# Patient Record
Sex: FEMALE | Race: BLACK OR AFRICAN AMERICAN | NOT HISPANIC OR LATINO | Employment: OTHER | URBAN - METROPOLITAN AREA
[De-identification: names, ages, dates, MRNs, and addresses within clinical notes are randomized per-mention and may not be internally consistent; named-entity substitution may affect disease eponyms.]

---

## 2017-01-01 ENCOUNTER — PATIENT OUTREACH (OUTPATIENT)
Dept: ADMINISTRATIVE | Facility: CLINIC | Age: 82
End: 2017-01-01
Payer: MEDICARE

## 2017-01-01 ENCOUNTER — HOSPITAL ENCOUNTER (INPATIENT)
Facility: HOSPITAL | Age: 82
LOS: 13 days | DRG: 870 | End: 2017-02-17
Attending: EMERGENCY MEDICINE | Admitting: HOSPITALIST
Payer: MEDICARE

## 2017-01-01 ENCOUNTER — HOSPITAL ENCOUNTER (INPATIENT)
Facility: HOSPITAL | Age: 82
LOS: 6 days | Discharge: NURSING, REHAB OR LONG-TERM CARE | DRG: 291 | End: 2017-01-24
Attending: EMERGENCY MEDICINE | Admitting: HOSPITALIST
Payer: MEDICARE

## 2017-01-01 ENCOUNTER — TELEPHONE (OUTPATIENT)
Dept: INTERNAL MEDICINE | Facility: CLINIC | Age: 82
End: 2017-01-01

## 2017-01-01 VITALS
SYSTOLIC BLOOD PRESSURE: 82 MMHG | WEIGHT: 190.69 LBS | TEMPERATURE: 96 F | BODY MASS INDEX: 37.44 KG/M2 | DIASTOLIC BLOOD PRESSURE: 56 MMHG | HEIGHT: 60 IN

## 2017-01-01 VITALS
DIASTOLIC BLOOD PRESSURE: 62 MMHG | OXYGEN SATURATION: 97 % | HEART RATE: 95 BPM | HEIGHT: 60 IN | TEMPERATURE: 98 F | BODY MASS INDEX: 28.07 KG/M2 | RESPIRATION RATE: 20 BRPM | WEIGHT: 143 LBS | SYSTOLIC BLOOD PRESSURE: 100 MMHG

## 2017-01-01 DIAGNOSIS — M10.9 GOUT OF FOOT, UNSPECIFIED CAUSE, UNSPECIFIED CHRONICITY, UNSPECIFIED LATERALITY: ICD-10-CM

## 2017-01-01 DIAGNOSIS — I50.40 SEVERE COMBINED SYSTOLIC AND DIASTOLIC CONGESTIVE HEART FAILURE: Chronic | ICD-10-CM

## 2017-01-01 DIAGNOSIS — N18.9 CHRONIC KIDNEY DISEASE, UNSPECIFIED STAGE: ICD-10-CM

## 2017-01-01 DIAGNOSIS — I48.0 PAROXYSMAL ATRIAL FIBRILLATION WITH RAPID VENTRICULAR RESPONSE: ICD-10-CM

## 2017-01-01 DIAGNOSIS — I50.9 CONGESTIVE HEART FAILURE, UNSPECIFIED CONGESTIVE HEART FAILURE CHRONICITY, UNSPECIFIED CONGESTIVE HEART FAILURE TYPE: ICD-10-CM

## 2017-01-01 DIAGNOSIS — I50.30 DIASTOLIC CONGESTIVE HEART FAILURE: ICD-10-CM

## 2017-01-01 DIAGNOSIS — I48.20 CHRONIC ATRIAL FIBRILLATION: ICD-10-CM

## 2017-01-01 DIAGNOSIS — I48.0 PAROXYSMAL ATRIAL FIBRILLATION: Chronic | ICD-10-CM

## 2017-01-01 DIAGNOSIS — E87.20 LACTIC ACIDOSIS: ICD-10-CM

## 2017-01-01 DIAGNOSIS — R93.0 ABNORMAL CT OF THE HEAD: ICD-10-CM

## 2017-01-01 DIAGNOSIS — N17.9 ACUTE ON CHRONIC RENAL FAILURE: Primary | ICD-10-CM

## 2017-01-01 DIAGNOSIS — I10 ESSENTIAL HYPERTENSION: ICD-10-CM

## 2017-01-01 DIAGNOSIS — I87.2 CHRONIC VENOUS STASIS DERMATITIS OF BOTH LOWER EXTREMITIES: Chronic | ICD-10-CM

## 2017-01-01 DIAGNOSIS — E16.2 HYPOGLYCEMIA: ICD-10-CM

## 2017-01-01 DIAGNOSIS — T68.XXXA HYPOTHERMIA, INITIAL ENCOUNTER: ICD-10-CM

## 2017-01-01 DIAGNOSIS — R07.9 CHEST PAIN, UNSPECIFIED TYPE: Primary | ICD-10-CM

## 2017-01-01 DIAGNOSIS — N18.9 ACUTE ON CHRONIC RENAL FAILURE: Primary | ICD-10-CM

## 2017-01-01 DIAGNOSIS — R60.0 EDEMA OF LOWER EXTREMITY, UNSPECIFIED LATERALITY: ICD-10-CM

## 2017-01-01 DIAGNOSIS — E87.5 HYPERKALEMIA: ICD-10-CM

## 2017-01-01 DIAGNOSIS — R79.1 SUPRATHERAPEUTIC INR: ICD-10-CM

## 2017-01-01 DIAGNOSIS — I42.0 NONISCHEMIC DILATED CARDIOMYOPATHY: Chronic | ICD-10-CM

## 2017-01-01 DIAGNOSIS — I48.91 ATRIAL FIBRILLATION WITH RVR: ICD-10-CM

## 2017-01-01 LAB
ALBUMIN SERPL BCP-MCNC: 1.7 G/DL
ALBUMIN SERPL BCP-MCNC: 1.8 G/DL
ALBUMIN SERPL BCP-MCNC: 1.8 G/DL
ALBUMIN SERPL BCP-MCNC: 1.9 G/DL
ALBUMIN SERPL BCP-MCNC: 1.9 G/DL
ALBUMIN SERPL BCP-MCNC: 2.2 G/DL
ALBUMIN SERPL BCP-MCNC: 2.6 G/DL
ALBUMIN SERPL BCP-MCNC: 2.7 G/DL
ALBUMIN SERPL BCP-MCNC: 2.9 G/DL
ALLENS TEST: ABNORMAL
ALP SERPL-CCNC: 107 U/L
ALP SERPL-CCNC: 108 U/L
ALP SERPL-CCNC: 114 U/L
ALP SERPL-CCNC: 117 U/L
ALP SERPL-CCNC: 121 U/L
ALP SERPL-CCNC: 122 U/L
ALP SERPL-CCNC: 134 U/L
ALP SERPL-CCNC: 89 U/L
ALP SERPL-CCNC: 90 U/L
ALP SERPL-CCNC: 97 U/L
ALP SERPL-CCNC: 99 U/L
ALT SERPL W/O P-5'-P-CCNC: 10 U/L
ALT SERPL W/O P-5'-P-CCNC: 10 U/L
ALT SERPL W/O P-5'-P-CCNC: 12 U/L
ALT SERPL W/O P-5'-P-CCNC: 13 U/L
ALT SERPL W/O P-5'-P-CCNC: 18 U/L
ALT SERPL W/O P-5'-P-CCNC: 18 U/L
ALT SERPL W/O P-5'-P-CCNC: 20 U/L
ALT SERPL W/O P-5'-P-CCNC: 22 U/L
ALT SERPL W/O P-5'-P-CCNC: 23 U/L
ALT SERPL W/O P-5'-P-CCNC: 30 U/L
ALT SERPL W/O P-5'-P-CCNC: 31 U/L
AMMONIA PLAS-SCNC: 41 UMOL/L
AMMONIA PLAS-SCNC: 59 UMOL/L
AMORPH CRY URNS QL MICRO: ABNORMAL
ANION GAP SERPL CALC-SCNC: 10 MMOL/L
ANION GAP SERPL CALC-SCNC: 11 MMOL/L
ANION GAP SERPL CALC-SCNC: 12 MMOL/L
ANION GAP SERPL CALC-SCNC: 13 MMOL/L
ANION GAP SERPL CALC-SCNC: 14 MMOL/L
ANION GAP SERPL CALC-SCNC: 15 MMOL/L
ANION GAP SERPL CALC-SCNC: 16 MMOL/L
ANION GAP SERPL CALC-SCNC: 17 MMOL/L
ANION GAP SERPL CALC-SCNC: 17 MMOL/L
ANION GAP SERPL CALC-SCNC: 19 MMOL/L
ANION GAP SERPL CALC-SCNC: 22 MMOL/L
ANION GAP SERPL CALC-SCNC: 26 MMOL/L
ANION GAP SERPL CALC-SCNC: 27 MMOL/L
ANION GAP SERPL CALC-SCNC: 8 MMOL/L
ANISOCYTOSIS BLD QL SMEAR: ABNORMAL
ANISOCYTOSIS BLD QL SMEAR: ABNORMAL
ANISOCYTOSIS BLD QL SMEAR: SLIGHT
AST SERPL-CCNC: 15 U/L
AST SERPL-CCNC: 16 U/L
AST SERPL-CCNC: 17 U/L
AST SERPL-CCNC: 18 U/L
AST SERPL-CCNC: 18 U/L
AST SERPL-CCNC: 19 U/L
AST SERPL-CCNC: 21 U/L
AST SERPL-CCNC: 27 U/L
AST SERPL-CCNC: 34 U/L
AST SERPL-CCNC: 45 U/L
AST SERPL-CCNC: 70 U/L
BACTERIA #/AREA URNS HPF: ABNORMAL /HPF
BACTERIA #/AREA URNS HPF: ABNORMAL /HPF
BACTERIA BLD CULT: NORMAL
BACTERIA BLD CULT: NORMAL
BACTERIA SPEC AEROBE CULT: NORMAL
BACTERIA SPEC AEROBE CULT: NORMAL
BACTERIA STL CULT: NORMAL
BACTERIA UR CULT: NO GROWTH
BACTERIA UR CULT: NORMAL
BASOPHILS # BLD AUTO: 0.01 K/UL
BASOPHILS # BLD AUTO: 0.02 K/UL
BASOPHILS # BLD AUTO: 0.03 K/UL
BASOPHILS # BLD AUTO: ABNORMAL K/UL
BASOPHILS NFR BLD: 0 %
BASOPHILS NFR BLD: 0.1 %
BASOPHILS NFR BLD: 0.2 %
BASOPHILS NFR BLD: 0.3 %
BASOPHILS NFR BLD: 0.4 %
BILIRUB DIRECT SERPL-MCNC: 0.8 MG/DL
BILIRUB DIRECT SERPL-MCNC: 0.8 MG/DL
BILIRUB DIRECT SERPL-MCNC: 1 MG/DL
BILIRUB SERPL-MCNC: 1.1 MG/DL
BILIRUB SERPL-MCNC: 1.3 MG/DL
BILIRUB SERPL-MCNC: 1.3 MG/DL
BILIRUB SERPL-MCNC: 1.4 MG/DL
BILIRUB SERPL-MCNC: 1.5 MG/DL
BILIRUB SERPL-MCNC: 1.6 MG/DL
BILIRUB SERPL-MCNC: 1.8 MG/DL
BILIRUB SERPL-MCNC: 1.9 MG/DL
BILIRUB SERPL-MCNC: 2.6 MG/DL
BILIRUB UR QL STRIP: NEGATIVE
BILIRUB UR QL STRIP: NEGATIVE
BNP SERPL-MCNC: 1641 PG/ML
BNP SERPL-MCNC: 3500 PG/ML
BUN SERPL-MCNC: 40 MG/DL
BUN SERPL-MCNC: 50 MG/DL
BUN SERPL-MCNC: 51 MG/DL
BUN SERPL-MCNC: 51 MG/DL
BUN SERPL-MCNC: 53 MG/DL
BUN SERPL-MCNC: 60 MG/DL
BUN SERPL-MCNC: 61 MG/DL
BUN SERPL-MCNC: 62 MG/DL
BUN SERPL-MCNC: 63 MG/DL
BUN SERPL-MCNC: 64 MG/DL
BUN SERPL-MCNC: 65 MG/DL
BUN SERPL-MCNC: 69 MG/DL
BUN SERPL-MCNC: 70 MG/DL
BUN SERPL-MCNC: 71 MG/DL
BUN SERPL-MCNC: 73 MG/DL
BUN SERPL-MCNC: 76 MG/DL
BUN SERPL-MCNC: 77 MG/DL
BUN SERPL-MCNC: 78 MG/DL
C DIFF GDH STL QL: NEGATIVE
C DIFF TOX A+B STL QL IA: NEGATIVE
CALCIUM SERPL-MCNC: 7.8 MG/DL
CALCIUM SERPL-MCNC: 7.9 MG/DL
CALCIUM SERPL-MCNC: 8 MG/DL
CALCIUM SERPL-MCNC: 8 MG/DL
CALCIUM SERPL-MCNC: 8.1 MG/DL
CALCIUM SERPL-MCNC: 8.2 MG/DL
CALCIUM SERPL-MCNC: 8.2 MG/DL
CALCIUM SERPL-MCNC: 8.4 MG/DL
CALCIUM SERPL-MCNC: 8.8 MG/DL
CALCIUM SERPL-MCNC: 8.9 MG/DL
CALCIUM SERPL-MCNC: 9 MG/DL
CALCIUM SERPL-MCNC: 9.1 MG/DL
CALCIUM SERPL-MCNC: 9.2 MG/DL
CALCIUM SERPL-MCNC: 9.2 MG/DL
CALCIUM SERPL-MCNC: 9.4 MG/DL
CALCIUM SERPL-MCNC: 9.6 MG/DL
CHLORIDE SERPL-SCNC: 101 MMOL/L
CHLORIDE SERPL-SCNC: 101 MMOL/L
CHLORIDE SERPL-SCNC: 90 MMOL/L
CHLORIDE SERPL-SCNC: 91 MMOL/L
CHLORIDE SERPL-SCNC: 92 MMOL/L
CHLORIDE SERPL-SCNC: 93 MMOL/L
CHLORIDE SERPL-SCNC: 94 MMOL/L
CHLORIDE SERPL-SCNC: 95 MMOL/L
CHLORIDE SERPL-SCNC: 96 MMOL/L
CHLORIDE SERPL-SCNC: 96 MMOL/L
CHLORIDE SERPL-SCNC: 97 MMOL/L
CHLORIDE SERPL-SCNC: 98 MMOL/L
CHLORIDE SERPL-SCNC: 98 MMOL/L
CHLORIDE SERPL-SCNC: 99 MMOL/L
CHLORIDE UR-SCNC: 29 MMOL/L
CLARITY UR: ABNORMAL
CLARITY UR: CLEAR
CO2 SERPL-SCNC: 13 MMOL/L
CO2 SERPL-SCNC: 19 MMOL/L
CO2 SERPL-SCNC: 20 MMOL/L
CO2 SERPL-SCNC: 20 MMOL/L
CO2 SERPL-SCNC: 21 MMOL/L
CO2 SERPL-SCNC: 22 MMOL/L
CO2 SERPL-SCNC: 23 MMOL/L
CO2 SERPL-SCNC: 24 MMOL/L
CO2 SERPL-SCNC: 25 MMOL/L
CO2 SERPL-SCNC: 25 MMOL/L
CO2 SERPL-SCNC: 26 MMOL/L
CO2 SERPL-SCNC: 28 MMOL/L
CO2 SERPL-SCNC: 30 MMOL/L
CO2 SERPL-SCNC: 30 MMOL/L
CO2 SERPL-SCNC: 32 MMOL/L
CO2 SERPL-SCNC: 35 MMOL/L
CO2 SERPL-SCNC: 36 MMOL/L
CO2 SERPL-SCNC: 8 MMOL/L
CO2 SERPL-SCNC: 8 MMOL/L
COLOR UR: ABNORMAL
COLOR UR: YELLOW
CREAT SERPL-MCNC: 1.7 MG/DL
CREAT SERPL-MCNC: 2 MG/DL
CREAT SERPL-MCNC: 2 MG/DL
CREAT SERPL-MCNC: 2.2 MG/DL
CREAT SERPL-MCNC: 2.5 MG/DL
CREAT SERPL-MCNC: 2.6 MG/DL
CREAT SERPL-MCNC: 2.7 MG/DL
CREAT SERPL-MCNC: 2.8 MG/DL
CREAT SERPL-MCNC: 2.9 MG/DL
CREAT SERPL-MCNC: 2.9 MG/DL
CREAT SERPL-MCNC: 3.1 MG/DL
CREAT SERPL-MCNC: 3.3 MG/DL
CREAT SERPL-MCNC: 3.4 MG/DL
CREAT SERPL-MCNC: 3.4 MG/DL
CREAT SERPL-MCNC: 3.5 MG/DL
CREAT UR-MCNC: 50.1 MG/DL
CREAT UR-MCNC: 50.1 MG/DL
DELSYS: ABNORMAL
DIASTOLIC DYSFUNCTION: YES
DIFFERENTIAL METHOD: ABNORMAL
DIGOXIN SERPL-MCNC: 1 NG/ML
E COLI SXT1 STL QL IA: NEGATIVE
E COLI SXT2 STL QL IA: NEGATIVE
EOSINOPHIL # BLD AUTO: 0 K/UL
EOSINOPHIL # BLD AUTO: ABNORMAL K/UL
EOSINOPHIL NFR BLD: 0 %
EOSINOPHIL URNS QL WRIGHT STN: NORMAL
ERYTHROCYTE [DISTWIDTH] IN BLOOD BY AUTOMATED COUNT: 18.2 %
ERYTHROCYTE [DISTWIDTH] IN BLOOD BY AUTOMATED COUNT: 18.4 %
ERYTHROCYTE [DISTWIDTH] IN BLOOD BY AUTOMATED COUNT: 18.5 %
ERYTHROCYTE [DISTWIDTH] IN BLOOD BY AUTOMATED COUNT: 18.5 %
ERYTHROCYTE [DISTWIDTH] IN BLOOD BY AUTOMATED COUNT: 18.8 %
ERYTHROCYTE [DISTWIDTH] IN BLOOD BY AUTOMATED COUNT: 18.8 %
ERYTHROCYTE [DISTWIDTH] IN BLOOD BY AUTOMATED COUNT: 19 %
ERYTHROCYTE [DISTWIDTH] IN BLOOD BY AUTOMATED COUNT: 19.1 %
ERYTHROCYTE [DISTWIDTH] IN BLOOD BY AUTOMATED COUNT: 19.2 %
ERYTHROCYTE [DISTWIDTH] IN BLOOD BY AUTOMATED COUNT: 19.3 %
ERYTHROCYTE [DISTWIDTH] IN BLOOD BY AUTOMATED COUNT: 19.4 %
ERYTHROCYTE [DISTWIDTH] IN BLOOD BY AUTOMATED COUNT: 19.5 %
ERYTHROCYTE [DISTWIDTH] IN BLOOD BY AUTOMATED COUNT: 19.6 %
ERYTHROCYTE [DISTWIDTH] IN BLOOD BY AUTOMATED COUNT: 20 %
ERYTHROCYTE [DISTWIDTH] IN BLOOD BY AUTOMATED COUNT: 20 %
ERYTHROCYTE [DISTWIDTH] IN BLOOD BY AUTOMATED COUNT: 20.1 %
ERYTHROCYTE [DISTWIDTH] IN BLOOD BY AUTOMATED COUNT: 20.2 %
ERYTHROCYTE [DISTWIDTH] IN BLOOD BY AUTOMATED COUNT: 20.4 %
ERYTHROCYTE [DISTWIDTH] IN BLOOD BY AUTOMATED COUNT: 20.5 %
ERYTHROCYTE [DISTWIDTH] IN BLOOD BY AUTOMATED COUNT: 20.8 %
ERYTHROCYTE [SEDIMENTATION RATE] IN BLOOD BY WESTERGREN METHOD: 10 MM/H
ERYTHROCYTE [SEDIMENTATION RATE] IN BLOOD BY WESTERGREN METHOD: 10 MM/H
ERYTHROCYTE [SEDIMENTATION RATE] IN BLOOD BY WESTERGREN METHOD: 15 MM/H
ERYTHROCYTE [SEDIMENTATION RATE] IN BLOOD BY WESTERGREN METHOD: 15 MM/H
EST. GFR  (AFRICAN AMERICAN): 13 ML/MIN/1.73 M^2
EST. GFR  (AFRICAN AMERICAN): 14 ML/MIN/1.73 M^2
EST. GFR  (AFRICAN AMERICAN): 15 ML/MIN/1.73 M^2
EST. GFR  (AFRICAN AMERICAN): 17 ML/MIN/1.73 M^2
EST. GFR  (AFRICAN AMERICAN): 18 ML/MIN/1.73 M^2
EST. GFR  (AFRICAN AMERICAN): 19 ML/MIN/1.73 M^2
EST. GFR  (AFRICAN AMERICAN): 20 ML/MIN/1.73 M^2
EST. GFR  (AFRICAN AMERICAN): 23 ML/MIN/1.73 M^2
EST. GFR  (AFRICAN AMERICAN): 26 ML/MIN/1.73 M^2
EST. GFR  (AFRICAN AMERICAN): 26 ML/MIN/1.73 M^2
EST. GFR  (AFRICAN AMERICAN): 32 ML/MIN/1.73 M^2
EST. GFR  (NON AFRICAN AMERICAN): 12 ML/MIN/1.73 M^2
EST. GFR  (NON AFRICAN AMERICAN): 13 ML/MIN/1.73 M^2
EST. GFR  (NON AFRICAN AMERICAN): 15 ML/MIN/1.73 M^2
EST. GFR  (NON AFRICAN AMERICAN): 16 ML/MIN/1.73 M^2
EST. GFR  (NON AFRICAN AMERICAN): 17 ML/MIN/1.73 M^2
EST. GFR  (NON AFRICAN AMERICAN): 20 ML/MIN/1.73 M^2
EST. GFR  (NON AFRICAN AMERICAN): 23 ML/MIN/1.73 M^2
EST. GFR  (NON AFRICAN AMERICAN): 23 ML/MIN/1.73 M^2
EST. GFR  (NON AFRICAN AMERICAN): 28 ML/MIN/1.73 M^2
ESTIMATED PA SYSTOLIC PRESSURE: 43.09
FIO2: 100
FIO2: 45
FIO2: 45
FIO2: 60
FIO2: 60
FLOW: 15
GLOBAL PERICARDIAL EFFUSION: ABNORMAL
GLUCOSE SERPL-MCNC: 100 MG/DL
GLUCOSE SERPL-MCNC: 105 MG/DL
GLUCOSE SERPL-MCNC: 107 MG/DL
GLUCOSE SERPL-MCNC: 108 MG/DL
GLUCOSE SERPL-MCNC: 111 MG/DL
GLUCOSE SERPL-MCNC: 114 MG/DL
GLUCOSE SERPL-MCNC: 114 MG/DL
GLUCOSE SERPL-MCNC: 122 MG/DL
GLUCOSE SERPL-MCNC: 123 MG/DL
GLUCOSE SERPL-MCNC: 124 MG/DL
GLUCOSE SERPL-MCNC: 128 MG/DL
GLUCOSE SERPL-MCNC: 138 MG/DL
GLUCOSE SERPL-MCNC: 140 MG/DL
GLUCOSE SERPL-MCNC: 149 MG/DL
GLUCOSE SERPL-MCNC: 152 MG/DL
GLUCOSE SERPL-MCNC: 152 MG/DL
GLUCOSE SERPL-MCNC: 156 MG/DL
GLUCOSE SERPL-MCNC: 160 MG/DL
GLUCOSE SERPL-MCNC: 161 MG/DL
GLUCOSE SERPL-MCNC: 177 MG/DL
GLUCOSE SERPL-MCNC: 179 MG/DL
GLUCOSE SERPL-MCNC: 217 MG/DL
GLUCOSE SERPL-MCNC: 224 MG/DL
GLUCOSE SERPL-MCNC: 84 MG/DL
GLUCOSE SERPL-MCNC: 92 MG/DL
GLUCOSE SERPL-MCNC: 99 MG/DL
GLUCOSE UR QL STRIP: NEGATIVE
GLUCOSE UR QL STRIP: NEGATIVE
GRAM STN SPEC: NORMAL
GRAN CASTS #/AREA URNS LPF: 2 /LPF
HCO3 UR-SCNC: 14.7 MMOL/L (ref 24–28)
HCO3 UR-SCNC: 15.6 MMOL/L (ref 24–28)
HCO3 UR-SCNC: 22.6 MMOL/L (ref 24–28)
HCO3 UR-SCNC: 25.7 MMOL/L (ref 24–28)
HCO3 UR-SCNC: 26.1 MMOL/L (ref 24–28)
HCT VFR BLD AUTO: 29 %
HCT VFR BLD AUTO: 29.9 %
HCT VFR BLD AUTO: 30.2 %
HCT VFR BLD AUTO: 33.4 %
HCT VFR BLD AUTO: 34.6 %
HCT VFR BLD AUTO: 34.9 %
HCT VFR BLD AUTO: 34.9 %
HCT VFR BLD AUTO: 35.6 %
HCT VFR BLD AUTO: 35.7 %
HCT VFR BLD AUTO: 35.8 %
HCT VFR BLD AUTO: 35.9 %
HCT VFR BLD AUTO: 36.1 %
HCT VFR BLD AUTO: 36.1 %
HCT VFR BLD AUTO: 36.8 %
HCT VFR BLD AUTO: 37.2 %
HCT VFR BLD AUTO: 37.3 %
HCT VFR BLD AUTO: 37.5 %
HCT VFR BLD AUTO: 37.7 %
HCT VFR BLD AUTO: 38.6 %
HCT VFR BLD AUTO: 39.1 %
HEMOGLOBIN A1C REFERRAL TEST: 6.6 % (ref 4–5.6)
HGB BLD-MCNC: 10.3 G/DL
HGB BLD-MCNC: 10.4 G/DL
HGB BLD-MCNC: 10.4 G/DL
HGB BLD-MCNC: 10.5 G/DL
HGB BLD-MCNC: 10.6 G/DL
HGB BLD-MCNC: 10.7 G/DL
HGB BLD-MCNC: 10.8 G/DL
HGB BLD-MCNC: 10.8 G/DL
HGB BLD-MCNC: 10.9 G/DL
HGB BLD-MCNC: 11 G/DL
HGB BLD-MCNC: 11 G/DL
HGB BLD-MCNC: 11.1 G/DL
HGB BLD-MCNC: 11.1 G/DL
HGB BLD-MCNC: 11.2 G/DL
HGB BLD-MCNC: 11.5 G/DL
HGB BLD-MCNC: 9 G/DL
HGB BLD-MCNC: 9.6 G/DL
HGB BLD-MCNC: 9.7 G/DL
HGB UR QL STRIP: ABNORMAL
HGB UR QL STRIP: ABNORMAL
HYALINE CASTS #/AREA URNS LPF: 0 /LPF
HYALINE CASTS #/AREA URNS LPF: 10 /LPF
HYPOCHROMIA BLD QL SMEAR: ABNORMAL
INR PPP: 1.6
INR PPP: 1.7
INR PPP: 1.8
INR PPP: 1.9
INR PPP: 2
INR PPP: 2
INR PPP: 2.2
INR PPP: 2.3
INR PPP: 2.4
INR PPP: 2.4
INR PPP: 2.5
INR PPP: 2.5
INR PPP: 2.6
INR PPP: 2.9
INR PPP: 4.8
INR PPP: 5.2
INR PPP: 5.7
KETONES UR QL STRIP: NEGATIVE
KETONES UR QL STRIP: NEGATIVE
LACTATE SERPL-SCNC: 2.6 MMOL/L
LACTATE SERPL-SCNC: 5.2 MMOL/L
LACTATE SERPL-SCNC: >12 MMOL/L
LACTATE SERPL-SCNC: >12 MMOL/L
LEUKOCYTE ESTERASE UR QL STRIP: ABNORMAL
LEUKOCYTE ESTERASE UR QL STRIP: NEGATIVE
LIPASE SERPL-CCNC: 69 U/L
LYMPHOCYTES # BLD AUTO: 0.7 K/UL
LYMPHOCYTES # BLD AUTO: 0.8 K/UL
LYMPHOCYTES # BLD AUTO: 0.9 K/UL
LYMPHOCYTES # BLD AUTO: 1 K/UL
LYMPHOCYTES # BLD AUTO: 1.1 K/UL
LYMPHOCYTES # BLD AUTO: 1.1 K/UL
LYMPHOCYTES # BLD AUTO: 1.2 K/UL
LYMPHOCYTES # BLD AUTO: 1.5 K/UL
LYMPHOCYTES # BLD AUTO: 1.7 K/UL
LYMPHOCYTES # BLD AUTO: ABNORMAL K/UL
LYMPHOCYTES NFR BLD: 10.1 %
LYMPHOCYTES NFR BLD: 10.4 %
LYMPHOCYTES NFR BLD: 12 %
LYMPHOCYTES NFR BLD: 13 %
LYMPHOCYTES NFR BLD: 14 %
LYMPHOCYTES NFR BLD: 15.1 %
LYMPHOCYTES NFR BLD: 15.7 %
LYMPHOCYTES NFR BLD: 16.1 %
LYMPHOCYTES NFR BLD: 18.9 %
LYMPHOCYTES NFR BLD: 20.2 %
LYMPHOCYTES NFR BLD: 5 %
LYMPHOCYTES NFR BLD: 5 %
LYMPHOCYTES NFR BLD: 5.4 %
LYMPHOCYTES NFR BLD: 7.2 %
LYMPHOCYTES NFR BLD: 9.1 %
LYMPHOCYTES NFR BLD: 9.8 %
MAGNESIUM SERPL-MCNC: 1.6 MG/DL
MAGNESIUM SERPL-MCNC: 1.6 MG/DL
MAGNESIUM SERPL-MCNC: 1.7 MG/DL
MAGNESIUM SERPL-MCNC: 1.8 MG/DL
MAGNESIUM SERPL-MCNC: 1.9 MG/DL
MAGNESIUM SERPL-MCNC: 1.9 MG/DL
MAGNESIUM SERPL-MCNC: 2 MG/DL
MAGNESIUM SERPL-MCNC: 2 MG/DL
MAGNESIUM SERPL-MCNC: 2.2 MG/DL
MAGNESIUM SERPL-MCNC: 2.2 MG/DL
MAGNESIUM SERPL-MCNC: 2.3 MG/DL
MAGNESIUM SERPL-MCNC: 2.3 MG/DL
MAGNESIUM SERPL-MCNC: 2.5 MG/DL
MCH RBC QN AUTO: 23.6 PG
MCH RBC QN AUTO: 23.9 PG
MCH RBC QN AUTO: 24 PG
MCH RBC QN AUTO: 24.2 PG
MCH RBC QN AUTO: 24.2 PG
MCH RBC QN AUTO: 24.3 PG
MCH RBC QN AUTO: 24.5 PG
MCH RBC QN AUTO: 24.6 PG
MCH RBC QN AUTO: 24.6 PG
MCH RBC QN AUTO: 24.7 PG
MCH RBC QN AUTO: 24.8 PG
MCH RBC QN AUTO: 25.3 PG
MCHC RBC AUTO-ENTMCNC: 28.5 %
MCHC RBC AUTO-ENTMCNC: 28.6 %
MCHC RBC AUTO-ENTMCNC: 28.6 %
MCHC RBC AUTO-ENTMCNC: 29 %
MCHC RBC AUTO-ENTMCNC: 29 %
MCHC RBC AUTO-ENTMCNC: 29.1 %
MCHC RBC AUTO-ENTMCNC: 29.8 %
MCHC RBC AUTO-ENTMCNC: 29.8 %
MCHC RBC AUTO-ENTMCNC: 30.1 %
MCHC RBC AUTO-ENTMCNC: 30.2 %
MCHC RBC AUTO-ENTMCNC: 30.2 %
MCHC RBC AUTO-ENTMCNC: 30.5 %
MCHC RBC AUTO-ENTMCNC: 30.5 %
MCHC RBC AUTO-ENTMCNC: 30.6 %
MCHC RBC AUTO-ENTMCNC: 30.8 %
MCHC RBC AUTO-ENTMCNC: 30.9 %
MCHC RBC AUTO-ENTMCNC: 31 %
MCHC RBC AUTO-ENTMCNC: 31.2 %
MCHC RBC AUTO-ENTMCNC: 32.1 %
MCHC RBC AUTO-ENTMCNC: 32.1 %
MCV RBC AUTO: 75 FL
MCV RBC AUTO: 76 FL
MCV RBC AUTO: 77 FL
MCV RBC AUTO: 79 FL
MCV RBC AUTO: 80 FL
MCV RBC AUTO: 80 FL
MCV RBC AUTO: 81 FL
MCV RBC AUTO: 83 FL
MCV RBC AUTO: 84 FL
MCV RBC AUTO: 85 FL
MCV RBC AUTO: 86 FL
MICROSCOPIC COMMENT: ABNORMAL
MICROSCOPIC COMMENT: ABNORMAL
MIN VOL: 7.3
MIN VOL: 7.6
MITRAL VALVE MOBILITY: NORMAL
MITRAL VALVE REGURGITATION: ABNORMAL
MODE: ABNORMAL
MONOCYTES # BLD AUTO: 0.5 K/UL
MONOCYTES # BLD AUTO: 0.6 K/UL
MONOCYTES # BLD AUTO: 0.7 K/UL
MONOCYTES # BLD AUTO: 0.8 K/UL
MONOCYTES # BLD AUTO: 0.9 K/UL
MONOCYTES # BLD AUTO: 0.9 K/UL
MONOCYTES # BLD AUTO: 1 K/UL
MONOCYTES # BLD AUTO: 1 K/UL
MONOCYTES # BLD AUTO: 1.1 K/UL
MONOCYTES # BLD AUTO: 1.1 K/UL
MONOCYTES # BLD AUTO: 1.2 K/UL
MONOCYTES # BLD AUTO: 1.6 K/UL
MONOCYTES # BLD AUTO: ABNORMAL K/UL
MONOCYTES NFR BLD: 10.1 %
MONOCYTES NFR BLD: 10.8 %
MONOCYTES NFR BLD: 11.2 %
MONOCYTES NFR BLD: 11.4 %
MONOCYTES NFR BLD: 11.5 %
MONOCYTES NFR BLD: 11.6 %
MONOCYTES NFR BLD: 13.1 %
MONOCYTES NFR BLD: 2.5 %
MONOCYTES NFR BLD: 3.5 %
MONOCYTES NFR BLD: 3.6 %
MONOCYTES NFR BLD: 4 %
MONOCYTES NFR BLD: 5.1 %
MONOCYTES NFR BLD: 6.4 %
MONOCYTES NFR BLD: 6.5 %
MONOCYTES NFR BLD: 6.8 %
MONOCYTES NFR BLD: 7.1 %
MONOCYTES NFR BLD: 7.2 %
MONOCYTES NFR BLD: 9.5 %
MONOCYTES NFR BLD: 9.8 %
MONOCYTES NFR BLD: 9.9 %
NEUTROPHILS # BLD AUTO: 11.1 K/UL
NEUTROPHILS # BLD AUTO: 12.2 K/UL
NEUTROPHILS # BLD AUTO: 13.6 K/UL
NEUTROPHILS # BLD AUTO: 14.3 K/UL
NEUTROPHILS # BLD AUTO: 16.2 K/UL
NEUTROPHILS # BLD AUTO: 16.9 K/UL
NEUTROPHILS # BLD AUTO: 17.6 K/UL
NEUTROPHILS # BLD AUTO: 4.1 K/UL
NEUTROPHILS # BLD AUTO: 4.1 K/UL
NEUTROPHILS # BLD AUTO: 5 K/UL
NEUTROPHILS # BLD AUTO: 5.4 K/UL
NEUTROPHILS # BLD AUTO: 5.5 K/UL
NEUTROPHILS # BLD AUTO: 6.3 K/UL
NEUTROPHILS # BLD AUTO: 7.2 K/UL
NEUTROPHILS # BLD AUTO: 7.2 K/UL
NEUTROPHILS # BLD AUTO: 7.7 K/UL
NEUTROPHILS # BLD AUTO: 7.7 K/UL
NEUTROPHILS # BLD AUTO: 8 K/UL
NEUTROPHILS # BLD AUTO: 9.7 K/UL
NEUTROPHILS NFR BLD: 68.2 %
NEUTROPHILS NFR BLD: 70.6 %
NEUTROPHILS NFR BLD: 73.6 %
NEUTROPHILS NFR BLD: 74.5 %
NEUTROPHILS NFR BLD: 75.2 %
NEUTROPHILS NFR BLD: 75.6 %
NEUTROPHILS NFR BLD: 77.6 %
NEUTROPHILS NFR BLD: 78 %
NEUTROPHILS NFR BLD: 78 %
NEUTROPHILS NFR BLD: 79 %
NEUTROPHILS NFR BLD: 80.1 %
NEUTROPHILS NFR BLD: 82.4 %
NEUTROPHILS NFR BLD: 83 %
NEUTROPHILS NFR BLD: 83 %
NEUTROPHILS NFR BLD: 86 %
NEUTROPHILS NFR BLD: 86.3 %
NEUTROPHILS NFR BLD: 87.2 %
NEUTROPHILS NFR BLD: 91.4 %
NEUTROPHILS NFR BLD: 92 %
NEUTROPHILS NFR BLD: 92 %
NEUTS BAND NFR BLD MANUAL: 3 %
NITRITE UR QL STRIP: NEGATIVE
NITRITE UR QL STRIP: POSITIVE
NON-SQ EPI CELLS #/AREA URNS HPF: 3 /HPF
NRBC BLD-RTO: ABNORMAL /100 WBC
OB PNL STL: POSITIVE
OSMOLALITY SERPL: 326 MOSM/KG
OSMOLALITY UR: 354 MOSM/KG
OVALOCYTES BLD QL SMEAR: ABNORMAL
PCO2 BLDA: 32.1 MMHG (ref 35–45)
PCO2 BLDA: 33 MMHG (ref 35–45)
PCO2 BLDA: 34.6 MMHG (ref 35–45)
PCO2 BLDA: 36.4 MMHG (ref 35–45)
PCO2 BLDA: 40.7 MMHG (ref 35–45)
PEEP: 5
PH SMN: 7.17 [PH] (ref 7.35–7.45)
PH SMN: 7.29 [PH] (ref 7.35–7.45)
PH SMN: 7.44 [PH] (ref 7.35–7.45)
PH SMN: 7.46 [PH] (ref 7.35–7.45)
PH SMN: 7.48 [PH] (ref 7.35–7.45)
PH UR STRIP: 5 [PH] (ref 5–8)
PH UR STRIP: 5 [PH] (ref 5–8)
PHOSPHATE SERPL-MCNC: 2.8 MG/DL
PHOSPHATE SERPL-MCNC: 2.8 MG/DL
PHOSPHATE SERPL-MCNC: 3.5 MG/DL
PHOSPHATE SERPL-MCNC: 4.1 MG/DL
PHOSPHATE SERPL-MCNC: 4.2 MG/DL
PHOSPHATE SERPL-MCNC: 4.4 MG/DL
PHOSPHATE SERPL-MCNC: 4.6 MG/DL
PHOSPHATE SERPL-MCNC: 4.7 MG/DL
PHOSPHATE SERPL-MCNC: 4.7 MG/DL
PHOSPHATE SERPL-MCNC: 5.1 MG/DL
PHOSPHATE SERPL-MCNC: 5.3 MG/DL
PHOSPHATE SERPL-MCNC: 5.6 MG/DL
PHOSPHATE SERPL-MCNC: 5.6 MG/DL
PHOSPHATE SERPL-MCNC: 5.8 MG/DL
PHOSPHATE SERPL-MCNC: 6 MG/DL
PIP: 30
PIP: 31
PLATELET # BLD AUTO: 112 K/UL
PLATELET # BLD AUTO: 121 K/UL
PLATELET # BLD AUTO: 140 K/UL
PLATELET # BLD AUTO: 145 K/UL
PLATELET # BLD AUTO: 162 K/UL
PLATELET # BLD AUTO: 163 K/UL
PLATELET # BLD AUTO: 170 K/UL
PLATELET # BLD AUTO: 172 K/UL
PLATELET # BLD AUTO: 173 K/UL
PLATELET # BLD AUTO: 178 K/UL
PLATELET # BLD AUTO: 180 K/UL
PLATELET # BLD AUTO: 182 K/UL
PLATELET # BLD AUTO: 185 K/UL
PLATELET # BLD AUTO: 203 K/UL
PLATELET # BLD AUTO: 256 K/UL
PLATELET # BLD AUTO: 270 K/UL
PLATELET # BLD AUTO: 290 K/UL
PLATELET # BLD AUTO: 295 K/UL
PLATELET # BLD AUTO: 322 K/UL
PLATELET # BLD AUTO: 359 K/UL
PLATELET BLD QL SMEAR: ABNORMAL
PMV BLD AUTO: 10 FL
PMV BLD AUTO: 10.2 FL
PMV BLD AUTO: 10.3 FL
PMV BLD AUTO: 10.4 FL
PMV BLD AUTO: 10.4 FL
PMV BLD AUTO: 10.5 FL
PMV BLD AUTO: 10.6 FL
PMV BLD AUTO: 10.7 FL
PMV BLD AUTO: 10.9 FL
PMV BLD AUTO: 10.9 FL
PMV BLD AUTO: 11.1 FL
PMV BLD AUTO: 9.3 FL
PMV BLD AUTO: 9.3 FL
PMV BLD AUTO: 9.5 FL
PMV BLD AUTO: 9.5 FL
PMV BLD AUTO: 9.7 FL
PMV BLD AUTO: 9.7 FL
PO2 BLDA: 111 MMHG (ref 80–100)
PO2 BLDA: 121 MMHG (ref 80–100)
PO2 BLDA: 127 MMHG (ref 80–100)
PO2 BLDA: 89 MMHG (ref 80–100)
PO2 BLDA: 91 MMHG (ref 80–100)
POC BE: -1 MMOL/L
POC BE: -10 MMOL/L
POC BE: -13 MMOL/L
POC BE: 2 MMOL/L
POC BE: 2 MMOL/L
POC SATURATED O2: 96 % (ref 95–100)
POC SATURATED O2: 98 % (ref 95–100)
POC SATURATED O2: 98 % (ref 95–100)
POC SATURATED O2: 99 % (ref 95–100)
POC SATURATED O2: 99 % (ref 95–100)
POC TCO2: 16 MMOL/L (ref 23–27)
POC TCO2: 17 MMOL/L (ref 23–27)
POC TCO2: 24 MMOL/L (ref 23–27)
POC TCO2: 27 MMOL/L (ref 23–27)
POC TCO2: 27 MMOL/L (ref 23–27)
POCT GLUCOSE: 101 MG/DL (ref 70–110)
POCT GLUCOSE: 104 MG/DL (ref 70–110)
POCT GLUCOSE: 108 MG/DL (ref 70–110)
POCT GLUCOSE: 109 MG/DL (ref 70–110)
POCT GLUCOSE: 110 MG/DL (ref 70–110)
POCT GLUCOSE: 110 MG/DL (ref 70–110)
POCT GLUCOSE: 112 MG/DL (ref 70–110)
POCT GLUCOSE: 113 MG/DL (ref 70–110)
POCT GLUCOSE: 122 MG/DL (ref 70–110)
POCT GLUCOSE: 124 MG/DL (ref 70–110)
POCT GLUCOSE: 125 MG/DL (ref 70–110)
POCT GLUCOSE: 134 MG/DL (ref 70–110)
POCT GLUCOSE: 134 MG/DL (ref 70–110)
POCT GLUCOSE: 135 MG/DL (ref 70–110)
POCT GLUCOSE: 144 MG/DL (ref 70–110)
POCT GLUCOSE: 144 MG/DL (ref 70–110)
POCT GLUCOSE: 150 MG/DL (ref 70–110)
POCT GLUCOSE: 152 MG/DL (ref 70–110)
POCT GLUCOSE: 152 MG/DL (ref 70–110)
POCT GLUCOSE: 155 MG/DL (ref 70–110)
POCT GLUCOSE: 157 MG/DL (ref 70–110)
POCT GLUCOSE: 159 MG/DL (ref 70–110)
POCT GLUCOSE: 164 MG/DL (ref 70–110)
POCT GLUCOSE: 166 MG/DL (ref 70–110)
POCT GLUCOSE: 169 MG/DL (ref 70–110)
POCT GLUCOSE: 169 MG/DL (ref 70–110)
POCT GLUCOSE: 173 MG/DL (ref 70–110)
POCT GLUCOSE: 174 MG/DL (ref 70–110)
POCT GLUCOSE: 175 MG/DL (ref 70–110)
POCT GLUCOSE: 175 MG/DL (ref 70–110)
POCT GLUCOSE: 176 MG/DL (ref 70–110)
POCT GLUCOSE: 178 MG/DL (ref 70–110)
POCT GLUCOSE: 178 MG/DL (ref 70–110)
POCT GLUCOSE: 181 MG/DL (ref 70–110)
POCT GLUCOSE: 184 MG/DL (ref 70–110)
POCT GLUCOSE: 186 MG/DL (ref 70–110)
POCT GLUCOSE: 186 MG/DL (ref 70–110)
POCT GLUCOSE: 187 MG/DL (ref 70–110)
POCT GLUCOSE: 189 MG/DL (ref 70–110)
POCT GLUCOSE: 189 MG/DL (ref 70–110)
POCT GLUCOSE: 190 MG/DL (ref 70–110)
POCT GLUCOSE: 192 MG/DL (ref 70–110)
POCT GLUCOSE: 193 MG/DL (ref 70–110)
POCT GLUCOSE: 193 MG/DL (ref 70–110)
POCT GLUCOSE: 195 MG/DL (ref 70–110)
POCT GLUCOSE: 221 MG/DL (ref 70–110)
POCT GLUCOSE: 225 MG/DL (ref 70–110)
POCT GLUCOSE: 254 MG/DL (ref 70–110)
POCT GLUCOSE: 256 MG/DL (ref 70–110)
POCT GLUCOSE: 302 MG/DL (ref 70–110)
POCT GLUCOSE: 310 MG/DL (ref 70–110)
POCT GLUCOSE: 73 MG/DL (ref 70–110)
POCT GLUCOSE: 83 MG/DL (ref 70–110)
POCT GLUCOSE: 91 MG/DL (ref 70–110)
POCT GLUCOSE: 91 MG/DL (ref 70–110)
POCT GLUCOSE: 98 MG/DL (ref 70–110)
POCT GLUCOSE: <20 MG/DL (ref 70–110)
POIKILOCYTOSIS BLD QL SMEAR: SLIGHT
POLYCHROMASIA BLD QL SMEAR: ABNORMAL
POTASSIUM SERPL-SCNC: 2.3 MMOL/L
POTASSIUM SERPL-SCNC: 3 MMOL/L
POTASSIUM SERPL-SCNC: 3 MMOL/L
POTASSIUM SERPL-SCNC: 3.1 MMOL/L
POTASSIUM SERPL-SCNC: 3.3 MMOL/L
POTASSIUM SERPL-SCNC: 3.4 MMOL/L
POTASSIUM SERPL-SCNC: 3.4 MMOL/L
POTASSIUM SERPL-SCNC: 3.5 MMOL/L
POTASSIUM SERPL-SCNC: 3.5 MMOL/L
POTASSIUM SERPL-SCNC: 3.6 MMOL/L
POTASSIUM SERPL-SCNC: 3.7 MMOL/L
POTASSIUM SERPL-SCNC: 3.7 MMOL/L
POTASSIUM SERPL-SCNC: 3.8 MMOL/L
POTASSIUM SERPL-SCNC: 3.8 MMOL/L
POTASSIUM SERPL-SCNC: 4 MMOL/L
POTASSIUM SERPL-SCNC: 4 MMOL/L
POTASSIUM SERPL-SCNC: 4.1 MMOL/L
POTASSIUM SERPL-SCNC: 4.1 MMOL/L
POTASSIUM SERPL-SCNC: 4.2 MMOL/L
POTASSIUM SERPL-SCNC: 5.6 MMOL/L
POTASSIUM SERPL-SCNC: 5.7 MMOL/L
POTASSIUM SERPL-SCNC: 7.2 MMOL/L
POTASSIUM SERPL-SCNC: 7.5 MMOL/L
POTASSIUM UR-SCNC: 64 MMOL/L
PROCALCITONIN SERPL IA-MCNC: 2.74 NG/ML
PROT SERPL-MCNC: 5.3 G/DL
PROT SERPL-MCNC: 5.4 G/DL
PROT SERPL-MCNC: 5.4 G/DL
PROT SERPL-MCNC: 6.2 G/DL
PROT SERPL-MCNC: 6.3 G/DL
PROT SERPL-MCNC: 6.4 G/DL
PROT SERPL-MCNC: 6.7 G/DL
PROT UR QL STRIP: ABNORMAL
PROT UR QL STRIP: ABNORMAL
PROT UR-MCNC: 310 MG/DL
PROT/CREAT RATIO, UR: 6.19
PROTHROMBIN TIME: 17 SEC
PROTHROMBIN TIME: 17.4 SEC
PROTHROMBIN TIME: 18.8 SEC
PROTHROMBIN TIME: 19 SEC
PROTHROMBIN TIME: 19 SEC
PROTHROMBIN TIME: 19.3 SEC
PROTHROMBIN TIME: 19.6 SEC
PROTHROMBIN TIME: 19.9 SEC
PROTHROMBIN TIME: 19.9 SEC
PROTHROMBIN TIME: 20.3 SEC
PROTHROMBIN TIME: 20.8 SEC
PROTHROMBIN TIME: 22.8 SEC
PROTHROMBIN TIME: 23.4 SEC
PROTHROMBIN TIME: 24.1 SEC
PROTHROMBIN TIME: 24.8 SEC
PROTHROMBIN TIME: 25.7 SEC
PROTHROMBIN TIME: 26 SEC
PROTHROMBIN TIME: 27.1 SEC
PROTHROMBIN TIME: 29.4 SEC
PROTHROMBIN TIME: 47.5 SEC
PROTHROMBIN TIME: 51.5 SEC
PROTHROMBIN TIME: 56.7 SEC
RBC # BLD AUTO: 3.67 M/UL
RBC # BLD AUTO: 4 M/UL
RBC # BLD AUTO: 4.01 M/UL
RBC # BLD AUTO: 4.21 M/UL
RBC # BLD AUTO: 4.28 M/UL
RBC # BLD AUTO: 4.35 M/UL
RBC # BLD AUTO: 4.36 M/UL
RBC # BLD AUTO: 4.36 M/UL
RBC # BLD AUTO: 4.38 M/UL
RBC # BLD AUTO: 4.39 M/UL
RBC # BLD AUTO: 4.41 M/UL
RBC # BLD AUTO: 4.47 M/UL
RBC # BLD AUTO: 4.5 M/UL
RBC # BLD AUTO: 4.5 M/UL
RBC # BLD AUTO: 4.54 M/UL
RBC # BLD AUTO: 4.57 M/UL
RBC # BLD AUTO: 4.58 M/UL
RBC # BLD AUTO: 4.6 M/UL
RBC # BLD AUTO: 4.67 M/UL
RBC # BLD AUTO: 4.68 M/UL
RBC #/AREA URNS HPF: 3 /HPF (ref 0–4)
RBC #/AREA URNS HPF: 5 /HPF (ref 0–4)
RETIRED EF AND QEF - SEE NOTES: 30 (ref 55–65)
SAMPLE: ABNORMAL
SITE: ABNORMAL
SODIUM SERPL-SCNC: 131 MMOL/L
SODIUM SERPL-SCNC: 131 MMOL/L
SODIUM SERPL-SCNC: 133 MMOL/L
SODIUM SERPL-SCNC: 134 MMOL/L
SODIUM SERPL-SCNC: 135 MMOL/L
SODIUM SERPL-SCNC: 136 MMOL/L
SODIUM SERPL-SCNC: 138 MMOL/L
SODIUM SERPL-SCNC: 139 MMOL/L
SODIUM UR-SCNC: 41 MMOL/L
SODIUM UR-SCNC: 43 MMOL/L
SP GR UR STRIP: 1.01 (ref 1–1.03)
SP GR UR STRIP: 1.01 (ref 1–1.03)
SP02: 100
SP02: 100
SP02: 99
SQUAMOUS #/AREA URNS HPF: 2 /HPF
STOMATOCYTES BLD QL SMEAR: PRESENT
T4 FREE SERPL-MCNC: 0.93 NG/DL
TARGETS BLD QL SMEAR: ABNORMAL
TRICUSPID VALVE REGURGITATION: ABNORMAL
TRIGL SERPL-MCNC: 176 MG/DL
TRIGL SERPL-MCNC: 247 MG/DL
TRIGL SERPL-MCNC: 65 MG/DL
TROPONIN I SERPL DL<=0.01 NG/ML-MCNC: 0.02 NG/ML
TROPONIN I SERPL DL<=0.01 NG/ML-MCNC: 0.03 NG/ML
TROPONIN I SERPL DL<=0.01 NG/ML-MCNC: 0.04 NG/ML
TSH SERPL DL<=0.005 MIU/L-ACNC: 17.59 UIU/ML
URN SPEC COLLECT METH UR: ABNORMAL
URN SPEC COLLECT METH UR: ABNORMAL
UROBILINOGEN UR STRIP-ACNC: 1 EU/DL
UROBILINOGEN UR STRIP-ACNC: ABNORMAL EU/DL
UUN UR-MCNC: 259 MG/DL
VANCOMYCIN SERPL-MCNC: 16.9 UG/ML
VANCOMYCIN SERPL-MCNC: 18.8 UG/ML
VANCOMYCIN SERPL-MCNC: 9.6 UG/ML
VT: 500
WBC # BLD AUTO: 10.94 K/UL
WBC # BLD AUTO: 11.63 K/UL
WBC # BLD AUTO: 12.69 K/UL
WBC # BLD AUTO: 14.67 K/UL
WBC # BLD AUTO: 15.74 K/UL
WBC # BLD AUTO: 16.68 K/UL
WBC # BLD AUTO: 17.67 K/UL
WBC # BLD AUTO: 18.38 K/UL
WBC # BLD AUTO: 19.29 K/UL
WBC # BLD AUTO: 5.77 K/UL
WBC # BLD AUTO: 5.94 K/UL
WBC # BLD AUTO: 6.63 K/UL
WBC # BLD AUTO: 7.13 K/UL
WBC # BLD AUTO: 7.44 K/UL
WBC # BLD AUTO: 7.94 K/UL
WBC # BLD AUTO: 8.48 K/UL
WBC # BLD AUTO: 8.77 K/UL
WBC # BLD AUTO: 9.17 K/UL
WBC # BLD AUTO: 9.67 K/UL
WBC # BLD AUTO: 9.92 K/UL
WBC #/AREA URNS HPF: 3 /HPF (ref 0–5)
WBC #/AREA URNS HPF: 5 /HPF (ref 0–5)

## 2017-01-01 PROCEDURE — 96376 TX/PRO/DX INJ SAME DRUG ADON: CPT

## 2017-01-01 PROCEDURE — 84484 ASSAY OF TROPONIN QUANT: CPT | Mod: 91

## 2017-01-01 PROCEDURE — 63600175 PHARM REV CODE 636 W HCPCS: Performed by: INTERNAL MEDICINE

## 2017-01-01 PROCEDURE — 25000003 PHARM REV CODE 250: Performed by: HOSPITALIST

## 2017-01-01 PROCEDURE — 27000221 HC OXYGEN, UP TO 24 HOURS

## 2017-01-01 PROCEDURE — 87070 CULTURE OTHR SPECIMN AEROBIC: CPT

## 2017-01-01 PROCEDURE — C9113 INJ PANTOPRAZOLE SODIUM, VIA: HCPCS | Performed by: INTERNAL MEDICINE

## 2017-01-01 PROCEDURE — 80202 ASSAY OF VANCOMYCIN: CPT

## 2017-01-01 PROCEDURE — 94761 N-INVAS EAR/PLS OXIMETRY MLT: CPT

## 2017-01-01 PROCEDURE — 25000003 PHARM REV CODE 250: Performed by: INTERNAL MEDICINE

## 2017-01-01 PROCEDURE — 80053 COMPREHEN METABOLIC PANEL: CPT

## 2017-01-01 PROCEDURE — 37799 UNLISTED PX VASCULAR SURGERY: CPT

## 2017-01-01 PROCEDURE — 25000242 PHARM REV CODE 250 ALT 637 W/ HCPCS: Performed by: INTERNAL MEDICINE

## 2017-01-01 PROCEDURE — 83735 ASSAY OF MAGNESIUM: CPT

## 2017-01-01 PROCEDURE — 80048 BASIC METABOLIC PNL TOTAL CA: CPT

## 2017-01-01 PROCEDURE — 97535 SELF CARE MNGMENT TRAINING: CPT

## 2017-01-01 PROCEDURE — 94640 AIRWAY INHALATION TREATMENT: CPT

## 2017-01-01 PROCEDURE — 97110 THERAPEUTIC EXERCISES: CPT

## 2017-01-01 PROCEDURE — 84300 ASSAY OF URINE SODIUM: CPT

## 2017-01-01 PROCEDURE — G8982 BODY POS GOAL STATUS: HCPCS | Mod: CJ

## 2017-01-01 PROCEDURE — 82962 GLUCOSE BLOOD TEST: CPT

## 2017-01-01 PROCEDURE — 85610 PROTHROMBIN TIME: CPT

## 2017-01-01 PROCEDURE — 99232 SBSQ HOSP IP/OBS MODERATE 35: CPT | Mod: ,,, | Performed by: INTERNAL MEDICINE

## 2017-01-01 PROCEDURE — 84478 ASSAY OF TRIGLYCERIDES: CPT

## 2017-01-01 PROCEDURE — 85007 BL SMEAR W/DIFF WBC COUNT: CPT

## 2017-01-01 PROCEDURE — 84484 ASSAY OF TROPONIN QUANT: CPT

## 2017-01-01 PROCEDURE — 87427 SHIGA-LIKE TOXIN AG IA: CPT

## 2017-01-01 PROCEDURE — 80162 ASSAY OF DIGOXIN TOTAL: CPT

## 2017-01-01 PROCEDURE — 97530 THERAPEUTIC ACTIVITIES: CPT

## 2017-01-01 PROCEDURE — 25000003 PHARM REV CODE 250: Performed by: EMERGENCY MEDICINE

## 2017-01-01 PROCEDURE — 36415 COLL VENOUS BLD VENIPUNCTURE: CPT

## 2017-01-01 PROCEDURE — 93306 TTE W/DOPPLER COMPLETE: CPT

## 2017-01-01 PROCEDURE — 31720 CLEARANCE OF AIRWAYS: CPT

## 2017-01-01 PROCEDURE — G8997 SWALLOW GOAL STATUS: HCPCS | Mod: CI

## 2017-01-01 PROCEDURE — 87205 SMEAR GRAM STAIN: CPT

## 2017-01-01 PROCEDURE — 11000001 HC ACUTE MED/SURG PRIVATE ROOM

## 2017-01-01 PROCEDURE — 94003 VENT MGMT INPAT SUBQ DAY: CPT

## 2017-01-01 PROCEDURE — 63600175 PHARM REV CODE 636 W HCPCS: Performed by: EMERGENCY MEDICINE

## 2017-01-01 PROCEDURE — G8987 SELF CARE CURRENT STATUS: HCPCS | Mod: CL | Performed by: SPECIALIST

## 2017-01-01 PROCEDURE — 84100 ASSAY OF PHOSPHORUS: CPT

## 2017-01-01 PROCEDURE — 85025 COMPLETE CBC W/AUTO DIFF WBC: CPT

## 2017-01-01 PROCEDURE — 0BH17EZ INSERTION OF ENDOTRACHEAL AIRWAY INTO TRACHEA, VIA NATURAL OR ARTIFICIAL OPENING: ICD-10-PCS | Performed by: INTERNAL MEDICINE

## 2017-01-01 PROCEDURE — 97163 PT EVAL HIGH COMPLEX 45 MIN: CPT

## 2017-01-01 PROCEDURE — 87088 URINE BACTERIA CULTURE: CPT

## 2017-01-01 PROCEDURE — 87040 BLOOD CULTURE FOR BACTERIA: CPT | Mod: 59

## 2017-01-01 PROCEDURE — 87077 CULTURE AEROBIC IDENTIFY: CPT

## 2017-01-01 PROCEDURE — 99285 EMERGENCY DEPT VISIT HI MDM: CPT

## 2017-01-01 PROCEDURE — 83036 HEMOGLOBIN GLYCOSYLATED A1C: CPT

## 2017-01-01 PROCEDURE — G8979 MOBILITY GOAL STATUS: HCPCS | Mod: CL

## 2017-01-01 PROCEDURE — 87086 URINE CULTURE/COLONY COUNT: CPT

## 2017-01-01 PROCEDURE — 94002 VENT MGMT INPAT INIT DAY: CPT

## 2017-01-01 PROCEDURE — 99900026 HC AIRWAY MAINTENANCE (STAT)

## 2017-01-01 PROCEDURE — 87449 NOS EACH ORGANISM AG IA: CPT

## 2017-01-01 PROCEDURE — 93010 ELECTROCARDIOGRAM REPORT: CPT | Mod: ,,, | Performed by: INTERNAL MEDICINE

## 2017-01-01 PROCEDURE — 99233 SBSQ HOSP IP/OBS HIGH 50: CPT | Mod: ,,, | Performed by: INTERNAL MEDICINE

## 2017-01-01 PROCEDURE — 99900035 HC TECH TIME PER 15 MIN (STAT)

## 2017-01-01 PROCEDURE — 25000003 PHARM REV CODE 250: Performed by: NURSE PRACTITIONER

## 2017-01-01 PROCEDURE — 63600175 PHARM REV CODE 636 W HCPCS: Performed by: HOSPITALIST

## 2017-01-01 PROCEDURE — 93005 ELECTROCARDIOGRAM TRACING: CPT

## 2017-01-01 PROCEDURE — 97161 PT EVAL LOW COMPLEX 20 MIN: CPT

## 2017-01-01 PROCEDURE — 84156 ASSAY OF PROTEIN URINE: CPT

## 2017-01-01 PROCEDURE — 97535 SELF CARE MNGMENT TRAINING: CPT | Performed by: SPECIALIST

## 2017-01-01 PROCEDURE — 83605 ASSAY OF LACTIC ACID: CPT

## 2017-01-01 PROCEDURE — 96375 TX/PRO/DX INJ NEW DRUG ADDON: CPT

## 2017-01-01 PROCEDURE — 83880 ASSAY OF NATRIURETIC PEPTIDE: CPT

## 2017-01-01 PROCEDURE — 20000000 HC ICU ROOM

## 2017-01-01 PROCEDURE — 02HV33Z INSERTION OF INFUSION DEVICE INTO SUPERIOR VENA CAVA, PERCUTANEOUS APPROACH: ICD-10-PCS | Performed by: EMERGENCY MEDICINE

## 2017-01-01 PROCEDURE — 36600 WITHDRAWAL OF ARTERIAL BLOOD: CPT

## 2017-01-01 PROCEDURE — 12000002 HC ACUTE/MED SURGE SEMI-PRIVATE ROOM

## 2017-01-01 PROCEDURE — 82140 ASSAY OF AMMONIA: CPT

## 2017-01-01 PROCEDURE — 25500020 PHARM REV CODE 255: Performed by: INTERNAL MEDICINE

## 2017-01-01 PROCEDURE — 5A1955Z RESPIRATORY VENTILATION, GREATER THAN 96 CONSECUTIVE HOURS: ICD-10-PCS | Performed by: INTERNAL MEDICINE

## 2017-01-01 PROCEDURE — G8978 MOBILITY CURRENT STATUS: HCPCS | Mod: CN

## 2017-01-01 PROCEDURE — 87186 SC STD MICRODIL/AGAR DIL: CPT

## 2017-01-01 PROCEDURE — 99291 CRITICAL CARE FIRST HOUR: CPT | Mod: ,,, | Performed by: INTERNAL MEDICINE

## 2017-01-01 PROCEDURE — 85027 COMPLETE CBC AUTOMATED: CPT

## 2017-01-01 PROCEDURE — 87045 FECES CULTURE AEROBIC BACT: CPT

## 2017-01-01 PROCEDURE — G0378 HOSPITAL OBSERVATION PER HR: HCPCS

## 2017-01-01 PROCEDURE — 96365 THER/PROPH/DIAG IV INF INIT: CPT | Mod: 59

## 2017-01-01 PROCEDURE — 83735 ASSAY OF MAGNESIUM: CPT | Mod: 91

## 2017-01-01 PROCEDURE — 51702 INSERT TEMP BLADDER CATH: CPT

## 2017-01-01 PROCEDURE — 99291 CRITICAL CARE FIRST HOUR: CPT

## 2017-01-01 PROCEDURE — 97166 OT EVAL MOD COMPLEX 45 MIN: CPT | Performed by: SPECIALIST

## 2017-01-01 PROCEDURE — 97112 NEUROMUSCULAR REEDUCATION: CPT

## 2017-01-01 PROCEDURE — 80076 HEPATIC FUNCTION PANEL: CPT

## 2017-01-01 PROCEDURE — 82272 OCCULT BLD FECES 1-3 TESTS: CPT

## 2017-01-01 PROCEDURE — 84443 ASSAY THYROID STIM HORMONE: CPT

## 2017-01-01 PROCEDURE — 82803 BLOOD GASES ANY COMBINATION: CPT

## 2017-01-01 PROCEDURE — 29580 STRAPPING UNNA BOOT: CPT

## 2017-01-01 PROCEDURE — 83930 ASSAY OF BLOOD OSMOLALITY: CPT

## 2017-01-01 PROCEDURE — 97167 OT EVAL HIGH COMPLEX 60 MIN: CPT

## 2017-01-01 PROCEDURE — 83935 ASSAY OF URINE OSMOLALITY: CPT

## 2017-01-01 PROCEDURE — 36556 INSERT NON-TUNNEL CV CATH: CPT

## 2017-01-01 PROCEDURE — G8996 SWALLOW CURRENT STATUS: HCPCS | Mod: CJ

## 2017-01-01 PROCEDURE — 84100 ASSAY OF PHOSPHORUS: CPT | Mod: 91

## 2017-01-01 PROCEDURE — 87046 STOOL CULTR AEROBIC BACT EA: CPT | Mod: 59

## 2017-01-01 PROCEDURE — 99285 EMERGENCY DEPT VISIT HI MDM: CPT | Mod: 25

## 2017-01-01 PROCEDURE — 96374 THER/PROPH/DIAG INJ IV PUSH: CPT

## 2017-01-01 PROCEDURE — 25000003 PHARM REV CODE 250

## 2017-01-01 PROCEDURE — 99499 UNLISTED E&M SERVICE: CPT | Mod: ,,, | Performed by: INTERNAL MEDICINE

## 2017-01-01 PROCEDURE — 84133 ASSAY OF URINE POTASSIUM: CPT

## 2017-01-01 PROCEDURE — G8981 BODY POS CURRENT STATUS: HCPCS | Mod: CL

## 2017-01-01 PROCEDURE — 92610 EVALUATE SWALLOWING FUNCTION: CPT

## 2017-01-01 PROCEDURE — G8988 SELF CARE GOAL STATUS: HCPCS | Mod: CL

## 2017-01-01 PROCEDURE — 83690 ASSAY OF LIPASE: CPT

## 2017-01-01 PROCEDURE — 82436 ASSAY OF URINE CHLORIDE: CPT

## 2017-01-01 PROCEDURE — P9047 ALBUMIN (HUMAN), 25%, 50ML: HCPCS | Performed by: INTERNAL MEDICINE

## 2017-01-01 PROCEDURE — 99223 1ST HOSP IP/OBS HIGH 75: CPT | Mod: ,,, | Performed by: INTERNAL MEDICINE

## 2017-01-01 PROCEDURE — 81000 URINALYSIS NONAUTO W/SCOPE: CPT

## 2017-01-01 PROCEDURE — 84439 ASSAY OF FREE THYROXINE: CPT

## 2017-01-01 PROCEDURE — 89220 SPUTUM SPECIMEN COLLECTION: CPT

## 2017-01-01 PROCEDURE — 63600175 PHARM REV CODE 636 W HCPCS: Performed by: NURSE PRACTITIONER

## 2017-01-01 PROCEDURE — 87205 SMEAR GRAM STAIN: CPT | Mod: 91

## 2017-01-01 PROCEDURE — 85025 COMPLETE CBC W/AUTO DIFF WBC: CPT | Mod: 91

## 2017-01-01 PROCEDURE — 93306 TTE W/DOPPLER COMPLETE: CPT | Mod: 26,,, | Performed by: INTERNAL MEDICINE

## 2017-01-01 PROCEDURE — G8987 SELF CARE CURRENT STATUS: HCPCS | Mod: CN

## 2017-01-01 PROCEDURE — 84132 ASSAY OF SERUM POTASSIUM: CPT

## 2017-01-01 PROCEDURE — 96361 HYDRATE IV INFUSION ADD-ON: CPT

## 2017-01-01 PROCEDURE — 84540 ASSAY OF URINE/UREA-N: CPT

## 2017-01-01 PROCEDURE — 84145 PROCALCITONIN (PCT): CPT

## 2017-01-01 PROCEDURE — 25000242 PHARM REV CODE 250 ALT 637 W/ HCPCS: Performed by: HOSPITALIST

## 2017-01-01 PROCEDURE — G8988 SELF CARE GOAL STATUS: HCPCS | Mod: CK | Performed by: SPECIALIST

## 2017-01-01 PROCEDURE — 25000003 PHARM REV CODE 250: Performed by: PHYSICIAN ASSISTANT

## 2017-01-01 PROCEDURE — 25000242 PHARM REV CODE 250 ALT 637 W/ HCPCS: Performed by: EMERGENCY MEDICINE

## 2017-01-01 PROCEDURE — 96367 TX/PROPH/DG ADDL SEQ IV INF: CPT

## 2017-01-01 RX ORDER — PANTOPRAZOLE SODIUM 40 MG/10ML
40 INJECTION, POWDER, LYOPHILIZED, FOR SOLUTION INTRAVENOUS 2 TIMES DAILY
Status: DISCONTINUED | OUTPATIENT
Start: 2017-01-01 | End: 2017-01-01

## 2017-01-01 RX ORDER — DIGOXIN 125 MCG
125 TABLET ORAL DAILY
Status: ON HOLD | COMMUNITY
End: 2017-01-01 | Stop reason: HOSPADM

## 2017-01-01 RX ORDER — ISOSORBIDE MONONITRATE 30 MG/1
30 TABLET, EXTENDED RELEASE ORAL DAILY
Status: DISCONTINUED | OUTPATIENT
Start: 2017-01-01 | End: 2017-01-01

## 2017-01-01 RX ORDER — HYDRALAZINE HYDROCHLORIDE 25 MG/1
25 TABLET, FILM COATED ORAL EVERY 8 HOURS
Status: DISCONTINUED | OUTPATIENT
Start: 2017-01-01 | End: 2017-01-01

## 2017-01-01 RX ORDER — ACETAMINOPHEN 325 MG/1
650 TABLET ORAL EVERY 6 HOURS PRN
Status: DISCONTINUED | OUTPATIENT
Start: 2017-01-01 | End: 2017-01-01 | Stop reason: HOSPADM

## 2017-01-01 RX ORDER — FUROSEMIDE 10 MG/ML
40 INJECTION INTRAMUSCULAR; INTRAVENOUS ONCE
Status: COMPLETED | OUTPATIENT
Start: 2017-01-01 | End: 2017-01-01

## 2017-01-01 RX ORDER — MORPHINE SULFATE 10 MG/ML
2 INJECTION INTRAMUSCULAR; INTRAVENOUS; SUBCUTANEOUS
Status: DISCONTINUED | OUTPATIENT
Start: 2017-01-01 | End: 2017-01-01

## 2017-01-01 RX ORDER — ALLOPURINOL 100 MG/1
100 TABLET ORAL DAILY
Qty: 270 TABLET | Refills: 1
Start: 2017-01-01

## 2017-01-01 RX ORDER — ALLOPURINOL 100 MG/1
100 TABLET ORAL DAILY
Status: DISCONTINUED | OUTPATIENT
Start: 2017-01-01 | End: 2017-01-01 | Stop reason: HOSPADM

## 2017-01-01 RX ORDER — NOREPINEPHRINE BITARTRATE/D5W 4MG/250ML
PLASTIC BAG, INJECTION (ML) INTRAVENOUS
Status: COMPLETED
Start: 2017-01-01 | End: 2017-01-01

## 2017-01-01 RX ORDER — ALBUTEROL SULFATE 2.5 MG/.5ML
5 SOLUTION RESPIRATORY (INHALATION)
Status: COMPLETED | OUTPATIENT
Start: 2017-01-01 | End: 2017-01-01

## 2017-01-01 RX ORDER — PANTOPRAZOLE SODIUM 40 MG/1
40 TABLET, DELAYED RELEASE ORAL DAILY
Status: DISCONTINUED | OUTPATIENT
Start: 2017-01-01 | End: 2017-01-01 | Stop reason: HOSPADM

## 2017-01-01 RX ORDER — DEXTROSE 50 % IN WATER (D50W) INTRAVENOUS SYRINGE
25
Status: COMPLETED | OUTPATIENT
Start: 2017-01-01 | End: 2017-01-01

## 2017-01-01 RX ORDER — SODIUM CHLORIDE 9 MG/ML
1000 INJECTION, SOLUTION INTRAVENOUS
Status: COMPLETED | OUTPATIENT
Start: 2017-01-01 | End: 2017-01-01

## 2017-01-01 RX ORDER — ONDANSETRON 8 MG/1
8 TABLET, ORALLY DISINTEGRATING ORAL EVERY 8 HOURS PRN
Status: DISCONTINUED | OUTPATIENT
Start: 2017-01-01 | End: 2017-01-01 | Stop reason: HOSPADM

## 2017-01-01 RX ORDER — FUROSEMIDE 10 MG/ML
80 INJECTION INTRAMUSCULAR; INTRAVENOUS
Status: COMPLETED | OUTPATIENT
Start: 2017-01-01 | End: 2017-01-01

## 2017-01-01 RX ORDER — POTASSIUM CHLORIDE 14.9 MG/ML
20 INJECTION INTRAVENOUS ONCE
Status: COMPLETED | OUTPATIENT
Start: 2017-01-01 | End: 2017-01-01

## 2017-01-01 RX ORDER — DOPAMINE HYDROCHLORIDE 160 MG/100ML
5 INJECTION, SOLUTION INTRAVENOUS CONTINUOUS
Status: DISCONTINUED | OUTPATIENT
Start: 2017-01-01 | End: 2017-01-01

## 2017-01-01 RX ORDER — IPRATROPIUM BROMIDE AND ALBUTEROL SULFATE 2.5; .5 MG/3ML; MG/3ML
3 SOLUTION RESPIRATORY (INHALATION) EVERY 4 HOURS PRN
Status: DISCONTINUED | OUTPATIENT
Start: 2017-01-01 | End: 2017-01-01 | Stop reason: HOSPADM

## 2017-01-01 RX ORDER — METOPROLOL TARTRATE 1 MG/ML
5 INJECTION, SOLUTION INTRAVENOUS ONCE
Status: COMPLETED | OUTPATIENT
Start: 2017-01-01 | End: 2017-01-01

## 2017-01-01 RX ORDER — SODIUM CHLORIDE 9 MG/ML
INJECTION, SOLUTION INTRAVENOUS CONTINUOUS
Status: DISCONTINUED | OUTPATIENT
Start: 2017-01-01 | End: 2017-01-01

## 2017-01-01 RX ORDER — FUROSEMIDE 10 MG/ML
20 INJECTION INTRAMUSCULAR; INTRAVENOUS 2 TIMES DAILY
Status: DISCONTINUED | OUTPATIENT
Start: 2017-01-01 | End: 2017-01-01

## 2017-01-01 RX ORDER — FUROSEMIDE 40 MG/1
80 TABLET ORAL 2 TIMES DAILY
Status: DISCONTINUED | OUTPATIENT
Start: 2017-01-01 | End: 2017-01-01

## 2017-01-01 RX ORDER — SODIUM BICARBONATE 1 MEQ/ML
100 SYRINGE (ML) INTRAVENOUS ONCE
Status: COMPLETED | OUTPATIENT
Start: 2017-01-01 | End: 2017-01-01

## 2017-01-01 RX ORDER — SODIUM CHLORIDE 0.9 % (FLUSH) 0.9 %
3 SYRINGE (ML) INJECTION EVERY 8 HOURS
Status: DISCONTINUED | OUTPATIENT
Start: 2017-01-01 | End: 2017-01-01 | Stop reason: HOSPADM

## 2017-01-01 RX ORDER — BISACODYL 10 MG
10 SUPPOSITORY, RECTAL RECTAL ONCE
Status: COMPLETED | OUTPATIENT
Start: 2017-01-01 | End: 2017-01-01

## 2017-01-01 RX ORDER — POTASSIUM CHLORIDE 20 MEQ/1
20 TABLET, EXTENDED RELEASE ORAL DAILY
COMMUNITY

## 2017-01-01 RX ORDER — FUROSEMIDE 10 MG/ML
60 INJECTION INTRAMUSCULAR; INTRAVENOUS ONCE
Status: COMPLETED | OUTPATIENT
Start: 2017-01-01 | End: 2017-01-01

## 2017-01-01 RX ORDER — DIGOXIN 0.25 MG/ML
250 INJECTION INTRAMUSCULAR; INTRAVENOUS ONCE
Status: COMPLETED | OUTPATIENT
Start: 2017-01-01 | End: 2017-01-01

## 2017-01-01 RX ORDER — LORAZEPAM 2 MG/ML
1 INJECTION INTRAMUSCULAR ONCE
Status: COMPLETED | OUTPATIENT
Start: 2017-01-01 | End: 2017-01-01

## 2017-01-01 RX ORDER — ONDANSETRON 2 MG/ML
4 INJECTION INTRAMUSCULAR; INTRAVENOUS EVERY 6 HOURS PRN
Status: DISCONTINUED | OUTPATIENT
Start: 2017-01-01 | End: 2017-01-01 | Stop reason: HOSPADM

## 2017-01-01 RX ORDER — POTASSIUM CHLORIDE 29.8 MG/ML
40 INJECTION INTRAVENOUS 2 TIMES DAILY
Status: DISCONTINUED | OUTPATIENT
Start: 2017-01-01 | End: 2017-01-01

## 2017-01-01 RX ORDER — LORAZEPAM 2 MG/ML
2 INJECTION INTRAMUSCULAR ONCE
Status: COMPLETED | OUTPATIENT
Start: 2017-01-01 | End: 2017-01-01

## 2017-01-01 RX ORDER — PROMETHAZINE HYDROCHLORIDE 25 MG/1
25 SUPPOSITORY RECTAL EVERY 6 HOURS PRN
Status: DISCONTINUED | OUTPATIENT
Start: 2017-01-01 | End: 2017-01-01

## 2017-01-01 RX ORDER — ALBUMIN HUMAN 250 G/1000ML
25 SOLUTION INTRAVENOUS EVERY 6 HOURS
Status: COMPLETED | OUTPATIENT
Start: 2017-01-01 | End: 2017-01-01

## 2017-01-01 RX ORDER — WARFARIN 1 MG/1
1 TABLET ORAL DAILY
Status: DISCONTINUED | OUTPATIENT
Start: 2017-01-01 | End: 2017-01-01 | Stop reason: HOSPADM

## 2017-01-01 RX ORDER — GABAPENTIN 100 MG/1
200 CAPSULE ORAL 2 TIMES DAILY
Status: ON HOLD | COMMUNITY
End: 2017-01-01 | Stop reason: HOSPADM

## 2017-01-01 RX ORDER — ONDANSETRON 2 MG/ML
4 INJECTION INTRAMUSCULAR; INTRAVENOUS
Status: COMPLETED | OUTPATIENT
Start: 2017-01-01 | End: 2017-01-01

## 2017-01-01 RX ORDER — ALBUTEROL SULFATE 2.5 MG/.5ML
5 SOLUTION RESPIRATORY (INHALATION) EVERY 4 HOURS
Status: COMPLETED | OUTPATIENT
Start: 2017-01-01 | End: 2017-01-01

## 2017-01-01 RX ORDER — PROPOFOL 10 MG/ML
INJECTION, EMULSION INTRAVENOUS
Status: DISPENSED
Start: 2017-01-01 | End: 2017-01-01

## 2017-01-01 RX ORDER — AMIODARONE HYDROCHLORIDE 200 MG/1
200 TABLET ORAL 2 TIMES DAILY
Status: DISCONTINUED | OUTPATIENT
Start: 2017-01-01 | End: 2017-01-01

## 2017-01-01 RX ORDER — BUMETANIDE 0.25 MG/ML
2 INJECTION INTRAMUSCULAR; INTRAVENOUS DAILY
Status: DISCONTINUED | OUTPATIENT
Start: 2017-01-01 | End: 2017-01-01

## 2017-01-01 RX ORDER — ATORVASTATIN CALCIUM 10 MG/1
20 TABLET, FILM COATED ORAL NIGHTLY
Status: DISCONTINUED | OUTPATIENT
Start: 2017-01-01 | End: 2017-01-01

## 2017-01-01 RX ORDER — POTASSIUM CHLORIDE 20 MEQ/15ML
20 SOLUTION ORAL ONCE
Status: COMPLETED | OUTPATIENT
Start: 2017-01-01 | End: 2017-01-01

## 2017-01-01 RX ORDER — GLUCAGON 1 MG
1 KIT INJECTION
Status: DISCONTINUED | OUTPATIENT
Start: 2017-01-01 | End: 2017-01-01

## 2017-01-01 RX ORDER — HEPARIN SODIUM 5000 [USP'U]/ML
5000 INJECTION, SOLUTION INTRAVENOUS; SUBCUTANEOUS EVERY 8 HOURS
Status: DISCONTINUED | OUTPATIENT
Start: 2017-01-01 | End: 2017-01-01

## 2017-01-01 RX ORDER — PROPOFOL 10 MG/ML
5 INJECTION, EMULSION INTRAVENOUS CONTINUOUS
Status: DISCONTINUED | OUTPATIENT
Start: 2017-01-01 | End: 2017-01-01

## 2017-01-01 RX ORDER — BUMETANIDE 0.25 MG/ML
2 INJECTION INTRAMUSCULAR; INTRAVENOUS ONCE
Status: COMPLETED | OUTPATIENT
Start: 2017-01-01 | End: 2017-01-01

## 2017-01-01 RX ORDER — FUROSEMIDE 10 MG/ML
20 INJECTION INTRAMUSCULAR; INTRAVENOUS ONCE
Status: COMPLETED | OUTPATIENT
Start: 2017-01-01 | End: 2017-01-01

## 2017-01-01 RX ORDER — WARFARIN 1 MG/1
1 TABLET ORAL DAILY
Status: DISCONTINUED | OUTPATIENT
Start: 2017-01-01 | End: 2017-01-01

## 2017-01-01 RX ORDER — DEXTROSE 50 % IN WATER (D50W) INTRAVENOUS SYRINGE
Status: COMPLETED
Start: 2017-01-01 | End: 2017-01-01

## 2017-01-01 RX ORDER — PRAVASTATIN SODIUM 40 MG/1
40 TABLET ORAL DAILY
Status: DISCONTINUED | OUTPATIENT
Start: 2017-01-01 | End: 2017-01-01 | Stop reason: HOSPADM

## 2017-01-01 RX ORDER — BISACODYL 10 MG
10 SUPPOSITORY, RECTAL RECTAL DAILY PRN
Status: DISCONTINUED | OUTPATIENT
Start: 2017-01-01 | End: 2017-01-01

## 2017-01-01 RX ORDER — SODIUM BICARBONATE 1 MEQ/ML
50 SYRINGE (ML) INTRAVENOUS ONCE
Status: COMPLETED | OUTPATIENT
Start: 2017-01-01 | End: 2017-01-01

## 2017-01-01 RX ORDER — PRAVASTATIN SODIUM 40 MG/1
40 TABLET ORAL DAILY
Status: DISCONTINUED | OUTPATIENT
Start: 2017-01-01 | End: 2017-01-01

## 2017-01-01 RX ORDER — ETOMIDATE 2 MG/ML
INJECTION INTRAVENOUS
Status: DISPENSED
Start: 2017-01-01 | End: 2017-01-01

## 2017-01-01 RX ORDER — DIGOXIN 0.25 MG/ML
125 INJECTION INTRAMUSCULAR; INTRAVENOUS EVERY 6 HOURS
Status: COMPLETED | OUTPATIENT
Start: 2017-01-01 | End: 2017-01-01

## 2017-01-01 RX ORDER — DIGOXIN 125 MCG
0.12 TABLET ORAL EVERY OTHER DAY
Status: DISCONTINUED | OUTPATIENT
Start: 2017-01-01 | End: 2017-01-01

## 2017-01-01 RX ORDER — GABAPENTIN 600 MG/1
600 TABLET ORAL DAILY
Status: ON HOLD | COMMUNITY
End: 2017-01-01 | Stop reason: HOSPADM

## 2017-01-01 RX ORDER — DIGOXIN 125 MCG
0.12 TABLET ORAL DAILY
Status: DISCONTINUED | OUTPATIENT
Start: 2017-01-01 | End: 2017-01-01

## 2017-01-01 RX ORDER — SUCCINYLCHOLINE CHLORIDE 20 MG/ML
INJECTION INTRAMUSCULAR; INTRAVENOUS
Status: DISPENSED
Start: 2017-01-01 | End: 2017-01-01

## 2017-01-01 RX ORDER — DIGOXIN 0.25 MG/ML
125 INJECTION INTRAMUSCULAR; INTRAVENOUS DAILY
Status: DISCONTINUED | OUTPATIENT
Start: 2017-01-01 | End: 2017-01-01

## 2017-01-01 RX ORDER — ACETAMINOPHEN 325 MG/1
650 TABLET ORAL EVERY 8 HOURS PRN
Status: DISCONTINUED | OUTPATIENT
Start: 2017-01-01 | End: 2017-01-01

## 2017-01-01 RX ORDER — DEXTROMETHORPHAN POLISTIREX 30 MG/5 ML
1 SUSPENSION, EXTENDED RELEASE 12 HR ORAL DAILY PRN
Status: DISCONTINUED | OUTPATIENT
Start: 2017-01-01 | End: 2017-01-01

## 2017-01-01 RX ORDER — AMIODARONE HYDROCHLORIDE 200 MG/1
400 TABLET ORAL 2 TIMES DAILY
Status: DISCONTINUED | OUTPATIENT
Start: 2017-01-01 | End: 2017-01-01

## 2017-01-01 RX ORDER — DILTIAZEM HYDROCHLORIDE 5 MG/ML
20 INJECTION INTRAVENOUS ONCE
Status: COMPLETED | OUTPATIENT
Start: 2017-01-01 | End: 2017-01-01

## 2017-01-01 RX ORDER — FUROSEMIDE 40 MG/1
80 TABLET ORAL DAILY
Qty: 180 TABLET | Refills: 3
Start: 2017-01-01

## 2017-01-01 RX ORDER — HEPARIN SODIUM 5000 [USP'U]/ML
5000 INJECTION, SOLUTION INTRAVENOUS; SUBCUTANEOUS EVERY 12 HOURS
Status: DISCONTINUED | OUTPATIENT
Start: 2017-01-01 | End: 2017-01-01

## 2017-01-01 RX ORDER — CIPROFLOXACIN 500 MG/1
500 TABLET ORAL EVERY 12 HOURS
Status: DISCONTINUED | OUTPATIENT
Start: 2017-01-01 | End: 2017-01-01

## 2017-01-01 RX ORDER — LORAZEPAM 2 MG/ML
1 INJECTION INTRAMUSCULAR EVERY 30 MIN PRN
Status: DISCONTINUED | OUTPATIENT
Start: 2017-01-01 | End: 2017-01-01 | Stop reason: HOSPADM

## 2017-01-01 RX ORDER — RAMELTEON 8 MG/1
8 TABLET ORAL NIGHTLY PRN
Status: DISCONTINUED | OUTPATIENT
Start: 2017-01-01 | End: 2017-01-01

## 2017-01-01 RX ORDER — SODIUM CHLORIDE 9 MG/ML
500 INJECTION, SOLUTION INTRAVENOUS
Status: DISCONTINUED | OUTPATIENT
Start: 2017-01-01 | End: 2017-01-01

## 2017-01-01 RX ORDER — HYDROMORPHONE HYDROCHLORIDE 2 MG/ML
0.5 INJECTION, SOLUTION INTRAMUSCULAR; INTRAVENOUS; SUBCUTANEOUS ONCE
Status: COMPLETED | OUTPATIENT
Start: 2017-01-01 | End: 2017-01-01

## 2017-01-01 RX ORDER — INSULIN ASPART 100 [IU]/ML
0-5 INJECTION, SOLUTION INTRAVENOUS; SUBCUTANEOUS EVERY 6 HOURS PRN
Status: DISCONTINUED | OUTPATIENT
Start: 2017-01-01 | End: 2017-01-01

## 2017-01-01 RX ORDER — IPRATROPIUM BROMIDE AND ALBUTEROL SULFATE 2.5; .5 MG/3ML; MG/3ML
3 SOLUTION RESPIRATORY (INHALATION) EVERY 4 HOURS
Status: DISCONTINUED | OUTPATIENT
Start: 2017-01-01 | End: 2017-01-01

## 2017-01-01 RX ORDER — FUROSEMIDE 40 MG/1
80 TABLET ORAL DAILY
Status: DISCONTINUED | OUTPATIENT
Start: 2017-01-01 | End: 2017-01-01 | Stop reason: HOSPADM

## 2017-01-01 RX ORDER — FUROSEMIDE 10 MG/ML
80 INJECTION INTRAMUSCULAR; INTRAVENOUS ONCE
Status: COMPLETED | OUTPATIENT
Start: 2017-01-01 | End: 2017-01-01

## 2017-01-01 RX ORDER — LEVOTHYROXINE SODIUM ANHYDROUS 100 UG/5ML
100 INJECTION, POWDER, LYOPHILIZED, FOR SOLUTION INTRAVENOUS DAILY
Status: DISCONTINUED | OUTPATIENT
Start: 2017-01-01 | End: 2017-01-01

## 2017-01-01 RX ORDER — FUROSEMIDE 10 MG/ML
40 INJECTION INTRAMUSCULAR; INTRAVENOUS 2 TIMES DAILY
Status: DISCONTINUED | OUTPATIENT
Start: 2017-01-01 | End: 2017-01-01

## 2017-01-01 RX ORDER — MAGNESIUM SULFATE HEPTAHYDRATE 40 MG/ML
2 INJECTION, SOLUTION INTRAVENOUS ONCE
Status: COMPLETED | OUTPATIENT
Start: 2017-01-01 | End: 2017-01-01

## 2017-01-01 RX ORDER — IPRATROPIUM BROMIDE AND ALBUTEROL SULFATE 2.5; .5 MG/3ML; MG/3ML
3 SOLUTION RESPIRATORY (INHALATION) EVERY 4 HOURS PRN
Status: DISCONTINUED | OUTPATIENT
Start: 2017-01-01 | End: 2017-01-01

## 2017-01-01 RX ORDER — FUROSEMIDE 10 MG/ML
40 INJECTION INTRAMUSCULAR; INTRAVENOUS
Status: ACTIVE | OUTPATIENT
Start: 2017-01-01 | End: 2017-01-01

## 2017-01-01 RX ORDER — METOPROLOL SUCCINATE 50 MG/1
100 TABLET, EXTENDED RELEASE ORAL DAILY
Status: DISCONTINUED | OUTPATIENT
Start: 2017-01-01 | End: 2017-01-01 | Stop reason: HOSPADM

## 2017-01-01 RX ORDER — POTASSIUM CHLORIDE 14.9 MG/ML
20 INJECTION INTRAVENOUS
Status: COMPLETED | OUTPATIENT
Start: 2017-01-01 | End: 2017-01-01

## 2017-01-01 RX ORDER — POLYETHYLENE GLYCOL 3350 17 G/17G
17 POWDER, FOR SOLUTION ORAL DAILY PRN
Status: DISCONTINUED | OUTPATIENT
Start: 2017-01-01 | End: 2017-01-01

## 2017-01-01 RX ORDER — SODIUM BICARBONATE 1 MEQ/ML
50 SYRINGE (ML) INTRAVENOUS
Status: COMPLETED | OUTPATIENT
Start: 2017-01-01 | End: 2017-01-01

## 2017-01-01 RX ORDER — PHYTONADIONE 10 MG/ML
5 INJECTION, EMULSION INTRAMUSCULAR; INTRAVENOUS; SUBCUTANEOUS ONCE
Status: COMPLETED | OUTPATIENT
Start: 2017-01-01 | End: 2017-01-01

## 2017-01-01 RX ORDER — AMIODARONE HYDROCHLORIDE 200 MG/1
200 TABLET ORAL DAILY
Status: DISCONTINUED | OUTPATIENT
Start: 2017-02-23 | End: 2017-01-01

## 2017-01-01 RX ORDER — MILRINONE LACTATE 0.2 MG/ML
0.25 INJECTION, SOLUTION INTRAVENOUS CONTINUOUS
Status: DISCONTINUED | OUTPATIENT
Start: 2017-01-01 | End: 2017-01-01

## 2017-01-01 RX ORDER — POTASSIUM CHLORIDE 20 MEQ/15ML
40 SOLUTION ORAL ONCE
Status: COMPLETED | OUTPATIENT
Start: 2017-01-01 | End: 2017-01-01

## 2017-01-01 RX ORDER — HYDROMORPHONE HYDROCHLORIDE 2 MG/ML
0.5 INJECTION, SOLUTION INTRAMUSCULAR; INTRAVENOUS; SUBCUTANEOUS EVERY 10 MIN PRN
Status: DISCONTINUED | OUTPATIENT
Start: 2017-01-01 | End: 2017-01-01 | Stop reason: HOSPADM

## 2017-01-01 RX ORDER — NOREPINEPHRINE BITARTRATE/D5W 4MG/250ML
0.02 PLASTIC BAG, INJECTION (ML) INTRAVENOUS CONTINUOUS
Status: DISCONTINUED | OUTPATIENT
Start: 2017-01-01 | End: 2017-01-01

## 2017-01-01 RX ORDER — ONDANSETRON 2 MG/ML
4 INJECTION INTRAMUSCULAR; INTRAVENOUS EVERY 12 HOURS PRN
Status: DISCONTINUED | OUTPATIENT
Start: 2017-01-01 | End: 2017-01-01

## 2017-01-01 RX ORDER — IPRATROPIUM BROMIDE AND ALBUTEROL SULFATE 2.5; .5 MG/3ML; MG/3ML
3 SOLUTION RESPIRATORY (INHALATION)
Status: DISCONTINUED | OUTPATIENT
Start: 2017-01-01 | End: 2017-01-01

## 2017-01-01 RX ORDER — METRONIDAZOLE 500 MG/100ML
500 INJECTION, SOLUTION INTRAVENOUS
Status: DISCONTINUED | OUTPATIENT
Start: 2017-01-01 | End: 2017-01-01

## 2017-01-01 RX ORDER — METOPROLOL SUCCINATE 100 MG/1
100 TABLET, EXTENDED RELEASE ORAL DAILY
Start: 2017-01-01

## 2017-01-01 RX ORDER — GLYCOPYRROLATE 0.2 MG/ML
0.2 INJECTION INTRAMUSCULAR; INTRAVENOUS 3 TIMES DAILY PRN
Status: DISCONTINUED | OUTPATIENT
Start: 2017-01-01 | End: 2017-01-01 | Stop reason: HOSPADM

## 2017-01-01 RX ORDER — POTASSIUM CHLORIDE 20 MEQ/15ML
20 SOLUTION ORAL ONCE
Status: DISCONTINUED | OUTPATIENT
Start: 2017-01-01 | End: 2017-01-01 | Stop reason: HOSPADM

## 2017-01-01 RX ADMIN — CIPROFLOXACIN 500 MG: 500 TABLET, FILM COATED ORAL at 08:01

## 2017-01-01 RX ADMIN — Medication 0.11 MCG/KG/MIN: at 08:02

## 2017-01-01 RX ADMIN — Medication 3 ML: at 05:02

## 2017-01-01 RX ADMIN — Medication 3 ML: at 09:02

## 2017-01-01 RX ADMIN — DEXTROSE MONOHYDRATE 50 ML: 25 INJECTION, SOLUTION INTRAVENOUS at 06:02

## 2017-01-01 RX ADMIN — I.V. FAT EMULSION 250 ML: 20 EMULSION INTRAVENOUS at 10:02

## 2017-01-01 RX ADMIN — SODIUM BICARBONATE: 84 INJECTION, SOLUTION INTRAVENOUS at 11:02

## 2017-01-01 RX ADMIN — FUROSEMIDE 80 MG: 10 INJECTION, SOLUTION INTRAMUSCULAR; INTRAVENOUS at 08:01

## 2017-01-01 RX ADMIN — LORAZEPAM 1 MG: 2 INJECTION, SOLUTION INTRAMUSCULAR; INTRAVENOUS at 02:02

## 2017-01-01 RX ADMIN — BUMETANIDE 2 MG: 0.25 INJECTION, SOLUTION INTRAMUSCULAR; INTRAVENOUS at 08:02

## 2017-01-01 RX ADMIN — ACETAMINOPHEN 650 MG: 325 TABLET ORAL at 01:01

## 2017-01-01 RX ADMIN — Medication 0.11 MCG/KG/MIN: at 02:02

## 2017-01-01 RX ADMIN — AMIODARONE HYDROCHLORIDE 0.5 MG/MIN: 1.8 INJECTION, SOLUTION INTRAVENOUS at 02:02

## 2017-01-01 RX ADMIN — PANTOPRAZOLE SODIUM 40 MG: 40 INJECTION, POWDER, FOR SOLUTION INTRAVENOUS at 09:02

## 2017-01-01 RX ADMIN — FUROSEMIDE 60 MG: 10 INJECTION, SOLUTION INTRAMUSCULAR; INTRAVENOUS at 01:01

## 2017-01-01 RX ADMIN — METRONIDAZOLE 500 MG: 500 INJECTION, SOLUTION INTRAVENOUS at 02:02

## 2017-01-01 RX ADMIN — ALLOPURINOL 100 MG: 100 TABLET ORAL at 08:01

## 2017-01-01 RX ADMIN — Medication 0.12 MCG/KG/MIN: at 11:02

## 2017-01-01 RX ADMIN — PIPERACILLIN SODIUM AND TAZOBACTAM SODIUM 4.5 G: 4; .5 INJECTION, POWDER, FOR SOLUTION INTRAVENOUS at 08:02

## 2017-01-01 RX ADMIN — IPRATROPIUM BROMIDE AND ALBUTEROL SULFATE 3 ML: .5; 3 SOLUTION RESPIRATORY (INHALATION) at 03:02

## 2017-01-01 RX ADMIN — IPRATROPIUM BROMIDE AND ALBUTEROL SULFATE 3 ML: .5; 3 SOLUTION RESPIRATORY (INHALATION) at 11:02

## 2017-01-01 RX ADMIN — PANTOPRAZOLE SODIUM 40 MG: 40 INJECTION, POWDER, FOR SOLUTION INTRAVENOUS at 08:02

## 2017-01-01 RX ADMIN — METOPROLOL SUCCINATE 100 MG: 50 TABLET, EXTENDED RELEASE ORAL at 08:01

## 2017-01-01 RX ADMIN — CALCIUM GLUCONATE 1 G: 94 INJECTION, SOLUTION INTRAVENOUS at 02:02

## 2017-01-01 RX ADMIN — METOPROLOL SUCCINATE 100 MG: 50 TABLET, EXTENDED RELEASE ORAL at 02:01

## 2017-01-01 RX ADMIN — ACETAMINOPHEN 650 MG: 325 TABLET ORAL at 08:01

## 2017-01-01 RX ADMIN — ONDANSETRON 4 MG: 2 INJECTION INTRAMUSCULAR; INTRAVENOUS at 09:02

## 2017-01-01 RX ADMIN — DOPAMINE HYDROCHLORIDE 15 MCG/KG/MIN: 160 INJECTION, SOLUTION INTRAVENOUS at 01:02

## 2017-01-01 RX ADMIN — LEVOTHYROXINE SODIUM ANHYDROUS 100 MCG: 100 INJECTION, POWDER, LYOPHILIZED, FOR SOLUTION INTRAVENOUS at 09:02

## 2017-01-01 RX ADMIN — Medication 10 ML: at 10:02

## 2017-01-01 RX ADMIN — SODIUM BICARBONATE 50 MEQ: 84 INJECTION INTRAVENOUS at 01:02

## 2017-01-01 RX ADMIN — FUROSEMIDE 40 MG: 10 INJECTION, SOLUTION INTRAMUSCULAR; INTRAVENOUS at 05:01

## 2017-01-01 RX ADMIN — LORAZEPAM 2 MG: 2 INJECTION, SOLUTION INTRAMUSCULAR; INTRAVENOUS at 01:02

## 2017-01-01 RX ADMIN — IPRATROPIUM BROMIDE AND ALBUTEROL SULFATE 3 ML: .5; 3 SOLUTION RESPIRATORY (INHALATION) at 08:02

## 2017-01-01 RX ADMIN — AMIODARONE HYDROCHLORIDE 0.5 MG/MIN: 1.8 INJECTION, SOLUTION INTRAVENOUS at 12:02

## 2017-01-01 RX ADMIN — DIGOXIN 250 MCG: 250 INJECTION, SOLUTION INTRAMUSCULAR; INTRAVENOUS at 04:01

## 2017-01-01 RX ADMIN — Medication 0.11 MCG/KG/MIN: at 06:02

## 2017-01-01 RX ADMIN — DEXTROSE MONOHYDRATE 25 G: 25 INJECTION, SOLUTION INTRAVENOUS at 02:02

## 2017-01-01 RX ADMIN — SODIUM BICARBONATE: 84 INJECTION, SOLUTION INTRAVENOUS at 08:02

## 2017-01-01 RX ADMIN — HYDROMORPHONE HYDROCHLORIDE 0.5 MG: 2 INJECTION INTRAMUSCULAR; INTRAVENOUS; SUBCUTANEOUS at 02:02

## 2017-01-01 RX ADMIN — WARFARIN SODIUM 1 MG: 1 TABLET ORAL at 05:01

## 2017-01-01 RX ADMIN — PIPERACILLIN SODIUM AND TAZOBACTAM SODIUM 4.5 G: 4; .5 INJECTION, POWDER, FOR SOLUTION INTRAVENOUS at 09:02

## 2017-01-01 RX ADMIN — INSULIN ASPART 4 UNITS: 100 INJECTION, SOLUTION INTRAVENOUS; SUBCUTANEOUS at 12:02

## 2017-01-01 RX ADMIN — Medication 3 ML: at 02:02

## 2017-01-01 RX ADMIN — SODIUM POLYSTYRENE SULFONATE 30 G: 15 SUSPENSION ORAL; RECTAL at 08:02

## 2017-01-01 RX ADMIN — Medication 10 ML: at 06:02

## 2017-01-01 RX ADMIN — ONDANSETRON 4 MG: 2 INJECTION INTRAMUSCULAR; INTRAVENOUS at 11:01

## 2017-01-01 RX ADMIN — RETINOL, ERGOCALCIFEROL, .ALPHA.-TOCOPHEROL ACETATE, DL-, PHYTONADIONE, ASCORBIC ACID, NIACINAMIDE, RIBOFLAVIN 5-PHOSPHATE SODIUM, THIAMINE HYDROCHLORIDE, PYRIDOXINE HYDROCHLORIDE, DEXPANTHENOL, BIOTIN, FOLIC ACID, AND CYANOCOBALAMIN: KIT at 10:02

## 2017-01-01 RX ADMIN — METRONIDAZOLE 500 MG: 500 INJECTION, SOLUTION INTRAVENOUS at 10:02

## 2017-01-01 RX ADMIN — FUROSEMIDE 60 MG: 10 INJECTION, SOLUTION INTRAMUSCULAR; INTRAVENOUS at 03:01

## 2017-01-01 RX ADMIN — POTASSIUM CHLORIDE 40 MEQ: 400 INJECTION, SOLUTION INTRAVENOUS at 10:02

## 2017-01-01 RX ADMIN — Medication 0.02 MCG: at 11:02

## 2017-01-01 RX ADMIN — METOPROLOL SUCCINATE 100 MG: 50 TABLET, EXTENDED RELEASE ORAL at 09:01

## 2017-01-01 RX ADMIN — BUMETANIDE 2 MG: 0.25 INJECTION, SOLUTION INTRAMUSCULAR; INTRAVENOUS at 11:02

## 2017-01-01 RX ADMIN — PIPERACILLIN SODIUM,TAZOBACTAM SODIUM 4.5 G: 4; .5 INJECTION, POWDER, FOR SOLUTION INTRAVENOUS at 09:02

## 2017-01-01 RX ADMIN — SODIUM CHLORIDE 500 ML: 0.9 INJECTION, SOLUTION INTRAVENOUS at 12:02

## 2017-01-01 RX ADMIN — SODIUM BICARBONATE: 84 INJECTION, SOLUTION INTRAVENOUS at 06:02

## 2017-01-01 RX ADMIN — ACETAMINOPHEN 650 MG: 325 TABLET ORAL at 02:01

## 2017-01-01 RX ADMIN — DOPAMINE HYDROCHLORIDE 5 MCG/KG/MIN: 160 INJECTION, SOLUTION INTRAVENOUS at 06:02

## 2017-01-01 RX ADMIN — FUROSEMIDE 60 MG: 10 INJECTION, SOLUTION INTRAMUSCULAR; INTRAVENOUS at 05:01

## 2017-01-01 RX ADMIN — DIGOXIN 125 MCG: 250 INJECTION, SOLUTION INTRAMUSCULAR; INTRAVENOUS; PARENTERAL at 05:02

## 2017-01-01 RX ADMIN — CHLOROTHIAZIDE SODIUM 250 MG: 500 INJECTION, POWDER, LYOPHILIZED, FOR SOLUTION INTRAVENOUS at 06:01

## 2017-01-01 RX ADMIN — AMIODARONE HYDROCHLORIDE 0.5 MG/MIN: 1.8 INJECTION, SOLUTION INTRAVENOUS at 10:02

## 2017-01-01 RX ADMIN — CIPROFLOXACIN 500 MG: 500 TABLET, FILM COATED ORAL at 09:01

## 2017-01-01 RX ADMIN — FUROSEMIDE 40 MG: 10 INJECTION, SOLUTION INTRAMUSCULAR; INTRAVENOUS at 09:01

## 2017-01-01 RX ADMIN — PROPOFOL 15 MCG/KG/MIN: 10 INJECTION, EMULSION INTRAVENOUS at 06:02

## 2017-01-01 RX ADMIN — PRAVASTATIN SODIUM 40 MG: 40 TABLET ORAL at 09:01

## 2017-01-01 RX ADMIN — PROPOFOL 30.05 MCG/KG/MIN: 10 INJECTION, EMULSION INTRAVENOUS at 11:02

## 2017-01-01 RX ADMIN — SODIUM CHLORIDE: 0.9 INJECTION, SOLUTION INTRAVENOUS at 04:02

## 2017-01-01 RX ADMIN — PRAVASTATIN SODIUM 40 MG: 40 TABLET ORAL at 09:02

## 2017-01-01 RX ADMIN — FUROSEMIDE 80 MG: 10 INJECTION, SOLUTION INTRAMUSCULAR; INTRAVENOUS at 11:01

## 2017-01-01 RX ADMIN — ONDANSETRON 4 MG: 2 INJECTION INTRAMUSCULAR; INTRAVENOUS at 01:02

## 2017-01-01 RX ADMIN — PANTOPRAZOLE SODIUM 40 MG: 40 TABLET, DELAYED RELEASE ORAL at 08:01

## 2017-01-01 RX ADMIN — Medication 10 ML: at 11:02

## 2017-01-01 RX ADMIN — AMIODARONE HYDROCHLORIDE 0.5 MG/MIN: 1.8 INJECTION, SOLUTION INTRAVENOUS at 04:02

## 2017-01-01 RX ADMIN — LEVOTHYROXINE SODIUM ANHYDROUS 100 MCG: 100 INJECTION, POWDER, LYOPHILIZED, FOR SOLUTION INTRAVENOUS at 01:02

## 2017-01-01 RX ADMIN — AMIODARONE HYDROCHLORIDE 0.5 MG/MIN: 1.8 INJECTION, SOLUTION INTRAVENOUS at 06:02

## 2017-01-01 RX ADMIN — PANTOPRAZOLE SODIUM 40 MG: 40 TABLET, DELAYED RELEASE ORAL at 09:01

## 2017-01-01 RX ADMIN — ONDANSETRON 4 MG: 2 INJECTION INTRAMUSCULAR; INTRAVENOUS at 08:02

## 2017-01-01 RX ADMIN — Medication 10 ML: at 09:02

## 2017-01-01 RX ADMIN — SODIUM POLYSTYRENE SULFONATE 30 G: 15 SUSPENSION ORAL; RECTAL at 06:02

## 2017-01-01 RX ADMIN — DILTIAZEM HYDROCHLORIDE 20 MG: 5 INJECTION INTRAVENOUS at 09:01

## 2017-01-01 RX ADMIN — INSULIN HUMAN 6 UNITS: 100 INJECTION, SOLUTION PARENTERAL at 08:02

## 2017-01-01 RX ADMIN — Medication 3 ML: at 11:02

## 2017-01-01 RX ADMIN — POTASSIUM CHLORIDE 20 MEQ: 14.9 INJECTION, SOLUTION INTRAVENOUS at 10:02

## 2017-01-01 RX ADMIN — Medication 0.15 MCG/KG/MIN: at 07:02

## 2017-01-01 RX ADMIN — SODIUM BICARBONATE 100 MEQ: 84 INJECTION INTRAVENOUS at 07:02

## 2017-01-01 RX ADMIN — MAGNESIUM SULFATE HEPTAHYDRATE 2 G: 40 INJECTION, SOLUTION INTRAVENOUS at 08:02

## 2017-01-01 RX ADMIN — Medication 0.02 MCG/KG/MIN: at 08:02

## 2017-01-01 RX ADMIN — HYDRALAZINE HYDROCHLORIDE 25 MG: 25 TABLET, FILM COATED ORAL at 01:01

## 2017-01-01 RX ADMIN — POTASSIUM BICARBONATE 50 MEQ: 25 TABLET, EFFERVESCENT ORAL at 06:02

## 2017-01-01 RX ADMIN — ALLOPURINOL 100 MG: 100 TABLET ORAL at 09:01

## 2017-01-01 RX ADMIN — FUROSEMIDE 40 MG: 10 INJECTION, SOLUTION INTRAMUSCULAR; INTRAVENOUS at 08:01

## 2017-01-01 RX ADMIN — CALCIUM GLUCONATE: 94 INJECTION, SOLUTION INTRAVENOUS at 10:02

## 2017-01-01 RX ADMIN — SODIUM CHLORIDE: 0.9 INJECTION, SOLUTION INTRAVENOUS at 06:02

## 2017-01-01 RX ADMIN — BUMETANIDE 2 MG: 0.25 INJECTION, SOLUTION INTRAMUSCULAR; INTRAVENOUS at 09:02

## 2017-01-01 RX ADMIN — Medication 0.15 MCG/KG/MIN: at 06:02

## 2017-01-01 RX ADMIN — PIPERACILLIN SODIUM,TAZOBACTAM SODIUM 4.5 G: 4; .5 INJECTION, POWDER, FOR SOLUTION INTRAVENOUS at 08:02

## 2017-01-01 RX ADMIN — Medication 0.02 MCG/KG/MIN: at 02:02

## 2017-01-01 RX ADMIN — AMIODARONE HYDROCHLORIDE 0.5 MG/MIN: 1.8 INJECTION, SOLUTION INTRAVENOUS at 05:02

## 2017-01-01 RX ADMIN — METRONIDAZOLE 500 MG: 500 INJECTION, SOLUTION INTRAVENOUS at 06:02

## 2017-01-01 RX ADMIN — AMIODARONE HYDROCHLORIDE 1 MG/MIN: 1.8 INJECTION, SOLUTION INTRAVENOUS at 02:02

## 2017-01-01 RX ADMIN — AMIODARONE HYDROCHLORIDE 400 MG: 200 TABLET ORAL at 09:02

## 2017-01-01 RX ADMIN — POTASSIUM CHLORIDE 40 MEQ: 400 INJECTION, SOLUTION INTRAVENOUS at 06:02

## 2017-01-01 RX ADMIN — Medication 10 ML: at 08:02

## 2017-01-01 RX ADMIN — LEVOTHYROXINE SODIUM ANHYDROUS 100 MCG: 100 INJECTION, POWDER, LYOPHILIZED, FOR SOLUTION INTRAVENOUS at 08:02

## 2017-01-01 RX ADMIN — MILRINONE LACTATE 0.25 MCG/KG/MIN: 200 INJECTION, SOLUTION INTRAVENOUS at 05:02

## 2017-01-01 RX ADMIN — POTASSIUM CHLORIDE 20 MEQ: 200 INJECTION, SOLUTION INTRAVENOUS at 09:02

## 2017-01-01 RX ADMIN — CHLOROTHIAZIDE SODIUM 250 MG: 500 INJECTION, POWDER, LYOPHILIZED, FOR SOLUTION INTRAVENOUS at 05:01

## 2017-01-01 RX ADMIN — AMIODARONE HYDROCHLORIDE 1 MG/MIN: 1.8 INJECTION, SOLUTION INTRAVENOUS at 10:02

## 2017-01-01 RX ADMIN — POTASSIUM PHOSPHATE, MONOBASIC 500 MG: 500 TABLET, SOLUBLE ORAL at 04:01

## 2017-01-01 RX ADMIN — FUROSEMIDE 80 MG: 40 TABLET ORAL at 08:01

## 2017-01-01 RX ADMIN — HYDROMORPHONE HYDROCHLORIDE 0.5 MG: 2 INJECTION INTRAMUSCULAR; INTRAVENOUS; SUBCUTANEOUS at 01:02

## 2017-01-01 RX ADMIN — POTASSIUM CHLORIDE 40 MEQ: 20 SOLUTION ORAL at 08:01

## 2017-01-01 RX ADMIN — ACETAMINOPHEN 650 MG: 325 TABLET ORAL at 10:02

## 2017-01-01 RX ADMIN — SODIUM CHLORIDE: 0.9 INJECTION, SOLUTION INTRAVENOUS at 11:02

## 2017-01-01 RX ADMIN — CALCIUM GLUCONATE 1 G: 94 INJECTION, SOLUTION INTRAVENOUS at 08:02

## 2017-01-01 RX ADMIN — Medication 3 ML: at 06:02

## 2017-01-01 RX ADMIN — PRAVASTATIN SODIUM 40 MG: 40 TABLET ORAL at 08:02

## 2017-01-01 RX ADMIN — IPRATROPIUM BROMIDE AND ALBUTEROL SULFATE 3 ML: .5; 3 SOLUTION RESPIRATORY (INHALATION) at 12:02

## 2017-01-01 RX ADMIN — SODIUM BICARBONATE: 84 INJECTION, SOLUTION INTRAVENOUS at 10:02

## 2017-01-01 RX ADMIN — ALBUTEROL SULFATE 5 MG: 2.5 SOLUTION RESPIRATORY (INHALATION) at 07:02

## 2017-01-01 RX ADMIN — PROPOFOL 30.05 MCG/KG/MIN: 10 INJECTION, EMULSION INTRAVENOUS at 05:02

## 2017-01-01 RX ADMIN — DOPAMINE HYDROCHLORIDE 5 MCG/KG/MIN: 160 INJECTION, SOLUTION INTRAVENOUS at 03:02

## 2017-01-01 RX ADMIN — PROPOFOL 30.05 MCG/KG/MIN: 10 INJECTION, EMULSION INTRAVENOUS at 10:02

## 2017-01-01 RX ADMIN — VANCOMYCIN HYDROCHLORIDE 1000 MG: 1 INJECTION, POWDER, LYOPHILIZED, FOR SOLUTION INTRAVENOUS at 11:02

## 2017-01-01 RX ADMIN — AMIODARONE HYDROCHLORIDE 150 MG: 1.5 INJECTION, SOLUTION INTRAVENOUS at 10:02

## 2017-01-01 RX ADMIN — AMIODARONE HYDROCHLORIDE 400 MG: 200 TABLET ORAL at 08:02

## 2017-01-01 RX ADMIN — PRAVASTATIN SODIUM 40 MG: 40 TABLET ORAL at 08:01

## 2017-01-01 RX ADMIN — AMIODARONE HYDROCHLORIDE 0.5 MG/MIN: 1.8 INJECTION, SOLUTION INTRAVENOUS at 01:02

## 2017-01-01 RX ADMIN — PROPOFOL 30.05 MCG/KG/MIN: 10 INJECTION, EMULSION INTRAVENOUS at 12:02

## 2017-01-01 RX ADMIN — SODIUM CHLORIDE: 0.9 INJECTION, SOLUTION INTRAVENOUS at 01:02

## 2017-01-01 RX ADMIN — ACETAMINOPHEN 650 MG: 325 TABLET ORAL at 03:01

## 2017-01-01 RX ADMIN — PIPERACILLIN SODIUM AND TAZOBACTAM SODIUM 4.5 G: 4; .5 INJECTION, POWDER, FOR SOLUTION INTRAVENOUS at 11:02

## 2017-01-01 RX ADMIN — DIPHENHYDRAMINE HYDROCHLORIDE, ZINC ACETATE: 2; .1 CREAM TOPICAL at 09:01

## 2017-01-01 RX ADMIN — ONDANSETRON 4 MG: 2 INJECTION INTRAMUSCULAR; INTRAVENOUS at 03:02

## 2017-01-01 RX ADMIN — METRONIDAZOLE 500 MG: 500 INJECTION, SOLUTION INTRAVENOUS at 11:02

## 2017-01-01 RX ADMIN — ALBUTEROL SULFATE 5 MG: 2.5 SOLUTION RESPIRATORY (INHALATION) at 03:02

## 2017-01-01 RX ADMIN — BUMETANIDE 2 MG: 0.25 INJECTION, SOLUTION INTRAMUSCULAR; INTRAVENOUS at 12:02

## 2017-01-01 RX ADMIN — POTASSIUM CHLORIDE 20 MEQ: 14.9 INJECTION, SOLUTION INTRAVENOUS at 01:02

## 2017-01-01 RX ADMIN — Medication 3 ML: at 01:02

## 2017-01-01 RX ADMIN — PROPOFOL 30.05 MCG/KG/MIN: 10 INJECTION, EMULSION INTRAVENOUS at 03:02

## 2017-01-01 RX ADMIN — ONDANSETRON 4 MG: 2 INJECTION INTRAMUSCULAR; INTRAVENOUS at 02:02

## 2017-01-01 RX ADMIN — PROPOFOL 30 MCG/KG/MIN: 10 INJECTION, EMULSION INTRAVENOUS at 10:02

## 2017-01-01 RX ADMIN — Medication 10 ML: at 05:02

## 2017-01-01 RX ADMIN — I.V. FAT EMULSION 250 ML: 20 EMULSION INTRAVENOUS at 11:02

## 2017-01-01 RX ADMIN — IPRATROPIUM BROMIDE AND ALBUTEROL SULFATE 3 ML: .5; 3 SOLUTION RESPIRATORY (INHALATION) at 07:02

## 2017-01-01 RX ADMIN — ALBUTEROL SULFATE 5 MG: 2.5 SOLUTION RESPIRATORY (INHALATION) at 09:02

## 2017-01-01 RX ADMIN — DIGOXIN 0.12 MG: 125 TABLET ORAL at 09:02

## 2017-01-01 RX ADMIN — POTASSIUM CHLORIDE 40 MEQ: 400 INJECTION, SOLUTION INTRAVENOUS at 09:02

## 2017-01-01 RX ADMIN — AMIODARONE HYDROCHLORIDE 400 MG: 200 TABLET ORAL at 10:02

## 2017-01-01 RX ADMIN — ONDANSETRON 4 MG: 2 INJECTION INTRAMUSCULAR; INTRAVENOUS at 04:02

## 2017-01-01 RX ADMIN — POTASSIUM CHLORIDE 20 MEQ: 20 SOLUTION ORAL at 04:01

## 2017-01-01 RX ADMIN — ALBUMIN (HUMAN) 25 G: 12.5 SOLUTION INTRAVENOUS at 11:02

## 2017-01-01 RX ADMIN — Medication 0.11 MCG/KG/MIN: at 12:02

## 2017-01-01 RX ADMIN — Medication 0.35 MCG/KG/MIN: at 02:02

## 2017-01-01 RX ADMIN — Medication 0.1 MCG/KG/MIN: at 06:02

## 2017-01-01 RX ADMIN — FUROSEMIDE 20 MG: 10 INJECTION, SOLUTION INTRAMUSCULAR; INTRAVENOUS at 11:02

## 2017-01-01 RX ADMIN — DIGOXIN 0.12 MG: 0.12 TABLET ORAL at 08:01

## 2017-01-01 RX ADMIN — DIGOXIN 125 MCG: 250 INJECTION, SOLUTION INTRAMUSCULAR; INTRAVENOUS; PARENTERAL at 09:02

## 2017-01-01 RX ADMIN — POTASSIUM CHLORIDE 20 MEQ: 200 INJECTION, SOLUTION INTRAVENOUS at 01:02

## 2017-01-01 RX ADMIN — SODIUM POLYSTYRENE SULFONATE 30 G: 15 SUSPENSION ORAL; RECTAL at 11:02

## 2017-01-01 RX ADMIN — AMIODARONE HYDROCHLORIDE 150 MG: 1.5 INJECTION, SOLUTION INTRAVENOUS at 01:02

## 2017-01-01 RX ADMIN — INSULIN ASPART 4 UNITS: 100 INJECTION, SOLUTION INTRAVENOUS; SUBCUTANEOUS at 05:02

## 2017-01-01 RX ADMIN — PHYTONADIONE 5 MG: 10 INJECTION, EMULSION INTRAMUSCULAR; INTRAVENOUS; SUBCUTANEOUS at 06:02

## 2017-01-01 RX ADMIN — HYDROMORPHONE HYDROCHLORIDE 0.5 MG: 2 INJECTION INTRAMUSCULAR; INTRAVENOUS; SUBCUTANEOUS at 12:02

## 2017-01-01 RX ADMIN — SODIUM CHLORIDE 1000 ML: 0.9 INJECTION, SOLUTION INTRAVENOUS at 10:02

## 2017-01-01 RX ADMIN — PIPERACILLIN SODIUM,TAZOBACTAM SODIUM 4.5 G: 4; .5 INJECTION, POWDER, FOR SOLUTION INTRAVENOUS at 10:02

## 2017-01-01 RX ADMIN — SODIUM CHLORIDE: 0.9 INJECTION, SOLUTION INTRAVENOUS at 09:02

## 2017-01-01 RX ADMIN — Medication 3 ML: at 03:02

## 2017-01-01 RX ADMIN — DOPAMINE HYDROCHLORIDE 10 MCG/KG/MIN: 160 INJECTION, SOLUTION INTRAVENOUS at 09:02

## 2017-01-01 RX ADMIN — INSULIN ASPART 2 UNITS: 100 INJECTION, SOLUTION INTRAVENOUS; SUBCUTANEOUS at 12:02

## 2017-01-01 RX ADMIN — INSULIN HUMAN 6 UNITS: 100 INJECTION, SOLUTION PARENTERAL at 02:02

## 2017-01-01 RX ADMIN — PROPOFOL 30 MCG/KG/MIN: 10 INJECTION, EMULSION INTRAVENOUS at 05:02

## 2017-01-01 RX ADMIN — DIGOXIN 125 MCG: 250 INJECTION, SOLUTION INTRAMUSCULAR; INTRAVENOUS; PARENTERAL at 12:02

## 2017-01-01 RX ADMIN — ACETAMINOPHEN 650 MG: 325 TABLET ORAL at 09:02

## 2017-01-01 RX ADMIN — ONDANSETRON 8 MG: 4 TABLET, ORALLY DISINTEGRATING ORAL at 02:01

## 2017-01-01 RX ADMIN — PROPOFOL 5 MCG/KG/MIN: 10 INJECTION, EMULSION INTRAVENOUS at 02:02

## 2017-01-01 RX ADMIN — SODIUM CHLORIDE: 0.9 INJECTION, SOLUTION INTRAVENOUS at 10:02

## 2017-01-01 RX ADMIN — PROPOFOL 20 MCG/KG/MIN: 10 INJECTION, EMULSION INTRAVENOUS at 11:02

## 2017-01-01 RX ADMIN — WARFARIN SODIUM 1 MG: 1 TABLET ORAL at 06:01

## 2017-01-01 RX ADMIN — IOHEXOL 15 ML: 300 INJECTION, SOLUTION INTRAVENOUS at 06:02

## 2017-01-01 RX ADMIN — SODIUM BICARBONATE: 84 INJECTION, SOLUTION INTRAVENOUS at 03:02

## 2017-01-01 RX ADMIN — ONDANSETRON 4 MG: 2 INJECTION INTRAMUSCULAR; INTRAVENOUS at 10:02

## 2017-01-01 RX ADMIN — Medication 0.1 MCG/KG/MIN: at 02:02

## 2017-01-01 RX ADMIN — Medication 3 ML: at 10:02

## 2017-01-01 RX ADMIN — DEXTROSE MONOHYDRATE 25 G: 25 INJECTION, SOLUTION INTRAVENOUS at 08:02

## 2017-01-01 RX ADMIN — PANTOPRAZOLE SODIUM 40 MG: 40 INJECTION, POWDER, FOR SOLUTION INTRAVENOUS at 10:02

## 2017-01-01 RX ADMIN — BISACODYL 10 MG: 10 SUPPOSITORY RECTAL at 03:01

## 2017-01-01 RX ADMIN — ACETAZOLAMIDE 250 MG: 500 INJECTION, POWDER, LYOPHILIZED, FOR SOLUTION INTRAVENOUS at 04:01

## 2017-01-01 RX ADMIN — DEXTROSE 50 % IN WATER (D50W) INTRAVENOUS SYRINGE 50 ML: at 06:02

## 2017-01-01 RX ADMIN — FUROSEMIDE 40 MG: 10 INJECTION, SOLUTION INTRAVENOUS at 09:02

## 2017-01-01 RX ADMIN — AMIODARONE HYDROCHLORIDE 400 MG: 200 TABLET ORAL at 11:02

## 2017-01-01 RX ADMIN — SODIUM POLYSTYRENE SULFONATE 30 G: 15 SUSPENSION ORAL; RECTAL at 04:02

## 2017-01-01 RX ADMIN — SODIUM CHLORIDE 1000 ML: 0.9 INJECTION, SOLUTION INTRAVENOUS at 07:02

## 2017-01-01 RX ADMIN — SODIUM BICARBONATE 50 MEQ: 84 INJECTION INTRAVENOUS at 08:02

## 2017-01-01 RX ADMIN — LORAZEPAM 1 MG: 2 INJECTION, SOLUTION INTRAMUSCULAR; INTRAVENOUS at 12:02

## 2017-01-01 RX ADMIN — SODIUM CHLORIDE 500 ML: 0.9 INJECTION, SOLUTION INTRAVENOUS at 09:01

## 2017-01-01 RX ADMIN — Medication: at 10:02

## 2017-01-01 RX ADMIN — WARFARIN SODIUM 1 MG: 1 TABLET ORAL at 04:01

## 2017-01-01 RX ADMIN — IPRATROPIUM BROMIDE AND ALBUTEROL SULFATE 3 ML: .5; 3 SOLUTION RESPIRATORY (INHALATION) at 04:02

## 2017-01-01 RX ADMIN — FUROSEMIDE 60 MG: 10 INJECTION, SOLUTION INTRAMUSCULAR; INTRAVENOUS at 06:01

## 2017-01-01 RX ADMIN — DOPAMINE HYDROCHLORIDE 5 MCG/KG/MIN: 160 INJECTION, SOLUTION INTRAVENOUS at 01:02

## 2017-01-01 RX ADMIN — VANCOMYCIN HYDROCHLORIDE 1000 MG: 1 INJECTION, POWDER, LYOPHILIZED, FOR SOLUTION INTRAVENOUS at 09:02

## 2017-01-01 RX ADMIN — GLYCOPYRROLATE 0.2 MG: 0.2 INJECTION, SOLUTION INTRAMUSCULAR; INTRAVENOUS at 12:02

## 2017-01-01 RX ADMIN — POTASSIUM CHLORIDE 20 MEQ: 20 SOLUTION ORAL at 01:01

## 2017-01-01 RX ADMIN — METOPROLOL TARTRATE 5 MG: 5 INJECTION INTRAVENOUS at 03:01

## 2017-01-01 RX ADMIN — DIGOXIN 0.12 MG: 125 TABLET ORAL at 08:02

## 2017-01-01 RX ADMIN — ALBUTEROL SULFATE 5 MG: 2.5 SOLUTION RESPIRATORY (INHALATION) at 12:02

## 2017-01-01 RX ADMIN — Medication 0.1 MCG/KG/MIN: at 12:02

## 2017-01-01 RX ADMIN — IPRATROPIUM BROMIDE AND ALBUTEROL SULFATE 3 ML: .5; 3 SOLUTION RESPIRATORY (INHALATION) at 02:02

## 2017-01-01 RX ADMIN — ALBUMIN (HUMAN) 25 G: 12.5 SOLUTION INTRAVENOUS at 05:02

## 2017-01-01 RX ADMIN — LORAZEPAM 1 MG: 2 INJECTION, SOLUTION INTRAMUSCULAR; INTRAVENOUS at 03:02

## 2017-01-01 RX ADMIN — Medication 0.11 MCG/KG/MIN: at 04:02

## 2017-01-01 RX ADMIN — POTASSIUM CHLORIDE 40 MEQ: 400 INJECTION, SOLUTION INTRAVENOUS at 03:02

## 2017-01-01 RX ADMIN — FUROSEMIDE 80 MG: 40 TABLET ORAL at 09:01

## 2017-01-01 RX ADMIN — Medication 0.05 MCG/KG/MIN: at 02:02

## 2017-01-01 RX ADMIN — Medication 0.05 MCG/KG/MIN: at 09:02

## 2017-01-01 RX ADMIN — DOPAMINE HYDROCHLORIDE 10 MCG/KG/MIN: 160 INJECTION, SOLUTION INTRAVENOUS at 07:02

## 2017-01-01 RX ADMIN — PROPOFOL 30 MCG/KG/MIN: 10 INJECTION, EMULSION INTRAVENOUS at 04:02

## 2017-01-01 RX ADMIN — Medication 0.1 MCG/KG/MIN: at 04:02

## 2017-01-01 RX ADMIN — AMIODARONE HYDROCHLORIDE 1 MG/MIN: 1.8 INJECTION, SOLUTION INTRAVENOUS at 06:02

## 2017-01-01 RX ADMIN — RETINOL, ERGOCALCIFEROL, .ALPHA.-TOCOPHEROL ACETATE, DL-, PHYTONADIONE, ASCORBIC ACID, NIACINAMIDE, RIBOFLAVIN 5-PHOSPHATE SODIUM, THIAMINE HYDROCHLORIDE, PYRIDOXINE HYDROCHLORIDE, DEXPANTHENOL, BIOTIN, FOLIC ACID, AND CYANOCOBALAMIN: KIT at 11:02

## 2017-01-01 RX ADMIN — FUROSEMIDE 20 MG: 10 INJECTION, SOLUTION INTRAMUSCULAR; INTRAVENOUS at 07:01

## 2017-01-01 RX ADMIN — CHLOROTHIAZIDE SODIUM 250 MG: 500 INJECTION, POWDER, LYOPHILIZED, FOR SOLUTION INTRAVENOUS at 11:01

## 2017-01-16 PROBLEM — Z79.811 LONG TERM CURRENT USE OF AROMATASE INHIBITOR: Chronic | Status: ACTIVE | Noted: 2017-01-01

## 2017-01-16 PROBLEM — D50.9 IRON DEFICIENCY ANEMIA: Chronic | Status: ACTIVE | Noted: 2017-01-01

## 2017-01-16 PROBLEM — I48.91 ATRIAL FIBRILLATION WITH RVR: Status: ACTIVE | Noted: 2017-01-01

## 2017-01-16 PROBLEM — R07.2 SUBSTERNAL CHEST PAIN: Status: ACTIVE | Noted: 2017-01-01

## 2017-01-16 PROBLEM — I48.0 PAROXYSMAL ATRIAL FIBRILLATION WITH RAPID VENTRICULAR RESPONSE: Status: ACTIVE | Noted: 2017-01-01

## 2017-01-16 NOTE — ED NOTES
Pt states she has not yet felt the urge to urinate since receiving lasix. Pt placed on bedpan and encouraged to urinate. Full diaper noted when placing pt on bedpan- unable to measure output. Pt refuses daniel catheter at this time.

## 2017-01-16 NOTE — ED NOTES
Pt states pain has decreased to 6/10. Denies needs at this time. Lying comfortably in bed speaking with her sister

## 2017-01-16 NOTE — ED NOTES
Pt arrived per [facility] from [facility] c/o right-sided and midsternal chest pain since last night. States pain improves when she belches and when she applies pressure to chest. Denies nausea or SOB. Atrial fibrillation with RVR on monitor on arrival. Skin is warm, dry.

## 2017-01-16 NOTE — ED NOTES
Pt c/o burning to bilateral lower legs. States her unna boots feel too tight. Coban dressing to lower legs appears too tight. Removed. Wound care consult entered, per nursing protocol. Pt reports immediate improvement in pain

## 2017-01-16 NOTE — ED PROVIDER NOTES
"Encounter Date: 1/16/2017       History     Chief Complaint   Patient presents with    Chest Pain     pt arrived per [facility] from [facility] c/o substernal chest pain onset last night after dinner. states pain relieved when she belches     Review of patient's allergies indicates:   Allergen Reactions    Shellfish containing products Itching     Can eat shrimp    Adhesive      Other reaction(s): blisters    Iodinated contrast media - iv dye      Other reaction(s): Vomiting    Iodine      Other reaction(s): Topical  Other reaction(s): Rash    Morphine      Other reaction(s): Hallucinations     HPI Comments: 81yo female with PMHx of HTN, dyslipidemia, CHF, A-fib, A-flutter, SVT, cardiomyopathy, and on anticoagulation with coumadin presents via EMS for CP.  Pt reports she started having CP this morning while she was lying in bed, as reported to me.  Pt was recently DC'd from this facility on 1/5/17 for CHF exacerbation. Pt cannot recall if she has had this type of CP previously and reports, "I can't remember, Miss.  I'm old."      Patient is a 82 y.o. female presenting with the following complaint: chest pain. The history is provided by the patient, the EMS personnel and medical records.   Chest Pain   The current episode started 2 to 3 hours ago. Chest pain occurs constantly. The chest pain is unchanged. At its most intense, the chest pain is at 9/10. The chest pain is currently at 9/10. The quality of the pain is described as similar to previous episodes and aching. The pain radiates to the upper back. Chest pain is worsened by certain positions. Primary symptoms include fatigue, shortness of breath (occasionally), palpitations and vomiting (X2 today). Pertinent negatives for primary symptoms include no fever, no syncope, no cough, no abdominal pain, no nausea, no dizziness and no altered mental status.   The fatigue began today. The fatigue has been unchanged since its onset.   The shortness of breath began " today. The shortness of breath developed at rest. The shortness of breath is moderate. The patient's medical history is significant for CHF.   The palpitations began 1 to 2 hours ago. An episode of palpitations lasts for more than 30 minutes. The palpitations have occurred more than 10 time(s). The palpitations occur while in bed. The palpitations also occurred with shortness of breath (occasionally). The palpitations did not occur with syncope or dizziness.   The vomiting began today. Vomiting occurs 2 to 5 times per day. The emesis contains stomach contents.   Pertinent negatives for associated symptoms include no diaphoresis, no lower extremity edema, no near-syncope, no numbness, no orthopnea, no paroxysmal nocturnal dyspnea and no weakness. She tried nitroglycerin and aspirin for the symptoms. Risk factors include being elderly, obesity and sedentary lifestyle.   Her past medical history is significant for arrhythmia, CHF, hyperlipidemia and hypertension.   Pertinent negatives for past medical history include no aneurysm and no recent injury.   Procedure history is positive for echocardiogram.     Past Medical History   Diagnosis Date    *Atrial fibrillation     *Atrial flutter     Atrial fibrillation     Atrial flutter     Cardiomyopathy     CHF (congestive heart failure)     Colon cancer     Dyslipidemia     Hx of breast cancer     Hyperlipidemia     Hypertension     Supraventricular tachycardia      PAF, AFL     No past medical history pertinent negatives.  Past Surgical History   Procedure Laterality Date    Colon surgery  2000     upper colon removal    Partial hip arthroplasty       anuerysm in hip/right hip    Cataract extraction  8/5/09     both eyes (Dr. Adam)    Breast cyst excision       R right     Family History   Problem Relation Age of Onset    Heart disease Mother     Hypertension Mother     Diabetes Mother     Glaucoma Mother     Glaucoma Maternal Aunt      Social History    Substance Use Topics    Smoking status: Never Smoker    Smokeless tobacco: Never Used    Alcohol use No     Review of Systems   Constitutional: Positive for fatigue. Negative for chills, diaphoresis and fever.   HENT: Negative for rhinorrhea and sore throat.    Respiratory: Positive for chest tightness and shortness of breath (occasionally). Negative for cough.    Cardiovascular: Positive for chest pain and palpitations. Negative for orthopnea, leg swelling, syncope and near-syncope.   Gastrointestinal: Positive for vomiting (X2 today). Negative for abdominal pain, diarrhea and nausea.   Musculoskeletal: Positive for back pain (radiation from chest).   Skin: Negative for rash.   Allergic/Immunologic: Negative for immunocompromised state.   Neurological: Negative for dizziness, syncope, weakness, numbness and headaches.   Psychiatric/Behavioral: Negative for confusion.   All other systems reviewed and are negative.      Physical Exam   Initial Vitals   BP Pulse Resp Temp SpO2   01/16/17 0857 01/16/17 0857 01/16/17 0857 01/16/17 0857 01/16/17 0857   106/74 131 18 97.7 °F (36.5 °C) 95 %     Physical Exam    Nursing note and vitals reviewed.  Constitutional: She appears well-developed and well-nourished. She is active and cooperative.  Non-toxic appearance. She does not have a sickly appearance. She does not appear ill. She appears distressed.   HENT:   Head: Normocephalic and atraumatic.   Eyes: Conjunctivae and EOM are normal.   Neck: Normal range of motion. Neck supple.   Cardiovascular: An irregularly irregular rhythm present. Tachycardia present.    Pulses:       Radial pulses are 2+ on the right side, and 2+ on the left side.        Dorsalis pedis pulses are 2+ on the right side, and 2+ on the left side.   Pulmonary/Chest: Effort normal and breath sounds normal.   Abdominal: Soft. Normal appearance and bowel sounds are normal. She exhibits no distension. There is no tenderness. There is no rigidity, no  rebound, no guarding and no CVA tenderness.   Musculoskeletal:   LE wrapped bilaterally.    Neurological: She is alert and oriented to person, place, and time. She has normal strength. GCS eye subscore is 4. GCS verbal subscore is 5. GCS motor subscore is 6.   Skin: Skin is warm, dry and intact. No rash noted.   Psychiatric: She has a normal mood and affect. Her speech is normal and behavior is normal. Judgment and thought content normal. Cognition and memory are normal.         ED Course   Procedures  Labs Reviewed   CBC W/ AUTO DIFFERENTIAL - Abnormal; Notable for the following:        Result Value    Hemoglobin 11.5 (*)     MCH 24.6 (*)     MCHC 29.8 (*)     RDW 20.0 (*)     Platelets 359 (*)     Gran # 11.1 (*)     Lymph # 0.7 (*)     Gran% 87.2 (*)     Lymph% 5.4 (*)     All other components within normal limits    Narrative:     PLEASE REVIEW ORDER START TIME BEFORE MARKING SPECIMEN  COLLECTED.   PROTIME-INR - Abnormal; Notable for the following:     Prothrombin Time 19.6 (*)     INR 1.9 (*)     All other components within normal limits   CBC W/ AUTO DIFFERENTIAL - Abnormal; Notable for the following:     WBC 14.67 (*)     Hemoglobin 11.1 (*)     MCH 25.3 (*)     MCHC 29.8 (*)     RDW 18.5 (*)     Gran # 12.2 (*)     Lymph # 0.8 (*)     Mono # 1.6 (*)     Gran% 83.0 (*)     Lymph% 5.4 (*)     All other components within normal limits    Narrative:     PLEASE REVIEW ORDER START TIME BEFORE MARKING SPECIMEN  COLLECTED.   COMPREHENSIVE METABOLIC PANEL - Abnormal; Notable for the following:     CO2 20 (*)     Glucose 124 (*)     BUN, Bld 61 (*)     Creatinine 2.0 (*)     Albumin 2.9 (*)     Total Bilirubin 1.8 (*)     Anion Gap 19 (*)     eGFR if  26 (*)     eGFR if non  23 (*)     All other components within normal limits    Narrative:     PLEASE REVIEW ORDER START TIME BEFORE MARKING SPECIMEN  COLLECTED.   TROPONIN I - Abnormal; Notable for the following:     Troponin I 0.034 (*)      All other components within normal limits    Narrative:     PLEASE REVIEW ORDER START TIME BEFORE MARKING SPECIMEN  COLLECTED.   B-TYPE NATRIURETIC PEPTIDE - Abnormal; Notable for the following:     BNP 1641 (*)     All other components within normal limits    Narrative:     PLEASE REVIEW ORDER START TIME BEFORE MARKING SPECIMEN  COLLECTED.   TROPONIN I - Abnormal; Notable for the following:     Troponin I 0.034 (*)     All other components within normal limits    Narrative:     PLEASE REVIEW ORDER START TIME BEFORE MARKING SPECIMEN  COLLECTED.              Imaging Results         X-Ray Chest AP Portable (Final result) Result time:  01/16/17 10:04:40    Final result by Keith Caruso MD (01/16/17 10:04:40)    Impression:     Stable appearance of the chest with findings suggestive of pulmonary edema and possible interstitial lung disease.      Electronically signed by: KEITH CARUSO MD  Date:     01/16/17  Time:    10:04     Narrative:    Chest pain.    Comparison: 1/2/17.    Single frontal view of the chest was obtained.    Trachea is patent.  Cardiac silhouette is enlarged.  There is atherosclerosis.  Prominent interstitial lung markings again noted some of which may be related to edema.  No new consolidation, mass, effusion, pneumothorax or free air below the diaphragm.  Clips are seen in the right axilla.  Advanced degenerative changes of the shoulders noted.  Most are similar to prior.              Medical Decision Making:   Initial Assessment:   81yo female here for CP which started this morning.  Pt has history of HTN, dyslipidemia, CHF, a-fib, a-flutter, cardiomyopathy.    Differential Diagnosis:   STEMI/NStemi, A-Fib RVR, Pneumonia, pneumothorax, GERD  Clinical Tests:   Lab Tests: Ordered and Reviewed  Radiological Study: Ordered and Reviewed  Medical Tests: Ordered and Reviewed  ED Management:  Labs, IV meds, EKG, CXR.  Pt was turned over to  at 1000.               Attending Attestation:      Physician Attestation Statement for NP/PA:   I have conducted a face to face encounter with this patient in addition to the NP/PA, due to NP/PA Request    Other NP/PA Attestation Additions:    History of Present Illness: 82-year-old female chest pain.   Physical Exam: CV : Irregularly irregular.  Diminished breath sounds bilaterally.                  ED Course     Clinical Impression:   The primary encounter diagnosis was Chest pain, unspecified type. Diagnoses of Congestive heart failure, unspecified congestive heart failure chronicity, unspecified congestive heart failure type and Atrial fibrillation with RVR were also pertinent to this visit.          KHALIDA Gunn  01/16/17 1154  82-year-old female     Yury Kaur MD  01/16/17 8938

## 2017-01-16 NOTE — H&P
[facility]  History & Physical    Patient Name: Myra Murphy  MRN: [id number]  Admission Date: 2017  Attending Physician: Denys Zuñiga MD  Primary Care Provider: Lupe Delatorre MD         Patient information was obtained from patient, relative(s), past medical records and ER records.     Subjective:     Principal Problem:Substernal chest pain    Chief Complaint:  Substernal chest pain     HPI: Myra Murphy is a 82 y.o. black woman with history of ALYSE-BSO in , hypertension, hyperlipidemia, gastroesophageal reflux disease, memory deficit, degenerative joint disease of the knees and spine, gout, peripheral neuralgia, history of colon cancer s/p resection in , history of right breast cancer (T1N3Mx ER/ND positive, Her-2 Colleen negative) s/p right partial mastectomy and lymph node dissection 3/14/08 and chemotherapy (docataxel, doxorubicin, cyclophosphamide) on aromatase inhibitor therapy, history of total hip arthroplasty 3/05/10, peripheral vascular disease, chronic venous stasis dermatitis of the legs, obstructive sleep apnea (supposed to be on CPAP 12 cmH2O but CPAP broken), 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 She lives at [facility] in [place], [place]. She used to be a . Her sister Caty Figueroa is involved in her medical care. Her primary care physician is Dr. Lupe Delatorre. Her cardiologist is Dr. Sunshine Mckeon. She has refused ICD placement.     On 16 a nurse recommended to her primary care physician, Dr. Delatorre, to decrease her furosemide from 80 mg to 20 mg  "daily. Dr. Delatorre agreed. She was subsequently hospitalized under the care of Cardiology at [facility] from 12/26/16-1/05/17 for CHF exacerbation cardiogenic shock, hypoxia, and mental decline. She had developed acute renal failure. She was diuresed with IV furosemide and dobutamine drips, removing 20 lbs of fluid (181 lbs to 161 lbs). Furosemide was increased to 80 mg twice daily. Digoxin was discontinued, and she was started on hydralazine and isosorbide mononitrate. She was fluid restricted to 1500 mL daily. She was discharged to [facility] skilled nursing facility.     On 1/15/17 she began having substernal after dinner, described as "sticking" and nonradiating. The night nurse reassured her, but the pain persisted the next morning, so the day shift nurse sent her to [facility] ED. She received aspirin 325 mg and sublingual nitroglycerin without relief. She was found to be in atrial fibrillation with rapid ventricular response (pulse 134 bpm on EKG). Chest x-ray showed stable pulmonary edema. Labs showed new leukocytosis (14,670) and mildly elevated troponin (0.034). BNP was improved (1641) from 12/31/16 (2770). She was given diltiazem 20 mg IV, furosemide 80 mg IV, and ondansetron 4 mg IV, reporting some relief. She was admitted to [facility] to further evaluate her chest pain.       Past Medical History   Diagnosis Date    Atrial fibrillation     Atrial flutter     Carcinoma of right breast, estrogen receptor negative and progesterone receptor positive 02/2008    Chronic combined systolic and diastolic congestive heart failure     Colon cancer     Dilated cardiomyopathy 12/01/2008     adriamycin toxicity    Dyslipidemia     Hyperlipidemia     Hypertension     Supraventricular tachycardia      PAF, AFL       Past Surgical History   Procedure Laterality Date    Colon surgery  2000     upper colon removal    Cataract extraction  8/5/09     both eyes (Dr." Kia)    Cardioversion  04/24/2014    Total abdominal hysterectomy w/ bilateral salpingoophorectomy  1973    Mastectomy, partial Right 03/14/2008     R right    Total hip arthroplasty  03/05/2010     anuerysm in hip/right hip       Review of patient's allergies indicates:   Allergen Reactions    Shellfish containing products Itching     Can eat shrimp    Adhesive      Other reaction(s): blisters    Iodinated contrast media - iv dye      Other reaction(s): Vomiting    Iodine      Other reaction(s): Topical  Other reaction(s): Rash    Morphine      Other reaction(s): Hallucinations       No current facility-administered medications on file prior to encounter.      Current Outpatient Prescriptions on File Prior to Encounter   Medication Sig    acetaminophen (TYLENOL) 325 MG tablet Take 2 tablets (650 mg total) by mouth every 6 (six) hours as needed for Pain. Do NOT exceed 3000 mg in a 24 hour time period and do not take with other medications containing acetaminophen  Do NOT drink alcohol when taking this medication    allopurinol (ZYLOPRIM) 100 MG tablet TAKE 1 TABLET(100 MG) BY MOUTH THREE TIMES DAILY (Patient taking differently: Take 100 mg by mouth once daily. TAKE 1 TABLET(100 MG) BY MOUTH THREE TIMES DAILY)    fluticasone (FLONASE) 50 mcg/actuation nasal spray 1 spray by Each Nare route once daily.    furosemide (LASIX) 40 MG tablet Take 2 tablets (80 mg total) by mouth 2 (two) times daily. 80 mg twice daily for 1 week. Then, take 80 mg once daily    hydrALAZINE (APRESOLINE) 25 MG tablet Take 1 tablet (25 mg total) by mouth every 8 (eight) hours.    isosorbide mononitrate (IMDUR) 30 MG 24 hr tablet Take 1 tablet (30 mg total) by mouth once daily.    letrozole (FEMARA) 2.5 mg Tab Take 1 tablet (2.5 mg total) by mouth once daily.    metoprolol succinate (TOPROL-XL) 25 MG 24 hr tablet Take 2 tablets (50 mg total) by mouth once daily.    omeprazole (PRILOSEC) 20 MG capsule Take 1 capsule (20 mg  total) by mouth once daily.    pravastatin (PRAVACHOL) 40 MG tablet Take 1 tablet (40 mg total) by mouth once daily.    senna-docusate 8.6-50 mg (PERICOLACE) 8.6-50 mg per tablet Take 1 tablet by mouth daily as needed for Constipation.    warfarin (COUMADIN) 1 MG tablet Take 1 tablet (1 mg total) by mouth Daily. Adjust to keep INR between 2.0-3.0     Family History     Problem Relation (Age of Onset)    Diabetes Mother    Glaucoma Mother, Maternal Aunt    Heart disease Mother    Hypertension Mother        Social History Main Topics    Smoking status: Never Smoker    Smokeless tobacco: Never Used    Alcohol use No    Drug use: No    Sexual activity: Not on file     Review of Systems   Constitutional: Negative for chills and fever.   HENT: Negative for sore throat and trouble swallowing.    Eyes: Negative for pain and redness.   Respiratory: Negative for cough and shortness of breath.    Cardiovascular: Positive for chest pain. Negative for leg swelling.   Gastrointestinal: Negative for nausea and vomiting.   Genitourinary: Negative for difficulty urinating and dysuria.   Musculoskeletal: Negative for myalgias and neck pain.   Skin: Negative for color change and rash.   Neurological: Negative for seizures and syncope.     Objective:     Vital Signs (Most Recent):  Temp: 97.4 °F (36.3 °C) (01/16/17 1428)  Pulse: (!) 126 (01/16/17 1430)  Resp: 20 (01/16/17 1430)  BP: (!) 104/53 (01/16/17 1430)  SpO2: 99 % (01/16/17 1430) Vital Signs (24h Range):  Temp:  [97.4 °F (36.3 °C)-97.7 °F (36.5 °C)] 97.4 °F (36.3 °C)  Pulse:  [] 126  Resp:  [18-24] 20  SpO2:  [95 %-99 %] 99 %  BP: ()/(51-86) 104/53     Weight: 75.3 kg (166 lb)  Body mass index is 32.42 kg/(m^2).    Physical Exam   Constitutional: She appears well-developed. No distress.   HENT:   Head: Normocephalic and atraumatic.   Eyes: Conjunctivae are normal. Pupils are equal, round, and reactive to light.   Neck: Neck supple. JVD present.    Cardiovascular: An irregularly irregular rhythm present. Tachycardia present.    Murmur heard.   Systolic murmur is present   Pulmonary/Chest: Effort normal. She has rales.   Abdominal: Soft. There is no tenderness.   Musculoskeletal: She exhibits no edema or tenderness.   Neurological: She is alert.   Answers questions about history wrongly that her sister corrects   Skin: Skin is warm and dry.   Psychiatric: Her affect is blunt. Cognition and memory are impaired.        Significant Labs: All pertinent labs within the past 24 hours have been reviewed.  See HPI for significant lab results.    Significant Imaging:   EKG 1/16/17: Atrial fibrillation with rapid ventricular response with premature ventricular or aberrantly conducted complexes. No significant change from 12/26/16.  X-Ray Chest AP Portable 1/16/17: Comparison: 1/2/17. Trachea is patent.  Cardiac silhouette is enlarged.  There is atherosclerosis.  Prominent interstitial lung markings again noted some of which may be related to edema.  No new consolidation, mass, effusion, pneumothorax or free air below the diaphragm.  Clips are seen in the right axilla.  Advanced degenerative changes of the shoulders noted.  Most are similar to prior.  Impression: Stable appearance of the chest with findings suggestive of pulmonary edema and possible interstitial lung disease.    Assessment/Plan:     * Substernal chest pain due to Paroxysmal atrial fibrillation with rapid ventricular response  Restart digoxin, renally dosed at 0.125 mg every other day.      MANE (obstructive sleep apnea)  CPAP broken. Follow up with Dr. Delatorre.      Peripheral vascular disease  Continue pravastatin 40 mg daily.       Chronic venous stasis dermatitis of both lower extremities  Legs wrapped. Continue wound care at SNF as ordered from main [place].      Chronic combined systolic and diastolic CHF, NYHA class 4  Continue hydralazine 25 mg q8h, isosorbide mononitrate 30 mg daily,furosemide 80 mg  twice daily. Takes Toprol-XL 50 mg daily and will increase to 100 mg.      Chronic kidney disease, stage 4, severely decreased GFR  Stable on admission.      Atrial fibrillation with RVR  Increase Toprol-XL 50 mg to 100 mg.      Iron deficiency anemia  Stable on admission. Was transfused pRBCs at [facility].      Memory deficit  Patient denies. Sister reports worsening since admission to [facility], likely due to hypoxic encephalopathy with residual effects.      VTE Risk Mitigation     None        Disposition: Return to SNF tomorrow if rate controlled.    Denys Zuñiga MD  Department of Hospital Medicine   [facility]

## 2017-01-16 NOTE — SUBJECTIVE & OBJECTIVE
Past Medical History   Diagnosis Date    Atrial fibrillation     Atrial flutter     Carcinoma of right breast, estrogen receptor negative and progesterone receptor positive 02/2008    Chronic combined systolic and diastolic congestive heart failure     Colon cancer     Dilated cardiomyopathy 12/01/2008     adriamycin toxicity    Dyslipidemia     Hyperlipidemia     Hypertension     Supraventricular tachycardia      PAF, AFL       Past Surgical History   Procedure Laterality Date    Colon surgery  2000     upper colon removal    Cataract extraction  8/5/09     both eyes (Dr. Adam)    Cardioversion  04/24/2014    Total abdominal hysterectomy w/ bilateral salpingoophorectomy  1973    Mastectomy, partial Right 03/14/2008     R right    Total hip arthroplasty  03/05/2010     anuerysm in hip/right hip       Review of patient's allergies indicates:   Allergen Reactions    Shellfish containing products Itching     Can eat shrimp    Adhesive      Other reaction(s): blisters    Iodinated contrast media - iv dye      Other reaction(s): Vomiting    Iodine      Other reaction(s): Topical  Other reaction(s): Rash    Morphine      Other reaction(s): Hallucinations       No current facility-administered medications on file prior to encounter.      Current Outpatient Prescriptions on File Prior to Encounter   Medication Sig    acetaminophen (TYLENOL) 325 MG tablet Take 2 tablets (650 mg total) by mouth every 6 (six) hours as needed for Pain. Do NOT exceed 3000 mg in a 24 hour time period and do not take with other medications containing acetaminophen  Do NOT drink alcohol when taking this medication    allopurinol (ZYLOPRIM) 100 MG tablet TAKE 1 TABLET(100 MG) BY MOUTH THREE TIMES DAILY (Patient taking differently: Take 100 mg by mouth once daily. TAKE 1 TABLET(100 MG) BY MOUTH THREE TIMES DAILY)    fluticasone (FLONASE) 50 mcg/actuation nasal spray 1 spray by Each Nare route once daily.    furosemide  (LASIX) 40 MG tablet Take 2 tablets (80 mg total) by mouth 2 (two) times daily. 80 mg twice daily for 1 week. Then, take 80 mg once daily    hydrALAZINE (APRESOLINE) 25 MG tablet Take 1 tablet (25 mg total) by mouth every 8 (eight) hours.    isosorbide mononitrate (IMDUR) 30 MG 24 hr tablet Take 1 tablet (30 mg total) by mouth once daily.    letrozole (FEMARA) 2.5 mg Tab Take 1 tablet (2.5 mg total) by mouth once daily.    metoprolol succinate (TOPROL-XL) 25 MG 24 hr tablet Take 2 tablets (50 mg total) by mouth once daily.    omeprazole (PRILOSEC) 20 MG capsule Take 1 capsule (20 mg total) by mouth once daily.    pravastatin (PRAVACHOL) 40 MG tablet Take 1 tablet (40 mg total) by mouth once daily.    senna-docusate 8.6-50 mg (PERICOLACE) 8.6-50 mg per tablet Take 1 tablet by mouth daily as needed for Constipation.    warfarin (COUMADIN) 1 MG tablet Take 1 tablet (1 mg total) by mouth Daily. Adjust to keep INR between 2.0-3.0     Family History     Problem Relation (Age of Onset)    Diabetes Mother    Glaucoma Mother, Maternal Aunt    Heart disease Mother    Hypertension Mother        Social History Main Topics    Smoking status: Never Smoker    Smokeless tobacco: Never Used    Alcohol use No    Drug use: No    Sexual activity: Not on file     Review of Systems   Constitutional: Negative for chills and fever.   HENT: Negative for sore throat and trouble swallowing.    Eyes: Negative for pain and redness.   Respiratory: Negative for cough and shortness of breath.    Cardiovascular: Positive for chest pain. Negative for leg swelling.   Gastrointestinal: Negative for nausea and vomiting.   Genitourinary: Negative for difficulty urinating and dysuria.   Musculoskeletal: Negative for myalgias and neck pain.   Skin: Negative for color change and rash.   Neurological: Negative for seizures and syncope.     Objective:     Vital Signs (Most Recent):  Temp: 97.4 °F (36.3 °C) (01/16/17 1428)  Pulse: (!) 126  (01/16/17 1430)  Resp: 20 (01/16/17 1430)  BP: (!) 104/53 (01/16/17 1430)  SpO2: 99 % (01/16/17 1430) Vital Signs (24h Range):  Temp:  [97.4 °F (36.3 °C)-97.7 °F (36.5 °C)] 97.4 °F (36.3 °C)  Pulse:  [] 126  Resp:  [18-24] 20  SpO2:  [95 %-99 %] 99 %  BP: ()/(51-86) 104/53     Weight: 75.3 kg (166 lb)  Body mass index is 32.42 kg/(m^2).    Physical Exam   Constitutional: She appears well-developed. No distress.   HENT:   Head: Normocephalic and atraumatic.   Eyes: Conjunctivae are normal. Pupils are equal, round, and reactive to light.   Neck: Neck supple. JVD present.   Cardiovascular: An irregularly irregular rhythm present. Tachycardia present.    Murmur heard.   Systolic murmur is present   Pulmonary/Chest: Effort normal. She has rales.   Abdominal: Soft. There is no tenderness.   Musculoskeletal: She exhibits no edema or tenderness.   Neurological: She is alert.   Answers questions about history wrongly that her sister corrects   Skin: Skin is warm and dry.   Psychiatric: Her affect is blunt. Cognition and memory are impaired.        Significant Labs: All pertinent labs within the past 24 hours have been reviewed.  See HPI for significant lab results.    Significant Imaging:   EKG 1/16/17: Atrial fibrillation with rapid ventricular response with premature ventricular or aberrantly conducted complexes. No significant change from 12/26/16.  X-Ray Chest AP Portable 1/16/17: Comparison: 1/2/17. Trachea is patent.  Cardiac silhouette is enlarged.  There is atherosclerosis.  Prominent interstitial lung markings again noted some of which may be related to edema.  No new consolidation, mass, effusion, pneumothorax or free air below the diaphragm.  Clips are seen in the right axilla.  Advanced degenerative changes of the shoulders noted.  Most are similar to prior.  Impression: Stable appearance of the chest with findings suggestive of pulmonary edema and possible interstitial lung disease.

## 2017-01-16 NOTE — IP AVS SNAPSHOT
[facility]  [address]  [place] [address]  Phone: [phone]           Patient Discharge Instructions     Our goal is to set you up for success. This packet includes information on your condition, medications, and your home care. It will help you to care for yourself so you don't get sicker and need to go back to the hospital.     Please ask your nurse if you have any questions.        There are many details to remember when preparing to leave the hospital. Here is what you will need to do:    1. Take your medicine. If you are prescribed medications, review your Medication List in the following pages. You may have new medications to  at the pharmacy and others that you'll need to stop taking. Review the instructions for how and when to take your medications. Talk with your doctor or nurses if you are unsure of what to do.     2. Go to your follow-up appointments. Specific follow-up information is listed in the following pages. Your may be contacted by a transition nurse or clinical provider about future appointments. Be sure we have all of the phone numbers to reach you, if needed. Please contact your provider's office if you are unable to make an appointment.     3. Watch for warning signs. Your doctor or nurse will give you detailed warning signs to watch for and when to call for assistance. These instructions may also include educational information about your condition. If you experience any of warning signs to your health, call your doctor.               ** Verify the list of medication(s) below is accurate and up to date. Carry this with you in case of emergency. If your medications have changed, please notify your healthcare provider.             Medication List      CHANGE how you take these medications        Additional Info                      allopurinol 100 MG tablet   Commonly known as:  ZYLOPRIM   Quantity:  270 tablet   Refills:  1   Dose:  100 mg   What changed:    -  how much to take  - how to take this  - when to take this  - additional instructions   Comments:  **Patient requests 90 days supply**    Last time this was given:  100 mg on 1/24/2017  8:49 AM   Instructions:  Take 1 tablet (100 mg total) by mouth once daily.     Begin Date    AM    Noon    PM    Bedtime       furosemide 40 MG tablet   Commonly known as:  LASIX   Quantity:  180 tablet   Refills:  3   Dose:  80 mg   What changed:    - when to take this  - additional instructions    Last time this was given:  80 mg on 1/24/2017  8:49 AM   Instructions:  Take 2 tablets (80 mg total) by mouth once daily.     Begin Date    AM    Noon    PM    Bedtime       metoprolol succinate 100 MG 24 hr tablet   Commonly known as:  TOPROL-XL   Refills:  0   Dose:  100 mg   What changed:    - medication strength  - how much to take    Last time this was given:  100 mg on 1/24/2017  8:49 AM   Instructions:  Take 1 tablet (100 mg total) by mouth once daily.     Begin Date    AM    Noon    PM    Bedtime       warfarin 1 MG tablet   Commonly known as:  COUMADIN   Quantity:  240 tablet   Refills:  6   Dose:  1 mg   What changed:  additional instructions   Comments:  **Patient requests 90 days supply**    Last time this was given:  1 mg on 1/23/2017  4:45 PM   Instructions:  Take 1 tablet (1 mg total) by mouth Daily. Adjust to keep INR between 2.0-3.0     Begin Date    AM    Noon    PM    Bedtime         CONTINUE taking these medications        Additional Info                      acetaminophen 325 MG tablet   Commonly known as:  TYLENOL   Refills:  0   Dose:  650 mg    Last time this was given:  650 mg on 1/23/2017  8:45 PM   Instructions:  Take 2 tablets (650 mg total) by mouth every 6 (six) hours as needed for Pain. Do NOT exceed 3000 mg in a 24 hour time period and do not take with other medications containing acetaminophen Do NOT drink alcohol when taking this medication     Begin Date    AM    Noon    PM    Bedtime       fluticasone 50  mcg/actuation nasal spray   Commonly known as:  FLONASE   Quantity:  16 g   Refills:  0   Dose:  1 spray    Instructions:  1 spray by Each Nare route once daily.     Begin Date    AM    Noon    PM    Bedtime       letrozole 2.5 mg Tab   Commonly known as:  FEMARA   Quantity:  90 tablet   Refills:  1   Dose:  2.5 mg    Instructions:  Take 1 tablet (2.5 mg total) by mouth once daily.     Begin Date    AM    Noon    PM    Bedtime       omeprazole 20 MG capsule   Commonly known as:  PRILOSEC   Quantity:  90 capsule   Refills:  1   Dose:  20 mg   Comments:  **Patient requests 90 days supply**    Instructions:  Take 1 capsule (20 mg total) by mouth once daily.     Begin Date    AM    Noon    PM    Bedtime       potassium chloride SA 20 MEQ tablet   Commonly known as:  K-DUR,KLOR-CON   Refills:  0   Dose:  20 mEq    Instructions:  Take 20 mEq by mouth once daily.     Begin Date    AM    Noon    PM    Bedtime       pravastatin 40 MG tablet   Commonly known as:  PRAVACHOL   Quantity:  90 tablet   Refills:  1   Dose:  40 mg   Comments:  **Patient requests 90 days supply**    Last time this was given:  40 mg on 1/24/2017  8:49 AM   Instructions:  Take 1 tablet (40 mg total) by mouth once daily.     Begin Date    AM    Noon    PM    Bedtime       senna-docusate 8.6-50 mg 8.6-50 mg per tablet   Commonly known as:  PERICOLACE   Refills:  0   Dose:  1 tablet    Instructions:  Take 1 tablet by mouth daily as needed for Constipation.     Begin Date    AM    Noon    PM    Bedtime         STOP taking these medications     digoxin 125 mcg tablet   Commonly known as:  LANOXIN       gabapentin 100 MG capsule   Commonly known as:  NEURONTIN       gabapentin 600 MG tablet   Commonly known as:  NEURONTIN       hydrALAZINE 25 MG tablet   Commonly known as:  APRESOLINE       isosorbide mononitrate 30 MG 24 hr tablet   Commonly known as:  IMDUR            Where to Get Your Medications      Information about where to get these medications is  not yet available     ! Ask your nurse or doctor about these medications     allopurinol 100 MG tablet    furosemide 40 MG tablet    metoprolol succinate 100 MG 24 hr tablet                  Please bring to all follow up appointments:    1. A copy of your discharge instructions.  2. All medicines you are currently taking in their original bottles.  3. Identification and insurance card.    Please arrive 15 minutes ahead of scheduled appointment time.    Please call 24 hours in advance if you must reschedule your appointment and/or time.        Your Scheduled Appointments     Feb 06, 2017 10:00 AM [facility]   Hospital Follow Up with MD Ben Saucedo - Internal Medicine ([facility])    [address]  [facility] [address]   [phone]            Mar 06, 2017  9:20 AM [facility]   New Patient with Haris Rodriguez MD   [facility] ([facility]    [address]  [facility] [address]   [phone]              Follow-up Information     Follow up with Lupe Delatorre MD On 2/6/2017.    Specialty:  Family Medicine    Why:  time: 10:00    Contact information:    [address]  [facility] [address]  [phone]          Follow up with [facility] On 1/24/2017.    Specialties:  Nursing Home Agency, SNF Agency    Why:  SNF    Contact information:    [place] [address]  [phone]          Discharge Instructions     Future Orders    Activity: Per rehab recommendations     Diet Adult Regular     Questions:    Total calories:      Fat restriction, if any:      Protein restriction, if any:      Na restriction, if any:  2gNa    Fluid restriction:      Additional restrictions:      Full code     No activity restrictions     Skin assessment every shift      Vital signs per facility protocol         Discharge Instructions       HEART FAILURE, WHAT IS   HEART FAILURE: MAKING CHANGES TO YOUR DIET   HEART FAILURE: MEDICINES TO HELP YOUR HEART    HEART FAILURE: TAKING MEDICATION TO CONTROL   HEART FAILURE: TRACKING YOUR WEIGHT   ATRIAL FIBRILLATION, UNDERSTANDING   ATRIAL FLUTTER/FIBRILLATION, WHAT IS     HEART FAILURE, DISCHARGE INSTRUCTIONS FOR         Discharge References/Attachments     HEART FAILURE, WHAT IS (ENGLISH)    HEART FAILURE: MAKING CHANGES TO YOUR DIET (ENGLISH)    HEART FAILURE: TRACKING YOUR WEIGHT (ENGLISH)    ATRIAL FIBRILLATION, UNDERSTANDING (ENGLISH)    HEART FAILURE: BEING ACTIVE (ENGLISH)    HYPERTENSION, ESTABLISHED (ENGLISH)    ANEMIA, IRON-DEFICIENCY (ADULT) (ENGLISH)    CHRONIC KIDNEY DISEASE (CKD) (ENGLISH)    DEALING WITH SLEEP PROBLEMS, HEART FAILURE (ENGLISH)    HEART FAILURE, DISCHARGE INSTRUCTIONS FOR (ENGLISH)        Primary Diagnosis     Your primary diagnosis was:  Paroxysmal Atrial Fibrillation With Rapid Ventricular Response      Admission Information     Date & Time Provider Department CSN    1/16/2017  9:00 AM Denys Zuñiga MD [facility] [id number]      Care Providers     Provider Role Specialty Primary office phone    Denys Zuñiga MD Attending Provider Hospitalist [phone]    Denys Zuñiga MD Physician  Hospitalist  [phone]    Chanelle Galan MD Consulting Physician  Nephrology [phone]    Vinay Pretty MD Consulting Physician  INTERVENTIONAL CARDIOLOGY [phone]    Natividad Schultz MD Consulting Physician  Cardiology [phone]    Skyler Saba MD Consulting Physician  Cardiology [phone]    Jason Cespedes MD Consulting Physician  Cardiology [phone]      Important Medicare Message          Most Recent Value    Important Message from Medicare Regarding Discharge Appeal Rights  Signed/date by patient/caregiver, Explained to patient/caregiver, Given to patient/caregiver yes 01/23/2017 1159      Your Vitals Were     BP Pulse Temp Resp Height Weight    102/57 (BP Location: Left arm, Patient Position: Lying, BP Method: Automatic) 76 97.8 °F (36.6 °C)  (Oral) 20 5' (1.524 m) 64.9 kg (143 lb)    SpO2 BMI             97% 27.93 kg/m2         Recent Lab Values        3/7/2005 2/21/2014 4/22/2014 6/22/2015 6/22/2015 8/22/2016 12/27/2016        10:23 AM 12:52 PM 12:10 PM  3:44 PM  3:47 PM  1:07 PM  3:00 AM     A1C 5.6 6.5 (H) 7.1 (H) 5.3 5.2 5.0 6.1          5.2       Comment for A1C at  1:07 PM on 8/22/2016:  According to ADA guidelines, hemoglobin A1C <7.0% represents  optimal control in non-pregnant diabetic patients.  Different  metrics may apply to specific populations.   Standards of Medical Care in Diabetes - 2016.  For the purpose of screening for the presence of diabetes:  <5.7%     Consistent with the absence of diabetes  5.7-6.4%  Consistent with increasing risk for diabetes   (prediabetes)  >or=6.5%  Consistent with diabetes  Currently no consensus exists for use of hemoglobin A1C  for diagnosis of diabetes for children.      Comment for A1C at  3:00 AM on 12/27/2016:  According to ADA guidelines, hemoglobin A1C <7.0% represents  optimal control in non-pregnant diabetic patients.  Different  metrics may apply to specific populations.   Standards of Medical Care in Diabetes - 2016.  For the purpose of screening for the presence of diabetes:  <5.7%     Consistent with the absence of diabetes  5.7-6.4%  Consistent with increasing risk for diabetes   (prediabetes)  >or=6.5%  Consistent with diabetes  Currently no consensus exists for use of hemoglobin A1C  for diagnosis of diabetes for children.        Allergies as of 1/24/2017        Reactions    Shellfish Containing Products Itching    Can eat shrimp    Adhesive     Other reaction(s): blisters    Iodinated Contrast Media - Iv Dye     Other reaction(s): Vomiting    Iodine     Other reaction(s): Topical  Other reaction(s): Rash    Morphine     Other reaction(s): Hallucinations      [facility]     [facility] Nurse Care Line - 24/7 Assistance  Unless otherwise directed by your provider, please contact  [facility], our nurse care line that is available for 24/7 assistance.     Registered nurses in the [facility] provide clinical advisement, health education, appointment booking, and other advisory services.  Call for this free service at [phone].        Advance Directives     An advance directive is a document which, in the event you are no longer able to make decisions for yourself, tells your healthcare team what kind of treatment you do or do not want to receive, or who you would like to make those decisions for you.  If you do not currently have an advance directive, [facility] encourages you to create one.  For more information call:  [phone]), [phone] ([phone]),  or log on to [email or web address].        Language Assistance Services     ATTENTION: Language assistance services are available, free of charge. Please call [phone].      ATENCIÓN: Si habla español, tiene a zaragoza disposición servicios gratuitos de asistencia lingüística. Llame al [phone].     CHÚ Ý: N?u b?n nói Ti?ng Vi?t, có các d?ch v? h? tr? ngôn ng? mi?n phí dành cho b?n. G?i s? [phone].        Heart Failure Education       Heart Failure: Being Active  You have a condition called heart failure. Being active doesnt mean that you have to wear yourself out. Even a little movement each day helps to strengthen your heart. If you cant get out to exercise, you can do simple stretching and strengthening exercises at home. These are good ways to keep you well-conditioned and prevent you and your heart from becoming excessively weak.    Ideas to get you started  · Add a little movement to things you do now. Walk to mail letters. Park your car at the far end of the parking lot and walk to the store. Walk up a flight of stairs instead of taking the elevator.  · Choose activities you enjoy. You might walk, swim, or ride an exercise bike. Things like gardening and washing the car count,  too. Other possibilities include: washing dishes, walking the dog, walking around the mall, and doing aerobic activities with friends.  · Join a group exercise program at a [facility] or [facility], a senior center, or a community center. Or look into a hospital cardiac rehabilitation program. Ask your doctor if you qualify.  Tips to keep you going  · Get up and get dressed each day. Go to a coffee shop and read a newspaper or go somewhere that you'll be in the presence of other active people. Youll feel more like being active.  · Make a plan. Choose one or more activities that you enjoy and that you can easily do. Then plan to do at least one each day. You might write your plan on a calendar.  · Go with a friend or a group if you like company. This can help you feel supported and stay motivated, too.  · Plan social events that you enjoy. This will keep you mentally engaged as well as physically motivated to do things you find pleasure in.  For your safety  · Talk with your healthcare provider before starting an exercise program.  · Exercise indoors when its too hot or too cold outside, or when the air quality is poor. Try walking at a shopping mall.  · Wear socks and sturdy shoes to maintain your balance and prevent falls.  · Start slowly. Do a few minutes several times a day at first. Increase your time and speed little by little.  · Stop and rest whenever you feel tired or get short of breath.  · Dont push yourself on days when you dont feel well.  © 9155-6335 [employer]. [address] [place] [address], [place] [address]. All rights reserved. This information is not intended as a substitute for professional medical care. Always follow your healthcare professional's instructions.              Heart Failure: Evaluating Your Heart  You have a condition called heart failure. To evaluate your condition, your doctor will examine you, ask questions, and do some tests. Along with looking for signs of heart failure, the  doctor looks for any other health problems that may have led to heart failure. The results of your evaluation will help your doctor form a treatment plan.  Health history and physical exam  Your visit will start with a health history. Tell the doctor about any symptoms youve noticed and about all medicines you take. Then youll have a physical exam. This includes listening to your heartbeat and breathing. Youll also be checked for swelling (edema) in your legs and neck. When you have fluid buildup or fluid in the lungs, it may be called congestive heart failure.  Diagnosing heart failure     During an echocardiogram, sound waves bounce off the heart. These are converted into a picture on the screen.   The following may be done to help your doctor form a diagnosis:  · X-rays show the size and shape of your heart. These pictures can also show fluid in your lungs.  · An electrocardiogram (ECG or EKG) shows the pattern of your heartbeat. Small pads (electrodes) are placed on your chest, arms, and legs. Wires connect the pads to the ECG machine, which records your hearts electrical signals. This can give the doctor information about heart function.  · An echocardiogram uses ultrasound waves to show the structure and movement of your heart muscle. This shows how well the heart pumps. It also shows the thickness of the heart walls, and if the heart is enlarged. It is one of the most useful, non-invasive tests as it provides information about the heart's general function. This helps your doctor make treatment decisions.  · Lab tests evaluate small amounts of blood or urine for signs of problems. A BNP lab test can help diagnose and evaluate heart failure. BNP stands for B-type natriuretic peptide. The ventricles secrete more BNP when heart failure worsens. Lab tests can also provide information about metabolic dysfunction or heart dysfunction.  Your treatment plan  Based on the results of your evaluation and tests, your  doctor will develop a treatment plan. This plan is designed to relieve some of your heart failure symptoms and help make you more comfortable. Your treatment plan may include:  · Medicine to help your heart work better and improve your quality of life  · Changes in what you eat and drink to help prevent fluid from backing up in your body  · Daily monitoring of your weight and heart failure symptoms to see how well your treatment plan is working  · Exercise to help you stay healthy  · Help with quitting smoking  · Emotional and psychological support to help adjust to the changes  · Referrals to other specialists to make sure you are being treated comprehensively  © 8187-1402 [employer]. [address] [place] [address], [place] [address]. All rights reserved. This information is not intended as a substitute for professional medical care. Always follow your healthcare professional's instructions.              Heart Failure: Making Changes to Your Diet  You have a condition called heart failure. When you have heart failure, excess fluid is more likely to build up in your body because your heart isn't working well. This makes the heart work harder to pump blood. Fluid buildup causes symptoms such as shortness of breath and swelling (edema). This is often referred to as congestive heart failure or CHF. Controlling the amount of salt (sodium) you eat may help stop fluid from building up. Your doctor may also tell you to reduce the amount of fluid you drink.  Reading food labels    Your healthcare provider will tell you how much sodium you can eat each day. Read food labels to keep track. Keep in mind that certain foods are high in salt. These include canned, frozen, and processed foods. Check the amount of sodium in each serving. Watch out for high-sodium ingredients. These include MSG (monosodium glutamate), baking soda, and sodium phosphate.   Eating less salt  Give yourself time to get used to eating less salt. It  may take a little while. Here are some tips to help:  · Take the saltshaker off the table. Replace it with salt-free herb mixes and spices.  · Eat fresh or plain frozen vegetables. These have much less salt than canned vegetables.  · Choose low-sodium snacks like sodium-free pretzels, crackers, or air-popped popcorn.  · Dont add salt to your food when youre cooking. Instead, season your foods with pepper, lemon, garlic, or onion.  · When you eat out, ask that your food be cooked without added salt.  · Avoid eating fried foods as these often have a great deal of salt.  If youre told to limit fluids  You may need to limit how much fluid you have to help prevent swelling. This includes anything that is liquid at room temperature, such as ice cream and soup. If your doctor tells you to limit fluid, try these tips:  · Measure drinks in a measuring cup before you drink them. This will help you meet daily goals.  · Chill drinks to make them more refreshing.  · Suck on frozen lemon wedges to quench thirst.  · Only drink when youre thirsty.  · Chew sugarless gum or suck on hard candy to keep your mouth moist.  · Weigh yourself daily to know if your body's fluid content is rising.  My sodium goal  Your healthcare provider may give you a sodium goal to meet each day. This includes sodium found in food as well as salt that you add. My goal is to eat no more than ___________ mg of sodium per day.     When to call your doctor  Call your doctor right away if you have any symptoms of worsening heart failure. These can include:  · Sudden weight gain  · Increased swelling of your legs or ankles  · Trouble breathing when youre resting or at night  · Increase in the number of pillows you have to sleep on  · Chest pain, pressure, discomfort, or pain in the jaw, neck, or back   © 5598-8547 [employer]. [address] [place] [address], [place] [address]. All rights reserved. This information is not intended as a substitute for  professional medical care. Always follow your healthcare professional's instructions.              Heart Failure: Medicines to Help Your Heart    You have a condition called heart failure (also known as congestive heart failure, or CHF). Your doctor will likely prescribe medicines for heart failure and any underlying health problems you have. Most heart failure patients take one or more types of medicinen. Your healthcare provider will work to find the combination of medicines that works best for you.  Heart failure medicines  Here are the most common heart failure medicines:  · ACE inhibitors lower blood pressure and decrease strain on the heart. This makes it easier for the heart to pump. Angiotensin receptor blockers have similar effects. These are prescribed for some patients instead of ACE inhibitors.  · Beta-blockers relieve stress on the heart. They also improve symptoms. They may also improve the heart's pumping action over time.  · Diuretics (also called water pills) help rid your body of excess water. This can help rid your body of swelling (edema). Having less fluid to pump means your heart doesnt have to work as hard. Some diuretics make your body lose a mineral called potassium. Your doctor will tell you if you need to take supplements or eat more foods high in potassium.  · Digoxin helps your heart pump with more strength. This helps your heart pump more blood with each beat. So, more oxygen-rich blood travels to the rest of the body.  · Aldosterone antagonists help alter hormones and decrease strain on the heart.  · Hydralazine and nitrates are two separate medicines used together to treat heart failure. They may come in one combination pill. They lower blood pressure and decrease how hard the heart has to pump.  Medicines for related conditions  Controlling other heart problems helps keep heart failure under control, too. Depending on other heart problems you have, medicines may be prescribed  to:  · Lower blood pressure (antihypertensives).  · Lower cholesterol levels (statins).  · Prevent blood clots (anticoagulants or aspirin).  · Keep the heartbeat steady (antiarrhythmics).  © 7903-1843 [employer]. [address] [place] [address], [place] [address]. All rights reserved. This information is not intended as a substitute for professional medical care. Always follow your healthcare professional's instructions.              Heart Failure: Procedures That May Help    The heart is a muscle that pumps oxygen-rich blood to all parts of the body. When you have heart failure, the heart is not able to pump as well as it should. Blood and fluid may back up into the lungs (congestive heart failure), and some parts of the body dont get enough oxygen-rich blood to work normally. These problems lead to the symptoms of heart failure.     Certain procedures may help the heart pump better in some cases of heart failure. Some procedures are done to treat health problems that may have caused the heart failure such as coronary artery disease or heart rhythm problems. For more serious heart failure, other options are available.  Treating artery and valve problems  If you have coronary artery disease or valve disease, procedures may be done to improve blood flow. This helps the heart pump better, which can improve heart failure symptoms. First, your doctor may do a cardiac catheterization to help detect clogged blood vessels or valve damage. During this procedure, a  thin tube (catheter) in inserted into a blood vessel and guided to the heart. There a dye is injected and a special type of X-ray (angiogram) is taken of the blood vessels. Procedures to open a blocked artery or fix damaged valves can also be done using catheterization.  · Angioplasty uses a balloon-tipped instrument at the end of the catheter. The balloon is inflated to widen the narrowed artery. In many cases, a stent is expanded to further support the  narrowed artery. A stent is a metal mesh tube.  · Valve surgery repairs or replacement of faulty valves can also be done during catheterization so blood can flow properly through the chambers of the heart.  Bypass surgery is another option to help treat blocked arteries. It uses a healthy blood vessel from elsewhere in the body. The healthy blood vessel is attached above and below the blocked area so that blood can flow around the blocked artery.  Treating heart rhythm problems  A device may be placed in the chest to help a weak heart maintain a healthy, heartbeat so the heart can pump more effectively:  · Pacemaker. A pacemaker is an implanted device that regulates your heartbeat electronically. It monitors your heart's rhythm and generates a painless electric impulse that helps the heart beat in a regular rhythm. A pacemaker is programmed to meet your specific heart rhythm needs.  · Biventricular pacing/cardiac resynchronization therapy. A type of pacemaker that paces both pumping chambers of the heart at the same time to coordinate contractions and to improve the heart's function. Some people with heart failure are candidates for this therapy.  · Implantable cardioverter defibrillator. A device similar to a pacemaker that senses when the heart is beating too fast and delivers an electrical shock to convert the fast rhythm to a normal rhythm. This can be a life saving device.  In severe cases  In more serious cases of heart failure when other treatments no longer work, other options may include:  · Ventricular assist devices (VADs). These are mechanical devices used to take over the pumping function for one or both of the heart's ventricles, or pumping chambers. A VAD may be necessary when heart failure progresses to the point that medicines and other treatments no longer help. In some cases, a VAD may be used as a bridge to transplant.  · Heart transplant. This is replacing the diseased heart with a healthy one  from a donor. This is an option for a few people who are very sick. A heart transplant is very serious and not an option for all patients. Your doctor can tell you more.  © 6897-0361 [employer]. [address] [place] [address], [place] [address]. All rights reserved. This information is not intended as a substitute for professional medical care. Always follow your healthcare professional's instructions.              Heart Failure: Tracking Your Weight  You have a condition called heart failure. When you have heart failure, a sudden weight gain or a steady rise in weight is a warning sign that your body is retaining too much water and salt. This could mean your heart failure is getting worse. If left untreated, it can cause problems for your lungs and result in shortness of breath. Weighing yourself each day is the best way to know if youre retaining water. If your weight goes up quickly, call your doctor. You will be given instructions on how to get rid of the excess water. You will likely need medicines and to avoid salt. This will help your heart work better.  Call your doctor if you gain more than 2 pounds in 1 day, more than 5 pounds in 1 week, or whatever weight gain you were told to report by your doctor. This is often a sign of worsening heart failure and needs to be evaluated and treated. Your doctor will tell you what to do next.   Tips for weighing yourself    · Weigh yourself at the same time each morning, wearing the same clothes. Weigh yourself after urinating and before eating.  · Use the same scale each day. Make sure the numbers are easy to read. Put the scale on a flat, hard surface -- not on a rug or carpet.  · Do not stop weighing yourself. If you forget one day, weigh again the next morning.  How to use your weight chart  · Keep your weight chart near the scale. Write your weight on the chart as soon as you get off the scale.  · Fill in the month and the start date on the chart. Then write  down your weight each day. Your chart will look like this:    · If you miss a day, leave the space blank. Weigh yourself the next day and write your weight in the next space.  · Take your weight chart with you when you go to see your doctor.  © 1460-9600 [employer]. [address] [place] [address], [place] [address]. All rights reserved. This information is not intended as a substitute for professional medical care. Always follow your healthcare professional's instructions.              Heart Failure: Warning Signs of a Flare-Up  You have a condition called heart failure. Once you have heart failure, flare-ups can happen. Below are signs that can mean your heart failure is getting worse. If you notice any of these warning signs, call your healthcare provider.  Swelling    · Your feet, ankles, or lower legs get puffier.  · You notice skin changes on your lower legs.  · Your shoes feel too tight.  · Your clothes are tighter in the waist.  · You have trouble getting rings on or off your fingers.  Shortness of breath  · You have to breathe harder even when youre doing your normal activities or when youre resting.  · You are short of breath walking up stairs or even short distances.  · You wake up at night short of breath or coughing.  · You need to use more pillows or sit up to sleep.  · You wake up tired or restless.  Other warning signs  · You feel weaker, dizzy, or more tired.  · You have chest pain or changes in your heartbeat.  · You have a cough that wont go away.  · You cant remember things or dont feel like eating.  Tracking your weight  Gaining weight is often the first warning sign that heart failure is getting worse. Gaining even a few pounds can be a sign that your body is retaining excess water and salt. Weighing yourself each day in the morning after you urinate and before you eat, is the best way to know if you're retaining water. Get a scale that is easy to read and make sure you wear the same  clothes and use the same scale every time you weigh. Your healthcare provider will show you how to track your weight. Call your doctor if you gain more than 2 pounds in 1 day, 5 pounds in 1 week, or whatever weight gain you were told to report by your doctor. This is often a sign of worsening heart failure and needs to be evaluated and treated before it compromises your breathing. Your doctor will tell you what to do next.    © 5069-9053 [employer]. [address] [place] [address], [place] [address]. All rights reserved. This information is not intended as a substitute for professional medical care. Always follow your healthcare professional's instructions.              Coumadin Discharge Instructions                         Chronic [place] Disease Education             Diabetes Discharge Instructions                                   [facility] Sign-Up     Activating your [facility] account is as easy as 1-2-3!     1) Visit [email or web address], select Sign Up Now, enter this activation code and your date of birth, then select Next.  [email or web address]  Expires: 2/17/2017  9:09 AM      2) Create a username and password to use when you visit [facility] in the future and select a security question in case you lose your password and select Next.    3) Enter your e-mail address and click Sign Up!    Additional Information  If you have questions, please e-mail [email or web address] or call [phone] to talk to our [facility] staff. Remember, [facility] is NOT to be used for urgent needs. For medical emergencies, dial 911.          [facility] complies with applicable Federal civil rights laws and does not discriminate on the basis of race, color, national origin, age, disability, or sex.

## 2017-01-17 NOTE — PROGRESS NOTES
made phone contact with [facility] , Oliva to check status of referral.  Justo said she will have to follow up with her nurse regarding status of referral.  She will follow up with  after she speaks with her nurse.

## 2017-01-17 NOTE — PROGRESS NOTES
4:03pm -  received phone call from Sabiha Whaley Oliva stating patient is accepted for skilled level of care admission.  Oliva reported that patient can not admit until tomorrow.  She has scheduled a 10:30am appointment with the sister, Caty to complete admission paper work tomorrow. Oliva will follow up with  after admission paper work has been signed.

## 2017-01-17 NOTE — PLAN OF CARE
Problem: Occupational Therapy Goal  Goal: Occupational Therapy Goal  Goals to be met by: 02/07/2017     Patient will increase functional independence with ADLs by performing:    UE Dressing with Set-up Assistance.  LE Dressing with Moderate Assistance and Assistive Devices as needed.  Grooming while EOB with Modified [place].  Sitting at edge of bed x 20 minutes with Supervision.  Supine to sit with Minimal Assistance.  Upper extremity exercise program x 10 reps per handout, with supervision.   Outcome: Ongoing (interventions implemented as appropriate)  OT evaluation complete.  Pt will benefit from SNF placement.

## 2017-01-17 NOTE — PROGRESS NOTES
consulted to assist with discharge planning, need skilled nursing facility placement.  Patient admitted from [facility] where she was receiving skilled level of care.  Family does not want patient to readmit to [facility] for skilled level of care.   Transition navigator, Angy spoke with family regarding preference for skilled nursing facility.  Per family preference  faxed referral to [facility] via [facility].  Then attempted to make phone contact with [facility] admit coordinator at [phone] to inform her referral has been sent via [facility] for skilled level of care admission.  Admit coordinator was not available left voice message.

## 2017-01-17 NOTE — PT/OT/SLP EVAL
Occupational Therapy  Evaluation    Myra Murphy   MRN: [id number]   Admitting Diagnosis: Paroxysmal atrial fibrillation with rapid ventricular response    OT Date of Treatment: 01/17/17   OT Start Time: 0910  OT Stop Time: 0937  OT Total Time (min): 27 min    Billable Minutes:  Evaluation 15  Self Care/Home Management 12    Diagnosis: Paroxysmal atrial fibrillation with rapid ventricular response   Per MD note, pt was at [facility] from 12/26/16-1/05/17 for CHF exacerbation cardiogenic shock, hypoxia, and mental decline and then t/f'd to  ([facility]).      Past Medical History   Diagnosis Date    Anticoagulant long-term use     Arthritis     Atrial fibrillation     Atrial flutter     Carcinoma of right breast, estrogen receptor negative and progesterone receptor positive 02/2008    Chronic combined systolic and diastolic congestive heart failure     Colon cancer     Diabetes mellitus     Dilated cardiomyopathy 12/01/2008     adriamycin toxicity    Dyslipidemia     Encounter for blood transfusion     Hyperlipidemia     Hypertension     Supraventricular tachycardia      PAF, AFL      Past Surgical History   Procedure Laterality Date    Colon surgery  2000     upper colon removal    Cataract extraction  8/5/09     both eyes (Dr. Adam)    Cardioversion  04/24/2014    Total abdominal hysterectomy w/ bilateral salpingoophorectomy  1973    Mastectomy, partial Right 03/14/2008     R right    Total hip arthroplasty  03/05/2010     anuerysm in hip/right hip    Hysterectomy         Referring physician: Abadco   Date referred to OT: 01/16/2017    General Precautions: Standard, fall (standard)  Orthopedic Precautions: N/A  Braces: N/A          Patient History:  Living Environment  Lives With: alone (at  PTA)  Living Arrangements: apartment  Home Accessibility: other (see comments) (elevator)  Living Environment Comment: Pt admitted to [facility] from  ([facility]).  Pt reports assist with t/fs  to w/c and ADLs at SNF.  Lived alone in 2nd floor apt with elevator prior to going to SNF.  Equipment Currently Used at Home: wheelchair    Prior level of function:   Bed Mobility/Transfers: needs device and assist  Grooming: needs assist  Bathing: needs assist  Upper Body Dressing: needs assist  Lower Body Dressing: needs assist  Toileting: needs assist  Home Management Skills: unable to perform        Dominant hand: right    Patient/Family stated goals: To go back to SNF    Pain Rating:  (Pt did not report number)  Location - Side: Bilateral     Location: leg  Pain Addressed: Reposition, Distraction, Cessation of Activity  Pain Rating Post-Intervention: 0/10    Objective:  Patient found with: telemetry    Cognitive Exam:  Oriented to: Person and Situation  Follows Commands/attention: Follows two-step commands  Communication: clear/fluent  Memory:  Impaired LTM  Safety awareness/insight to disability: impaired  Coping skills/emotional control: Appropriate to situation    Visual/perceptual:  Intact     Physical Exam:  Postural examination/scapula alignment: Rounded shoulder, Anterior pelvic tilt and Scoliosis      Sensation:   Intact    Upper Extremity Range of Motion:  Right Upper Extremity: shoulder flexion to 85 degrees, limited IR, WFL ER and distal ROM  Left Upper Extremity: shoulder flexion to 85 degrees, limited IR, WFL ER and distal ROM    Upper Extremity Strength:  Right Upper Extremity: 3-/5  Left Upper Extremity: 3-/5      Fine motor coordination:   Intact    Gross motor coordination: WFL    Functional Mobility:  Bed Mobility:  Supine to Sit: Maximum Assistance  Sit to Supine: Total Assistance    Transfers:  Sit <> Stand Assistance: Activity did not occur (Pt declined to attempt stand stating she broke her hip years ago; Pt reported t/fs with Assist to w/c at SNF)        Activities of Daily Living:  Feeding Level of Assistance: Activity did not occur    UE Dressing Level of Assistance: Moderate  "assistance    LE Dressing Level of Assistance: Total assistance    Grooming Position: EOB  Grooming Level of Assistance: Contact guard assistance     Toileting Level of Assistance: Activity did not occur (Pt in diaper)     Bathing Level of Assistance: Activity did not occur      Balance:   Static Sit: FAIR: Maintains without assist, but unable to take any challenges   Dynamic Sit: FAIR: Cannot move trunk without losing balance      Therapeutic Activities and Exercises:  Pt able to sit EOB with CGA for self care tasks.     AM-PAC 6 CLICK ADL  How much help from another person does this patient currently need?  1 = Unable, Total/Dependent Assistance  2 = A lot, Maximum/Moderate Assistance  3 = A little, Minimum/Contact Guard/Supervision  4 = None, Modified [place]/Independent    Putting on and taking off regular lower body clothing? : 1  Bathing (including washing, rinsing, drying)?: 2  Toileting, which includes using toilet, bedpan, or urinal? : 2  Putting on and taking off regular upper body clothing?: 2  Taking care of personal grooming such as brushing teeth?: 3  Eating meals?: 3  Total Score: 13    AM-PAC Raw Score CMS "G-Code Modifier Level of Impairment Assistance   6 % Total / Unable   7 - 9 CM 80 - 100% Maximal Assist   10 - 14 CL 60 - 80% Moderate Assist   15 - 19 CK 40 - 60% Moderate Assist   20 - 22 CJ 20 - 40% Minimal Assist   23 CI 1-20% SBA / CGA   24 CH 0% Independent/ Mod I       Patient left supine with all lines intact, call button in reach and PCT present    Assessment:  Myra Murphy is a 82 y.o. female with a medical diagnosis of Paroxysmal atrial fibrillation with rapid ventricular response and presents with deficits in balance, strength, ROM, and endurance limiting ability to assist with ADLs.  Pt was at SNF prior to admit and would benefit from more skilled services as lived alone prior.    Rehab identified problem list/impairments: Rehab identified problem list/impairments: " weakness, impaired endurance, impaired self care skills, impaired functional mobilty, impaired balance, impaired cognition, decreased ROM, decreased safety awareness, pain    Rehab potential is fair.    Activity tolerance: Fair    Discharge recommendations: Discharge Facility/Level Of Care Needs: nursing facility, skilled (Pt reports she is willling to return to SNF placement, but does not want to return to the same facility)     Barriers to discharge: Barriers to Discharge: Inaccessible home environment, Decreased caregiver support    Equipment recommendations:  (TBD )     GOALS:   Occupational Therapy Goals        Problem: Occupational Therapy Goal    Goal Priority Disciplines Outcome Interventions   Occupational Therapy Goal     OT, PT/OT     Description:  Goals to be met by: 02/07/2017     Patient will increase functional independence with ADLs by performing:    UE Dressing with Set-up Assistance.  LE Dressing with Moderate Assistance and Assistive Devices as needed.  Grooming while EOB with Modified [place].  Sitting at edge of bed x 20 minutes with Supervision.  Supine to sit with Minimal Assistance.  Upper extremity exercise program x 10 reps per handout, with supervision.                PLAN:  Patient to be seen 5 x/week to address the above listed problems via self-care/home management, therapeutic exercises, therapeutic activities  Plan of Care expires: 02/07/17  Plan of Care reviewed with: patient    OT G-codes  Functional Assessment Tool Used: AM-PAC  Score: 13  Functional Limitation: Self care  Self Care Current Status (): CL  Self Care Goal Status (): VON Aguilar OT  01/17/2017

## 2017-01-17 NOTE — PROGRESS NOTES
Pt's bp 92/56 manually. Pt asymptomatic. Lasix 80 mg po scheduled for 2100. T, Edward, NP notified of pt's bp. Instructed to give lasix as scheduled. Will admin and continue to monitor.

## 2017-01-17 NOTE — PLAN OF CARE
Problem: Patient Care Overview  Goal: Plan of Care Review  Outcome: Ongoing (interventions implemented as appropriate)  Plan of care reviewed with pt. Pt verbalizes understanding. Bed in lowest position, side rails up times 2, wheels locked, nonslip socks on, and bed alarm on. Call bell w/in reach. Instructed to call for needs/assist oob. No c/o pain t/o night. Pt on telemetry. A fib 80's-90's with occasional pvc's. No true red alarms noted. Will continue to monitor.

## 2017-01-17 NOTE — CONSULTS
Pt states that she has been having bilateral lower legs wrapped with Unna Boots for several years now to manage swelling. Nursing changed at [facility] recently. Pt complained of pain during admit to ED and so coban was cut free. Appeared to be a Zinc Oxide contact layer left intact. Removed. Pt complaining of itching. Will clean area with saline and apply Calamine Unna Boot to bilateral legs. Change q 7 days if C/D/I.     Left Lower Leg with chronic Venous Stasis Dermatitis. Noted Venous Stasis Ulcer 0.5 x 0.3 (cm) - pink/moist to medial calf.     Posterior Left Leg: Venous Stasis Dermatitis and Ulcer 0.5 x 0.3 (cm) pink/moist.     Right Lower Leg with Chronic Venous Stasis Dermatitis. No swelling noted currently. Darkened, dead skin easily peeling away, revealing intact skin beneath.     Right Posterior Leg with Venous Stasis Dermatitis. No open wounds noted.

## 2017-01-17 NOTE — PT/OT/SLP EVAL
Physical Therapy  Evaluation    Myra Murphy   MRN: [id number]   Admitting Diagnosis: Paroxysmal atrial fibrillation with rapid ventricular response    PT Received On: 01/17/17  PT Start Time: 1340     PT Stop Time: 1410    PT Total Time (min): 30 min       Billable Minutes:  Evaluation 10 and Therapeutic Activity 15    Diagnosis: Paroxysmal atrial fibrillation with rapid ventricular response      Past Medical History   Diagnosis Date    Anticoagulant long-term use     Arthritis     Atrial fibrillation     Atrial flutter     Carcinoma of right breast, estrogen receptor negative and progesterone receptor positive 02/2008    Chronic combined systolic and diastolic congestive heart failure     Colon cancer     Diabetes mellitus     Dilated cardiomyopathy 12/01/2008     adriamycin toxicity    Dyslipidemia     Encounter for blood transfusion     Hyperlipidemia     Hypertension     Supraventricular tachycardia      PAF, AFL      Past Surgical History   Procedure Laterality Date    Colon surgery  2000     upper colon removal    Cataract extraction  8/5/09     both eyes (Dr. Adam)    Cardioversion  04/24/2014    Total abdominal hysterectomy w/ bilateral salpingoophorectomy  1973    Mastectomy, partial Right 03/14/2008     R right    Total hip arthroplasty  03/05/2010     anuerysm in hip/right hip    Hysterectomy         Referring physician: Sam  Date referred to PT: 1/17/2017    General Precautions: Standard, fall  Orthopedic Precautions: N/A, Full weight bearing   Braces: N/A            Patient History:  Lives With: sibling(s), alone  Living Arrangements: apartment  Transportation Available: family or friend will provide  Equipment Currently Used at Home: wheelchair, walker, rolling  DME owned (not currently used): none    Previous Level of Function:  Ambulation Skills: needs device and assist  Transfer Skills: needs device and assist  ADL Skills: needs assist    Subjective:  Communicated with  primary nurse prior to session.  Patient poor historian  Chief Complaint: weakness  Patient goals:  Go home    Pain Rating:  (did not rate on scale)   Location - Side: Left     Location: hip  Pain Addressed: Reposition, Distraction       Objective:   Patient found with: telemetry, oxygen     Cognitive Exam:  Oriented to: Person, Place and Time    Follows Commands/attention: Follows one-step commands  Communication: clear/fluent  Safety awareness/insight to disability: impaired    Physical Exam:  Postural examination/scapula alignment: Rounded shoulder    Skin integrity: Visible skin intact  Edema: Mild BLE    Sensation:   na    Upper Extremity Range of Motion:  Right Upper Extremity: WFL  Left Upper Extremity: Deficits: elvation decreased     Upper Extremity Strength:  Right Upper Extremity: +3/5  Left Upper Extremity: +3/5    Lower Extremity Range of Motion:  Right Lower Extremity: WFL  Left Lower Extremity: Deficits: hip    Lower Extremity Strength:  Right Lower Extremity: 3/5 to -4/5  Left Lower Extremity: +2/5 to +3/5       Fine motor coordination:  na    Gross motor coordination: impaired    Functional Mobility:  Bed Mobility:  Rolling/Turning to Left: Moderate assistance  Supine to Sit: Maximum Assistance  Sit to Supine: Maximum Assistance    Transfers:  Sit <> Stand Assistance: Moderate Assistance  Sit <> Stand Assistive Device: Rolling Walker    Gait:   Gait Distance: unable to take steps slided feet towards left  Assistance 1: Moderate assistance    Stairs:  na    Balance:   Static Sit: FAIR+: Able to take MINIMAL challenges from all directions  Dynamic Sit: FAIR+: Maintains balance through MINIMAL excursions of active trunk motion  Static Stand: POOR: Needs MODERATE assist to maintain  Dynamic stand: POOR: N/A    Therapeutic Activities and Exercises:  Worked on sit to stand for postural control and strengthening. Patient stood X 3 briefllly with the last trial the best in duration and quality    AM-PAC 6  CLICK MOBILITY  How much help from another person does this patient currently need?   1 = Unable, Total/Dependent Assistance  2 = A lot, Maximum/Moderate Assistance  3 = A little, Minimum/Contact Guard/Supervision  4 = None, Modified [place]/Independent    Turning over in bed (including adjusting bedclothes, sheets and blankets)?: 3  Sitting down on and standing up from a chair with arms (e.g., wheelchair, bedside commode, etc.): 2  Moving from lying on back to sitting on the side of the bed?: 2  Moving to and from a bed to a chair (including a wheelchair)?: 2  Need to walk in hospital room?: 2  Climbing 3-5 steps with a railing?: 1  Total Score: 12     AM-PAC Raw Score CMS G-Code Modifier Level of Impairment Assistance   6 % Total / Unable   7 - 9 CM 80 - 100% Maximal Assist   10 - 14 CL 60 - 80% Moderate Assist   15 - 19 CK 40 - 60% Moderate Assist   20 - 22 CJ 20 - 40% Minimal Assist   23 CI 1-20% SBA / CGA   24 CH 0% Independent/ Mod I     Patient left supine with all lines intact, call button in reach and bed alarm on.    Assessment:   Myra Murphy is a 82 y.o. female with a medical diagnosis of Paroxysmal atrial fibrillation with rapid ventricular response and presents with weakness, gait disturbance and decline from PLOF. PT should help strengthen and improve functional mobility with transfer and gait.    Rehab identified problem list/impairments: Rehab identified problem list/impairments: weakness, gait instability, impaired balance, impaired self care skills, impaired functional mobilty, impaired cognition, decreased safety awareness, decreased lower extremity function, decreased upper extremity function    Rehab potential is good.    Activity tolerance: Fair    Discharge recommendations: Discharge Facility/Level Of Care Needs: nursing facility, skilled     Barriers to discharge: Barriers to Discharge: Decreased caregiver support    Equipment recommendations: Equipment Needed After Discharge:   (TBD)     GOALS:   Physical Therapy Goals        Problem: Physical Therapy Goal    Goal Priority Disciplines Outcome Goal Variances Interventions   Physical Therapy Goal     PT/OT, PT      Description:  Goals to be met by: 2017     Patient will increase functional independence with mobility by performin. Supine to sit with Moderate Assistance  2. Sit to stand transfer with Minimal Assistance  3. Bed to chair transfer with Moderate Assistance using Rolling Walker  4. Gait  x 20 feet with Moderate Assistance using Rolling Walker.                 PLAN:    Patient to be seen 5 x/week to address the above listed problems via gait training, therapeutic activities, therapeutic exercises  Plan of Care expires: 17  Plan of Care reviewed with: patient    Functional Assessment Tool Used: ampac  Score: 12  Functional Limitation: Changing and maintaining body position  Changing and Maintaining Body Position Current Status (): CL  Changing and Maintaining Body Position Goal Status (): SHANNON Babin, PT  2017

## 2017-01-17 NOTE — PLAN OF CARE
Pt discharged from [facility] to [facility] 1/5/17.  Per Md pt stable for return to SNF however pt and pt's sister (Caty) refusing for her to go back to [facility] as she feels they did not take care of her when she was having chest pain. Pt and sister provided Tn with new SNF- [facility] in [place]. TN consulted RACHELL Garcia to facilitate new SNF placement. TN notified Dr. Vargas of above.    Prior to admit to SNF pt lived at [facility] and had [facility]  and all DME in place.  TN to follow and assist SW as needed.       01/17/17 1127   Discharge Assessment   Assessment Type Discharge Planning Assessment   Confirmed/corrected address and phone number on facesheet? Yes   Assessment information obtained from? Patient;Caregiver   Expected Length of Stay (days) 1   Communicated expected length of stay with patient/caregiver yes   Prior to hospitilization cognitive status: Alert/Oriented   Prior to hospitalization functional status: Needs Assistance;Assistive Equipment   Current cognitive status: Alert/Oriented   Current Functional Status: Assistive Equipment;Needs Assistance   Arrived From skilled nursing facility   Lives With alone   Able to Return to Prior Arrangements yes   Is patient able to care for self after discharge? No   How many people do you have in your home that can help with your care after discharge? 0   Who are your caregiver(s) and their phone number(s)? Caty (sister) [phone]   Patient's perception of discharge disposition skilled nursing facility   Readmission Within The Last 30 Days previous discharge plan unsuccessful   If yes, most recent facility name: [facility]   What reason does the patient think brought them back to the hospital? chest pain   Patient currently being followed by outpatient case management? No   Patient currently receives home health services? No   Does the patient currently use HME? Yes   Patient currently receives private duty nursing? No   Patient currently receives  any other outside agency services? No   Equipment Currently Used at Home wheelchair;3-in-1 commode;rollator;raised toilet   Do you have any problems affording any of your prescribed medications? No   Is the patient taking medications as prescribed? yes   Do you have any financial concerns preventing you from receiving the healthcare you need? No   Does the patient have transportation to healthcare appointments? Yes   Transportation Available family or friend will provide   On Dialysis? No   Does the patient receive services at the Coumadin Clinic? No   Are there any open cases? No   Discharge Plan A Skilled Nursing Facility   Discharge Plan B Home Health   Patient/Family In Agreement With Plan yes

## 2017-01-17 NOTE — CONSULTS
Medical Nutrition Therapy      Reason for Assessment: Consulted for coumadin diet education  Dx: A-fib  Medical Hx: HTN, HLD, DM, CHF, Gerd,     Interdisciplinary Rounds: Did not attend  Discharge planning: ADA/Cardiac diet    General Info: Pt reports appetite improved. Eating breakfast at time of visit. Denies n/v/problems chewing/swallowing/cultural or [place] food preferences. Reports to have been on coumadin for awhile and has previously been educated on coumadin diet restrictions. Denies need for further information at this time.   Diet/Supplement PTA: ADA/Cardiac    Current Diet: Cardiac/1500 ml Fluid restriction  % intake of meals:50-75% of breakfast    Ht: 152.4  Wt: 71 kg  % IBW: 148%    Labs: Reviewed  Meds: Reviewed    Overall Physical Appearance: Well Nourished    Level of Risk: Low    Nutrition Dx: No nutrition diagnosis at this time R/T Nutrition Parameters WNL AEB : 50-75% po intake.    Next Follow Up Date: 1/24/2017

## 2017-01-17 NOTE — PLAN OF CARE
Problem: Patient Care Overview  Goal: Plan of Care Review  Outcome: Ongoing (interventions implemented as appropriate)  Pt on RA with sats of 96%.  Will continue to monitor.

## 2017-01-18 PROBLEM — R07.9 CHEST PAIN: Status: ACTIVE | Noted: 2017-01-01

## 2017-01-18 NOTE — PLAN OF CARE
Problem: Patient Care Overview  Goal: Plan of Care Review  Outcome: Ongoing (interventions implemented as appropriate)  No Pressure ulcers noted, only dermatitis between buttocks.  Repositioned every 2 hours.  Telemetry remains in A fib with frequent PVC's.  Fall precautions maintained.  Bed alarm on.  Continue with plan of care.

## 2017-01-18 NOTE — PT/OT/SLP PROGRESS
Physical Therapy  Treatment    Myra Murphy   MRN: [id number]   Admitting Diagnosis: Paroxysmal atrial fibrillation with rapid ventricular response    PT Received On: 17  PT Start Time: 1225     PT Stop Time: 1252    PT Total Time (min): 27 min       Billable Minutes:  Therapeutic Activity 16 and Therapeutic Exercise 11    Treatment Type: Treatment  PT/PTA: PTA     PTA Visit Number: 1       General Precautions: Standard, fall  Orthopedic Precautions: N/A   Braces:           Subjective:  Communicated with nsg prior to session.      Pain Ratin/10                   Objective:   Patient found with: oxygen, peripheral IV, telemetry    Functional Mobility:  Bed Mobility:   Supine to Sit: Maximum Assistance  Sit to Supine: Maximum Assistance    Transfers:  Sit <> Stand Assistance:  (X 3 trials with Mod A,tolerated ~3-5 seconds ea)  Sit <> Stand Assistive Device: Rolling Walker    Gait:   Gait Distance: N/A    Stairs:      Balance:   Static Sit: FAIR-: Maintains without assist but inconsistent   Dynamic Sit: FAIR: Cannot move trunk without losing balance  Static Stand: POOR: Needs MODERATE assist to maintain  Dynamic stand: 0: N/A     Therapeutic Activities and Exercises:       AM-PAC 6 CLICK MOBILITY  How much help from another person does this patient currently need?   1 = Unable, Total/Dependent Assistance  2 = A lot, Maximum/Moderate Assistance  3 = A little, Minimum/Contact Guard/Supervision  4 = None, Modified [place]/Independent    Turning over in bed (including adjusting bedclothes, sheets and blankets)?: 3  Sitting down on and standing up from a chair with arms (e.g., wheelchair, bedside commode, etc.): 2  Moving from lying on back to sitting on the side of the bed?: 2  Moving to and from a bed to a chair (including a wheelchair)?: 2  Need to walk in hospital room?: 2  Climbing 3-5 steps with a railing?: 1  Total Score: 12    AM-PAC Raw Score CMS G-Code Modifier Level of Impairment Assistance   6 CN  100% Total / Unable   7 - 9 CM 80 - 100% Maximal Assist   10 - 14 CL 60 - 80% Moderate Assist   15 - 19 CK 40 - 60% Moderate Assist   20 - 22 CJ 20 - 40% Minimal Assist   23 CI 1-20% SBA / CGA   24 CH 0% Independent/ Mod I     Patient left supine with all lines intact, call button in reach and bed alarm on.    Assessment:  Myra Murphy is a 82 y.o. female with a medical diagnosis of Paroxysmal atrial fibrillation with rapid ventricular response and presents with .    Rehab identified problem list/impairments: Rehab identified problem list/impairments: weakness, impaired endurance, impaired functional mobilty, decreased lower extremity function, impaired cognition, decreased upper extremity function    Rehab potential is fair.    Activity tolerance: Fair    Discharge recommendations: Discharge Facility/Level Of Care Needs: nursing facility, skilled     Barriers to discharge: Barriers to Discharge: Decreased caregiver support    Equipment recommendations: Equipment Needed After Discharge:  (TBD)     GOALS:   Physical Therapy Goals        Problem: Physical Therapy Goal    Goal Priority Disciplines Outcome Goal Variances Interventions   Physical Therapy Goal     PT/OT, PT Ongoing (interventions implemented as appropriate)     Description:  Goals to be met by: 2017     Patient will increase functional independence with mobility by performin. Supine to sit with Moderate Assistance  2. Sit to stand transfer with Minimal Assistance  3. Bed to chair transfer with Moderate Assistance using Rolling Walker  4. Gait  x 20 feet with Moderate Assistance using Rolling Walker.                 PLAN:    Patient to be seen 5 x/week  to address the above listed problems via gait training, therapeutic activities, therapeutic exercises  Plan of Care expires: 17  Plan of Care reviewed with: patient         Walter ANDRES Awais, PTA  2017

## 2017-01-18 NOTE — SUBJECTIVE & OBJECTIVE
Interval History: Says that she is feeling better today. No further chest pain or palpitations.     Review of Systems   Constitutional: Negative for fever.   Respiratory: Negative for cough and shortness of breath.    Gastrointestinal: Negative for nausea and vomiting.     Objective:     Vital Signs (Most Recent):  Temp: 98.2 °F (36.8 °C) (01/17/17 2005)  Pulse: 80 (01/17/17 2005)  Resp: 18 (01/17/17 2005)  BP: 90/64 (01/17/17 2005)  SpO2: 99 % (01/17/17 1553) Vital Signs (24h Range):  Temp:  [96.1 °F (35.6 °C)-98.6 °F (37 °C)] 98.2 °F (36.8 °C)  Pulse:  [80-94] 80  Resp:  [18-21] 18  SpO2:  [96 %-99 %] 99 %  BP: ()/(56-68) 90/64     Weight: 71 kg (156 lb 9.6 oz)  Body mass index is 30.58 kg/(m^2).    Intake/Output Summary (Last 24 hours) at 01/17/17 2057  Last data filed at 01/17/17 1800   Gross per 24 hour   Intake              840 ml   Output              940 ml   Net             -100 ml      Physical Exam   Constitutional: She is oriented to person, place, and time. She appears well-developed and well-nourished. No distress.   Cardiovascular: Normal rate.  An irregularly irregular rhythm present.   Murmur heard.  Pulmonary/Chest: Effort normal and breath sounds normal.   Abdominal: Soft. Bowel sounds are normal. She exhibits no distension.   Musculoskeletal: She exhibits no edema or tenderness.   Neurological: She is alert and oriented to person, place, and time.   Skin: Skin is warm and dry.   Nursing note and vitals reviewed.      Significant Labs:   CBC:   Recent Labs  Lab 01/16/17  0946 01/16/17  1010   WBC 12.69 14.67*   HGB 11.5* 11.1*   HCT 38.6 37.3   * 322     CMP:   Recent Labs  Lab 01/16/17  1010 01/17/17  0633    136   K 3.7 4.2   CL 97 96   CO2 20* 26   * 108   BUN 61* 65*   CREATININE 2.0* 2.5*   CALCIUM 9.4 9.6   PROT 6.7  --    ALBUMIN 2.9*  --    BILITOT 1.8*  --    ALKPHOS 117  --    AST 18  --    ALT 13  --    ANIONGAP 19* 14   EGFRNONAA 23* 17*     Coagulation:    Recent Labs  Lab 01/16/17  0954   INR 1.9*     Troponin:   Recent Labs  Lab 01/16/17  1010 01/16/17  2358 01/17/17  0633   TROPONINI 0.034*  0.034* 0.034* 0.035*       Significant Imaging: I have reviewed all pertinent imaging results/findings within the past 24 hours.

## 2017-01-18 NOTE — SUBJECTIVE & OBJECTIVE
Interval History: Says that she isn't quite right today. She is having some constipation currently. She denies SOB, but appears to be SOB.     Review of Systems   Constitutional: Negative for chills and fever.   Respiratory: Positive for shortness of breath. Negative for cough.    Cardiovascular: Negative for chest pain and palpitations.   Gastrointestinal: Positive for constipation. Negative for nausea and vomiting.     Objective:     Vital Signs (Most Recent):  Temp: 97.7 °F (36.5 °C) (01/18/17 1125)  Pulse: 74 (01/18/17 1125)  Resp: 20 (01/18/17 1125)  BP: 101/70 (01/18/17 1125)  SpO2: 100 % (01/18/17 1143) Vital Signs (24h Range):  Temp:  [96.4 °F (35.8 °C)-98.2 °F (36.8 °C)] 97.7 °F (36.5 °C)  Pulse:  [] 74  Resp:  [18-20] 20  SpO2:  [95 %-100 %] 100 %  BP: ()/(59-70) 101/70     Weight: 72.6 kg (160 lb)  Body mass index is 31.25 kg/(m^2).    Intake/Output Summary (Last 24 hours) at 01/18/17 1756  Last data filed at 01/18/17 1700   Gross per 24 hour   Intake              920 ml   Output              788 ml   Net              132 ml      Physical Exam   Constitutional: She appears well-developed and well-nourished. No distress.   Neck: JVD present.   Cardiovascular: Normal rate.    Murmur heard.  Pulmonary/Chest: Effort normal. No respiratory distress. She has rales.   Abdominal: Soft. Bowel sounds are normal. She exhibits no distension. There is no tenderness.   Musculoskeletal: She exhibits edema.   Neurological: She is alert.   Nursing note and vitals reviewed.      Significant Labs:   BMP:   Recent Labs  Lab 01/18/17  0400 01/18/17  1019    161*   * 133*   K 4.0 4.1   CL 96 97   CO2 25 23   BUN 70* 70*   CREATININE 2.6* 2.6*   CALCIUM 8.9 8.9   MG 2.3  --      BNP 3500    Significant Imaging: I have reviewed all pertinent imaging results/findings within the past 24 hours.

## 2017-01-18 NOTE — PT/OT/SLP PROGRESS
Occupational Therapy  Treatment    Myra Murphy   MRN: [id number]   Admitting Diagnosis: Paroxysmal atrial fibrillation with rapid ventricular response    OT Date of Treatment: 17   OT Start Time:   OT Stop Time:   OT Total Time (min): 34 min    Billable Minutes:  Self Care/Home Management 24, Therapeutic Activity 10 and Total Time 34    General Precautions: Standard, fall  Orthopedic Precautions: N/A  Braces: N/A         Subjective:  Communicated with nurse prior to session.    Pain Ratin/10              Pain Rating Post-Intervention: 0/10    Objective:  Patient found with: telemetry, oxygen     Functional Mobility:  Bed Mobility:  Rolling/Turning to Left: Maximum assistance, With side rail (4 trials)  Rolling/Turning Right: Maximum assistance, With side rail (4 trials for hygiene after BM)  Scooting/Bridging: With assist of 2, Dependent (drawsheet )  Supine to Sit: Maximum Assistance, WIth side rail  Sit to Supine: Moderate Assistance, With side rail (mod vcfor hand placement to descend to sideline)    Transfers:   Sit <> Stand Assistance: Activity did not occur    Functional Ambulation: NA    Activities of Daily Living:  Feeding Level of Assistance: Set-up Assistance (ate very little 2/2 c/o constipation)    Grooming Position: Seated, EOB  Grooming Level of Assistance: Stand by assistance (brushed teeth and OT assisted with setup tooth paste on brush)     Toileting Level of Assistance: Total assistance (2 person asist in bed cleanup 2/2 constaipated; had stool softeners )      Balance:   Static Sit: FAIR+: Able to take MINIMAL challenges from all directions  Dynamic Sit: FAIR: Cannot move trunk without losing balance  Static Stand:NA  Dynamic stand:  NA    Therapeutic Activities and Exercises:  Pt. anxious and complained of constipation. Nurse aware and nursing addressing pt needs.  Pt. agreeable to EOB sitting; bed mobility max assist supine>sit with assist for LB and UB; sensitivity in legs to  touch.  Sat 20 min EOB with SBA.  Pt fed self with Setup assist but ate very little; performed oral care with SBA to brush teeth.  Pt. Returned to supine with mod assist for legs only to lift into bed as pt. lowered self onto L side with BUE on bed rail.  Once in bed, pt dep x 2 for cleanup after BM in diaper; rolled to R/L side in bed with max assist with bed rail use.       AM-PAC 6 CLICK ADL   How much help from another person does this patient currently need?   1 = Unable, Total/Dependent Assistance  2 = A lot, Maximum/Moderate Assistance  3 = A little, Minimum/Contact Guard/Supervision  4 = None, Modified [place]/Independent    Putting on and taking off regular lower body clothing? : 1  Bathing (including washing, rinsing, drying)?: 2  Toileting, which includes using toilet, bedpan, or urinal? : 1  Putting on and taking off regular upper body clothing?: 2  Taking care of personal grooming such as brushing teeth?: 3  Eating meals?: 3  Total Score: 12     AM-PAC Raw Score CMS G-Code Modifier Level of Impairment Assistance   6 % Total / Unable   7 - 9 CM 80 - 100% Maximal Assist   10 - 14 CL 60 - 80% Moderate Assist   15 - 19 CK 40 - 60% Moderate Assist   20 - 22 CJ 20 - 40% Minimal Assist   23 CI 1-20% SBA / CGA   24 CH 0% Independent/ Mod I     Patient left supine with all lines intact and call button in reach    ASSESSMENT:  Myra Murphy is a 82 y.o. female with a medical diagnosis of Paroxysmal atrial fibrillation with rapid ventricular response and presents with limited by constipation today; anxious and easily frustrated.  Pt. Making good progress with EOB sitting endurance for ADLS. Contrinue with OT POC.    Rehab identified problem list/impairments: Rehab identified problem list/impairments: weakness, impaired self care skills, impaired balance, decreased safety awareness, impaired endurance, impaired functional mobilty, decreased upper extremity function, impaired skin, decreased lower  extremity function    Rehab potential is good.    Activity tolerance: Fair    Discharge recommendations: Discharge Facility/Level Of Care Needs: nursing facility, skilled     Barriers to discharge: Barriers to Discharge: Decreased caregiver support    Equipment recommendations:  (TBD)     GOALS:   Occupational Therapy Goals        Problem: Occupational Therapy Goal    Goal Priority Disciplines Outcome Interventions   Occupational Therapy Goal     OT, PT/OT Ongoing (interventions implemented as appropriate)    Description:  Goals to be met by: 02/07/2017     Patient will increase functional independence with ADLs by performing:    UE Dressing with Set-up Assistance.  LE Dressing with Moderate Assistance and Assistive Devices as needed.  Grooming while EOB with Modified [place].  Sitting at edge of bed x 20 minutes with Supervision.  Supine to sit with Minimal Assistance.  Upper extremity exercise program x 10 reps per handout, with supervision.                Plan:  Patient to be seen 5 x/week to address the above listed problems via self-care/home management, therapeutic activities, therapeutic exercises  Plan of Care expires:    Plan of Care reviewed with: patient         Leah Molina OT  01/18/2017

## 2017-01-18 NOTE — PROGRESS NOTES
[facility] Hospital Medicine  Progress Note    Patient Name: Myra Murphy  MRN: [id number]  Patient Class: OP- Observation   Admission Date: 2017  Length of Stay: 0 days  Attending Physician: Farhad Vargas MD  Primary Care Provider: Lupe Delatorre MD        Subjective:     Principal Problem:Paroxysmal atrial fibrillation with rapid ventricular response    HPI:  Myra Murphy is a 82 y.o. black woman with history of ALYSE-BSO in , hypertension, hyperlipidemia, gastroesophageal reflux disease, memory deficit, degenerative joint disease of the knees and spine, gout, peripheral neuralgia, history of colon cancer s/p resection in , history of right breast cancer (T1N3Mx ER/RI positive, Her-2 Colleen negative) s/p right partial mastectomy and lymph node dissection 3/14/08 and chemotherapy (docataxel, doxorubicin, cyclophosphamide) on aromatase inhibitor therapy, history of total hip arthroplasty 3/05/10, peripheral vascular disease, chronic venous stasis dermatitis of the legs, obstructive sleep apnea (supposed to be on CPAP 12 cmH2O but CPAP broken), 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 She lives at [facility] in [place], [place]. She used to be a . Her sister Caty Figueroa is involved in her medical care. Her primary care physician is Dr. Lupe Delatorre. Her cardiologist is Dr. Sunshine Mckeon. She has refused ICD placement.     On 16 a nurse recommended to her primary care physician, Dr. Delatorre, to decrease her furosemide from 80 mg to 20 mg daily. Dr. Delatorre agreed. She was subsequently  "hospitalized under the care of Cardiology at [facility] from 12/26/16-1/05/17 for CHF exacerbation cardiogenic shock, hypoxia, and mental decline. She had developed acute renal failure. She was diuresed with IV furosemide and dobutamine drips, removing 20 lbs of fluid (181 lbs to 161 lbs). Furosemide was increased to 80 mg twice daily. Digoxin was discontinued, and she was started on hydralazine and isosorbide mononitrate. She was fluid restricted to 1500 mL daily. She was discharged to [facility] skilled nursing facility.     On 1/15/17 she began having substernal after dinner, described as "sticking" and nonradiating. The night nurse reassured her, but the pain persisted the next morning, so the day shift nurse sent her to [facility] ED. She received aspirin 325 mg and sublingual nitroglycerin without relief. She was found to be in atrial fibrillation with rapid ventricular response (pulse 134 bpm on EKG). Chest x-ray showed stable pulmonary edema. Labs showed new leukocytosis (14,670) and mildly elevated troponin (0.034). BNP was improved (1641) from 12/31/16 (2770). She was given diltiazem 20 mg IV, furosemide 80 mg IV, and ondansetron 4 mg IV, reporting some relief. She was admitted to [facility] to further evaluate her chest pain.       Hospital Course:       Interval History: Says that she is feeling better today. No further chest pain or palpitations.     Review of Systems   Constitutional: Negative for fever.   Respiratory: Negative for cough and shortness of breath.    Gastrointestinal: Negative for nausea and vomiting.     Objective:     Vital Signs (Most Recent):  Temp: 98.2 °F (36.8 °C) (01/17/17 2005)  Pulse: 80 (01/17/17 2005)  Resp: 18 (01/17/17 2005)  BP: 90/64 (01/17/17 2005)  SpO2: 99 % (01/17/17 1553) Vital Signs (24h Range):  Temp:  [96.1 °F (35.6 °C)-98.6 °F (37 °C)] 98.2 °F (36.8 °C)  Pulse:  [80-94] 80  Resp:  [18-21] 18  SpO2:  [96 %-99 %] 99 " %  BP: ()/(56-68) 90/64     Weight: 71 kg (156 lb 9.6 oz)  Body mass index is 30.58 kg/(m^2).    Intake/Output Summary (Last 24 hours) at 01/17/17 2057  Last data filed at 01/17/17 1800   Gross per 24 hour   Intake              840 ml   Output              940 ml   Net             -100 ml      Physical Exam   Constitutional: She is oriented to person, place, and time. She appears well-developed and well-nourished. No distress.   Cardiovascular: Normal rate.  An irregularly irregular rhythm present.   Murmur heard.  Pulmonary/Chest: Effort normal and breath sounds normal.   Abdominal: Soft. Bowel sounds are normal. She exhibits no distension.   Musculoskeletal: She exhibits no edema or tenderness.   Neurological: She is alert and oriented to person, place, and time.   Skin: Skin is warm and dry.   Nursing note and vitals reviewed.      Significant Labs:   CBC:   Recent Labs  Lab 01/16/17  0946 01/16/17  1010   WBC 12.69 14.67*   HGB 11.5* 11.1*   HCT 38.6 37.3   * 322     CMP:   Recent Labs  Lab 01/16/17  1010 01/17/17  0633    136   K 3.7 4.2   CL 97 96   CO2 20* 26   * 108   BUN 61* 65*   CREATININE 2.0* 2.5*   CALCIUM 9.4 9.6   PROT 6.7  --    ALBUMIN 2.9*  --    BILITOT 1.8*  --    ALKPHOS 117  --    AST 18  --    ALT 13  --    ANIONGAP 19* 14   EGFRNONAA 23* 17*     Coagulation:   Recent Labs  Lab 01/16/17  0954   INR 1.9*     Troponin:   Recent Labs  Lab 01/16/17  1010 01/16/17  2358 01/17/17  0633   TROPONINI 0.034*  0.034* 0.034* 0.035*       Significant Imaging: I have reviewed all pertinent imaging results/findings within the past 24 hours.    Assessment/Plan:      * Paroxysmal atrial fibrillation with rapid ventricular response  Increased Toprol-XL 50 mg to 100 mg. Restart digoxin, renally dosed at 0.125 mg every other day. HR is better controlled. INR is 1.9. Continue warfarin.       Chronic venous stasis dermatitis of both lower extremities  Legs wrapped. Continue wound care at  SNF as ordered from main campus.      Debility  Does not want to return to [facility], so SW working on new SNF placement.       Chronic combined systolic and diastolic CHF, NYHA class 4  Continue hydralazine 25 mg q8h, isosorbide mononitrate 30 mg daily,furosemide 80 mg twice daily. Takes Toprol-XL 50 mg daily and increased to 100 mg for rate control.     Chronic kidney disease, stage 4, severely decreased GFR  Stable on admission, but up slightly this AM. Hold PM lasix today and reassess tomorrow. Baseline creatinine is 1.8 to 2.       Substernal chest pain  Due to rapid a fib. Troponin negative for ischemia.      VTE Risk Mitigation         Ordered     Medium Risk of VTE  Once      01/16/17 1832     Reason for No Pharmacological VTE Prophylaxis  Once      01/16/17 1832          Farhad Vargas MD  Department of Hospital Medicine   [facility]

## 2017-01-18 NOTE — PROGRESS NOTES
TN informed by Dr. Vargas pt not stable for discharge to SNF today. TN informed RACHELL Mejia. Plan for discharge to [facility] when stable.    Tn notified pt's sister Caty ([phone]) of discharge on hold for today.

## 2017-01-18 NOTE — PLAN OF CARE
Problem: Patient Care Overview  Goal: Plan of Care Review  Outcome: Ongoing (interventions implemented as appropriate)  Plan of care reviewed with patient. Voice understanding. Afib on monitor HR 70-80'S. Possible 6 beat run of vtach today and physician assistant aware. C/O constipation today. Suppository administered per orders. Patient will be monitored overnight.

## 2017-01-18 NOTE — PROGRESS NOTES
10:02am -  made phone contact with [facility] Oliva [phone] to make sure she know that patient is currently in observation status at [facility].  Patient had an inpatient admission stay December 2016 before admitting to [facility].  She was at [facility] two weeks for skilled level of care before admission to [facility].   reinstated that admit to inpatient order from December and observation status from this admission are attached in right care.      10:13AM -  made phone contact with sister, Caty at [phone] informing her patient was in observation status at [facility].  She was able to admit to [facility] in December for skilled her because patient had stayed three midnights.   told sister she need to confirm with [facility] admit coordinator that Medicare will cover skilled level of care admission.    Sister reported she was enroute to [facility] to complete admit paper work.

## 2017-01-18 NOTE — PROGRESS NOTES
informed by transition navigator, Angy patient is not ready for discharge to skilled nursing facility today.  Transition navigator will inform sister, Caty.       made phone contact with [facility] , Oliva and informed her patient's discharge has been put on hold for today.

## 2017-01-18 NOTE — PROGRESS NOTES
12:15pm -  received phone call from [facility] Shalini stating sister (Caty) has completed admission paper work.  Oliva said patient can admit today after she received and review transfer facility orders.    12:17pm -  informed Dr. Vargas daughter has signed admit paper work at [facility].

## 2017-01-18 NOTE — PLAN OF CARE
Problem: Patient Care Overview  Goal: Plan of Care Review  Outcome: Ongoing (interventions implemented as appropriate)  Received pt on 2L NC; SAT 99%

## 2017-01-18 NOTE — PLAN OF CARE
Problem: Patient Care Overview  Goal: Plan of Care Review  Pt received on nasal cannula at 2  lpm. SPO2   96%.  Pt in no apparent respiratory distress.  Will continue to monitor.

## 2017-01-18 NOTE — PLAN OF CARE
Problem: Physical Therapy Goal  Goal: Physical Therapy Goal  Goals to be met by: 2017     Patient will increase functional independence with mobility by performin. Supine to sit with Moderate Assistance  2. Sit to stand transfer with Minimal Assistance  3. Bed to chair transfer with Moderate Assistance using Rolling Walker  4. Gait x 20 feet with Moderate Assistance using Rolling Walker.    Outcome: Ongoing (interventions implemented as appropriate)  Goals ongoing

## 2017-01-18 NOTE — PLAN OF CARE
Problem: Physical Therapy Goal  Goal: Physical Therapy Goal  Goals to be met by: 2017     Patient will increase functional independence with mobility by performin. Supine to sit with Moderate Assistance  2. Sit to stand transfer with Minimal Assistance  3. Bed to chair transfer with Moderate Assistance using Rolling Walker  4. Gait x 20 feet with Moderate Assistance using Rolling Walker.    Outcome: Ongoing (interventions implemented as appropriate)  Recommend SNF

## 2017-01-18 NOTE — PLAN OF CARE
Problem: Patient Care Overview  Goal: Plan of Care Review  Outcome: Ongoing (interventions implemented as appropriate)  Pt is resting in bed. AAOx4. Only reported pain while trying to ambulate. Hip pain from previous surgery. PT and OT worked with Pt, see notes. A fib on the monitor. Pt has bed alarm set and is aware to call for assistance. No noted distress. Pt to go to SNF tomorrow.

## 2017-01-19 NOTE — PROGRESS NOTES
[facility] Hospital Medicine  Progress Note    Patient Name: Myra Murphy  MRN: [id number]  Patient Class: IP- Inpatient   Admission Date: 2017  Length of Stay: 0 days  Attending Physician: Farhad Vargas MD  Primary Care Provider: Lupe Delatorre MD        Subjective:     Principal Problem:Paroxysmal atrial fibrillation with rapid ventricular response    HPI:  Myra Murphy is a 82 y.o. black woman with history of ALYSE-BSO in , hypertension, hyperlipidemia, gastroesophageal reflux disease, memory deficit, degenerative joint disease of the knees and spine, gout, peripheral neuralgia, history of colon cancer s/p resection in , history of right breast cancer (T1N3Mx ER/WA positive, Her-2 Colleen negative) s/p right partial mastectomy and lymph node dissection 3/14/08 and chemotherapy (docataxel, doxorubicin, cyclophosphamide) on aromatase inhibitor therapy, history of total hip arthroplasty 3/05/10, peripheral vascular disease, chronic venous stasis dermatitis of the legs, obstructive sleep apnea (supposed to be on CPAP 12 cmH2O but CPAP broken), 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 She lives at [facility] in [place], [place]. She used to be a . Her sister Caty Figueroa is involved in her medical care. Her primary care physician is Dr. Lupe Delatorre. Her cardiologist is Dr. Sunshine Mckeon. She has refused ICD placement.     On 16 a nurse recommended to her primary care physician, Dr. Delatorre, to decrease her furosemide from 80 mg to 20 mg daily. Dr. Delatorre agreed. She was subsequently  "hospitalized under the care of Cardiology at [facility] from 12/26/16-1/05/17 for CHF exacerbation cardiogenic shock, hypoxia, and mental decline. She had developed acute renal failure. She was diuresed with IV furosemide and dobutamine drips, removing 20 lbs of fluid (181 lbs to 161 lbs). Furosemide was increased to 80 mg twice daily. Digoxin was discontinued, and she was started on hydralazine and isosorbide mononitrate. She was fluid restricted to 1500 mL daily. She was discharged to [facility] skilled nursing facility.     On 1/15/17 she began having substernal after dinner, described as "sticking" and nonradiating. The night nurse reassured her, but the pain persisted the next morning, so the day shift nurse sent her to [facility] ED. She received aspirin 325 mg and sublingual nitroglycerin without relief. She was found to be in atrial fibrillation with rapid ventricular response (pulse 134 bpm on EKG). Chest x-ray showed stable pulmonary edema. Labs showed new leukocytosis (14,670) and mildly elevated troponin (0.034). BNP was improved (1641) from 12/31/16 (2770). She was given diltiazem 20 mg IV, furosemide 80 mg IV, and ondansetron 4 mg IV, reporting some relief. She was admitted to [facility] to further evaluate her chest pain.       Hospital Course:       Interval History: Says that she isn't quite right today. She is having some constipation currently. She denies SOB, but appears to be SOB.     Review of Systems   Constitutional: Negative for chills and fever.   Respiratory: Positive for shortness of breath. Negative for cough.    Cardiovascular: Negative for chest pain and palpitations.   Gastrointestinal: Positive for constipation. Negative for nausea and vomiting.     Objective:     Vital Signs (Most Recent):  Temp: 97.7 °F (36.5 °C) (01/18/17 1125)  Pulse: 74 (01/18/17 1125)  Resp: 20 (01/18/17 1125)  BP: 101/70 (01/18/17 1125)  SpO2: 100 % (01/18/17 1143) " Vital Signs (24h Range):  Temp:  [96.4 °F (35.8 °C)-98.2 °F (36.8 °C)] 97.7 °F (36.5 °C)  Pulse:  [] 74  Resp:  [18-20] 20  SpO2:  [95 %-100 %] 100 %  BP: ()/(59-70) 101/70     Weight: 72.6 kg (160 lb)  Body mass index is 31.25 kg/(m^2).    Intake/Output Summary (Last 24 hours) at 01/18/17 1756  Last data filed at 01/18/17 1700   Gross per 24 hour   Intake              920 ml   Output              788 ml   Net              132 ml      Physical Exam   Constitutional: She appears well-developed and well-nourished. No distress.   Neck: JVD present.   Cardiovascular: Normal rate.    Murmur heard.  Pulmonary/Chest: Effort normal. No respiratory distress. She has rales.   Abdominal: Soft. Bowel sounds are normal. She exhibits no distension. There is no tenderness.   Musculoskeletal: She exhibits edema.   Neurological: She is alert.   Nursing note and vitals reviewed.      Significant Labs:   BMP:   Recent Labs  Lab 01/18/17  0400 01/18/17  1019    161*   * 133*   K 4.0 4.1   CL 96 97   CO2 25 23   BUN 70* 70*   CREATININE 2.6* 2.6*   CALCIUM 8.9 8.9   MG 2.3  --      BNP 3500    Significant Imaging: I have reviewed all pertinent imaging results/findings within the past 24 hours.    Assessment/Plan:      * Paroxysmal atrial fibrillation with rapid ventricular response  Increased Toprol-XL 50 mg to 100 mg. Stop digoxin given renal dysfunction. HR is better controlled. INR is 2.5. Continue warfarin.       Chronic venous stasis dermatitis of both lower extremities  Legs wrapped. Continue wound care at SNF.      Chronic combined systolic and diastolic CHF, NYHA class 4  Acute exacerbation today with BNP up to 3500 and increased symptoms. Will hold hydralazine 25 mg q8h, isosorbide mononitrate 30 mg daily because of low blood pressure. Will give IV furosemide 60 mg this afternoon. Continue Toprol- mg daily.     Chronic kidney disease, stage 4, severely decreased GFR  Cardiorenal syndrome    Stable  on admission, but up to 2.6 this AM. Gave a small amount of saline this AM with no change in creatinine or sodium. Now likely related to volume overload. Will give IV lasix now. Monitor UOP and renal function.     VTE Risk Mitigation         Ordered     Medium Risk of VTE  Once      01/16/17 1832     Reason for No Pharmacological VTE Prophylaxis  Once      01/16/17 1832          Farhad Vargas MD  Department of Hospital Medicine   [facility]

## 2017-01-19 NOTE — PLAN OF CARE
Problem: Patient Care Overview  Goal: Plan of Care Review  Sats 96% 1L NC; tolerating well; will continue to monitor.

## 2017-01-19 NOTE — CONSULTS
[facility]  Cardiology  Consult Note    Patient Name: Myra Murphy  MRN: [id number]  Admission Date: 1/16/2017  Hospital Length of Stay: 1 days  Code Status: Full Code   Attending Provider: Farhad Kenney MD   Consulting Provider: Steffen Cash NP  Primary Care Physician: Lupe Delatorre MD  Principal Problem:Paroxysmal atrial fibrillation with rapid ventricular response    Patient information was obtained from patient, past medical records and ER records.     Inpatient consult to [facility]  Consult performed by: STEFFEN CASH  Consult ordered by: FARHAD KENNEY  Reason for consult: CHF        Subjective:     Chief Complaint:    Chest pain and SOB with cough    HPI:yMra Murphy is a 82 y.o.female who presented to the ED on 01/15/2017 with reports of substernal chest pain that was nonradiating and sticking in nature. Pain was not relieved by NTG and ASA given in the ED. She was found to be in AF RVR. Chest x-ray showed pulmonary edema, noted to have mildly elevated troponin and elevated BNP. She was given diltiazem 20 mg IV, furosemide 80 mg IV with moderate relief in symptoms. Patient follows Dr. Sunshine Mckeon for cardiology at [facility].  Patient's Lasix was changed at the end of last year 2/2 declining renal function.  She was subsequently hospitalized at [facility] from 12/26/16-1/05/17 with CHF exacerbation cardiogenic shock, hypoxia, and mental decline. She had developed acute renal failure. She was diuresed with IV furosemide and dobutamine drips, removing 20 lbs of fluid (181 lbs to 161 lbs).  Her weight yesterday is documented at 160 lbs. Upon discharge her Lasix was increased to 80 mg twice daily. Digoxin was discontinued, and she was started on hydralazine and isosorbide mononitrate with 1.5 L fluid restriction. 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 On interview she is tearful regarding her current state of health and does not want any invasive procedures including valve replacement or PPM/AICD.     Past Medical History   Diagnosis Date    Anticoagulant long-term use     Arthritis     Atrial fibrillation     Atrial flutter     Carcinoma of right breast, estrogen receptor negative and progesterone receptor positive 02/2008    Chronic combined systolic and diastolic congestive heart failure     Colon cancer     Diabetes mellitus     Dilated cardiomyopathy 12/01/2008     adriamycin toxicity    Dyslipidemia     Encounter for blood transfusion     Hyperlipidemia     Hypertension     Supraventricular tachycardia      PAF, AFL       Past Surgical History   Procedure Laterality Date    Colon surgery  2000     upper colon removal    Cataract extraction  8/5/09     both eyes (Dr. Adam)    Cardioversion  04/24/2014    Total abdominal hysterectomy w/ bilateral salpingoophorectomy  1973    Mastectomy, partial Right 03/14/2008     R right    Total hip arthroplasty  03/05/2010     anuerysm in hip/right hip    Hysterectomy         Review of patient's allergies indicates:   Allergen Reactions    Shellfish containing products Itching     Can eat shrimp    Adhesive      Other reaction(s): blisters    Iodinated contrast media - iv dye      Other reaction(s): Vomiting    Iodine      Other reaction(s): Topical  Other reaction(s): Rash    Morphine      Other reaction(s): Hallucinations       No current facility-administered medications on file prior to encounter.      Current Outpatient Prescriptions on File Prior to Encounter   Medication Sig    acetaminophen (TYLENOL) 325 MG tablet Take 2 tablets (650 mg total) by mouth every 6 (six) hours as needed for Pain. Do NOT exceed 3000 mg in a 24 hour time period and do not take with  other medications containing acetaminophen  Do NOT drink alcohol when taking this medication    allopurinol (ZYLOPRIM) 100 MG tablet TAKE 1 TABLET(100 MG) BY MOUTH THREE TIMES DAILY (Patient taking differently: Take 100 mg by mouth 3 (three) times daily. )    fluticasone (FLONASE) 50 mcg/actuation nasal spray 1 spray by Each Nare route once daily.    furosemide (LASIX) 40 MG tablet Take 2 tablets (80 mg total) by mouth 2 (two) times daily. 80 mg twice daily for 1 week. Then, take 80 mg once daily    hydrALAZINE (APRESOLINE) 25 MG tablet Take 1 tablet (25 mg total) by mouth every 8 (eight) hours.    isosorbide mononitrate (IMDUR) 30 MG 24 hr tablet Take 1 tablet (30 mg total) by mouth once daily.    letrozole (FEMARA) 2.5 mg Tab Take 1 tablet (2.5 mg total) by mouth once daily.    metoprolol succinate (TOPROL-XL) 25 MG 24 hr tablet Take 2 tablets (50 mg total) by mouth once daily. (Patient taking differently: Take 50 mg by mouth once daily. Take half tablet by mouth daily)    omeprazole (PRILOSEC) 20 MG capsule Take 1 capsule (20 mg total) by mouth once daily.    pravastatin (PRAVACHOL) 40 MG tablet Take 1 tablet (40 mg total) by mouth once daily.    warfarin (COUMADIN) 1 MG tablet Take 1 tablet (1 mg total) by mouth Daily. Adjust to keep INR between 2.0-3.0 (Patient taking differently: Take 1 mg by mouth Daily. Adjust to keep INR between 2.0-3.0. Take as directed at the [facility].)    senna-docusate 8.6-50 mg (PERICOLACE) 8.6-50 mg per tablet Take 1 tablet by mouth daily as needed for Constipation.     Family History     Problem Relation (Age of Onset)    Diabetes Mother    Glaucoma Mother, Maternal Aunt    Heart disease Mother    Hypertension Mother        Social History Main Topics    Smoking status: Never Smoker    Smokeless tobacco: Never Used    Alcohol use No    Drug use: No    Sexual activity: Not on file     Review of Systems   Constitutional: Negative.    HENT: Negative.    Eyes:  Negative.    Respiratory: Positive for cough and shortness of breath. Negative for apnea, choking, chest tightness, wheezing and stridor.    Cardiovascular: Positive for leg swelling. Negative for chest pain and palpitations.   Gastrointestinal: Negative.    Endocrine: Negative.    Genitourinary:        Incontinent of urine at times   Musculoskeletal: Negative.    Skin: Positive for wound (BLE).   Allergic/Immunologic: Negative.    Neurological: Negative.    Hematological: Negative.    Psychiatric/Behavioral: Negative.      Objective:     Vital Signs (Most Recent):  Temp: 96.3 °F (35.7 °C) (01/19/17 0800)  Pulse: (!) 46 (01/19/17 0800)  Resp: 15 (01/19/17 0800)  BP: 118/75 (01/19/17 0800)  SpO2: 97 % (01/19/17 0720) Vital Signs (24h Range):  Temp:  [96.3 °F (35.7 °C)-98.3 °F (36.8 °C)] 96.3 °F (35.7 °C)  Pulse:  [45-99] 46  Resp:  [15-20] 15  SpO2:  [94 %-100 %] 97 %  BP: ()/(57-75) 118/75     Weight: 72.6 kg (160 lb)  Body mass index is 31.25 kg/(m^2).    SpO2: 97 %  O2 Device (Oxygen Therapy): nasal cannula      Intake/Output Summary (Last 24 hours) at 01/19/17 1012  Last data filed at 01/18/17 1804   Gross per 24 hour   Intake              840 ml   Output              226 ml   Net              614 ml       Lines/Drains/Airways     Peripheral Intravenous Line                 Peripheral IV - Single Lumen 01/17/17 2300 Left Forearm 1 day                Physical Exam   Constitutional: No distress.   HENT:   Head: Normocephalic and atraumatic.   Eyes: Right eye exhibits no discharge. Left eye exhibits no discharge.   Neck: JVD present.   Cardiovascular: Normal rate.  An irregularly irregular rhythm present. Frequent extrasystoles are present.   Murmur heard.  Pulmonary/Chest: Effort normal. No accessory muscle usage. No tachypnea. No respiratory distress. She has decreased breath sounds. She has rales.   Abdominal: Soft. Bowel sounds are normal.   Musculoskeletal: She exhibits edema.   BLE dressings CDI    Neurological: She is alert.   Skin: Skin is warm and dry. She is not diaphoretic.   Psychiatric: She has a normal mood and affect. Her behavior is normal. Judgment and thought content normal.         Recent Labs  Lab 01/19/17  0340   *   K 4.2   CL 97   CO2 23   BUN 71*   CREATININE 2.8*   MG 2.3       Recent Labs  Lab 01/19/17 0340   WBC 7.94   RBC 4.21   HGB 10.4*   HCT 35.9*      MCV 85   MCH 24.7*   MCHC 29.0*       Recent Labs  Lab 01/19/17  0340   ALT 10   AST 15   ALKPHOS 122   BILITOT 1.5*   PROT 6.3   ALBUMIN 2.6*       Recent Labs  Lab 01/19/17 0340   INR 2.9*     Assessment and Plan:     Active Diagnoses:    Diagnosis Date Noted POA    PRINCIPAL PROBLEM:  Paroxysmal atrial fibrillation with rapid ventricular response [I48.0] Presented with AF RVR rate since controlled; telemetry reviewed noting rate in 80s at the present; she has frequent multifocal PVCs and runs of VT longest noted 7 beats; Taking Toprol  mg daily with Coumadin for anticoagulation; INR 2.9 today; would recommend continuation of current treatment; Patient underwent DCCV in the past with return of AF/FLT in 1 week; previously had AF/FLT ablation recommended; she did not follow through and is not interested at this time 01/16/2017 Yes    Chronic combined systolic and diastolic CHF, NYHA class 4 [I50.42] Patient with noted pulmonary edema on chest x ray; BNP 3500; JVD and rales on exam with peripheral edema; Echo noting EF 20% with severe MR and moderate to severe TR; SBP noted marginal at ; on BB; Lasix 80 mg IV given this morning; Inaccurate I&O documented per nursing as patient is intermittently incontinent of urine and pads/diapers are not being weighed; Depending on response to Lasix; would consider adding 250 mg IV Diuril to be dosed with 80 mg IV Lasix this evening; Nephrology following as well; patient is not open to HD or invasive testing/procedures such as ICD, RHC, valve replacement; Discussed with  "her the likely mo of readmission despite best medical regimen to which she began crying asking "when will this end"  ? Hospice candidate   05/11/2014 Yes      Problems Resolved During this Admission:    Diagnosis Date Noted Date Resolved POA       VTE Risk Mitigation         Ordered     Medium Risk of VTE  Once      01/16/17 1832     Reason for No Pharmacological VTE Prophylaxis  Once      01/16/17 1832              Steffen Robb NP  Cardiology   [facility]      "

## 2017-01-19 NOTE — PLAN OF CARE
Problem: Patient Care Overview  Goal: Plan of Care Review  Sats 96% on 1L NC; tolerating well; will continue to monitor.

## 2017-01-19 NOTE — CONSULTS
NEPHROLOGY CONSULT NOTE    HPI & INTERVAL HISTORY:    Past Medical History   Diagnosis Date    Anticoagulant long-term use     Arthritis     Atrial fibrillation     Atrial flutter     Carcinoma of right breast, estrogen receptor negative and progesterone receptor positive 02/2008    Chronic combined systolic and diastolic congestive heart failure     Colon cancer     Diabetes mellitus     Dilated cardiomyopathy 12/01/2008     adriamycin toxicity    Dyslipidemia     Encounter for blood transfusion     Hyperlipidemia     Hypertension     Supraventricular tachycardia      PAF, AFL      Past Surgical History   Procedure Laterality Date    Colon surgery  2000     upper colon removal    Cataract extraction  8/5/09     both eyes (Dr. Adam)    Cardioversion  04/24/2014    Total abdominal hysterectomy w/ bilateral salpingoophorectomy  1973    Mastectomy, partial Right 03/14/2008     R right    Total hip arthroplasty  03/05/2010     anuerysm in hip/right hip    Hysterectomy        Review of patient's allergies indicates:   Allergen Reactions    Shellfish containing products Itching     Can eat shrimp    Adhesive      Other reaction(s): blisters    Iodinated contrast media - iv dye      Other reaction(s): Vomiting    Iodine      Other reaction(s): Topical  Other reaction(s): Rash    Morphine      Other reaction(s): Hallucinations      Prescriptions Prior to Admission   Medication Sig Dispense Refill Last Dose    acetaminophen (TYLENOL) 325 MG tablet Take 2 tablets (650 mg total) by mouth every 6 (six) hours as needed for Pain. Do NOT exceed 3000 mg in a 24 hour time period and do not take with other medications containing acetaminophen  Do NOT drink alcohol when taking this medication  0 1/15/2017 at Unknown time    allopurinol (ZYLOPRIM) 100 MG tablet TAKE 1 TABLET(100 MG) BY MOUTH THREE TIMES DAILY (Patient taking differently: Take 100 mg by mouth 3 (three) times daily. ) 270 tablet 1 1/16/2017  at Unknown time    digoxin (LANOXIN) 125 mcg tablet Take 125 mcg by mouth once daily.       fluticasone (FLONASE) 50 mcg/actuation nasal spray 1 spray by Each Nare route once daily. 16 g 0 1/16/2017 at Unknown time    furosemide (LASIX) 40 MG tablet Take 2 tablets (80 mg total) by mouth 2 (two) times daily. 80 mg twice daily for 1 week. Then, take 80 mg once daily 180 tablet 3 1/15/2017 at Unknown time    gabapentin (NEURONTIN) 100 MG capsule Take 200 mg by mouth 2 (two) times daily.       gabapentin (NEURONTIN) 600 MG tablet Take 600 mg by mouth once daily. In the evening       hydrALAZINE (APRESOLINE) 25 MG tablet Take 1 tablet (25 mg total) by mouth every 8 (eight) hours. 90 tablet 1 1/16/2017 at Unknown time    isosorbide mononitrate (IMDUR) 30 MG 24 hr tablet Take 1 tablet (30 mg total) by mouth once daily. 30 tablet 1 1/16/2017 at Unknown time    letrozole (FEMARA) 2.5 mg Tab Take 1 tablet (2.5 mg total) by mouth once daily. 90 tablet 1 1/16/2017 at Unknown time    metoprolol succinate (TOPROL-XL) 25 MG 24 hr tablet Take 2 tablets (50 mg total) by mouth once daily. (Patient taking differently: Take 50 mg by mouth once daily. Take half tablet by mouth daily) 90 tablet 1 1/16/2017 at Unknown time    omeprazole (PRILOSEC) 20 MG capsule Take 1 capsule (20 mg total) by mouth once daily. 90 capsule 1 1/16/2017 at Unknown time    potassium chloride SA (K-DUR,KLOR-CON) 20 MEQ tablet Take 20 mEq by mouth once daily.       pravastatin (PRAVACHOL) 40 MG tablet Take 1 tablet (40 mg total) by mouth once daily. 90 tablet 1     warfarin (COUMADIN) 1 MG tablet Take 1 tablet (1 mg total) by mouth Daily. Adjust to keep INR between 2.0-3.0 (Patient taking differently: Take 1 mg by mouth Daily. Adjust to keep INR between 2.0-3.0. Take as directed at the [facility].) 240 tablet 6 1/16/2017 at Unknown time    senna-docusate 8.6-50 mg (PERICOLACE) 8.6-50 mg per tablet Take 1 tablet by mouth daily as needed for  Constipation.   Unknown at Unknown time       Social History     Social History    Marital status:      Spouse name: N/A    Number of children: N/A    Years of education: N/A     Occupational History    Not on file.     Social History Main Topics    Smoking status: Never Smoker    Smokeless tobacco: Never Used    Alcohol use No    Drug use: No    Sexual activity: Not on file     Other Topics Concern    Not on file     Social History Narrative        MEDS   allopurinol  100 mg Oral Daily    metoprolol succinate  100 mg Oral Daily    pantoprazole  40 mg Oral Daily    pravastatin  40 mg Oral Daily                  CONTINOUS INFUSIONS:      Intake/Output Summary (Last 24 hours) at 01/19/17 1057  Last data filed at 01/18/17 1804   Gross per 24 hour   Intake              840 ml   Output              226 ml   Net              614 ml        HEMODYNAMICS:    Temp:  [96.3 °F (35.7 °C)-98.3 °F (36.8 °C)] 96.3 °F (35.7 °C)  Pulse:  [45-99] 46  Resp:  [15-20] 15  SpO2:  [94 %-100 %] 97 %  BP: ()/(57-75) 118/75   Gen:  NAD  No CP, no SOB, no cough, no nausea, no vomiting,   no diarrhea, no fever, no chills.   Decreased intake.  Cards: Pulse 50  Pul: diminished breath sounds   Abdomen soft  Extremities : edema  Skin:dry   Incontinent  LABS   Lab Results   Component Value Date    WBC 7.94 01/19/2017    HGB 10.4 (L) 01/19/2017    HCT 35.9 (L) 01/19/2017    MCV 85 01/19/2017     01/19/2017          Recent Labs  Lab 01/19/17  0340   GLU 99   CALCIUM 9.0   ALBUMIN 2.6*   PROT 6.3   *   K 4.2   CO2 23   CL 97   BUN 71*   CREATININE 2.8*   ALKPHOS 122   ALT 10   AST 15   BILITOT 1.5*      Lab Results   Component Value Date    PTH 81.5 (H) 03/16/2007    CALCIUM 9.0 01/19/2017    PHOS 4.7 (H) 01/19/2017      Lab Results   Component Value Date    IRON 19 (L) 09/12/2016    TIBC 269 09/12/2016    FERRITIN 215 09/12/2016        ABG  No results for input(s): PH, PO2, PCO2, HCO3, BE in the last 168  hours.      IMAGING:  CXR reviewed    ASSESSMENT / PLAN  Non ischemic dilated cardiomyopathy.  Echo 2016 -  1 - Severely depressed left ventricular systolic function (EF 20-25%).     2 - Left ventricular diastolic dysfunction.     3 - Biatrial enlargement.     4 - Moderately depressed right ventricular systolic function .     5 - Severe mitral regurgitation.     6 - Moderate to severe tricuspid regurgitation.     7 - Pulmonary hypertension. The estimated PA systolic pressure is 65 mmHg.     8 - Increased central venous pressure.   Acute kidney injury on Chronic kidney disease 4.  Creatinine 1.9 on 12/27/16.  Creatinine 2 on 1/16/17.  Creatinine 2.8 today.  Azotemia. BUN 71.  Metabolic acidosis.  Hyponatremia. Na 134.  Metabolic bone disease.   Hyperphosphatemia. Phosphorus 4.7. Binders.  Corrected calcium 10.2   Poor nutrition Albumin 2.6. Supplements.  Anemia. Hb 10.4. Check iron.  1/16/16 CXR - Stable appearance of the chest with findings suggestive of pulmonary edema and possible interstitial lung disease.   Blood pressure 118/75.  Avoid nephrotoxic agents, hypotension.  Weight daily. I and O.  Nepro 1 can tid.  UA, UA sodium, US kidney, CXR.  Will follow up.

## 2017-01-19 NOTE — PLAN OF CARE
Problem: Occupational Therapy Goal  Goal: Occupational Therapy Goal  Goals to be met by: 02/07/2017     Patient will increase functional independence with ADLs by performing:    UE Dressing with Set-up Assistance.  LE Dressing with Moderate Assistance and Assistive Devices as needed.  Grooming while EOB with Modified Foard.  Sitting at edge of bed x 20 minutes with Supervision.  Supine to sit with Minimal Assistance.  Upper extremity exercise program x 10 reps per handout, with supervision.   Outcome: Ongoing (interventions implemented as appropriate)  Myra Mruphy is a 82 y.o. female with a medical diagnosis of Paroxysmal atrial fibrillation with rapid ventricular response and presents with decreased overall strength, endurance, and Foard with ADL's and fx mobility. Pt with some confusion this am reporting she was 53 years old and also stating that she can't remember things. She also requires increased time with all tasks and encouragement to increase active participation. Continue OT services to address functional goals.  ÓSCAR Lemus/SHIVAM

## 2017-01-19 NOTE — PT/OT/SLP PROGRESS
Physical Therapy  Treatment    Myra Murphy   MRN: [id number]   Admitting Diagnosis: Paroxysmal atrial fibrillation with rapid ventricular response    PT Received On: 17  PT Start Time: 1232     PT Stop Time: 1256    PT Total Time (min): 24 min       Billable Minutes:  Therapeutic Activity 13 and Therapeutic Exercise 11    Treatment Type: Treatment  PT/PTA: PTA     PTA Visit Number: 2       General Precautions: Standard, fall  Orthopedic Precautions: N/A   Braces:           Subjective:  Communicated with nsg prior to session.      Pain Ratin/10                   Objective:   Patient found with: oxygen, telemetry    Functional Mobility:  Bed Mobility:   Rolling/Turning to Left: Moderate assistance  Rolling/Turning Right: Moderate assistance    Transfers:       Gait:        Stairs:      Balance:   Static Sit:   Dynamic Sit:   Static Stand:   Dynamic stand:      Therapeutic Activities and Exercises: le AA supine ex's X 15 reps inc: ap,qs,hs,abd/add,slr, pt to HOB with Dep A X 2, fed pt some food during rx      AM-PAC 6 CLICK MOBILITY  How much help from another person does this patient currently need?   1 = Unable, Total/Dependent Assistance  2 = A lot, Maximum/Moderate Assistance  3 = A little, Minimum/Contact Guard/Supervision  4 = None, Modified [place]/Independent    Turning over in bed (including adjusting bedclothes, sheets and blankets)?: 3  Sitting down on and standing up from a chair with arms (e.g., wheelchair, bedside commode, etc.): 2  Moving from lying on back to sitting on the side of the bed?: 2  Moving to and from a bed to a chair (including a wheelchair)?: 2  Need to walk in hospital room?: 2  Climbing 3-5 steps with a railing?: 1  Total Score: 12    AM-PAC Raw Score CMS G-Code Modifier Level of Impairment Assistance   6 % Total / Unable   7 - 9 CM 80 - 100% Maximal Assist   10 - 14 CL 60 - 80% Moderate Assist   15 - 19 CK 40 - 60% Moderate Assist   20 - 22 CJ 20 - 40% Minimal  Assist   23 CI 1-20% SBA / CGA   24 CH 0% Independent/ Mod I     Patient left supine with all lines intact, call button in reach and PCT present.    Assessment: tolerated w/o c/o's,L le weaker than R  Myra Murphy is a 82 y.o. female with a medical diagnosis of Paroxysmal atrial fibrillation with rapid ventricular response and presents with .    Rehab identified problem list/impairments: Rehab identified problem list/impairments: weakness, impaired endurance, impaired functional mobilty, decreased safety awareness, decreased upper extremity function, decreased lower extremity function    Rehab potential is good.    Activity tolerance: Fair    Discharge recommendations: Discharge Facility/Level Of Care Needs: nursing facility, skilled     Barriers to discharge: Barriers to Discharge: Decreased caregiver support    Equipment recommendations: Equipment Needed After Discharge:  (TBD)     GOALS: see general POC  Physical Therapy Goals        Problem: Physical Therapy Goal    Goal Priority Disciplines Outcome Goal Variances Interventions   Physical Therapy Goal     PT/OT, PT Ongoing (interventions implemented as appropriate)     Description:  Goals to be met by: 2017     Patient will increase functional independence with mobility by performin. Supine to sit with Moderate Assistance  2. Sit to stand transfer with Minimal Assistance  3. Bed to chair transfer with Moderate Assistance using Rolling Walker  4. Gait  x 20 feet with Moderate Assistance using Rolling Walker.                 PLAN:    Patient to be seen 5 x/week  to address the above listed problems via gait training, therapeutic activities, therapeutic exercises  Plan of Care expires: 17  Plan of Care reviewed with: patient         Walter Ernandez, PTA  2017

## 2017-01-19 NOTE — PT/OT/SLP PROGRESS
"Occupational Therapy  Treatment    Myra Murphy   MRN: [id number]   Admitting Diagnosis: Paroxysmal atrial fibrillation with rapid ventricular response    OT Date of Treatment: 17   OT Start Time: 945  OT Stop Time: 1015  OT Total Time (min): 30 min    Billable Minutes:  Self Care/Home Management 15 and Therapeutic Activity 15    General Precautions: Standard, fall  Orthopedic Precautions: N/A  Braces:           Subjective:  Communicated with RN prior to session.  "I'm just so weak."    Pain Ratin/10              Pain Rating Post-Intervention: 0/10    Objective:  Patient found with: telemetry, oxygen     Functional Mobility:  Bed Mobility:  Rolling/Turning to Left: Maximum assistance, With side rail (increased time and effort)  Rolling/Turning Right:  (increased time and effort)  Scooting/Bridging: Total Assistance (scoot to EOB)  Supine to Sit: Maximum Assistance, WIth side rail (increased time and effort)  Sit to Supine: Moderate Assistance, With side rail    Transfers:   Sit <> Stand Assistance: Maximum Assistance  Sit <> Stand Assistive Device: Rolling Walker      Activities of Daily Living:  Feeding Level of Assistance:  (breakfast tray in room. Pt refused to eat after max encouragement.)    LE Dressing Level of Assistance: Total assistance to haris socks.  Grooming Position: Seated, EOB  Grooming Level of Assistance: Stand by assistance (brush teeth, wash face)                  Balance:   Static Sit: FAIR+: Able to take MINIMAL challenges from all directions  Dynamic Sit: FAIR+: Maintains balance through MINIMAL excursions of active trunk motion  Static Stand: 0: Needs MAXIMAL assist to maintain   Dynamic stand: 0: N/A    Therapeutic Activities and Exercises:  Pt transferred sit<>stand with Max A x 4 attempts, using RW. Pt unable to extend trunk and stand tall all attempts and unable to take small side steps. Pt tolerated ~20 sec stand each trial.   Pt sat EOB ~20 min with SBA.    AM-PAC 6 CLICK ADL "   How much help from another person does this patient currently need?   1 = Unable, Total/Dependent Assistance  2 = A lot, Maximum/Moderate Assistance  3 = A little, Minimum/Contact Guard/Supervision  4 = None, Modified [place]/Independent    Putting on and taking off regular lower body clothing? : 1  Bathing (including washing, rinsing, drying)?: 2  Toileting, which includes using toilet, bedpan, or urinal? : 1  Putting on and taking off regular upper body clothing?: 2  Taking care of personal grooming such as brushing teeth?: 3  Eating meals?: 3  Total Score: 12     AM-PAC Raw Score CMS G-Code Modifier Level of Impairment Assistance   6 % Total / Unable   7 - 9 CM 80 - 100% Maximal Assist   10 - 14 CL 60 - 80% Moderate Assist   15 - 19 CK 40 - 60% Moderate Assist   20 - 22 CJ 20 - 40% Minimal Assist   23 CI 1-20% SBA / CGA   24 CH 0% Independent/ Mod I     Patient left supine with all lines intact and call button in reach    ASSESSMENT:  Myra Murphy is a 82 y.o. female with a medical diagnosis of Paroxysmal atrial fibrillation with rapid ventricular response and presents with decreased overall strength, endurance, and [place] with ADL's and fx mobility. Pt with some confusion this am reporting she was 53 years old and also stating that she can't remember things. She also requires increased time with all tasks and encouragement to increase active participation. Continue OT services to address functional goals.        Rehab identified problem list/impairments: Rehab identified problem list/impairments: weakness, impaired endurance, impaired self care skills, impaired functional mobilty, gait instability, impaired balance, impaired cognition, decreased safety awareness, decreased upper extremity function, decreased lower extremity function    Rehab potential is good.    Activity tolerance: Good    Discharge recommendations:       Barriers to discharge:      Equipment recommendations:       GOALS:    Occupational Therapy Goals        Problem: Occupational Therapy Goal    Goal Priority Disciplines Outcome Interventions   Occupational Therapy Goal     OT, PT/OT Ongoing (interventions implemented as appropriate)    Description:  Goals to be met by: 02/07/2017     Patient will increase functional independence with ADLs by performing:    UE Dressing with Set-up Assistance.  LE Dressing with Moderate Assistance and Assistive Devices as needed.  Grooming while EOB with Modified [place].  Sitting at edge of bed x 20 minutes with Supervision.  Supine to sit with Minimal Assistance.  Upper extremity exercise program x 10 reps per handout, with supervision.                Plan:  Patient to be seen 5 x/week to address the above listed problems via self-care/home management, therapeutic activities, therapeutic exercises  Plan of Care expires:    Plan of Care reviewed with: patient         ASIF Lemus  01/19/2017

## 2017-01-19 NOTE — PLAN OF CARE
Problem: Patient Care Overview  Goal: Plan of Care Review  Outcome: Ongoing (interventions implemented as appropriate)  Plan of care reviewed with patient. Voices understanding. Afib on monitor with frequent PVC's noted. In and out complete per orders to obtain urine sample. IV lasix and IV diuril will be administered. Will be monitored overnight.

## 2017-01-19 NOTE — PHARMACY MED REC
[facility] Medication Reconciliation  Template    Patient was admitted on 1/16/2017 for Paroxysmal atrial fibrillation with rapid ventricular response.      Patient's prior to admission medication regimen was as follows:  Prescriptions Prior to Admission   Medication Sig Dispense Refill Last Dose    acetaminophen (TYLENOL) 325 MG tablet Take 2 tablets (650 mg total) by mouth every 6 (six) hours as needed for Pain. Do NOT exceed 3000 mg in a 24 hour time period and do not take with other medications containing acetaminophen  Do NOT drink alcohol when taking this medication  0 1/15/2017 at Unknown time    allopurinol (ZYLOPRIM) 100 MG tablet TAKE 1 TABLET(100 MG) BY MOUTH THREE TIMES DAILY (Patient taking differently: Take 100 mg by mouth 3 (three) times daily. ) 270 tablet 1 1/16/2017 at Unknown time    digoxin (LANOXIN) 125 mcg tablet Take 125 mcg by mouth once daily.       fluticasone (FLONASE) 50 mcg/actuation nasal spray 1 spray by Each Nare route once daily. 16 g 0 1/16/2017 at Unknown time    furosemide (LASIX) 40 MG tablet Take 2 tablets (80 mg total) by mouth 2 (two) times daily. 80 mg twice daily for 1 week. Then, take 80 mg once daily 180 tablet 3 1/15/2017 at Unknown time    gabapentin (NEURONTIN) 100 MG capsule Take 200 mg by mouth 2 (two) times daily.       gabapentin (NEURONTIN) 600 MG tablet Take 600 mg by mouth once daily. In the evening       hydrALAZINE (APRESOLINE) 25 MG tablet Take 1 tablet (25 mg total) by mouth every 8 (eight) hours. 90 tablet 1 1/16/2017 at Unknown time    isosorbide mononitrate (IMDUR) 30 MG 24 hr tablet Take 1 tablet (30 mg total) by mouth once daily. 30 tablet 1 1/16/2017 at Unknown time    letrozole (FEMARA) 2.5 mg Tab Take 1 tablet (2.5 mg total) by mouth once daily. 90 tablet 1 1/16/2017 at Unknown time    metoprolol succinate (TOPROL-XL) 25 MG 24 hr tablet Take 2 tablets (50 mg total) by mouth once daily. (Patient taking differently: Take 50 mg by mouth  "once daily. Take half tablet by mouth daily) 90 tablet 1 1/16/2017 at Unknown time    omeprazole (PRILOSEC) 20 MG capsule Take 1 capsule (20 mg total) by mouth once daily. 90 capsule 1 1/16/2017 at Unknown time    potassium chloride SA (K-DUR,KLOR-CON) 20 MEQ tablet Take 20 mEq by mouth once daily.       pravastatin (PRAVACHOL) 40 MG tablet Take 1 tablet (40 mg total) by mouth once daily. 90 tablet 1     warfarin (COUMADIN) 1 MG tablet Take 1 tablet (1 mg total) by mouth Daily. Adjust to keep INR between 2.0-3.0 (Patient taking differently: Take 1 mg by mouth Daily. Adjust to keep INR between 2.0-3.0. Take as directed at [facility].) 240 tablet 6 1/16/2017 at Unknown time    senna-docusate 8.6-50 mg (PERICOLACE) 8.6-50 mg per tablet Take 1 tablet by mouth daily as needed for Constipation.   Unknown at Unknown time         Please add appropriate    SmartPhrase below:  Admission Medication Reconciliation - Pharmacy Consult Note    The home medication history was taken by Samia Armstrong CPHT.  Based on information gathered and subsequent review by the clinical pharmacist, the items below may need attention.     You may go to "Admission" then "Reconcile Home Medications" tabs to review and/or act upon these items. Based on information gathered and subsequent review by the clinical pharmacist, the items below may need attention.    Potentially problematic discrepancies with current MAR  o None    Potential issues to be addressed PRIOR TO DISCHARGE  o None    Please address this information as you see fit.  Feel free to contact us if you have any questions or require assistance.    Gunnar Rodriguez  [phone]                  "

## 2017-01-19 NOTE — PLAN OF CARE
Problem: Patient Care Overview  Goal: Plan of Care Review  Outcome: Ongoing (interventions implemented as appropriate)  Plan of care reviewed with patient. Able to participate in care with confusion. Bilateral lower extremities with dressings intact. Telemetry remains a fib with pvc. No complaints of pain or distress.repositioned 2 hours. Will continue to monitor.

## 2017-01-20 PROBLEM — R07.9 CHEST PAIN: Status: RESOLVED | Noted: 2017-01-01 | Resolved: 2017-01-01

## 2017-01-20 PROBLEM — R07.2 SUBSTERNAL CHEST PAIN: Status: RESOLVED | Noted: 2017-01-01 | Resolved: 2017-01-01

## 2017-01-20 PROBLEM — I48.0 PAROXYSMAL ATRIAL FIBRILLATION WITH RAPID VENTRICULAR RESPONSE: Status: RESOLVED | Noted: 2017-01-01 | Resolved: 2017-01-01

## 2017-01-20 NOTE — PROGRESS NOTES
Progress Note  Nephrology      Consult Requested By: Denys Zuñiga MD      SUBJECTIVE:     Overnight events  Patient is a 82 y.o. female     Patient Active Problem List   Diagnosis    Paroxysmal atrial fibrillation    Anticoagulation monitoring by pharmacist    Nonischemic dilated cardiomyopathy    MANE (obstructive sleep apnea)    Severe mitral regurgitation    HTN (hypertension)    Hyperlipidemia    Paroxysmal atrial flutter    Allergic rhinitis due to allergen    Hip pain    History of breast cancer    Peripheral vascular disease    Chronic venous stasis dermatitis of both lower extremities    Pyogenic arthritis, forearm    Malignant neoplasm of colon    Hypertensive heart disease with heart failure    Paroxysmal ventricular tachycardia    Right heart failure    Tricuspid regurgitation    Debility    Chronic combined systolic and diastolic CHF, NYHA class 4    Chronic kidney disease, stage 4, severely decreased GFR    Long term current use of anticoagulant therapy    Bilateral leg edema    Tinea pedis    Dystrophic nail    Vitamin D deficiency    Type 2 diabetes mellitus without complication    Cardiorenal syndrome    Pulmonary edema    Long term current use of aromatase inhibitor    Iron deficiency anemia    Memory deficit     Past Medical History   Diagnosis Date    Anticoagulant long-term use     Arthritis     Atrial fibrillation     Atrial flutter     Carcinoma of right breast, estrogen receptor negative and progesterone receptor positive 02/2008    Chronic combined systolic and diastolic congestive heart failure     Colon cancer     Diabetes mellitus     Dilated cardiomyopathy 12/01/2008     adriamycin toxicity    Dyslipidemia     Encounter for blood transfusion     Hyperlipidemia     Hypertension     Supraventricular tachycardia      PAF, AFL              OBJECTIVE:     Vitals:    01/20/17 1100 01/20/17 1130 01/20/17 1200 01/20/17 1300   BP:   99/65    BP  Location:   Left arm    Patient Position:   Lying    BP Method:   Automatic    Pulse: 85  87 85   Resp:   18    Temp:       TempSrc:       SpO2:  99%     Weight:       Height:           Temp: 97.5 °F (36.4 °C) (01/20/17 0800)  Pulse: 85 (01/20/17 1300)  Resp: 18 (01/20/17 1200)  BP: 99/65 (01/20/17 1200)  SpO2: 99 % (01/20/17 1130)      Date 01/20/17 0700 - 01/21/17 0659   Shift 7916-7807 0733-8904 5041-0869 24 Hour Total   I  N  T  A  K  E   Shift Total  (mL/kg)       O  U  T  P  U  T   Urine  (mL/kg/hr) 405   405    Shift Total  (mL/kg) 405  (5.6)   405  (5.6)   Weight (kg) 72.6 72.6 72.6 72.6             Medications:   allopurinol  100 mg Oral Daily    chlorothiazide (DIURIL) IVPB  250 mg Intravenous Once    furosemide  60 mg Intravenous Once    metoprolol succinate  100 mg Oral Daily    pantoprazole  40 mg Oral Daily    pravastatin  40 mg Oral Daily    warfarin  1 mg Oral Daily              NAD  Weak  Decreased  intake  Physical Exam:  Lungs: diminished breath sounds  Heart: Pulse 85  Abdomen: soft  Extremities: edema  Skin: dry  Laboratory:  ABG  Labs reviewed  Recent Results (from the past 336 hour(s))   Basic metabolic panel    Collection Time: 01/20/17  5:39 AM   Result Value Ref Range    Sodium 133 (L) 136 - 145 mmol/L    Potassium 4.0 3.5 - 5.1 mmol/L    Chloride 97 95 - 110 mmol/L    CO2 24 23 - 29 mmol/L    BUN, Bld 69 (H) 8 - 23 mg/dL    Creatinine 2.6 (H) 0.5 - 1.4 mg/dL    Calcium 9.1 8.7 - 10.5 mg/dL    Anion Gap 12 8 - 16 mmol/L   Basic metabolic panel    Collection Time: 01/19/17  3:40 AM   Result Value Ref Range    Sodium 134 (L) 136 - 145 mmol/L    Potassium 4.2 3.5 - 5.1 mmol/L    Chloride 97 95 - 110 mmol/L    CO2 23 23 - 29 mmol/L    BUN, Bld 71 (H) 8 - 23 mg/dL    Creatinine 2.8 (H) 0.5 - 1.4 mg/dL    Calcium 9.0 8.7 - 10.5 mg/dL    Anion Gap 14 8 - 16 mmol/L   Basic metabolic panel    Collection Time: 01/18/17 10:19 AM   Result Value Ref Range    Sodium 133 (L) 136 - 145 mmol/L     Potassium 4.1 3.5 - 5.1 mmol/L    Chloride 97 95 - 110 mmol/L    CO2 23 23 - 29 mmol/L    BUN, Bld 70 (H) 8 - 23 mg/dL    Creatinine 2.6 (H) 0.5 - 1.4 mg/dL    Calcium 8.9 8.7 - 10.5 mg/dL    Anion Gap 13 8 - 16 mmol/L     Recent Results (from the past 336 hour(s))   CBC auto differential    Collection Time: 01/20/17  5:39 AM   Result Value Ref Range    WBC 5.77 3.90 - 12.70 K/uL    Hemoglobin 10.8 (L) 12.0 - 16.0 g/dL    Hematocrit 37.2 37.0 - 48.5 %    Platelets 270 150 - 350 K/uL   CBC auto differential    Collection Time: 01/19/17  3:40 AM   Result Value Ref Range    WBC 7.94 3.90 - 12.70 K/uL    Hemoglobin 10.4 (L) 12.0 - 16.0 g/dL    Hematocrit 35.9 (L) 37.0 - 48.5 %    Platelets 290 150 - 350 K/uL   CBC auto differential    Collection Time: 01/16/17 10:10 AM   Result Value Ref Range    WBC 14.67 (H) 3.90 - 12.70 K/uL    Hemoglobin 11.1 (L) 12.0 - 16.0 g/dL    Hematocrit 37.3 37.0 - 48.5 %    Platelets 322 150 - 350 K/uL     Urinalysis    Recent Labs  Lab 01/19/17  1618   COLORU Yellow   SPECGRAV 1.015   PHUR 5.0   PROTEINUA 1+*   BACTERIA Moderate*   NITRITE Positive*   LEUKOCYTESUR 1+*   UROBILINOGEN 1.0   HYALINECASTS 0       Diagnostic Results:  X-Ray: Reviewed  US: Reviewed  Echo: Reviewed  ACCESS    ASSESSMENT/PLAN:   JOSE MIGUEL on CKD  4.  UA protein 1+.  US-  1.  Mild decreased bilateral renal perfusion with elevated right renal resistive index, nonspecific finding which may be seen in setting of medical renal disease.  2.  Small simple right renal cyst.  Creatinine 2.6.  BUN 69.  Metabolic bone disease.  Poor nutrition. Albumin 2.6.  Anemia. Hb 10.8.CXR  CXR - Cardiomegaly with mild CHF, unchanged.  Ordered lasix 40 mg and diuril 250 mg IV.  Weight daily.

## 2017-01-20 NOTE — PROGRESS NOTES
1:36pm -  made phone contact with [facility] , Oliva [phone].  Oliva informed patient is not ready for discharge today.   inquired if patient is ready this weekend could she admit on a Saturday or Sunday.  Oliva said patient can admit on Saturday or Sunday.  The  for the weekend is the assist director of nursing (AON), Omayra Chakraborty [phone].  Oliva said they will need updated clinicals and orders if patient discharge over the weekend.

## 2017-01-20 NOTE — SUBJECTIVE & OBJECTIVE
"Interval History: She feels "tired and cold".    Review of Systems   Constitutional: Positive for fatigue. Negative for chills and fever.   Cardiovascular: Positive for leg swelling. Negative for chest pain and palpitations.   Gastrointestinal: Negative for nausea and vomiting.     Objective:     Vital Signs (Most Recent):  Temp: 97.5 °F (36.4 °C) (01/20/17 0800)  Pulse: 86 (01/20/17 0900)  Resp: 18 (01/20/17 0800)  BP: 107/61 (01/20/17 0800)  SpO2: 97 % (01/20/17 0801) Vital Signs (24h Range):  Temp:  [96.2 °F (35.7 °C)-97.5 °F (36.4 °C)] 97.5 °F (36.4 °C)  Pulse:  [66-98] 86  Resp:  [17-18] 18  SpO2:  [94 %-97 %] 97 %  BP: ()/(59-68) 107/61     Weight: 72.6 kg (160 lb)  Body mass index is 31.25 kg/(m^2).    Intake/Output Summary (Last 24 hours) at 01/20/17 0954  Last data filed at 01/20/17 0500   Gross per 24 hour   Intake              230 ml   Output             1849 ml   Net            -1619 ml      Physical Exam   Constitutional: She appears well-developed and well-nourished. No distress.   Neck: JVD present.   Cardiovascular: Normal rate.    Murmur heard.  Pulmonary/Chest: Effort normal. No respiratory distress. She has rales.   Abdominal: Soft. Bowel sounds are normal. She exhibits no distension. There is no tenderness.   Musculoskeletal: She exhibits edema.   Neurological: She is alert.   Nursing note and vitals reviewed.      Significant Labs:   CBC:     Recent Labs  Lab 01/19/17  0340 01/20/17  0539   WBC 7.94 5.77   HGB 10.4* 10.8*   HCT 35.9* 37.2    270     CMP:     Recent Labs  Lab 01/18/17  1019 01/19/17  0340 01/20/17  0539   * 134* 133*   K 4.1 4.2 4.0   CL 97 97 97   CO2 23 23 24   * 99 100   BUN 70* 71* 69*   CREATININE 2.6* 2.8* 2.6*   CALCIUM 8.9 9.0 9.1   PROT  --  6.3 6.2   ALBUMIN  --  2.6* 2.6*   BILITOT  --  1.5* 1.3*   ALKPHOS  --  122 121   AST  --  15 21   ALT  --  10 10   ANIONGAP 13 14 12   EGFRNONAA 17* 15* 17*     Coagulation:     Recent Labs  Lab " 01/20/17  0539   INR 2.6*       Significant Imaging: I have reviewed all pertinent imaging results/findings within the past 24 hours.   US Kidney Only 1/19/17: The kidneys measure 9.2 cm on the right and 8.9 cm on the left. There is preserved corticomedullary differentiation and normal cortical thickness. No solid renal masses, nephrolithiasis, or hydronephrosis.  Small right renal cyst is visualized measuring 1.1 x 0.9 x 1.2 cm.  Perfusion to the kidneys is mildly decreased. Resistive indices are elevated on the right and normal on the left and as follow: 0.90 on the right and 0.71 on the left.  X-Ray Chest 1 View 1/19/17: The cardiac silhouette is enlarged.  Increased interstitial lung markings seen throughout both lung fields, unchanged.  Atherosclerotic changes of the aorta.  Axillary clips noted.  No pneumothorax.  No acute focal area of airspace consolidation.

## 2017-01-20 NOTE — PLAN OF CARE
TN spoke with Dr. Zuñiga regarding transfer to SNF. MD [facility] Nephrology consult- Crt 2.6  Pt accepeted to [facility] SNF when stable.    TN updated pt's sister Caty( [phone])     01/20/17 1137   Discharge Reassessment   Assessment Type Discharge Planning Reassessment   Can the patient answer the patient profile reliably? Yes, cognitively intact   How does the patient rate their overall health at the present time? Good   Describe the patient's ability to walk at the present time. Walks with the help of equipment   How often would a person be available to care for the patient? Whenever needed   During the past month, has the patient often been bothered by feeling down, depressed or hopeless? Yes   During the past month, has the patient often been bothered by little interest or pleasure in doing things? Yes   Discharge plan remains the same: Yes   Provided patient/caregiver education on the expected discharge date and the discharge plan Yes   Discharge Plan A Skilled Nursing Facility   Discharge Plan B Home Health

## 2017-01-20 NOTE — PLAN OF CARE
Problem: Patient Care Overview  Goal: Plan of Care Review  Outcome: Ongoing (interventions implemented as appropriate)  Plan of care discussed with patient. Safety measures maintained. Call bell in reach and instructed to call for assistance.  Continue to turn q2 hours. Contact dermitis noted between buttocks, barrier cream applied. Tele reading afib in he 80-90s

## 2017-01-20 NOTE — PROGRESS NOTES
[facility]  Cardiology  Progress Note    Patient Name: Myra Murphy  MRN: [id number]  Admission Date: 1/16/2017  Hospital Length of Stay: 2 days  Code Status: Full Code   Attending Physician: Denys Zuñiga MD   Primary Care Physician: Lupe Delatorre MD  Expected Discharge Date:   Principal Problem:Paroxysmal atrial fibrillation with rapid ventricular response    Subjective:     Hospital Course: Myra Murphy is a 82 y.o.female who presented to the ED on 01/15/2017 with reports of substernal chest pain that was nonradiating and sticking in nature. Pain was not relieved by NTG and ASA given in the ED. She was found to be in AF RVR. Chest x-ray showed pulmonary edema, noted to have mildly elevated troponin and elevated BNP. She was given diltiazem 20 mg IV, furosemide 80 mg IV with moderate relief in symptoms. Patient follows Dr. Sunshine Mckeon for cardiology at [facility]. Patient's Lasix was changed at the end of last year 2/2 declining renal function.  She was subsequently hospitalized at [facility] from 12/26/16-1/05/17 with CHF exacerbation cardiogenic shock, hypoxia, and mental decline. She had developed acute renal failure. She was diuresed with IV furosemide and dobutamine drips, removing 20 lbs of fluid (181 lbs to 161 lbs). Her weight yesterday is documented at 160 lbs. Upon discharge her Lasix was increased to 80 mg twice daily. Digoxin was discontinued, and she was started on hydralazine and isosorbide mononitrate with 1.5 L fluid restriction. 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 On interview she is tearful regarding her current state of health and does not want any invasive procedures including valve replacement or PPM/AICD. Patient seen on 01/20/2017  with AF rate controlled tolerating diuresis with Diuril and Lasix. Renal function noted stable. Plan to repeat Lasix and Diuril dose today with continued close monitoring of renal function.     Review of Systems   Constitutional: Negative.    HENT: Negative.    Eyes: Negative.    Respiratory: Positive for cough and shortness of breath. Negative for apnea, choking, chest tightness, wheezing and stridor.    Cardiovascular: Negative for chest pain, palpitations and leg swelling.   Gastrointestinal: Negative.    Endocrine: Negative.    Genitourinary: Negative.    Musculoskeletal: Negative.    Skin: Positive for wound.   Allergic/Immunologic: Negative.    Neurological: Negative.    Hematological: Negative.    Psychiatric/Behavioral: Negative.      Objective:     Vital Signs (Most Recent):  Temp: 97.5 °F (36.4 °C) (01/20/17 0800)  Pulse: 86 (01/20/17 0900)  Resp: 18 (01/20/17 0800)  BP: 107/61 (01/20/17 0800)  SpO2: 97 % (01/20/17 0801) Vital Signs (24h Range):  Temp:  [96.2 °F (35.7 °C)-97.5 °F (36.4 °C)] 97.5 °F (36.4 °C)  Pulse:  [66-98] 86  Resp:  [17-18] 18  SpO2:  [94 %-97 %] 97 %  BP: ()/(59-68) 107/61     Weight: 72.6 kg (160 lb)  Body mass index is 31.25 kg/(m^2).    SpO2: 97 %  O2 Device (Oxygen Therapy): nasal cannula      Intake/Output Summary (Last 24 hours) at 01/20/17 1045  Last data filed at 01/20/17 0500   Gross per 24 hour   Intake              230 ml   Output             1849 ml   Net            -1619 ml       Lines/Drains/Airways     Peripheral Intravenous Line                 Peripheral IV - Single Lumen 01/19/17 1744 Hand less than 1 day                Physical Exam   Constitutional: She is oriented to person, place, and time. No distress.   HENT:   Head: Normocephalic and atraumatic.   Eyes: Right eye exhibits no discharge. Left eye exhibits no discharge.   Neck: JVD present.   Cardiovascular: Normal rate.  An irregularly irregular rhythm present.   Murmur heard.  Pulmonary/Chest: Tachypnea  noted. She has rales.   Abdominal: Soft. Bowel sounds are normal.   Musculoskeletal: She exhibits edema.   Neurological: She is alert and oriented to person, place, and time.   Skin: Skin is warm and dry. She is not diaphoretic.   Psychiatric: She has a normal mood and affect. Her behavior is normal. Judgment and thought content normal.         Recent Labs  Lab 01/20/17  0539   *   K 4.0   CL 97   CO2 24   BUN 69*   CREATININE 2.6*   MG 2.2       Recent Labs  Lab 01/20/17  0539   WBC 5.77   RBC 4.35   HGB 10.8*   HCT 37.2      MCV 86   MCH 24.8*   MCHC 29.0*       Recent Labs  Lab 01/20/17  0539   ALT 10   AST 21   ALKPHOS 121   BILITOT 1.3*   PROT 6.2   ALBUMIN 2.6*       Recent Labs  Lab 01/20/17  0539   INR 2.6*     Assessment and Plan:     Brief HPI: Patient seen this morning on rounds denies any chest pain, reports SOB while sitting in bed. Mood and affect improved today. Discussed POC with patient to include continued volume removal with Diuril and Lasix with close monitoring of renal function. Agrees with plan.     Active Diagnoses:    Diagnosis Date Noted POA    Chronic combined systolic and diastolic CHF, NYHA class 4 [I50.42] Patient with noted pulmonary edema on chest x ray; BNP 3500; JVD and rales on exam with peripheral edema; Echo noting EF 20% with severe MR and moderate to severe TR; SBP continues to be marginal at ; on BB; Nephrology following as well; patient is not open to HD or invasive testing/procedures such as ICD, RHC, valve replacement; Discussed with her the likely mo of readmission despite best medical regimen. Good response to Lasix and Diuril given yesterday with documented 1800 cc out; patient incontinent at times and diapers/pads not weighed; continues to have SOB with tachypnea and rales on exam; repeat Lasix and Diuril today with close monitoring of renal function; Tbili improved today; cr stable  05/11/2014 Yes    Chronic atrial fibrillation with rapid  ventricular response Presented with AF RVR rate since controlled; telemetry reviewed noting rate in 80s at the present; she has frequent multifocal PVCs and runs of VT longest noted 11 beats; Taking Toprol  mg daily with Coumadin for anticoagulation; INR 2.6 today; recommend continuation of current treatment; Patient underwent DCCV in the past with return of AF/FLT in 1 week; previously had AF/FLT ablation recommended; she did not follow through and is not interested at this time 10/03/2012 Yes      Problems Resolved During this Admission:    Diagnosis Date Noted Date Resolved POA       VTE Risk Mitigation         Ordered     Medium Risk of VTE  Once      01/16/17 1832     Reason for No Pharmacological VTE Prophylaxis  Once      01/16/17 1832              Steffen Robb NP  Cardiology  [facility]

## 2017-01-20 NOTE — PLAN OF CARE
Patient with 11 beat run of V-tach. Patient asymptomatic. Blood pressure 108/63. ASHLEY Ferris, notified. No new orders received.

## 2017-01-20 NOTE — SUBJECTIVE & OBJECTIVE
Interval History: Says that she is feeling okay. Does not seem like herself still currently. Denies any SOB, but appears SOB. Had several BMs documented overnight.     Review of Systems   Constitutional: Positive for fatigue. Negative for chills and fever.   Cardiovascular: Positive for leg swelling. Negative for chest pain and palpitations.   Gastrointestinal: Negative for nausea and vomiting.     Objective:     Vital Signs (Most Recent):  Temp: 96.5 °F (35.8 °C) (01/19/17 1600)  Pulse: 76 (01/19/17 1600)  Resp: 18 (01/19/17 1600)  BP: 111/68 (01/19/17 1600)  SpO2: 95 % (01/19/17 1553) Vital Signs (24h Range):  Temp:  [96.3 °F (35.7 °C)-97.5 °F (36.4 °C)] 96.5 °F (35.8 °C)  Pulse:  [46-99] 76  Resp:  [15-20] 18  SpO2:  [94 %-97 %] 95 %  BP: (104-122)/(58-75) 111/68     Weight: 72.6 kg (160 lb)  Body mass index is 31.25 kg/(m^2).    Intake/Output Summary (Last 24 hours) at 01/19/17 1825  Last data filed at 01/19/17 1700   Gross per 24 hour   Intake                0 ml   Output              800 ml   Net             -800 ml      Physical Exam   Constitutional: She appears well-developed and well-nourished. No distress.   Neck: JVD present.   Cardiovascular: Normal rate.    Murmur heard.  Pulmonary/Chest: Effort normal. No respiratory distress. She has rales.   Abdominal: Soft. Bowel sounds are normal. She exhibits no distension. There is no tenderness.   Musculoskeletal: She exhibits edema.   Neurological: She is alert.   Nursing note and vitals reviewed.      Significant Labs:   CBC:   Recent Labs  Lab 01/19/17  0340   WBC 7.94   HGB 10.4*   HCT 35.9*        CMP:   Recent Labs  Lab 01/18/17  0400 01/18/17  1019 01/19/17  0340   * 133* 134*   K 4.0 4.1 4.2   CL 96 97 97   CO2 25 23 23    161* 99   BUN 70* 70* 71*   CREATININE 2.6* 2.6* 2.8*   CALCIUM 8.9 8.9 9.0   PROT  --   --  6.3   ALBUMIN  --   --  2.6*   BILITOT  --   --  1.5*   ALKPHOS  --   --  122   AST  --   --  15   ALT  --   --  10    ANIONGAP 13 13 14   EGFRNONAA 17* 17* 15*     Coagulation:   Recent Labs  Lab 01/19/17  0340   INR 2.9*       Significant Imaging: I have reviewed all pertinent imaging results/findings within the past 24 hours.

## 2017-01-20 NOTE — PT/OT/SLP PROGRESS
Physical Therapy  Treatment    Myra Murphy   MRN: [id number]   Admitting Diagnosis: Paroxysmal atrial fibrillation with rapid ventricular response    PT Received On: 01/20/17  PT Start Time: 1236     PT Stop Time: 1306    PT Total Time (min): 30 min       Billable Minutes:  Therapeutic Activity 30    Treatment Type: Treatment  PT/PTA: PTA     PTA Visit Number: 3       General Precautions: Standard, fall  Orthopedic Precautions: N/A   Braces:           Subjective:  Communicated with nsg prior to session.      Pain Rating:  (no rating)  Location - Side: Left     Location: hip  Pain Addressed: Reposition, Distraction, Nurse notified       Objective:   Patient found with: oxygen, telemetry    Functional Mobility:  Bed Mobility:   Supine to Sit: Maximum Assistance  Sit to Supine: Maximum Assistance    Transfers:       Gait:        Stairs:      Balance:   Static Sit: FAIR-: Maintains without assist but inconsistent   Dynamic Sit: FAIR: Cannot move trunk without losing balance  Static Stand:   Dynamic stand:      Therapeutic Activities and Exercises: pt sat EOB ~20 mins with SBA/CGA and fed pt some lunch, le laq's X 15 reps,pt raised to HOB with Dep A X 2      AM-PAC 6 CLICK MOBILITY  How much help from another person does this patient currently need?   1 = Unable, Total/Dependent Assistance  2 = A lot, Maximum/Moderate Assistance  3 = A little, Minimum/Contact Guard/Supervision  4 = None, Modified [place]/Independent    Turning over in bed (including adjusting bedclothes, sheets and blankets)?: 2  Sitting down on and standing up from a chair with arms (e.g., wheelchair, bedside commode, etc.): 2  Moving from lying on back to sitting on the side of the bed?: 2  Moving to and from a bed to a chair (including a wheelchair)?: 2  Need to walk in hospital room?: 2  Climbing 3-5 steps with a railing?: 1  Total Score: 11    AM-PAC Raw Score CMS G-Code Modifier Level of Impairment Assistance   6 % Total / Unable   7 -  9 CM 80 - 100% Maximal Assist   10 - 14 CL 60 - 80% Moderate Assist   15 - 19 CK 40 - 60% Moderate Assist   20 - 22 CJ 20 - 40% Minimal Assist   23 CI 1-20% SBA / CGA   24 CH 0% Independent/ Mod I     Patient left left sidelying with call button in reach and nsg present.    Assessment: pt requires Max A with bed mobility  Myra Murphy is a 82 y.o. female with a medical diagnosis of Paroxysmal atrial fibrillation with rapid ventricular response and presents with     Rehab identified problem list/impairments: Rehab identified problem list/impairments: weakness, impaired endurance, impaired functional mobilty, decreased upper extremity function, decreased lower extremity function, decreased ROM, edema    Rehab potential is fair.    Activity tolerance: Fair    Discharge recommendations: Discharge Facility/Level Of Care Needs: nursing facility, basic     Barriers to discharge: Barriers to Discharge: Decreased caregiver support    Equipment recommendations: Equipment Needed After Discharge:  (TBD)     GOALS: see general POC  Physical Therapy Goals        Problem: Physical Therapy Goal    Goal Priority Disciplines Outcome Goal Variances Interventions   Physical Therapy Goal     PT/OT, PT Ongoing (interventions implemented as appropriate)     Description:  Goals to be met by: 2017     Patient will increase functional independence with mobility by performin. Supine to sit with Moderate Assistance  2. Sit to stand transfer with Minimal Assistance  3. Bed to chair transfer with Moderate Assistance using Rolling Walker  4. Gait  x 20 feet with Moderate Assistance using Rolling Walker.                 PLAN:    Patient to be seen 5 x/week  to address the above listed problems via therapeutic activities, therapeutic exercises  Plan of Care expires: 17  Plan of Care reviewed with: patient         Walter ANDRES Awais, PTA  2017

## 2017-01-20 NOTE — PLAN OF CARE
Problem: Patient Care Overview  Goal: Plan of Care Review  Outcome: Ongoing (interventions implemented as appropriate)  Plan of care reviewed with patient. Patient verbalized understanding. Fall precautions maintained. Bed in lowest position, locked, call light within reach, and bed alarm on. Side rails up x's 2 with slip resistant socks on. Nurse instructed patient to notify staff for any assistance and pt verbalized understanding. Patient on telemetry throughout shift with no ectopy noted. Will continue to monitor.

## 2017-01-20 NOTE — PLAN OF CARE
Problem: Occupational Therapy Goal  Goal: Occupational Therapy Goal  Goals to be met by: 02/07/2017     Patient will increase functional independence with ADLs by performing:    UE Dressing with Set-up Assistance.  LE Dressing with Moderate Assistance and Assistive Devices as needed.  Grooming while EOB with Modified [place].  Sitting at edge of bed x 20 minutes with Supervision.  Supine to sit with Minimal Assistance.  Upper extremity exercise program x 10 reps per handout, with supervision.   Outcome: Ongoing (interventions implemented as appropriate)  Patient with poor mood and minimal active participation today. Required maximum encouragement and increased time for all tasks. Patient remains limited by decreased strength/endurance and poor ax tolerance. Will benefit from skilled OT to address functional deficits.

## 2017-01-20 NOTE — PROGRESS NOTES
[facility] Hospital Medicine  Progress Note    Patient Name: Myra Murphy  MRN: [id number]  Patient Class: IP- Inpatient   Admission Date: 2017  Length of Stay: 2 days  Attending Physician: Denys Zuñiga MD  Primary Care Provider: Lupe Delatorre MD        Subjective:     Principal Problem:Paroxysmal atrial fibrillation with rapid ventricular response    HPI:  Myra Murphy is a 82 y.o. black woman with history of ALYSE-BSO in , hypertension, hyperlipidemia, gastroesophageal reflux disease, memory deficit, degenerative joint disease of the knees and spine, gout, peripheral neuralgia, history of colon cancer s/p resection in , history of right breast cancer (T1N3Mx ER/IA positive, Her-2 Colleen negative) s/p right partial mastectomy and lymph node dissection 3/14/08 and chemotherapy (docataxel, doxorubicin, cyclophosphamide) on aromatase inhibitor therapy, history of total hip arthroplasty 3/05/10, peripheral vascular disease, chronic venous stasis dermatitis of the legs, obstructive sleep apnea (supposed to be on CPAP 12 cmH2O but CPAP broken), 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 She lives at [facility] in [place], [place]. She used to be a . Her sister Caty Figueroa is involved in her medical care. Her primary care physician is Dr. Lupe Delatorre. Her cardiologist is Dr. Sunshine Mckeon. She has refused ICD placement.     On 16 a nurse recommended to her primary care physician, Dr. Delatorre, to decrease her furosemide from 80 mg to 20 mg daily. Dr. Delatorre agreed. She was subsequently  "hospitalized under the care of Cardiology at [facility] from 12/26/16-1/05/17 for CHF exacerbation cardiogenic shock, hypoxia, and mental decline. She had developed acute renal failure. She was diuresed with IV furosemide and dobutamine drips, removing 20 lbs of fluid (181 lbs to 161 lbs). Furosemide was increased to 80 mg twice daily. Digoxin was discontinued, and she was started on hydralazine and isosorbide mononitrate. She was fluid restricted to 1500 mL daily. She was discharged to [facility] skilled nursing facility.     On 1/15/17 she began having substernal after dinner, described as "sticking" and nonradiating. The night nurse reassured her, but the pain persisted the next morning, so the day shift nurse sent her to [facility] ED. She received aspirin 325 mg and sublingual nitroglycerin without relief. She was found to be in atrial fibrillation with rapid ventricular response (pulse 134 bpm on EKG). Chest x-ray showed stable pulmonary edema. Labs showed new leukocytosis (14,670) and mildly elevated troponin (0.034). BNP was improved (1641) from 12/31/16 (2770). She was given diltiazem 20 mg IV, furosemide 80 mg IV, and ondansetron 4 mg IV, reporting some relief. She was admitted to [facility] to further evaluate her chest pain.       Hospital Course:  Her Toprol XL was increased to help with rate control of her atrial fibrillation and that was successful. Chest pain improved with decrease in heart rate. She was given a renally dosed load of digoxin on admission, but no further doses because of worsening renal function. Her creatinine increased from 2 to 2.5 the next day. Her furosemide was held and she was given saline 500 mL, but her creatinine lacie more to 2.6. Her BNP was also significantly elevated at 3500. Her creatinine continued to rise to 2.8 and she was given furosemide 80 mg IV with some diuresis. Blood pressures were controlled without hydralazine and " "isosorbide mononitrate, which were held. Cardiology and Nephrology were consulted.     She was accepted to another SNF and will go there on discharge.       Interval History: She feels "tired and cold".    Review of Systems   Constitutional: Positive for fatigue. Negative for chills and fever.   Cardiovascular: Positive for leg swelling. Negative for chest pain and palpitations.   Gastrointestinal: Negative for nausea and vomiting.     Objective:     Vital Signs (Most Recent):  Temp: 97.5 °F (36.4 °C) (01/20/17 0800)  Pulse: 86 (01/20/17 0900)  Resp: 18 (01/20/17 0800)  BP: 107/61 (01/20/17 0800)  SpO2: 97 % (01/20/17 0801) Vital Signs (24h Range):  Temp:  [96.2 °F (35.7 °C)-97.5 °F (36.4 °C)] 97.5 °F (36.4 °C)  Pulse:  [66-98] 86  Resp:  [17-18] 18  SpO2:  [94 %-97 %] 97 %  BP: ()/(59-68) 107/61     Weight: 72.6 kg (160 lb)  Body mass index is 31.25 kg/(m^2).    Intake/Output Summary (Last 24 hours) at 01/20/17 0954  Last data filed at 01/20/17 0500   Gross per 24 hour   Intake              230 ml   Output             1849 ml   Net            -1619 ml      Physical Exam   Constitutional: She appears well-developed and well-nourished. No distress.   Neck: JVD present.   Cardiovascular: Normal rate.    Murmur heard.  Pulmonary/Chest: Effort normal. No respiratory distress. She has rales.   Abdominal: Soft. Bowel sounds are normal. She exhibits no distension. There is no tenderness.   Musculoskeletal: She exhibits edema.   Neurological: She is alert.   Nursing note and vitals reviewed.      Significant Labs:   CBC:     Recent Labs  Lab 01/19/17  0340 01/20/17  0539   WBC 7.94 5.77   HGB 10.4* 10.8*   HCT 35.9* 37.2    270     CMP:     Recent Labs  Lab 01/18/17  1019 01/19/17  0340 01/20/17  0539   * 134* 133*   K 4.1 4.2 4.0   CL 97 97 97   CO2 23 23 24   * 99 100   BUN 70* 71* 69*   CREATININE 2.6* 2.8* 2.6*   CALCIUM 8.9 9.0 9.1   PROT  --  6.3 6.2   ALBUMIN  --  2.6* 2.6*   BILITOT  --  " 1.5* 1.3*   ALKPHOS  --  122 121   AST  --  15 21   ALT  --  10 10   ANIONGAP 13 14 12   EGFRNONAA 17* 15* 17*     Coagulation:     Recent Labs  Lab 01/20/17  0539   INR 2.6*       Significant Imaging: I have reviewed all pertinent imaging results/findings within the past 24 hours.   US Kidney Only 1/19/17: The kidneys measure 9.2 cm on the right and 8.9 cm on the left. There is preserved corticomedullary differentiation and normal cortical thickness. No solid renal masses, nephrolithiasis, or hydronephrosis.  Small right renal cyst is visualized measuring 1.1 x 0.9 x 1.2 cm.  Perfusion to the kidneys is mildly decreased. Resistive indices are elevated on the right and normal on the left and as follow: 0.90 on the right and 0.71 on the left.  X-Ray Chest 1 View 1/19/17: The cardiac silhouette is enlarged.  Increased interstitial lung markings seen throughout both lung fields, unchanged.  Atherosclerotic changes of the aorta.  Axillary clips noted.  No pneumothorax.  No acute focal area of airspace consolidation.    Assessment/Plan:      Paroxysmal atrial fibrillation  Toprol-XL dose increased to 100 mg daily with improvement in rate control.       Nonischemic dilated cardiomyopathy  Due to adriamycin toxicity, followed by Cardiology for many years.      MANE (obstructive sleep apnea)  CPAP broken. Follow up with Dr. Delatorre.      Peripheral vascular disease  Continue pravastatin 40 mg daily.       Chronic venous stasis dermatitis of both lower extremities  Legs wrapped. Continue wound care.      Debility  Will go to a different SNF on discharge.      Chronic combined systolic and diastolic CHF, NYHA class 4  Acute exacerbation/ Non-ischemic cardiomyopathy/Cardiorenal syndrome/Severe mitral regurgitation  Continue to hold hydralazine 25 mg q8h, isosorbide mononitrate 30 mg daily because of low blood pressure. Continue Toprol- mg daily. Diuresing. Appreciate Cardiology.    Chronic kidney disease, stage 4, severely  decreased GFR  Cardiorenal syndrome/JOSE MIGUEL  Monitor while diuresing. Appreciate Nephrology.    VTE Risk Mitigation         Ordered     Medium Risk of VTE  Once      01/16/17 1832     Reason for No Pharmacological VTE Prophylaxis  Once      01/16/17 1832          Denys Zuñiga MD  Department of Hospital Medicine   [facility]

## 2017-01-20 NOTE — PLAN OF CARE
Problem: Patient Care Overview  Goal: Plan of Care Review  Outcome: Ongoing (interventions implemented as appropriate)  Patient on documented O2, no respiratory distress noted. Will continue to monitor.

## 2017-01-20 NOTE — PROGRESS NOTES
[facility] Medicine  Progress Note    Patient Name: Myra Murphy  MRN: [id number]  Patient Class: IP- Inpatient   Admission Date: 2017  Length of Stay: 1 days  Attending Physician: Farhad Vargas MD  Primary Care Provider: Lupe Delatorre MD        Subjective:     Principal Problem:Paroxysmal atrial fibrillation with rapid ventricular response    HPI:  Myra Murphy is a 82 y.o. black woman with history of ALYSE-BSO in , hypertension, hyperlipidemia, gastroesophageal reflux disease, memory deficit, degenerative joint disease of the knees and spine, gout, peripheral neuralgia, history of colon cancer s/p resection in , history of right breast cancer (T1N3Mx ER/RI positive, Her-2 Colleen negative) s/p right partial mastectomy and lymph node dissection 3/14/08 and chemotherapy (docataxel, doxorubicin, cyclophosphamide) on aromatase inhibitor therapy, history of total hip arthroplasty 3/05/10, peripheral vascular disease, chronic venous stasis dermatitis of the legs, obstructive sleep apnea (supposed to be on CPAP 12 cmH2O but CPAP broken), 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 She lives at [facility] in [place], [place]. She used to be a . Her sister Caty Figueroa is involved in her medical care. Her primary care physician is Dr. Lupe Delatorre. Her cardiologist is Dr. Sunshine Mckeon. She has refused ICD placement.     On 16 a nurse recommended to her primary care physician, Dr. Delatorre, to decrease her furosemide from 80 mg to 20 mg daily. Dr. Delatorre agreed. She was subsequently  "hospitalized under the care of Cardiology at [facility] from 12/26/16-1/05/17 for CHF exacerbation cardiogenic shock, hypoxia, and mental decline. She had developed acute renal failure. She was diuresed with IV furosemide and dobutamine drips, removing 20 lbs of fluid (181 lbs to 161 lbs). Furosemide was increased to 80 mg twice daily. Digoxin was discontinued, and she was started on hydralazine and isosorbide mononitrate. She was fluid restricted to 1500 mL daily. She was discharged to [facility] skilled nursing facility.     On 1/15/17 she began having substernal after dinner, described as "sticking" and nonradiating. The night nurse reassured her, but the pain persisted the next morning, so the day shift nurse sent her to [facility]. She received aspirin 325 mg and sublingual nitroglycerin without relief. She was found to be in atrial fibrillation with rapid ventricular response (pulse 134 bpm on EKG). Chest x-ray showed stable pulmonary edema. Labs showed new leukocytosis (14,670) and mildly elevated troponin (0.034). BNP was improved (1641) from 12/31/16 (2770). She was given diltiazem 20 mg IV, furosemide 80 mg IV, and ondansetron 4 mg IV, reporting some relief. She was admitted to [facility] to further evaluate her chest pain.       Hospital Course:  Ms. Murphy was admitted to [facility] for further care. Her Toprol XL was increased to help with rate control of her atrial fibrillation and that was successful. She was given digoxin initially, but this was discontinued again because of worsening renal function. Her creatinine increased from 2 to 2.5 the next day. Her furosemide was held and she was given saline 500 mL, but her creatinine lacie more to 2.6. Her BNP was also significantly elevated at 3500. Her creatinine continued to rise to 2.8 and she was given furosemide 80 mg IV with some diuresis. Cardiology and Nephrology were consulted.     She " was accepted to another SNF and will go there on discharge.       Interval History: Says that she is feeling okay. Does not seem like herself still currently. Denies any SOB, but appears SOB. Had several BMs documented overnight.     Review of Systems   Constitutional: Positive for fatigue. Negative for chills and fever.   Cardiovascular: Positive for leg swelling. Negative for chest pain and palpitations.   Gastrointestinal: Negative for nausea and vomiting.     Objective:     Vital Signs (Most Recent):  Temp: 96.5 °F (35.8 °C) (01/19/17 1600)  Pulse: 76 (01/19/17 1600)  Resp: 18 (01/19/17 1600)  BP: 111/68 (01/19/17 1600)  SpO2: 95 % (01/19/17 1553) Vital Signs (24h Range):  Temp:  [96.3 °F (35.7 °C)-97.5 °F (36.4 °C)] 96.5 °F (35.8 °C)  Pulse:  [46-99] 76  Resp:  [15-20] 18  SpO2:  [94 %-97 %] 95 %  BP: (104-122)/(58-75) 111/68     Weight: 72.6 kg (160 lb)  Body mass index is 31.25 kg/(m^2).    Intake/Output Summary (Last 24 hours) at 01/19/17 1825  Last data filed at 01/19/17 1700   Gross per 24 hour   Intake                0 ml   Output              800 ml   Net             -800 ml      Physical Exam   Constitutional: She appears well-developed and well-nourished. No distress.   Neck: JVD present.   Cardiovascular: Normal rate.    Murmur heard.  Pulmonary/Chest: Effort normal. No respiratory distress. She has rales.   Abdominal: Soft. Bowel sounds are normal. She exhibits no distension. There is no tenderness.   Musculoskeletal: She exhibits edema.   Neurological: She is alert.   Nursing note and vitals reviewed.      Significant Labs:   CBC:   Recent Labs  Lab 01/19/17  0340   WBC 7.94   HGB 10.4*   HCT 35.9*        CMP:   Recent Labs  Lab 01/18/17  0400 01/18/17  1019 01/19/17  0340   * 133* 134*   K 4.0 4.1 4.2   CL 96 97 97   CO2 25 23 23    161* 99   BUN 70* 70* 71*   CREATININE 2.6* 2.6* 2.8*   CALCIUM 8.9 8.9 9.0   PROT  --   --  6.3   ALBUMIN  --   --  2.6*   BILITOT  --   --  1.5*    ALKPHOS  --   --  122   AST  --   --  15   ALT  --   --  10   ANIONGAP 13 13 14   EGFRNONAA 17* 17* 15*     Coagulation:   Recent Labs  Lab 01/19/17  0340   INR 2.9*       Significant Imaging: I have reviewed all pertinent imaging results/findings within the past 24 hours.    Assessment/Plan:      * Paroxysmal atrial fibrillation with rapid ventricular response  Increased Toprol-XL 50 mg to 100 mg. Stop digoxin given renal dysfunction. HR is better controlled. INR is 2.9. Hold warfarin tonight.       Chronic venous stasis dermatitis of both lower extremities  Legs wrapped. Continue wound care.      Chronic combined systolic and diastolic CHF, NYHA class 4  Acute exacerbation/ Non-ischemic cardiomyopathy/Cardiorenal syndrome/Severe mitral regurg    Continue to hold hydralazine 25 mg q8h, isosorbide mononitrate 30 mg daily because of low blood pressure. Will give IV furosemide 80 mg this AM. Continue Toprol- mg daily. Consult Cardiology for assistance    Chronic kidney disease, stage 4, severely decreased GFR  Cardiorenal syndrome/JOSE MIGUEL    Up to 2.8 this AM. Consult Cardiology and Nephology. Will give more lasix. Monitor UOP and renal function.     VTE Risk Mitigation         Ordered     Medium Risk of VTE  Once      01/16/17 1832     Reason for No Pharmacological VTE Prophylaxis  Once      01/16/17 1832          Farhad Vargas MD  Department of Hospital Medicine   [facility]

## 2017-01-21 PROBLEM — N30.00 ACUTE CYSTITIS WITHOUT HEMATURIA: Status: ACTIVE | Noted: 2017-01-01

## 2017-01-21 NOTE — PLAN OF CARE
Seen by Dr Cespedes.Bilatera leg dressings in place ,dry and intact.Hob elevated  ,[place].1.5 L of fluid restriction maintained On telemetry and fall precautions.

## 2017-01-21 NOTE — PLAN OF CARE
Problem: Patient Care Overview  Goal: Plan of Care Review  Outcome: Ongoing (interventions implemented as appropriate)  Received pt on 1L NC; SAT 98%

## 2017-01-21 NOTE — PLAN OF CARE
Repositioned. remains incontinent of urine. Buttocks excoriated ,barier cream applied  After each incontinence Iv access rotated

## 2017-01-21 NOTE — SUBJECTIVE & OBJECTIVE
Interval History: Says that she is not having any SOB currently. Denies any chest pain or palpitations.     Review of Systems   Constitutional: Negative for chills and fever.   Respiratory: Negative for cough and shortness of breath.    Cardiovascular: Negative for chest pain and palpitations.   Gastrointestinal: Negative for nausea and vomiting.     Objective:     Vital Signs (Most Recent):  Temp: 97.5 °F (36.4 °C) (01/21/17 0800)  Pulse: 64 (01/21/17 0800)  Resp: 18 (01/21/17 0800)  BP: 109/72 (01/21/17 0800)  SpO2: 96 % (01/21/17 0730) Vital Signs (24h Range):  Temp:  [96.3 °F (35.7 °C)-97.5 °F (36.4 °C)] 97.5 °F (36.4 °C)  Pulse:  [64-97] 64  Resp:  [17-18] 18  SpO2:  [96 %-99 %] 96 %  BP: ()/(55-72) 109/72     Weight: 69.6 kg (153 lb 8 oz)  Body mass index is 29.98 kg/(m^2).    Intake/Output Summary (Last 24 hours) at 01/21/17 1128  Last data filed at 01/21/17 0412   Gross per 24 hour   Intake               50 ml   Output             1704 ml   Net            -1654 ml      Physical Exam   Constitutional: She appears well-developed and well-nourished. No distress.   Cardiovascular: Normal rate.    Murmur heard.  Pulmonary/Chest: Effort normal. No respiratory distress. She has rales.   Abdominal: Soft. Bowel sounds are normal. She exhibits no distension. There is no tenderness.   Musculoskeletal: She exhibits edema.   Neurological: She is alert.   Nursing note and vitals reviewed.      Significant Labs:   CBC:   Recent Labs  Lab 01/20/17  0539 01/21/17 0257   WBC 5.77 5.94   HGB 10.8* 10.4*   HCT 37.2 35.8*    295     CMP:   Recent Labs  Lab 01/20/17  0539 01/21/17 0257   * 134*   K 4.0 3.5   CL 97 94*   CO2 24 28    111*   BUN 69* 70*   CREATININE 2.6* 2.5*   CALCIUM 9.1 9.1   PROT 6.2 6.2   ALBUMIN 2.6* 2.6*   BILITOT 1.3* 1.3*   ALKPHOS 121 114   AST 21 16   ALT 10 12   ANIONGAP 12 12   EGFRNONAA 17* 17*     Coagulation:   Recent Labs  Lab 01/21/17  0257   INR 2.5*       Significant  Imaging: I have reviewed all pertinent imaging results/findings within the past 24 hours.

## 2017-01-21 NOTE — PROGRESS NOTES
Progress Note  Nephrology      Consult Requested By: Farhad Vargas MD      SUBJECTIVE:     Overnight events  Patient is a 82 y.o. female     Patient Active Problem List   Diagnosis    Paroxysmal atrial fibrillation    Anticoagulation monitoring by pharmacist    Nonischemic dilated cardiomyopathy    MANE (obstructive sleep apnea)    Severe mitral regurgitation    HTN (hypertension)    Hyperlipidemia    Paroxysmal atrial flutter    Allergic rhinitis due to allergen    Hip pain    History of breast cancer    Peripheral vascular disease    Chronic venous stasis dermatitis of both lower extremities    Pyogenic arthritis, forearm    Malignant neoplasm of colon    Hypertensive heart disease with heart failure    Paroxysmal ventricular tachycardia    Right heart failure    Tricuspid regurgitation    Debility    Chronic combined systolic and diastolic CHF, NYHA class 4    Chronic kidney disease, stage 4, severely decreased GFR    Long term current use of anticoagulant therapy    Bilateral leg edema    Tinea pedis    Dystrophic nail    Vitamin D deficiency    Type 2 diabetes mellitus without complication    Cardiorenal syndrome    Pulmonary edema    Long term current use of aromatase inhibitor    Iron deficiency anemia    Memory deficit    Acute cystitis without hematuria     Past Medical History   Diagnosis Date    Anticoagulant long-term use     Arthritis     Atrial fibrillation     Atrial flutter     Carcinoma of right breast, estrogen receptor negative and progesterone receptor positive 02/2008    Chronic combined systolic and diastolic congestive heart failure     Colon cancer     Diabetes mellitus     Dilated cardiomyopathy 12/01/2008     adriamycin toxicity    Dyslipidemia     Encounter for blood transfusion     Hyperlipidemia     Hypertension     Supraventricular tachycardia      PAF, AFL              OBJECTIVE:     Vitals:    01/21/17 0500 01/21/17 0621 01/21/17  0730 01/21/17 0800   BP:    109/72   BP Location:    Left arm   Patient Position:    Lying   BP Method:    Automatic   Pulse: 78   64   Resp:    18   Temp:    97.5 °F (36.4 °C)   TempSrc:    Oral   SpO2:  98% 96%    Weight:       Height:           Temp: 97.5 °F (36.4 °C) (01/21/17 0800)  Pulse: 64 (01/21/17 0800)  Resp: 18 (01/21/17 0800)  BP: 109/72 (01/21/17 0800)  SpO2: 96 % (01/21/17 0730)      Date 01/21/17 0700 - 01/22/17 0659   Shift 4239-9634 1936-6159 [id number] 24 Hour Total   I  N  T  A  K  E   P.O. 420   420    Shift Total  (mL/kg) 420  (6)   420  (6)   O  U  T  P  U  T   Urine  (mL/kg/hr) 350   350    Shift Total  (mL/kg) 350  (5)   350  (5)   Weight (kg) 69.6 69.6 69.6 69.6             Medications:   allopurinol  100 mg Oral Daily    ciprofloxacin HCl  500 mg Oral Q12H    furosemide  40 mg Intravenous BID    furosemide  60 mg Intravenous Once    metoprolol succinate  100 mg Oral Daily    pantoprazole  40 mg Oral Daily    pravastatin  40 mg Oral Daily    warfarin  1 mg Oral Daily                  Physical Exam:  General appearance: No SOB  Lungs: diminished breath sounds  Heart: Pulse 64  Abdomen: soft  Extremities: edema  Laboratory:  ABG  Labs reviewed  Recent Results (from the past 336 hour(s))   Basic metabolic panel    Collection Time: 01/21/17  2:57 AM   Result Value Ref Range    Sodium 134 (L) 136 - 145 mmol/L    Potassium 3.5 3.5 - 5.1 mmol/L    Chloride 94 (L) 95 - 110 mmol/L    CO2 28 23 - 29 mmol/L    BUN, Bld 70 (H) 8 - 23 mg/dL    Creatinine 2.5 (H) 0.5 - 1.4 mg/dL    Calcium 9.1 8.7 - 10.5 mg/dL    Anion Gap 12 8 - 16 mmol/L   Basic metabolic panel    Collection Time: 01/20/17  5:39 AM   Result Value Ref Range    Sodium 133 (L) 136 - 145 mmol/L    Potassium 4.0 3.5 - 5.1 mmol/L    Chloride 97 95 - 110 mmol/L    CO2 24 23 - 29 mmol/L    BUN, Bld 69 (H) 8 - 23 mg/dL    Creatinine 2.6 (H) 0.5 - 1.4 mg/dL    Calcium 9.1 8.7 - 10.5 mg/dL    Anion Gap 12 8 - 16 mmol/L   Basic metabolic  panel    Collection Time: 01/19/17  3:40 AM   Result Value Ref Range    Sodium 134 (L) 136 - 145 mmol/L    Potassium 4.2 3.5 - 5.1 mmol/L    Chloride 97 95 - 110 mmol/L    CO2 23 23 - 29 mmol/L    BUN, Bld 71 (H) 8 - 23 mg/dL    Creatinine 2.8 (H) 0.5 - 1.4 mg/dL    Calcium 9.0 8.7 - 10.5 mg/dL    Anion Gap 14 8 - 16 mmol/L     Recent Results (from the past 336 hour(s))   CBC auto differential    Collection Time: 01/21/17  2:57 AM   Result Value Ref Range    WBC 5.94 3.90 - 12.70 K/uL    Hemoglobin 10.4 (L) 12.0 - 16.0 g/dL    Hematocrit 35.8 (L) 37.0 - 48.5 %    Platelets 295 150 - 350 K/uL   CBC auto differential    Collection Time: 01/20/17  5:39 AM   Result Value Ref Range    WBC 5.77 3.90 - 12.70 K/uL    Hemoglobin 10.8 (L) 12.0 - 16.0 g/dL    Hematocrit 37.2 37.0 - 48.5 %    Platelets 270 150 - 350 K/uL   CBC auto differential    Collection Time: 01/19/17  3:40 AM   Result Value Ref Range    WBC 7.94 3.90 - 12.70 K/uL    Hemoglobin 10.4 (L) 12.0 - 16.0 g/dL    Hematocrit 35.9 (L) 37.0 - 48.5 %    Platelets 290 150 - 350 K/uL     Urinalysis  No results for input(s): COLORU, CLARITYU, SPECGRAV, PHUR, PROTEINUA, GLUCOSEU, BILIRUBINCON, BLOODU, WBCU, RBCU, BACTERIA, MUCUS, NITRITE, LEUKOCYTESUR, UROBILINOGEN, HYALINECASTS in the last 24 hours.    Diagnostic Results:  X-Ray: Reviewed  US: Reviewed  Echo: Reviewed  ACCESS    ASSESSMENT/PLAN:     JOSE MIGUEL on CKD 4  Hyponatremia  Metabolic bone disease  Edema  Add lasix 40 mg IV bid  Watch creatinine  Replace potassium  Weight daily

## 2017-01-21 NOTE — PLAN OF CARE
Attempted to get patient out of bed , but to weak to even stand  With max assist, returned to bed .Siderails up, bed alarm on

## 2017-01-21 NOTE — PLAN OF CARE
14 eats ov v -tach nonsustained  noted . Patient asymptomatic.Dr Cespedes notified ,no new orders . Dr Vargas paged to inform of same.

## 2017-01-21 NOTE — PLAN OF CARE
Continued on telemetry and fall precautions monitoring. No red alarm ectopy presently. Bed alarm o9n. Remains incontinent of urine.Buttocks excoriated ,barrier cream applied.

## 2017-01-21 NOTE — PROGRESS NOTES
[facility]  Progress Note    Patient Name: Myra Murphy  MRN: [id number]  Patient Class: IP- Inpatient   Admission Date: 2017  Length of Stay: 3 days  Attending Physician: Farhad Vargas MD  Primary Care Provider: Lupe Delatorre MD        Subjective:     Principal Problem:Paroxysmal atrial fibrillation with rapid ventricular response    HPI:  Myra Murphy is a 82 y.o. black woman with history of ALYSE-BSO in , hypertension, hyperlipidemia, gastroesophageal reflux disease, memory deficit, degenerative joint disease of the knees and spine, gout, peripheral neuralgia, history of colon cancer s/p resection in , history of right breast cancer (T1N3Mx ER/ND positive, Her-2 Colleen negative) s/p right partial mastectomy and lymph node dissection 3/14/08 and chemotherapy (docataxel, doxorubicin, cyclophosphamide) on aromatase inhibitor therapy, history of total hip arthroplasty 3/05/10, peripheral vascular disease, chronic venous stasis dermatitis of the legs, obstructive sleep apnea (supposed to be on CPAP 12 cmH2O but CPAP broken), 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 She lives at [facility] in [place], [place]. She used to be a . Her sister Caty Figueroa is involved in her medical care. Her primary care physician is Dr. Lupe Delatorre. Her cardiologist is Dr. Sunshine Mckeon. She has refused ICD placement.     On 16 a nurse recommended to her primary care physician, Dr. Delatorre, to decrease her furosemide from 80 mg to 20 mg daily. Dr. Delatorre agreed. She was subsequently  "hospitalized under the care of Cardiology at [facility] from 12/26/16-1/05/17 for CHF exacerbation cardiogenic shock, hypoxia, and mental decline. She had developed acute renal failure. She was diuresed with IV furosemide and dobutamine drips, removing 20 lbs of fluid (181 lbs to 161 lbs). Furosemide was increased to 80 mg twice daily. Digoxin was discontinued, and she was started on hydralazine and isosorbide mononitrate. She was fluid restricted to 1500 mL daily. She was discharged to [facility] skilled nursing facility.     On 1/15/17 she began having substernal after dinner, described as "sticking" and nonradiating. The night nurse reassured her, but the pain persisted the next morning, so the day shift nurse sent her to [facility] ED. She received aspirin 325 mg and sublingual nitroglycerin without relief. She was found to be in atrial fibrillation with rapid ventricular response (pulse 134 bpm on EKG). Chest x-ray showed stable pulmonary edema. Labs showed new leukocytosis (14,670) and mildly elevated troponin (0.034). BNP was improved (1641) from 12/31/16 (2770). She was given diltiazem 20 mg IV, furosemide 80 mg IV, and ondansetron 4 mg IV, reporting some relief. She was admitted to [facility] to further evaluate her chest pain.       Hospital Course:  Her Toprol XL was increased to help with rate control of her atrial fibrillation and that was successful. Chest pain improved with decrease in heart rate. She was given a renally dosed load of digoxin on admission, but no further doses because of worsening renal function. Her creatinine increased from 2 to 2.5 the next day. Her furosemide was held and she was given saline 500 mL, but her creatinine lacie more to 2.6. Her BNP was also significantly elevated at 3500. Her creatinine continued to rise to 2.8 and she was given furosemide 80 mg IV with some diuresis. Blood pressures were controlled without hydralazine and " isosorbide mononitrate, which were held. Cardiology and Nephrology were consulted and are working on diuresis.     She was accepted to another SNF and will go there on discharge.       Interval History: Says that she is not having any SOB currently. Denies any chest pain or palpitations.     Review of Systems   Constitutional: Negative for chills and fever.   Respiratory: Negative for cough and shortness of breath.    Cardiovascular: Negative for chest pain and palpitations.   Gastrointestinal: Negative for nausea and vomiting.     Objective:     Vital Signs (Most Recent):  Temp: 97.5 °F (36.4 °C) (01/21/17 0800)  Pulse: 64 (01/21/17 0800)  Resp: 18 (01/21/17 0800)  BP: 109/72 (01/21/17 0800)  SpO2: 96 % (01/21/17 0730) Vital Signs (24h Range):  Temp:  [96.3 °F (35.7 °C)-97.5 °F (36.4 °C)] 97.5 °F (36.4 °C)  Pulse:  [64-97] 64  Resp:  [17-18] 18  SpO2:  [96 %-99 %] 96 %  BP: ()/(55-72) 109/72     Weight: 69.6 kg (153 lb 8 oz)  Body mass index is 29.98 kg/(m^2).    Intake/Output Summary (Last 24 hours) at 01/21/17 1128  Last data filed at 01/21/17 0412   Gross per 24 hour   Intake               50 ml   Output             1704 ml   Net            -1654 ml      Physical Exam   Constitutional: She appears well-developed and well-nourished. No distress.   Cardiovascular: Normal rate.    Murmur heard.  Pulmonary/Chest: Effort normal. No respiratory distress. She has rales.   Abdominal: Soft. Bowel sounds are normal. She exhibits no distension. There is no tenderness.   Musculoskeletal: She exhibits edema.   Neurological: She is alert.   Nursing note and vitals reviewed.      Significant Labs:   CBC:   Recent Labs  Lab 01/20/17 0539 01/21/17 0257   WBC 5.77 5.94   HGB 10.8* 10.4*   HCT 37.2 35.8*    295     CMP:   Recent Labs  Lab 01/20/17 0539 01/21/17  0257   * 134*   K 4.0 3.5   CL 97 94*   CO2 24 28    111*   BUN 69* 70*   CREATININE 2.6* 2.5*   CALCIUM 9.1 9.1   PROT 6.2 6.2   ALBUMIN 2.6*  2.6*   BILITOT 1.3* 1.3*   ALKPHOS 121 114   AST 21 16   ALT 10 12   ANIONGAP 12 12   EGFRNONAA 17* 17*     Coagulation:   Recent Labs  Lab 01/21/17  0257   INR 2.5*       Significant Imaging: I have reviewed all pertinent imaging results/findings within the past 24 hours.    Assessment/Plan:      Paroxysmal atrial fibrillation  Toprol-XL dose increased to 100 mg daily with improvement in rate control. Continue warfarin. INR is 2.5 today.       Chronic venous stasis dermatitis of both lower extremities  Legs wrapped. Continue wound care.      Chronic combined systolic and diastolic CHF, NYHA class 4  Acute exacerbation/ Non-ischemic cardiomyopathy/Cardiorenal syndrome/Severe mitral regurgitation  Continue to hold hydralazine 25 mg q8h, isosorbide mononitrate 30 mg daily because of low blood pressure. Continue Toprol- mg daily. Diuresing with diuril and furosemdie. Appreciate Cardiology.    Chronic kidney disease, stage 4, severely decreased GFR  Cardiorenal syndrome/JOSE MIGUEL  Monitor while diuresing. Appreciate Nephrology. Creatinine is slowly coming down and is 2.5 today.    VTE Risk Mitigation         Ordered     Medium Risk of VTE  Once      01/16/17 1832     Reason for No Pharmacological VTE Prophylaxis  Once      01/16/17 1832          Farhad Vargas MD  Department of Hospital Medicine   [facility]

## 2017-01-22 NOTE — PLAN OF CARE
Incontinent of large amount of urine ,missed most of the pad. Refused offered bath. Linens changed

## 2017-01-22 NOTE — PLAN OF CARE
Patient insisting on getting up .Very weak and unable to even sit up without leaning back. Safety teaching done , not open to teaching at this time. PT here ,states patient only able to stand for less than 15 seconds with the help of walker and max assist. Repositioned to side.

## 2017-01-22 NOTE — PLAN OF CARE
Spoke to Franchesca about patient complaining about her legs feeling like on fire. She put in an order for cream.  Patient has had some relief from the cream.

## 2017-01-22 NOTE — PLAN OF CARE
ectopy noted on tele. Call light in reach. Bed in low position. Report given to AM nurse. Safety measures taken.

## 2017-01-22 NOTE — PLAN OF CARE
Problem: Patient Care Overview  Goal: Plan of Care Review  Outcome: Ongoing (interventions implemented as appropriate)  No distress noted at this time

## 2017-01-22 NOTE — PLAN OF CARE
Problem: Physical Therapy Goal  Goal: Physical Therapy Goal  Goals to be met by: 2017     Patient will increase functional independence with mobility by performin. Supine to sit with Moderate Assistance  2. Sit to stand transfer with Minimal Assistance  3. Bed to chair transfer with Moderate Assistance using Rolling Walker  4. Gait x 20 feet with Moderate Assistance using Rolling Walker.    Pt slowly progressing towards goals, did well sitting up EOB (SBA x 30 min) and performing ADL's, though having difficulty w/ standing (pt resisting, may have fear of falling).

## 2017-01-22 NOTE — PLAN OF CARE
Problem: Patient Care Overview  Goal: Individualization & Mutuality  Plan of care  Discussed with patient and family,verbalizes understanding. Continue wit plan of cere ie: monitor VS, Labs, Correct electrolyte imbalance telemetry, safety ,wound care.Encouraged indipendence,PT/OT.

## 2017-01-23 NOTE — PLAN OF CARE
Continued with telemetry and fall precautions monitoring. No true red alarm ectopy.Linenes changed due to incontinence

## 2017-01-23 NOTE — PLAN OF CARE
Problem: Patient Care Overview  Goal: Plan of Care Review  SpO2  94 % on  1 lpm NC. No apparent distress noted. Will continue to monitor.

## 2017-01-23 NOTE — PLAN OF CARE
Problem: Occupational Therapy Goal  Goal: Occupational Therapy Goal  Goals to be met by: 02/07/2017     Patient will increase functional independence with ADLs by performing:    UE Dressing with Set-up Assistance.  LE Dressing with Moderate Assistance and Assistive Devices as needed.  Grooming while EOB with Modified [place].  Sitting at edge of bed x 20 minutes with Supervision. -- MET 1/23  Supine to sit with Minimal Assistance.  Upper extremity exercise program x 10 reps per handout, with supervision.   Outcome: Ongoing (interventions implemented as appropriate)  Patient with improved mobility and participation today, still with poor initiation of tasks and movements. Requires increased time. Patient will benefit from skilled OT to address functional deficits.

## 2017-01-23 NOTE — PROGRESS NOTES
[facility] Medicine  Progress Note    Patient Name: Myra Murphy  MRN: [id number]  Patient Class: IP- Inpatient   Admission Date: 2017  Length of Stay: 5 days  Attending Physician: Farhad Vargas MD  Primary Care Provider: Lupe Delatorre MD        Subjective:     Principal Problem:Paroxysmal atrial fibrillation with rapid ventricular response    HPI:  Myra Murphy is a 82 y.o. black woman with history of ALYSE-BSO in , hypertension, hyperlipidemia, gastroesophageal reflux disease, memory deficit, degenerative joint disease of the knees and spine, gout, peripheral neuralgia, history of colon cancer s/p resection in , history of right breast cancer (T1N3Mx ER/TX positive, Her-2 Colleen negative) s/p right partial mastectomy and lymph node dissection 3/14/08 and chemotherapy (docataxel, doxorubicin, cyclophosphamide) on aromatase inhibitor therapy, history of total hip arthroplasty 3/05/10, peripheral vascular disease, chronic venous stasis dermatitis of the legs, obstructive sleep apnea (supposed to be on CPAP 12 cmH2O but CPAP broken), 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 She lives at [facility] in [place], [place]. She used to be a . Her sister Caty Figueroa is involved in her medical care. Her primary care physician is Dr. Lupe Delatorre. Her cardiologist is Dr. Sunshine Mckeon. She has refused ICD placement.     On 16 a nurse recommended to her primary care physician, Dr. Delatorre, to decrease her furosemide from 80 mg to 20 mg daily. Dr. Delatorre agreed. She was subsequently  "hospitalized under the care of Cardiology at [facility] from 12/26/16-1/05/17 for CHF exacerbation cardiogenic shock, hypoxia, and mental decline. She had developed acute renal failure. She was diuresed with IV furosemide and dobutamine drips, removing 20 lbs of fluid (181 lbs to 161 lbs). Furosemide was increased to 80 mg twice daily. Digoxin was discontinued, and she was started on hydralazine and isosorbide mononitrate. She was fluid restricted to 1500 mL daily. She was discharged to [facility] skilled nursing facility.     On 1/15/17 she began having substernal after dinner, described as "sticking" and nonradiating. The night nurse reassured her, but the pain persisted the next morning, so the day shift nurse sent her to [facility] ED. She received aspirin 325 mg and sublingual nitroglycerin without relief. She was found to be in atrial fibrillation with rapid ventricular response (pulse 134 bpm on EKG). Chest x-ray showed stable pulmonary edema. Labs showed new leukocytosis (14,670) and mildly elevated troponin (0.034). BNP was improved (1641) from 12/31/16 (2770). She was given diltiazem 20 mg IV, furosemide 80 mg IV, and ondansetron 4 mg IV, reporting some relief. She was admitted to [facility] to further evaluate her chest pain.       Hospital Course:  Her Toprol XL was increased to help with rate control of her atrial fibrillation and that was successful. Chest pain improved with decrease in heart rate. She was given a renally dosed load of digoxin on admission, but no further doses because of worsening renal function. Her creatinine increased from 2 to 2.5 the next day. Her furosemide was held and she was given saline 500 mL, but her creatinine lacie more to 2.6. Her BNP was also significantly elevated at 3500. Her creatinine continued to rise to 2.8 and she was given furosemide 80 mg IV with some diuresis. Blood pressures were controlled without hydralazine and " isosorbide mononitrate, which were held. Cardiology and Nephrology were consulted and are working on diuresis.     She was accepted to another SNF and will go there on discharge.       Interval History: Says that she is okay today. Continues to urinate well. Seems a little confused today, but is interactive.     Review of Systems   Constitutional: Negative for chills and fever.   Cardiovascular: Negative for chest pain and palpitations.   Gastrointestinal: Negative for nausea and vomiting.     Objective:     Vital Signs (Most Recent):  Temp: 97 °F (36.1 °C) (01/23/17 0020)  Pulse: 87 (01/23/17 0100)  Resp: 20 (01/23/17 0020)  BP: (!) 102/58 (01/23/17 0020)  SpO2: 99 % (01/22/17 2322) Vital Signs (24h Range):  Temp:  [96.6 °F (35.9 °C)-97.6 °F (36.4 °C)] 97 °F (36.1 °C)  Pulse:  [60-92] 87  Resp:  [16-20] 20  SpO2:  [94 %-99 %] 99 %  BP: ()/(51-68) 102/58     Weight: 69.6 kg (153 lb 8 oz)  Body mass index is 29.98 kg/(m^2).    Intake/Output Summary (Last 24 hours) at 01/23/17 0123  Last data filed at 01/23/17 0118   Gross per 24 hour   Intake             1238 ml   Output             1994 ml   Net             -756 ml      Physical Exam   Constitutional: She appears well-developed and well-nourished. No distress.   Cardiovascular: Normal rate.    Murmur heard.  Pulmonary/Chest: Effort normal. No respiratory distress. She has rales.   Abdominal: Soft. Bowel sounds are normal. She exhibits no distension. There is no tenderness.   Musculoskeletal: She exhibits edema.   Neurological: She is alert.   Nursing note and vitals reviewed.      Significant Labs:   BMP:   Recent Labs  Lab 01/22/17  0354   *   *   K 3.1*   CL 91*   CO2 32*   BUN 61*   CREATININE 2.2*   CALCIUM 9.2   MG 1.9     CBC:   Recent Labs  Lab 01/21/17  0257   WBC 5.94   HGB 10.4*   HCT 35.8*          Significant Imaging: I have reviewed all pertinent imaging results/findings within the past 24 hours.    Assessment/Plan:       Paroxysmal atrial fibrillation  Toprol-XL dose increased to 100 mg daily with improvement in rate control. Continue warfarin. INR is 2.2 today.       Chronic combined systolic and diastolic CHF, NYHA class 4  Acute exacerbation/ Non-ischemic cardiomyopathy/Cardiorenal syndrome/Severe mitral regurgitation  Continue to hold hydralazine 25 mg q8h, isosorbide mononitrate 30 mg daily. Continue Toprol- mg daily. Diuresing well with IV lasix BID. Appreciate Cardiology.    Chronic kidney disease, stage 4, severely decreased GFR  Cardiorenal syndrome/JOSE MIGUEL  Monitor while diuresing. Appreciate Nephrology. Creatinine is slowly coming down and is 2.2 today.    Acute cystitis without hematuria  UCx with GNR. Started on ciprofloxacin.       VTE Risk Mitigation         Ordered     Medium Risk of VTE  Once      01/16/17 1832     Reason for No Pharmacological VTE Prophylaxis  Once      01/16/17 1832          Farhad Vargas MD  Department of Hospital Medicine   [facility]

## 2017-01-23 NOTE — PROGRESS NOTES
10:40am-  faxed updated clinicals to [facility] , [facility].  Then made phone contact with [facility] informing her updated clinicals has been attached in right care.  Also, patient is scheduled to discharge tomorrow.  [facility] said they will be able to accept patient tomorrow.

## 2017-01-23 NOTE — PLAN OF CARE
Per RACHELL Garcia pt is accepted to [facility] for tomorrow. Caty (sister ) [phone] to be notified when pt discharged.       01/23/17 1053   Discharge Reassessment   Assessment Type Discharge Planning Reassessment   Can the patient answer the patient profile reliably? Yes, cognitively intact   How does the patient rate their overall health at the present time? Good   Describe the patient's ability to walk at the present time. Walks with the help of equipment   How often would a person be available to care for the patient? Whenever needed   During the past month, has the patient often been bothered by feeling down, depressed or hopeless? Yes   During the past month, has the patient often been bothered by little interest or pleasure in doing things? Yes   Discharge plan remains the same: Yes   Provided patient/caregiver education on the expected discharge date and the discharge plan Yes   Discharge Plan A Skilled Nursing Facility   Discharge Plan B Home Health

## 2017-01-23 NOTE — PLAN OF CARE
Problem: Patient Care Overview  Goal: Plan of Care Review  Outcome: Ongoing (interventions implemented as appropriate)  Plan of care discussed with patient. Safety measures maintained. Remains afib on tele.  Received potassium 40mEq PO overnight, and potassium this AM is up to 3.8. No complaints of pain. Continue to turn q2 hours. Bilateral buttocks excoriation improving.

## 2017-01-23 NOTE — PT/OT/SLP PROGRESS
Physical Therapy  Treatment    Myra Murphy   MRN: [id number]   Admitting Diagnosis: Paroxysmal atrial fibrillation with rapid ventricular response    PT Received On: 17  PT Start Time: 1145     PT Stop Time: 1223    PT Total Time (min): 38 min       Billable Minutes:  Therapeutic Activity 10 and Neuromuscular Re-education 10    Treatment Type: Treatment  PT/PTA: PT     PTA Visit Number: 0       General Precautions: Standard, fall  Orthopedic Precautions: N/A   Braces: N/A         Subjective:  Communicated with nsg prior to session.  Pt agreeable to treatment.    Pain Ratin/10                   Objective:   Patient found with: peripheral IV, oxygen, telemetry    Functional Mobility:  Bed Mobility:   Scooting/Bridging: Maximum Assistance (to EOB in sitting and to HOB in supine with bed in trendelenberg position and pt bridging and using BR's)  Sit to Supine: Moderate Assistance (for B LE's)    Transfers:  Sit <> Stand Assistance: Maximum Assistance (x 2 people for safety, x 3 trials with VC for hand placement and forward weight shift )  Sit <> Stand Assistive Device: Rolling Walker    Gait:   Gait Distance: unable    Stairs:  N/A    Balance:   Static Sit: FAIR+: Able to take MINIMAL challenges from all directions  Dynamic Sit: FAIR+: Maintains balance through MINIMAL excursions of active trunk motion  Static Stand: 0: Needs MAXIMAL assist to maintain   Dynamic stand: 0: N/A     Therapeutic Activities and Exercises:  Pt sat at EOB with supervision approx 15m while performing ADL's with OT with fair static/dynamic balance.  Pt performed forward reaching activities during ADL's for facilitation of shifting COG over CARTER.  Pt performed sit to stand with RW and Max A x 2 people for safety x 3 trials.  Pt requires max A and max VC/TC for distribution of weight through UE's to walker increased trunk/elbow/hip/knee ext, and forward weight shifting.  Pt pushing into extension with standing.  Only able to stand approx  10 sec before trying to sit down.  Pt with poor initiation.  Requires max Encouragement and VC to participate.  Became agitated during treatment session.  Pt requires max increased time to perform all tasks.  Pt performed Bridging with Min A for LE's x 5 reps.    AM-PAC 6 CLICK MOBILITY  How much help from another person does this patient currently need?   1 = Unable, Total/Dependent Assistance  2 = A lot, Maximum/Moderate Assistance  3 = A little, Minimum/Contact Guard/Supervision  4 = None, Modified [place]/Independent    Turning over in bed (including adjusting bedclothes, sheets and blankets)?: 2  Sitting down on and standing up from a chair with arms (e.g., wheelchair, bedside commode, etc.): 2  Moving from lying on back to sitting on the side of the bed?: 3  Moving to and from a bed to a chair (including a wheelchair)?: 2  Need to walk in hospital room?: 1  Climbing 3-5 steps with a railing?: 1  Total Score: 11    AM-PAC Raw Score CMS G-Code Modifier Level of Impairment Assistance   6 % Total / Unable   7 - 9 CM 80 - 100% Maximal Assist   10 - 14 CL 60 - 80% Moderate Assist   15 - 19 CK 40 - 60% Moderate Assist   20 - 22 CJ 20 - 40% Minimal Assist   23 CI 1-20% SBA / CGA   24 CH 0% Independent/ Mod I     Patient left HOB elevated with all lines intact, call button in reach and bed alarm on.    Assessment:  Myra Murphy is a 82 y.o. female with a medical diagnosis of Paroxysmal atrial fibrillation with rapid ventricular response and presents with decreased functional independence 2/2 decreased initiation, gen weaknesss, and deconditioning. Pt is making slow steady progress and should continue to do so.   .    Rehab identified problem list/impairments: Rehab identified problem list/impairments: weakness, impaired endurance, impaired self care skills, impaired functional mobilty, impaired balance, decreased safety awareness, decreased coordination (fear of falling)    Rehab potential is  fair.    Activity tolerance: Fair    Discharge recommendations: Discharge Facility/Level Of Care Needs: nursing facility, skilled     Barriers to discharge: Barriers to Discharge: Decreased caregiver support    Equipment recommendations: Equipment Needed After Discharge:  (ongoing assessment pending pt progress)     GOALS:   Physical Therapy Goals        Problem: Physical Therapy Goal    Goal Priority Disciplines Outcome Goal Variances Interventions   Physical Therapy Goal     PT/OT, PT Ongoing (interventions implemented as appropriate)     Description:  Goals to be met by: 2017     Patient will increase functional independence with mobility by performin. Supine to sit with Moderate Assistance  2. Sit to stand transfer with Minimal Assistance  3. Bed to chair transfer with Moderate Assistance using Rolling Walker  4. Gait  x 20 feet with Moderate Assistance using Rolling Walker.                 PLAN:    Patient to be seen 5 x/week  to address the above listed problems via gait training, therapeutic activities, therapeutic exercises, neuromuscular re-education  Plan of Care expires: 17  Plan of Care reviewed with: patient         Katrin Jose, PT  2017

## 2017-01-23 NOTE — SUBJECTIVE & OBJECTIVE
Interval History: Says that she is okay today. Continues to urinate well. Seems a little confused today, but is interactive.     Review of Systems   Constitutional: Negative for chills and fever.   Cardiovascular: Negative for chest pain and palpitations.   Gastrointestinal: Negative for nausea and vomiting.     Objective:     Vital Signs (Most Recent):  Temp: 97 °F (36.1 °C) (01/23/17 0020)  Pulse: 87 (01/23/17 0100)  Resp: 20 (01/23/17 0020)  BP: (!) 102/58 (01/23/17 0020)  SpO2: 99 % (01/22/17 2322) Vital Signs (24h Range):  Temp:  [96.6 °F (35.9 °C)-97.6 °F (36.4 °C)] 97 °F (36.1 °C)  Pulse:  [60-92] 87  Resp:  [16-20] 20  SpO2:  [94 %-99 %] 99 %  BP: ()/(51-68) 102/58     Weight: 69.6 kg (153 lb 8 oz)  Body mass index is 29.98 kg/(m^2).    Intake/Output Summary (Last 24 hours) at 01/23/17 0123  Last data filed at 01/23/17 0118   Gross per 24 hour   Intake             1238 ml   Output             1994 ml   Net             -756 ml      Physical Exam   Constitutional: She appears well-developed and well-nourished. No distress.   Cardiovascular: Normal rate.    Murmur heard.  Pulmonary/Chest: Effort normal. No respiratory distress. She has rales.   Abdominal: Soft. Bowel sounds are normal. She exhibits no distension. There is no tenderness.   Musculoskeletal: She exhibits edema.   Neurological: She is alert.   Nursing note and vitals reviewed.      Significant Labs:   BMP:   Recent Labs  Lab 01/22/17  0354   *   *   K 3.1*   CL 91*   CO2 32*   BUN 61*   CREATININE 2.2*   CALCIUM 9.2   MG 1.9     CBC:   Recent Labs  Lab 01/21/17  0257   WBC 5.94   HGB 10.4*   HCT 35.8*          Significant Imaging: I have reviewed all pertinent imaging results/findings within the past 24 hours.

## 2017-01-23 NOTE — PLAN OF CARE
Problem: Patient Care Overview  Goal: Plan of Care Review  Pt received on nasal cannula at  1 lpm. SPO2   96%.  Pt in no apparent respiratory distress.  Will continue to monitor.

## 2017-01-23 NOTE — PROGRESS NOTES
[facility]  Cardiology  Progress Note    Patient Name: Myra Murphy  MRN: [id number]  Admission Date: 1/16/2017  Hospital Length of Stay: 5 days  Code Status: Full Code   Attending Physician: Farhad Vargas MD   Primary Care Physician: Lupe Delatorre MD  Expected Discharge Date:   Principal Problem:Paroxysmal atrial fibrillation with rapid ventricular response    Subjective:     Hospital Course: Myra Murphy is a 82 y.o.female who presented to the ED on 01/15/2017 with reports of substernal chest pain that was nonradiating and sticking in nature. Pain was not relieved by NTG and ASA given in the ED. She was found to be in AF RVR. Chest x-ray showed pulmonary edema, noted to have mildly elevated troponin and elevated BNP. She was given diltiazem 20 mg IV, furosemide 80 mg IV with moderate relief in symptoms. Patient follows Dr. Sunshine Mckeon for cardiology at [facility]. Patient's Lasix was changed at the end of last year 2/2 declining renal function. She was subsequently hospitalized at [facility] from 12/26/16-1/05/17 with CHF exacerbation cardiogenic shock, hypoxia, and mental decline. She had developed acute renal failure. She was diuresed with IV furosemide and dobutamine drips, removing 20 lbs of fluid (181 lbs to 161 lbs). Her weight yesterday is documented at 160 lbs. Upon discharge her Lasix was increased to 80 mg twice daily. Digoxin was discontinued, and she was started on hydralazine and isosorbide mononitrate with 1.5 L fluid restriction. 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 On interview she is tearful regarding her current state of health and does not want any invasive procedures including valve replacement or PPM/AICD. Patient seen on 01/20/2017  with AF rate controlled tolerating diuresis with Diuril and Lasix. Renal function noted stable. Over the weekend patient continued to respond well to diuresis with improvement in symptoms and improved renal function.     Review of Systems   Constitutional: Negative.    HENT: Negative.    Eyes: Negative.    Respiratory: Negative for cough, choking, chest tightness, shortness of breath, wheezing and stridor.    Cardiovascular: Positive for leg swelling. Negative for chest pain and palpitations.   Gastrointestinal: Negative.    Endocrine: Negative.    Genitourinary: Negative.    Musculoskeletal: Positive for arthralgias and myalgias.   Skin: Positive for wound.   Allergic/Immunologic: Negative.    Neurological: Negative.    Hematological: Negative.    Psychiatric/Behavioral: Negative.      Objective:     Vital Signs (Most Recent):  Temp: 97.5 °F (36.4 °C) (01/23/17 0800)  Pulse: 71 (01/23/17 0800)  Resp: 18 (01/23/17 0800)  BP: 110/66 (01/23/17 0800)  SpO2: 96 % (01/23/17 0726) Vital Signs (24h Range):  Temp:  [96.6 °F (35.9 °C)-97.6 °F (36.4 °C)] 97.5 °F (36.4 °C)  Pulse:  [62-92] 71  Resp:  [16-20] 18  SpO2:  [94 %-99 %] 96 %  BP: ()/(51-68) 110/66     Weight: 69.6 kg (153 lb 8 oz)  Body mass index is 29.98 kg/(m^2).    SpO2: 96 %  O2 Device (Oxygen Therapy): nasal cannula      Intake/Output Summary (Last 24 hours) at 01/23/17 1025  Last data filed at 01/23/17 0600   Gross per 24 hour   Intake              720 ml   Output             1578 ml   Net             -858 ml       Lines/Drains/Airways     Peripheral Intravenous Line                 Peripheral IV - Single Lumen 01/21/17 1100 Left Forearm 1 day                Physical Exam   Constitutional: She is oriented to person, place, and time. No distress.   HENT:   Head: Normocephalic and atraumatic.   Eyes: Right eye exhibits no discharge. Left eye exhibits no discharge.   Cardiovascular: Normal rate.  An irregularly irregular rhythm present. Exam reveals no  gallop and no friction rub.    Murmur heard.  Pulmonary/Chest: Effort normal.   Abdominal: Soft. Bowel sounds are normal.   Musculoskeletal: She exhibits edema.   Neurological: She is alert and oriented to person, place, and time.   Skin: Skin is warm and dry. She is not diaphoretic.   DSG to BLE    Psychiatric: She has a normal mood and affect. Her behavior is normal. Judgment and thought content normal.         Recent Labs  Lab 01/23/17  0300      K 3.8   CL 93*   CO2 35*   BUN 51*   CREATININE 2.0*   MG 2.0       Recent Labs  Lab 01/23/17  0300   WBC 6.63   RBC 4.41   HGB 10.7*   HCT 37.5      MCV 85   MCH 24.3*   MCHC 28.5*     Assessment and Plan:     Brief HPI: Patient seen this morning on rounds sitting on side of bed with reported improvement in SOB. Denies any chest pain.     Active Diagnoses:    Diagnosis Date Noted POA    Chronic combined systolic and diastolic CHF, NYHA class 4 [I50.42] Patient with noted pulmonary edema on chest x ray; BNP 3500; JVD and rales on exam with peripheral edema; Echo noting EF 20% with severe MR and moderate to severe TR; SBP continues to be marginal at ; on BB; Nephrology following as well; patient is not open to HD or invasive testing/procedures such as ICD, RHC, valve replacement; Discussed with her the likely mo of readmission despite best medical regimen. Continued Diuresis with IV Lasix 40 BID with good response; -6L overall; would not be opposed to conversion to P.O. Lasix 60 mg BID;  Cr improved from admit 2.0;          ? Palliative care 05/11/2014 Yes    Chronic atrial fibrillation Presented with AF RVR rate since controlled; she has frequent multifocal PVCs and runs of VT longest noted 16 beats; Taking Toprol  mg daily with Coumadin for anticoagulation; INR 1.9 today; recommend continuation of current treatment; Patient underwent DCCV in the past with return of AF/FLT in 1 week; previously had AF/FLT ablation recommended; she did not  follow through and is not interested at this time 11/27/2012 Yes      Problems Resolved During this Admission:    Diagnosis Date Noted Date Resolved POA       VTE Risk Mitigation         Ordered     Medium Risk of VTE  Once      01/16/17 1832     Reason for No Pharmacological VTE Prophylaxis  Once      01/16/17 1832          Steffen Robb NP  Cardiology  [facility]

## 2017-01-23 NOTE — PLAN OF CARE
Assessment complete. Patient resting in bed with no distress noted. Respirations even and unlabored, no complaints of shortness of breath. Tele on reading afib in the 80s. No complaints of pain or itching. Bilateral lower extremity dressings remain c/d/i. Call bell in reach and instructed to call for assistance. Bed alarm on

## 2017-01-23 NOTE — PROGRESS NOTES
Progress Note  Nephrology      Consult Requested By: Farhad Vargas MD      SUBJECTIVE:     Overnight events  Patient is a 82 y.o. female     Patient Active Problem List   Diagnosis    Paroxysmal atrial fibrillation    Anticoagulation monitoring by pharmacist    Nonischemic dilated cardiomyopathy    MANE (obstructive sleep apnea)    Severe mitral regurgitation    HTN (hypertension)    Hyperlipidemia    Paroxysmal atrial flutter    Allergic rhinitis due to allergen    Hip pain    History of breast cancer    Peripheral vascular disease    Chronic venous stasis dermatitis of both lower extremities    Pyogenic arthritis, forearm    Malignant neoplasm of colon    Hypertensive heart disease with heart failure    Paroxysmal ventricular tachycardia    Right heart failure    Tricuspid regurgitation    Debility    Chronic combined systolic and diastolic CHF, NYHA class 4    Chronic kidney disease, stage 4, severely decreased GFR    Long term current use of anticoagulant therapy    Bilateral leg edema    Tinea pedis    Dystrophic nail    Vitamin D deficiency    Type 2 diabetes mellitus without complication    Cardiorenal syndrome    Pulmonary edema    Long term current use of aromatase inhibitor    Iron deficiency anemia    Memory deficit    Acute cystitis without hematuria     Past Medical History   Diagnosis Date    Anticoagulant long-term use     Arthritis     Atrial fibrillation     Atrial flutter     Carcinoma of right breast, estrogen receptor negative and progesterone receptor positive 02/2008    Chronic combined systolic and diastolic congestive heart failure     Colon cancer     Diabetes mellitus     Dilated cardiomyopathy 12/01/2008     adriamycin toxicity    Dyslipidemia     Encounter for blood transfusion     Hyperlipidemia     Hypertension     Supraventricular tachycardia      PAF, AFL              OBJECTIVE:     Vitals:    01/23/17 1100 01/23/17 1132 01/23/17  1200 01/23/17 1300   BP:   (!) 99/55    BP Location:   Left arm    Patient Position:   Lying    BP Method:   Automatic    Pulse: 73  80 75   Resp:   20    Temp:       TempSrc:       SpO2:  96%     Weight:       Height:           Temp: 97.5 °F (36.4 °C) (01/23/17 0800)  Pulse: 75 (01/23/17 1300)  Resp: 20 (01/23/17 1200)  BP: (!) 99/55 (01/23/17 1200)  SpO2: 96 % (01/23/17 1132)      Date 01/23/17 0700 - 01/24/17 0659   Shift 5502-7236 2754-9767 2936-2158 24 Hour Total   I  N  T  A  K  E   Shift Total  (mL/kg)       O  U  T  P  U  T   Urine  (mL/kg/hr) 361   361    Shift Total  (mL/kg) 361  (5.2)   361  (5.2)   Weight (kg) 69.6 69.6 69.6 69.6             Medications:   allopurinol  100 mg Oral Daily    ciprofloxacin HCl  500 mg Oral Q12H    furosemide  40 mg Intravenous BID    metoprolol succinate  100 mg Oral Daily    pantoprazole  40 mg Oral Daily    pravastatin  40 mg Oral Daily    warfarin  1 mg Oral Daily                  Physical Exam:  General appearance: NAD  No SOB  Lungs: diminished breath sounds  Heart: Pulse 75  BP 95/55  Abdomen: soft  Extremities: edema  Skin: dry  Laboratory:  ABG  Labs reviewed  Recent Results (from the past 336 hour(s))   Basic metabolic panel    Collection Time: 01/23/17  3:00 AM   Result Value Ref Range    Sodium 139 136 - 145 mmol/L    Potassium 3.8 3.5 - 5.1 mmol/L    Chloride 93 (L) 95 - 110 mmol/L    CO2 35 (H) 23 - 29 mmol/L    BUN, Bld 51 (H) 8 - 23 mg/dL    Creatinine 2.0 (H) 0.5 - 1.4 mg/dL    Calcium 9.1 8.7 - 10.5 mg/dL    Anion Gap 11 8 - 16 mmol/L   Basic metabolic panel    Collection Time: 01/22/17  3:54 AM   Result Value Ref Range    Sodium 135 (L) 136 - 145 mmol/L    Potassium 3.1 (L) 3.5 - 5.1 mmol/L    Chloride 91 (L) 95 - 110 mmol/L    CO2 32 (H) 23 - 29 mmol/L    BUN, Bld 61 (H) 8 - 23 mg/dL    Creatinine 2.2 (H) 0.5 - 1.4 mg/dL    Calcium 9.2 8.7 - 10.5 mg/dL    Anion Gap 12 8 - 16 mmol/L   Basic metabolic panel    Collection Time: 01/21/17  2:57 AM    Result Value Ref Range    Sodium 134 (L) 136 - 145 mmol/L    Potassium 3.5 3.5 - 5.1 mmol/L    Chloride 94 (L) 95 - 110 mmol/L    CO2 28 23 - 29 mmol/L    BUN, Bld 70 (H) 8 - 23 mg/dL    Creatinine 2.5 (H) 0.5 - 1.4 mg/dL    Calcium 9.1 8.7 - 10.5 mg/dL    Anion Gap 12 8 - 16 mmol/L     Recent Results (from the past 336 hour(s))   CBC auto differential    Collection Time: 01/23/17  3:00 AM   Result Value Ref Range    WBC 6.63 3.90 - 12.70 K/uL    Hemoglobin 10.7 (L) 12.0 - 16.0 g/dL    Hematocrit 37.5 37.0 - 48.5 %    Platelets 256 150 - 350 K/uL   CBC auto differential    Collection Time: 01/21/17  2:57 AM   Result Value Ref Range    WBC 5.94 3.90 - 12.70 K/uL    Hemoglobin 10.4 (L) 12.0 - 16.0 g/dL    Hematocrit 35.8 (L) 37.0 - 48.5 %    Platelets 295 150 - 350 K/uL   CBC auto differential    Collection Time: 01/20/17  5:39 AM   Result Value Ref Range    WBC 5.77 3.90 - 12.70 K/uL    Hemoglobin 10.8 (L) 12.0 - 16.0 g/dL    Hematocrit 37.2 37.0 - 48.5 %    Platelets 270 150 - 350 K/uL     Urinalysis  No results for input(s): COLORU, CLARITYU, SPECGRAV, PHUR, PROTEINUA, GLUCOSEU, BILIRUBINCON, BLOODU, WBCU, RBCU, BACTERIA, MUCUS, NITRITE, LEUKOCYTESUR, UROBILINOGEN, HYALINECASTS in the last 24 hours.    Diagnostic Results:  X-Ray: Reviewed  US: Reviewed  Echo: Reviewed  ACCESS    ASSESSMENT/PLAN:     JOSE MIGUEL on CKD 4.  Metabolic alkalosis CO2 35. On lasix. May need to decrease lasix dose.  Edema.  Poor nutrition. Albumin 2.6.  Anemia. Hb 10.7.  BP 99/55.  Weight daily.

## 2017-01-23 NOTE — PLAN OF CARE
Problem: Physical Therapy Goal  Goal: Physical Therapy Goal  Goals to be met by: 2017     Patient will increase functional independence with mobility by performin. Supine to sit with Moderate Assistance  2. Sit to stand transfer with Minimal Assistance  3. Bed to chair transfer with Moderate Assistance using Rolling Walker  4. Gait x 20 feet with Moderate Assistance using Rolling Walker.    Outcome: Ongoing (interventions implemented as appropriate)  Pt making slow steady progress, remains appropriate for SNF.

## 2017-01-23 NOTE — PT/OT/SLP PROGRESS
Occupational Therapy  Treatment    Myra Murphy   MRN: [id number]   Admitting Diagnosis: Paroxysmal atrial fibrillation with rapid ventricular response    OT Date of Treatment: 17   OT Start Time: 1129  OT Stop Time: 1220  OT Total Time (min): 51 min    Billable Minutes:  Self Care/Home Management 15 and Therapeutic Activity 15  (Co-tx with P.TMarti)    General Precautions: Standard, fall  Orthopedic Precautions: N/A  Braces: N/A    Subjective:  Communicated with Summer mahmood prior to session.    Pain Ratin/10              Pain Rating Post-Intervention: 0/10    Objective:  Patient found with: telemetry, peripheral IV, oxygen     Functional Mobility:  Bed Mobility:  Scooting/Bridging: Maximum Assistance, With assist of 2  Supine to Sit: Moderate Assistance, WIth side rail  Sit to Supine: Moderate Assistance, With leg lift    Transfers:   Sit <> Stand Assistance: Maximum Assistance  Sit <> Stand Assistive Device: Rolling Walker    Functional Ambulation: N/A    Activities of Daily Living:  UE Dressing Level of Assistance: Minimum assistance  Grooming Position: Seated, EOB  Grooming Level of Assistance: Stand by assistance    Balance:   Static Sit: GOOD-: Takes MODERATE challenges from all directions but inconsistently  Dynamic Sit: FAIR+: Maintains balance through MINIMAL excursions of active trunk motion  Static Stand: 0: Needs MAXIMAL assist to maintain   Dynamic stand: 0: N/A    Therapeutic Activities and Exercises:  Patient asleep on arrival - VC/TC to arouse. Increased time and cues needed for movement - EOB with Min-Mod (A) (more mod to scoot completely to EOB). Sat EOB x 30 mins with (S). Donned gown with CGA. Increased time to initiate G/H tasks - dynamic reaching and weight shifting to reach for items while seated. Static stand trials, x 3 - max (A) of 2 for safety. Patient with difficulty following cues - very anxious/fearful to stand. Mod (A) to return to supine. Able to scoot towards HOB in  trendelenburg with Max (A) of 2 - plant feet and push.     AM-PAC 6 CLICK ADL   How much help from another person does this patient currently need?   1 = Unable, Total/Dependent Assistance  2 = A lot, Maximum/Moderate Assistance  3 = A little, Minimum/Contact Guard/Supervision  4 = None, Modified [place]/Independent    Putting on and taking off regular lower body clothing? : 1  Bathing (including washing, rinsing, drying)?: 2  Toileting, which includes using toilet, bedpan, or urinal? : 1  Putting on and taking off regular upper body clothing?: 3  Taking care of personal grooming such as brushing teeth?: 3  Eating meals?: 3  Total Score: 13     AM-PAC Raw Score CMS G-Code Modifier Level of Impairment Assistance   6 % Total / Unable   7 - 9 CM 80 - 100% Maximal Assist   10 - 14 CL 60 - 80% Moderate Assist   15 - 19 CK 40 - 60% Moderate Assist   20 - 22 CJ 20 - 40% Minimal Assist   23 CI 1-20% SBA / CGA   24 CH 0% Independent/ Mod I     Patient left HOB elevated with all lines intact, call button in reach and P.T. present    ASSESSMENT:  Myra Murphy is a 82 y.o. female with a medical diagnosis of Paroxysmal atrial fibrillation with rapid ventricular response   Patient with improved mobility and participation today, still with poor initiation of tasks and movements. Requires increased time. Patient will benefit from skilled OT to address functional deficits.    Rehab identified problem list/impairments: Rehab identified problem list/impairments: weakness, impaired endurance, impaired self care skills, impaired functional mobilty, gait instability, impaired balance, impaired cognition, decreased safety awareness, impaired skin, edema, decreased upper extremity function, decreased lower extremity function, impaired cardiopulmonary response to activity    Rehab potential is good.    Activity tolerance: Fair    Discharge recommendations: Discharge Facility/Level Of Care Needs: nursing facility, skilled      Barriers to discharge: Barriers to Discharge: Decreased caregiver support    Equipment recommendations:  (TBD)     GOALS:   Occupational Therapy Goals        Problem: Occupational Therapy Goal    Goal Priority Disciplines Outcome Interventions   Occupational Therapy Goal     OT, PT/OT Ongoing (interventions implemented as appropriate)    Description:  Goals to be met by: 02/07/2017     Patient will increase functional independence with ADLs by performing:    UE Dressing with Set-up Assistance.  LE Dressing with Moderate Assistance and Assistive Devices as needed.  Grooming while EOB with Modified [place].  Sitting at edge of bed x 20 minutes with Supervision. -- MET 1/23  Supine to sit with Minimal Assistance.  Upper extremity exercise program x 10 reps per handout, with supervision.                 Plan:  Patient to be seen 5 x/week to address the above listed problems via self-care/home management, therapeutic activities, therapeutic exercises  Plan of Care expires:    Plan of Care reviewed with: patient         ASIF Esquivel  01/23/2017

## 2017-01-24 PROBLEM — N30.00 ACUTE CYSTITIS WITHOUT HEMATURIA: Status: RESOLVED | Noted: 2017-01-01 | Resolved: 2017-01-01

## 2017-01-24 NOTE — DISCHARGE INSTRUCTIONS
HEART FAILURE, WHAT IS   HEART FAILURE: MAKING CHANGES TO YOUR DIET   HEART FAILURE: MEDICINES TO HELP YOUR HEART   HEART FAILURE: TAKING MEDICATION TO CONTROL   HEART FAILURE: TRACKING YOUR WEIGHT   ATRIAL FIBRILLATION, UNDERSTANDING   ATRIAL FLUTTER/FIBRILLATION, WHAT IS     HEART FAILURE, DISCHARGE INSTRUCTIONS FOR

## 2017-01-24 NOTE — SUBJECTIVE & OBJECTIVE
Interval History: Says that she is okay today. She says that she does not know what is wrong with her.     Review of Systems   Constitutional: Negative for chills and fever.   Cardiovascular: Negative for chest pain and palpitations.   Gastrointestinal: Negative for nausea and vomiting.     Objective:     Vital Signs (Most Recent):  Temp: 97.3 °F (36.3 °C) (01/23/17 2015)  Pulse: 84 (01/23/17 2015)  Resp: 20 (01/23/17 2015)  BP: 105/64 (01/23/17 2015)  SpO2: 95 % (01/23/17 1933) Vital Signs (24h Range):  Temp:  [97 °F (36.1 °C)-97.7 °F (36.5 °C)] 97.3 °F (36.3 °C)  Pulse:  [62-92] 84  Resp:  [18-20] 20  SpO2:  [95 %-99 %] 95 %  BP: ()/(55-66) 105/64     Weight: 69.6 kg (153 lb 8 oz)  Body mass index is 29.98 kg/(m^2).    Intake/Output Summary (Last 24 hours) at 01/23/17 2210  Last data filed at 01/23/17 2100   Gross per 24 hour   Intake             1050 ml   Output             1707 ml   Net             -657 ml      Physical Exam   Constitutional: She appears well-developed and well-nourished. No distress.   Cardiovascular: Normal rate and regular rhythm.    Murmur heard.  Pulmonary/Chest: Effort normal. She has rales.   Neurological: She is alert. She is disoriented.   Skin: Skin is warm and dry.   Nursing note and vitals reviewed.      Significant Labs:   CBC:   Recent Labs  Lab 01/23/17  0300   WBC 6.63   HGB 10.7*   HCT 37.5        CMP:   Recent Labs  Lab 01/22/17  0354 01/23/17  0300   * 139   K 3.1* 3.8   CL 91* 93*   CO2 32* 35*   * 122*   BUN 61* 51*   CREATININE 2.2* 2.0*   CALCIUM 9.2 9.1   ANIONGAP 12 11   EGFRNONAA 20* 23*       Significant Imaging: I have reviewed all pertinent imaging results/findings within the past 24 hours.

## 2017-01-24 NOTE — PLAN OF CARE
"Problem: Patient Care Overview  Goal: Plan of Care Review  Outcome: Ongoing (interventions implemented as appropriate)  No further breakdown noted.  Patient is now turning self much better. Repositioned often.  Telemetry with Afib, some PVC"S.  One short run of V-tach, asymptomatic. Lungs with fine crackles, no distress.  Fall risks maintained, bed alarm on.  Will continue to monitor VS, telemetry, labs, I & O and medications as ordered.      "

## 2017-01-24 NOTE — PROGRESS NOTES
[place] notified pt's sister  Caty ([phone]) of pt discharging to [facility] today. Caty agreed with plan and informed Tn she will be visiting her sister there.

## 2017-01-24 NOTE — NURSING
Report called to SUSANA Ho at [facility]. Pt waiting on transportation, will continue to monitor.

## 2017-01-24 NOTE — PROGRESS NOTES
[facility] Hospital Medicine  Progress Note    Patient Name: Myra Murphy  MRN: [id number]  Patient Class: IP- Inpatient   Admission Date: 2017  Length of Stay: 5 days  Attending Physician: Farhad Vargas MD  Primary Care Provider: Lupe Delatorre MD        Subjective:     Principal Problem:Paroxysmal atrial fibrillation with rapid ventricular response    HPI:  Myra Murphy is a 82 y.o. black woman with history of ALYSE-BSO in , hypertension, hyperlipidemia, gastroesophageal reflux disease, memory deficit, degenerative joint disease of the knees and spine, gout, peripheral neuralgia, history of colon cancer s/p resection in , history of right breast cancer (T1N3Mx ER/SC positive, Her-2 Colleen negative) s/p right partial mastectomy and lymph node dissection 3/14/08 and chemotherapy (docataxel, doxorubicin, cyclophosphamide) on aromatase inhibitor therapy, history of total hip arthroplasty 3/05/10, peripheral vascular disease, chronic venous stasis dermatitis of the legs, obstructive sleep apnea (supposed to be on CPAP 12 cmH2O but CPAP broken), 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 She lives at [facility] in [place], [place]. She used to be a . Her sister Caty Figueroa is involved in her medical care. Her primary care physician is Dr. Lupe Delatorre. Her cardiologist is Dr. Sunshine Mckeon. She has refused ICD placement.     On 16 a nurse recommended to her primary care physician, Dr. Delatorre, to decrease her furosemide from 80 mg to 20 mg daily. Dr. Delatorre agreed. She was subsequently  "hospitalized under the care of Cardiology at [facility] from 12/26/16-1/05/17 for CHF exacerbation cardiogenic shock, hypoxia, and mental decline. She had developed acute renal failure. She was diuresed with IV furosemide and dobutamine drips, removing 20 lbs of fluid (181 lbs to 161 lbs). Furosemide was increased to 80 mg twice daily. Digoxin was discontinued, and she was started on hydralazine and isosorbide mononitrate. She was fluid restricted to 1500 mL daily. She was discharged to [facility] skilled nursing facility.     On 1/15/17 she began having substernal after dinner, described as "sticking" and nonradiating. The night nurse reassured her, but the pain persisted the next morning, so the day shift nurse sent her to [facility] ED. She received aspirin 325 mg and sublingual nitroglycerin without relief. She was found to be in atrial fibrillation with rapid ventricular response (pulse 134 bpm on EKG). Chest x-ray showed stable pulmonary edema. Labs showed new leukocytosis (14,670) and mildly elevated troponin (0.034). BNP was improved (1641) from 12/31/16 (2770). She was given diltiazem 20 mg IV, furosemide 80 mg IV, and ondansetron 4 mg IV, reporting some relief. She was admitted to [facility] to further evaluate her chest pain.       Hospital Course:  Her Toprol XL was increased to help with rate control of her atrial fibrillation and that was successful. Chest pain improved with decrease in heart rate. She was given a renally dosed load of digoxin on admission, but no further doses because of worsening renal function. Her creatinine increased from 2 to 2.5 the next day. Her furosemide was held and she was given saline 500 mL, but her creatinine lacie more to 2.6. Her BNP was also significantly elevated at 3500. Her creatinine continued to rise to 2.8 and she was given furosemide 80 mg IV with some diuresis. Blood pressures were controlled without hydralazine and " isosorbide mononitrate, which were held. Cardiology and Nephrology were consulted. She responded well to IV chlorothiazide and furosemide, which was given on consecutive days, then transitioned to furosemide alone. By 1/23/17 her fluid balance was negative 6 liters. Renal functino improved with diuresis. She also had a pan-sensitive E coli UTI on 1/19/17 treated with ciprofloxacin.    She was accepted to another [facility], [facility] in [place].       Interval History: Says that she is okay today. She says that she does not know what is wrong with her.     Review of Systems   Constitutional: Negative for chills and fever.   Cardiovascular: Negative for chest pain and palpitations.   Gastrointestinal: Negative for nausea and vomiting.     Objective:     Vital Signs (Most Recent):  Temp: 97.3 °F (36.3 °C) (01/23/17 2015)  Pulse: 84 (01/23/17 2015)  Resp: 20 (01/23/17 2015)  BP: 105/64 (01/23/17 2015)  SpO2: 95 % (01/23/17 1933) Vital Signs (24h Range):  Temp:  [97 °F (36.1 °C)-97.7 °F (36.5 °C)] 97.3 °F (36.3 °C)  Pulse:  [62-92] 84  Resp:  [18-20] 20  SpO2:  [95 %-99 %] 95 %  BP: ()/(55-66) 105/64     Weight: 69.6 kg (153 lb 8 oz)  Body mass index is 29.98 kg/(m^2).    Intake/Output Summary (Last 24 hours) at 01/23/17 2210  Last data filed at 01/23/17 2100   Gross per 24 hour   Intake             1050 ml   Output             1707 ml   Net             -657 ml      Physical Exam   Constitutional: She appears well-developed and well-nourished. No distress.   Cardiovascular: Normal rate and regular rhythm.    Murmur heard.  Pulmonary/Chest: Effort normal. She has rales.   Neurological: She is alert. She is disoriented.   Skin: Skin is warm and dry.   Nursing note and vitals reviewed.      Significant Labs:   CBC:   Recent Labs  Lab 01/23/17  0300   WBC 6.63   HGB 10.7*   HCT 37.5        CMP:   Recent Labs  Lab 01/22/17  0354 01/23/17  0300   * 139   K 3.1* 3.8   CL 91* 93*   CO2 32* 35*   GLU  114* 122*   BUN 61* 51*   CREATININE 2.2* 2.0*   CALCIUM 9.2 9.1   ANIONGAP 12 11   EGFRNONAA 20* 23*       Significant Imaging: I have reviewed all pertinent imaging results/findings within the past 24 hours.    Assessment/Plan:      Paroxysmal atrial fibrillation  Toprol-XL dose increased to 100 mg daily with improvement in rate control. Continue warfarin. INR is 1.9 today.       Debility  Will go to a different SNF on discharge.      Chronic combined systolic and diastolic CHF, NYHA class 4  Acute exacerbation/ Non-ischemic cardiomyopathy/Cardiorenal syndrome/Severe mitral regurgitation  Continue to hold hydralazine 25 mg q8h, isosorbide mononitrate 30 mg daily. Continue Toprol- mg daily. Diuresing well with IV lasix BID, and nephrology decreased the dose for today. Will try oral furosemide tomorrow. Appreciate Cardiology.    Chronic kidney disease, stage 4, severely decreased GFR  Cardiorenal syndrome/JOSE MIGUEL  Monitor while diuresing. Appreciate Nephrology. Creatinine is slowly coming down and is 2 today. Will put back on oral lasix tomorrow.     Acute cystitis without hematuria  UCx with E.coli.  Completed 3 days of ciprofloxacin.       VTE Risk Mitigation         Ordered     Medium Risk of VTE  Once      01/16/17 1832     Reason for No Pharmacological VTE Prophylaxis  Once      01/16/17 1832          Farhad Vargas MD  Department of Hospital Medicine   [facility]

## 2017-01-24 NOTE — PLAN OF CARE
Problem: Patient Care Overview  Goal: Plan of Care Review  Outcome: Ongoing (interventions implemented as appropriate)  Plan of care reviewed with patient. Patient verbalized complete understanding. Fall precautions maintained. Bed in lowest position, locked, call light within reach, and bed alarm on. Side rails up x's 2 with slip resistant socks on. Nurse instructed patient to notify staff for any assistance and pt verbalized complete understanding. Patient on telemetry throughout shift with no ectopy noted. Will continue to monitor.

## 2017-01-24 NOTE — PLAN OF CARE
[facility]  Farhad Vargas MD, [facility], [place]     MD Papa Medina FNP Lauren Holmes, PA-C Lauren Johns, PA-C Renee Melancon, PA-C  [address] [place] [address]  Office: [phone]  Fax: [phone]         NURSING HOME ORDERS    Patient Name: Myra Murphy  YOB: 1934 01/24/2017    Admit to Nursing Home:  Skilled Bed                                                 Diagnoses:  Active Hospital Problems    Diagnosis  POA    Long term current use of aromatase inhibitor [Z79.811]  Not Applicable     Chronic    Iron deficiency anemia [D50.9]  Yes     Chronic    Cardiorenal syndrome [I13.10]  Yes     Chronic    Chronic combined systolic and diastolic CHF, NYHA class 4 [I50.42]  Yes     Chronic    Chronic kidney disease, stage 4, severely decreased GFR [N18.4]  Yes     Chronic    Debility [R53.81]  Yes    Hypertensive heart disease with heart failure [I11.0]  Yes     Chronic    Peripheral vascular disease [I73.9]  Yes     Chronic    Chronic venous stasis dermatitis of both lower extremities [I83.11, I83.12]  Yes     Chronic    Paroxysmal atrial flutter [I48.92]  Yes     Chronic    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 LVEF 20% 12/28/16.      Severe mitral regurgitation [I34.0]  Yes     Chronic     MODERATE-SEVERE      MANE (obstructive sleep apnea) [G47.33]  Yes     Chronic     CPAP broken      Paroxysmal atrial fibrillation [I48.0]  Yes     Chronic     WITH LA THROMBUS 10/12      Memory deficit [R41.3]  Yes     Chronic      Resolved Hospital Problems    Diagnosis Date Resolved POA    *Paroxysmal atrial fibrillation with rapid ventricular response [I48.0] 01/20/2017 Yes    Acute cystitis without hematuria [N30.00] 01/24/2017 No    Chest pain [R07.9] 01/20/2017 Yes    Substernal chest pain [R07.2] 01/20/2017 Yes       Patient is homebound  due to:  Paroxysmal atrial fibrillation with rapid ventricular response    Allergies:  Review of patient's allergies indicates:   Allergen Reactions    Shellfish containing products Itching     Can eat shrimp    Adhesive      Other reaction(s): blisters    Iodinated contrast media - iv dye      Other reaction(s): Vomiting    Iodine      Other reaction(s): Topical  Other reaction(s): Rash    Morphine      Other reaction(s): Hallucinations         Discharge Procedure Orders  Diet Adult Regular   Order Specific Question Answer Comments   Na restriction, if any: 2gNa      Vital signs per facility protocol     Skin assessment every shift      Activity: Per rehab recommendations     No activity restrictions     Full code       LABS:  Per facility protocol    Nursing Precautions:       - Fall precautions per nursing home protocol   - Decubitus precautions:        -  for positioning   - Pressure reducing foam mattress   - Turn patient every two hours. Use wedge pillows to anchor patient    CONSULTS:    Physical Therapy to evaluate and treat     Occupational Therapy to evaluate and treat     Nutrition to evaluate and recommend diet      MISCELLANEOUS CARE:      Routine Skin for Bedridden Patients:  Apply moisture barrier cream to all    skin folds and wet areas in perineal area daily and after baths and                           all bowel movements.    Bilateral venous stasis ulcers:   - wraps legs twice a week with unna boots which are currently intact. No open wounds or edema noted to BLE. Cleanse BLE with easicleanse wipes and dried with towel; applied Sween 24 per; AG dressing applied to BLE; BLE rewrapped with Co-Flex Boots (Calmoseptine) per pt request.     Other Wounds: Skin tear rectum   - Recommend nursing apply Critic Aid Barrier Paste BID and prn cleaning- apply a nickel thick layer of paste, remove only the soiled layer and reapply to protect the skin from moisture.          Heart Failure Home Health  Instructions:      SN to instruct on the following:   Instruct on the definition of CHF.  Instruct on the signs/sympoms of CHF to be reported.  Instruct on and monitor daily weights.  Instruct on factors that cause exacerbation.  Instruct on action, dose, schedule, and side effects of medications.  Instruct on diet as prescribed.  Instruct on activity allowed.  Instruct on life-style modifications for life long management of CHF  SN to assess compliance with daily weights, diet, medications, fluid retention,  safety precautions, activities permitted and life-style modifications.  Additional 1-2 SN visits per week as needed for signs and symptoms   of CHF exacerbation.         For Weight Gain > 2-3 lbs in 1 day or 4-6 lbs over 1 week: Call cardiologist Sunshine Mckeon MD                 Medications: Discontinue all previous medication orders, if any. See new list below.     Myra Murphy   Home Medication Instructions DAVID:[id number]    Printed on:01/24/17 3690   Medication Information                      acetaminophen (TYLENOL) 325 MG tablet  Take 2 tablets (650 mg total) by mouth every 6 (six) hours as needed for Pain. Do NOT exceed 3000 mg in a 24 hour time period and do not take with other medications containing acetaminophen  Do NOT drink alcohol when taking this medication             allopurinol (ZYLOPRIM) 100 MG tablet  Take 1 tablet (100 mg total) by mouth once daily.             fluticasone (FLONASE) 50 mcg/actuation nasal spray  1 spray by Each Nare route once daily.             furosemide (LASIX) 40 MG tablet  Take 2 tablets (80 mg total) by mouth once daily.             letrozole (FEMARA) 2.5 mg Tab  Take 1 tablet (2.5 mg total) by mouth once daily.             metoprolol succinate (TOPROL-XL) 100 MG 24 hr tablet  Take 1 tablet (100 mg total) by mouth once daily.             omeprazole (PRILOSEC) 20 MG capsule  Take 1 capsule (20 mg total) by mouth once daily.             potassium chloride SA  (K-DUR,KLOR-CON) 20 MEQ tablet  Take 20 mEq by mouth once daily.             pravastatin (PRAVACHOL) 40 MG tablet  Take 1 tablet (40 mg total) by mouth once daily.             senna-docusate 8.6-50 mg (PERICOLACE) 8.6-50 mg per tablet  Take 1 tablet by mouth daily as needed for Constipation.             warfarin (COUMADIN) 1 MG tablet  Take 1 tablet (1 mg total) by mouth Daily. Adjust to keep INR between 2.0-3.0               Follow-up Information     Follow up with Lupe Delatorre MD On 2/6/2017.    Specialty:  Family Medicine    Why:  time: 10:00    Contact information:    [address] [place] [address]  [phone]                  _________________________________  Denys Zuñiga MD  01/24/2017

## 2017-01-24 NOTE — NURSING
RN attempted to call report to [facility] Moon states that the nurse is st lunch and will call back. Call back number [phone]    PIV discontinued with tip intact. Tele remove and returned to nurses station

## 2017-01-24 NOTE — PLAN OF CARE
Problem: Patient Care Overview  Goal: Plan of Care Review  Pt received on nasal cannula at  .5  lpm. SPO2  97 %.  Pt in no apparent respiratory distress.  Will continue to monitor.

## 2017-01-24 NOTE — PROGRESS NOTES
[facility]  Cardiology  Progress Note    Patient Name: Myra Murphy  MRN: [id number]  Admission Date: 1/16/2017  Hospital Length of Stay: 6 days  Code Status: Prior   Attending Physician: Denys Zuñiga MD   Primary Care Physician: Lupe Delatorre MD  Expected Discharge Date: 1/24/2017  Principal Problem:Paroxysmal atrial fibrillation with rapid ventricular response    Subjective:     Hospital Course: Myra Murphy is a 82 y.o.female who presented to the ED on 01/15/2017 with reports of substernal chest pain that was nonradiating and sticking in nature. Pain was not relieved by NTG and ASA given in the ED. She was found to be in AF RVR. Chest x-ray showed pulmonary edema, noted to have mildly elevated troponin and elevated BNP. She was given diltiazem 20 mg IV, furosemide 80 mg IV with moderate relief in symptoms. Patient follows Dr. Sunshine Mckeon for cardiology at [facility]. Patient's Lasix was changed at the end of last year 2/2 declining renal function. She was subsequently hospitalized at [facility] from 12/26/16-1/05/17 with CHF exacerbation cardiogenic shock, hypoxia, and mental decline. She had developed acute renal failure. She was diuresed with IV furosemide and dobutamine drips, removing 20 lbs of fluid (181 lbs to 161 lbs). Her weight yesterday is documented at 160 lbs. Upon discharge her Lasix was increased to 80 mg twice daily. Digoxin was discontinued, and she was started on hydralazine and isosorbide mononitrate with 1.5 L fluid restriction. 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 On interview she is tearful regarding her current state of health and does not want any invasive procedures including valve replacement or PPM/AICD. Patient seen on 01/20/2017  with AF rate controlled tolerating diuresis with Diuril and Lasix. Renal function noted stable. Over the weekend patient continued to respond well to diuresis with improvement in symptoms and improved renal function. Patient was converted to oral lasix and monitored overnight on 12/23/2017, noted euvolemic on exam on 01/24/2017.     Review of Systems   Constitutional: Negative.    HENT: Negative.    Eyes: Negative.    Respiratory: Positive for cough. Negative for choking, chest tightness, shortness of breath, wheezing and stridor.    Cardiovascular: Negative for chest pain, palpitations and leg swelling.   Gastrointestinal: Negative.    Endocrine: Negative.    Genitourinary: Negative.    Musculoskeletal: Negative.    Skin: Negative.    Allergic/Immunologic: Negative.    Neurological: Negative.    Hematological: Negative.    Psychiatric/Behavioral: Negative.      Objective:     Vital Signs (Most Recent):  Temp: 97.6 °F (36.4 °C) (01/24/17 1200)  Pulse: 95 (01/24/17 1200)  Resp: 20 (01/24/17 1200)  BP: 100/62 (01/24/17 1200)  SpO2: 97 % (01/24/17 1137) Vital Signs (24h Range):  Temp:  [97.3 °F (36.3 °C)-98.1 °F (36.7 °C)] 97.6 °F (36.4 °C)  Pulse:  [72-98] 95  Resp:  [20] 20  SpO2:  [95 %-100 %] 97 %  BP: ()/(57-64) 100/62     Weight: 64.9 kg (143 lb)  Body mass index is 27.93 kg/(m^2).    SpO2: 97 %  O2 Device (Oxygen Therapy): room air      Intake/Output Summary (Last 24 hours) at 01/24/17 1411  Last data filed at 01/24/17 1206   Gross per 24 hour   Intake              730 ml   Output             1355 ml   Net             -625 ml       Lines/Drains/Airways          No matching active lines, drains, or airways          Physical Exam   Constitutional: She is oriented to person, place, and time. No distress.   HENT:   Head: Normocephalic and atraumatic.   Eyes: Right eye exhibits no discharge. Left eye exhibits no discharge.   Neck: JVD present.   Cardiovascular: Normal rate.  An irregularly irregular rhythm  present.   Murmur heard.  Pulmonary/Chest: Effort normal. No respiratory distress. She has decreased breath sounds. She has no wheezes. She has no rales.   Abdominal: Soft. Bowel sounds are normal.   Musculoskeletal: She exhibits edema.   Neurological: She is alert and oriented to person, place, and time.   Skin: Skin is warm and dry. She is not diaphoretic.   Psychiatric: She has a normal mood and affect. Her behavior is normal. Judgment and thought content normal.         Recent Labs  Lab 01/24/17  0311      K 3.5   CL 92*   CO2 36*   BUN 40*   CREATININE 1.7*   MG 1.8       Assessment and Plan:     Brief HPI: Patient seen this morning on rounds with reports of improvement in her SOB and without chest pain.     Active Diagnoses:    Diagnosis Date Noted POA    Chronic combined systolic and diastolic CHF, NYHA class 4 [I50.42] Patient with noted pulmonary edema on chest x ray; BNP 3500; JVD and rales on exam with peripheral edema; Echo noting EF 20% with severe MR and moderate to severe TR; SBP continues to be marginal at ; on BB; Nephrology following as well; patient is not open to HD or invasive testing/procedures such as ICD, RHC, valve replacement; Discussed with her the likely mo of readmission despite best medical regimen. Continued Diuresis with IV Lasix 40 BID with good response; transitioned to oral lasix yesterday afternoon, net -6.7 L from admit; appears euvolemic on exam; ease of breathing improved, BLE edema minimal ; ok to DC today from cards standpoint; continue Lasix 80 mg BID 05/11/2014 Yes    Chronic atrial fibrillation Presented with AF RVR rate since controlled; she has frequent multifocal PVCs and runs of VT longest noted 16 beats; Taking Toprol  mg daily with Coumadin for anticoagulation; INR 1.8 today; recommend continuation of current treatment; Patient underwent DCCV in the past with return of AF/FLT in 1 week; previously had AF/FLT ablation recommended; she did not  follow through and is not interested at this time 11/27/2012 Yes      Problems Resolved During this Admission:    Diagnosis Date Noted Date Resolved POA       VTE Risk Mitigation         Ordered     Medium Risk of VTE  Once      01/16/17 1832     Reason for No Pharmacological VTE Prophylaxis  Once      01/16/17 1832              Steffen Robb NP  Cardiology  [facility]

## 2017-01-24 NOTE — PROGRESS NOTES
faxed transfer facility to [facility] to review for admission today.  Then received phone call from [facility] stating transfer facility orders received and reviewed.  [facility] gave permission for patient to admit to room 110 and [facility] nurse to call report to [facility] Albin mahmood at [phone] prior to discharge.   Also, [facility] gave permission for  to arrange [facility] wheelchair van via [facility] contract.     informed bedside nurseRamona number to call report is located on front of ambulance packet.  Bedside nurse gave permission for  to arrange 1:30pm transport.       arranged [facility] Patient [facility] wheelchair van transport for 1;30pm with oxygen.

## 2017-01-24 NOTE — DISCHARGE SUMMARY
[facility]  Discharge Summary      Patient Name: Myra Murphy  MRN: [id number]  Admission Date: 2017  Hospital Length of Stay: 6 days  Discharge Date and Time: 2017  2:20 PM  Attending Physician: Denys Zuñiga MD   Discharging Provider: Denys Zuñiga MD  Primary Care Provider: Lupe Delatorre MD      HPI:   Myra Murphy is a 82 y.o. black woman with history of ALYSE-BSO in , hypertension, hyperlipidemia, gastroesophageal reflux disease, memory deficit, degenerative joint disease of the knees and spine, gout, peripheral neuralgia, history of colon cancer s/p resection in , history of right breast cancer (T1N3Mx ER/ID positive, Her-2 Colleen negative) s/p right partial mastectomy and lymph node dissection 3/14/08 and chemotherapy (docataxel, doxorubicin, cyclophosphamide) on aromatase inhibitor therapy, history of total hip arthroplasty 3/05/10, peripheral vascular disease, chronic venous stasis dermatitis of the legs, obstructive sleep apnea (supposed to be on CPAP 12 cmH2O but CPAP broken), 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 She lives at [facility] in [place], [place]. She used to be a . Her sister Caty Figueroa is involved in her medical care. Her primary care physician is Dr. Lupe Delatorre. Her cardiologist is Dr. Sunshine Mckeon. She has refused ICD placement.     On 16 a nurse recommended to her cardiologist, Dr. Mckeon, to decrease her furosemide from 80 mg to 20 mg daily. Dr. Mckeon agreed. She was subsequently hospitalized under the care of  "Cardiology at [facility] from 12/26/16-1/05/17 for CHF exacerbation cardiogenic shock, hypoxia, and mental decline. She had developed acute renal failure. She was diuresed with IV furosemide and dobutamine drips, removing 20 lbs of fluid (181 lbs to 161 lbs). Furosemide was increased to 80 mg twice daily. Digoxin was discontinued, and she was started on hydralazine and isosorbide mononitrate. She was fluid restricted to 1500 mL daily. She was discharged to [facility] skilled nursing facility.     On 1/15/17 she began having substernal after dinner, described as "sticking" and nonradiating. The night nurse reassured her, but the pain persisted the next morning, so the day shift nurse sent her to [facility] ED. She received aspirin 325 mg and sublingual nitroglycerin without relief. She was found to be in atrial fibrillation with rapid ventricular response (pulse 134 bpm on EKG). Chest x-ray showed stable pulmonary edema. Labs showed new leukocytosis (14,670) and mildly elevated troponin (0.034). BNP was improved (1641) from 12/31/16 (2770). She was given diltiazem 20 mg IV, furosemide 80 mg IV, and ondansetron 4 mg IV, reporting some relief. She was admitted to [facility] to further evaluate her chest pain.       * No surgery found *      Indwelling Lines/Drains at time of discharge: None    Hospital Course:   Her Toprol XL was increased to help with rate control of her atrial fibrillation and that was successful. Chest pain improved with decrease in heart rate. She was given a renally dosed load of digoxin on admission, but no further doses because of worsening renal function. Her creatinine increased from 2 to 2.5 the next day. Her furosemide was held and she was given saline 500 mL, but her creatinine lacie more to 2.6. Her BNP was also significantly elevated at 3500. Her creatinine continued to rise to 2.8 and she was given furosemide 80 mg IV with some diuresis. Blood " pressures were controlled without hydralazine and isosorbide mononitrate, which were held. Cardiology and Nephrology were consulted. She responded well to IV chlorothiazide and furosemide, which was given on consecutive days, then transitioned to furosemide alone. By 1/23/17 her fluid balance was negative 6 liters. Renal function improved with diuresis. She also had a pan-sensitive E coli UTI on 1/19/17 treated with ciprofloxacin for 3 days.    She was accepted to another [facility], [facility] in [place] and was discharged on 1/24/17.       Consults:   Consults         Status Ordering Provider     Inpatient consult to Anesthesiology  Once     Provider:  (Not yet assigned)    Acknowledged RADHA LEDBETTER     [facility]  Once     Provider:  (Not yet assigned)    Final result PIYUSH KENNEY     Inpatient consult to Nephrology-[facility] ([facility])  Once     Provider:  Chanelle Galan MD    Completed PIYUSH KENNEY     IP consult to case management  Once     Provider:  (Not yet assigned)    Acknowledged RADHA LEDBETTER     Nutrition Services Referral  Once     Provider:  (Not yet assigned)    Completed RADHA LEDBETTER          Significant Diagnostic Studies:   EKG 1/16/17: Atrial fibrillation with rapid ventricular response with premature ventricular or aberrantly conducted complexes. No significant change from 12/26/16.  X-Ray Chest AP Portable 1/16/17: Comparison: 1/2/17. Trachea is patent.  Cardiac silhouette is enlarged.  There is atherosclerosis.  Prominent interstitial lung markings again noted some of which may be related to edema.  No new consolidation, mass, effusion, pneumothorax or free air below the diaphragm.  Clips are seen in the right axilla.  Advanced degenerative changes of the shoulders noted.  Most are similar to prior.  Impression: Stable appearance of the chest with findings suggestive of pulmonary edema and possible  interstitial lung disease.  US Kidney Only 1/19/17: The kidneys measure 9.2 cm on the right and 8.9 cm on the left. There is preserved corticomedullary differentiation and normal cortical thickness. No solid renal masses, nephrolithiasis, or hydronephrosis.  Small right renal cyst is visualized measuring 1.1 x 0.9 x 1.2 cm.  Perfusion to the kidneys is mildly decreased. Resistive indices are elevated on the right and normal on the left and as follow: 0.90 on the right and 0.71 on the left.  X-Ray Chest 1 View 1/19/17: The cardiac silhouette is enlarged.  Increased interstitial lung markings seen throughout both lung fields, unchanged.  Atherosclerotic changes of the aorta.  Axillary clips noted.  No pneumothorax.  No acute focal area of airspace consolidation.    Final Active Diagnoses:    Diagnosis Date Noted POA    Long term current use of aromatase inhibitor [Z79.811] 01/16/2017 Not Applicable     Chronic    Iron deficiency anemia [D50.9] 01/16/2017 Yes     Chronic    Cardiorenal syndrome [I13.10] 12/26/2016 Yes     Chronic    Chronic combined systolic and diastolic CHF, NYHA class 4 [I50.42] 05/11/2014 Yes     Chronic    Chronic kidney disease, stage 4, severely decreased GFR [N18.4] 05/11/2014 Yes     Chronic    Debility [R53.81] 05/11/2014 Yes    Hypertensive heart disease with heart failure [I11.0] 02/22/2013 Yes     Chronic    Peripheral vascular disease [I73.9] 01/21/2013 Yes     Chronic    Chronic venous stasis dermatitis of both lower extremities [I83.11, I83.12] 01/21/2013 Yes     Chronic    Paroxysmal atrial flutter [I48.92] 11/27/2012 Yes     Chronic    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] 11/08/2012 Yes     Chronic    Severe mitral regurgitation [I34.0] 11/08/2012 Yes     Chronic    MANE (obstructive sleep apnea) [G47.33] 11/08/2012 Yes     Chronic    Paroxysmal atrial fibrillation [I48.0] 10/03/2012 Yes     Chronic    Memory deficit [R41.3] 05/25/2004 Yes     Chronic      Problems  Resolved During this Admission:    Diagnosis Date Noted Date Resolved POA    PRINCIPAL PROBLEM:  Paroxysmal atrial fibrillation with rapid ventricular response [I48.0] 01/16/2017 01/20/2017 Yes    Acute cystitis without hematuria [N30.00] 01/21/2017 01/24/2017 No    Chest pain [R07.9] 01/18/2017 01/20/2017 Yes    Substernal chest pain [R07.2] 01/16/2017 01/20/2017 Yes      No new Assessment & Plan notes have been filed under this hospital service since the last note was generated.  Service: Hospital Medicine      Discharged Condition: good    Disposition: Skilled Nursing Facility    Follow Up:  Follow-up Information     Follow up with Lupe Delatorre MD On 2/6/2017.    Specialty:  Family Medicine    Why:  time: 10:00    Contact information:    [address]  [facility] [id number]  [phone]          Patient Instructions:     Diet Adult Regular   Order Specific Question Answer Comments   Na restriction, if any: 2gNa      Vital signs per facility protocol     Skin assessment every shift      Activity: Per rehab recommendations     No activity restrictions     Full code       Medications:  Reconciled Home Medications:   Current Discharge Medication List      CONTINUE these medications which have CHANGED    Details   allopurinol (ZYLOPRIM) 100 MG tablet Take 1 tablet (100 mg total) by mouth once daily.  Qty: 270 tablet, Refills: 1    Comments: **Patient requests 90 days supply**  Associated Diagnoses: Gout of foot, unspecified cause, unspecified chronicity, unspecified laterality      furosemide (LASIX) 40 MG tablet Take 2 tablets (80 mg total) by mouth once daily.  Qty: 180 tablet, Refills: 3    Associated Diagnoses: Essential hypertension; Edema of lower extremity, unspecified laterality      metoprolol succinate (TOPROL-XL) 100 MG 24 hr tablet Take 1 tablet (100 mg total) by mouth once daily.    Associated Diagnoses: Chronic atrial fibrillation; Essential hypertension         CONTINUE these medications  which have NOT CHANGED    Details   acetaminophen (TYLENOL) 325 MG tablet Take 2 tablets (650 mg total) by mouth every 6 (six) hours as needed for Pain. Do NOT exceed 3000 mg in a 24 hour time period and do not take with other medications containing acetaminophen  Do NOT drink alcohol when taking this medication  Refills: 0    Associated Diagnoses: Debility      fluticasone (FLONASE) 50 mcg/actuation nasal spray 1 spray by Each Nare route once daily.  Qty: 16 g, Refills: 0    Associated Diagnoses: Cough      letrozole (FEMARA) 2.5 mg Tab Take 1 tablet (2.5 mg total) by mouth once daily.  Qty: 90 tablet, Refills: 1    Associated Diagnoses: History of breast cancer      omeprazole (PRILOSEC) 20 MG capsule Take 1 capsule (20 mg total) by mouth once daily.  Qty: 90 capsule, Refills: 1    Comments: **Patient requests 90 days supply**  Associated Diagnoses: Gastroesophageal reflux disease without esophagitis      potassium chloride SA (K-DUR,KLOR-CON) 20 MEQ tablet Take 20 mEq by mouth once daily.      pravastatin (PRAVACHOL) 40 MG tablet Take 1 tablet (40 mg total) by mouth once daily.  Qty: 90 tablet, Refills: 1    Comments: **Patient requests 90 days supply**  Associated Diagnoses: Hyperlipidemia, unspecified hyperlipidemia type      warfarin (COUMADIN) 1 MG tablet Take 1 tablet (1 mg total) by mouth Daily. Adjust to keep INR between 2.0-3.0  Qty: 240 tablet, Refills: 6    Comments: **Patient requests 90 days supply**  Associated Diagnoses: Chronic atrial fibrillation      senna-docusate 8.6-50 mg (PERICOLACE) 8.6-50 mg per tablet Take 1 tablet by mouth daily as needed for Constipation.    Associated Diagnoses: Debility         STOP taking these medications       digoxin (LANOXIN) 125 mcg tablet Comments:   Reason for Stopping:         gabapentin (NEURONTIN) 100 MG capsule Comments:   Reason for Stopping:         gabapentin (NEURONTIN) 600 MG tablet Comments:   Reason for Stopping:         hydrALAZINE (APRESOLINE) 25  MG tablet Comments:   Reason for Stopping:         isosorbide mononitrate (IMDUR) 30 MG 24 hr tablet Comments:   Reason for Stopping:             Time spent on the discharge of patient: 35 minutes    Denys Zuñiga MD  Department of Hospital Medicine  [facility]

## 2017-01-24 NOTE — PROGRESS NOTES
TN sent updated clinicals and facility transfer orders to [facility] via [facility].

## 2017-01-24 NOTE — PROGRESS NOTES
Changed Calamine Unna Boots to bilateral legs. Persistent Venous Stasis Dermatitis. Ulcerations to right shin and left calf are both healing. No new swelling or complication noted. Next change due again in 7 days as long as dressing remains clean, dry, and intact.     Left Lower Leg Chronic Venous Stasis Dermatitis with Ulcer to Shin: 2 x 0.5 (cm) pink and moist. Serosanguinous drainage. Cleaned with wound cleanser and applied Melgisorb AG prior to re-wrapping with Unna Boot.     Left Posterior Leg with Chronic Venous Stasis Dermatitis. No new ulcerations.     Right Lower Leg with Chronic Venous Stasis Dermatitis. Ulceration to Inner Calf: 4 x 1 (cm) pink/moist. Bleeds easily. Cleaned with wound cleanser and applied Melgisorb AG prior to re-wrapping with Unna Boot. No new ulcerations or increased swelling noted.     Right Posterior Leg with Chronic Venous Stasis Dermatitis. No new ulcerations.

## 2017-01-25 NOTE — PT/OT/SLP PROGRESS
Physical Therapy      Myra Murphy  MRN: [id number]    Patient not seen today secondary to  (attempted to see pt, but pt was discharged and had left hospital.). .    Coretta Guevara, PTA

## 2017-02-04 PROBLEM — E87.20 LACTIC ACID ACIDOSIS: Status: ACTIVE | Noted: 2017-01-01

## 2017-02-04 PROBLEM — T68.XXXA HYPOTHERMIA: Status: ACTIVE | Noted: 2017-01-01

## 2017-02-04 PROBLEM — E87.5 HYPERKALEMIA, DIMINISHED RENAL EXCRETION: Status: ACTIVE | Noted: 2017-01-01

## 2017-02-04 PROBLEM — R79.1 SUPRATHERAPEUTIC INR: Status: ACTIVE | Noted: 2017-01-01

## 2017-02-04 PROBLEM — N18.9 ACUTE ON CHRONIC RENAL FAILURE: Status: ACTIVE | Noted: 2017-01-01

## 2017-02-04 PROBLEM — N17.9 ACUTE ON CHRONIC RENAL FAILURE: Status: ACTIVE | Noted: 2017-01-01

## 2017-02-04 PROBLEM — G93.41 ENCEPHALOPATHY, METABOLIC: Status: ACTIVE | Noted: 2017-01-01

## 2017-02-04 NOTE — CONSULTS
Consult Note    Consults for JOSE MIGUEL    SUBJECTIVE:     History of Present Illness:  Patient is a 82 y.o. female presented to ER with c/o having vomiting and LLQ pain.  She was also found hypotensive with BP of 52/34. IV fluid bolus given, BP improving, she was admitted to ICU.  Patient was found with elevated BUN/creatinine, consulted for evaluation of acute renal failure.    Review of patient's allergies indicates:   Allergen Reactions    Shellfish containing products Itching     Can eat shrimp    Adhesive      Other reaction(s): blisters    Iodinated contrast media - iv dye      Other reaction(s): Vomiting    Iodine      Other reaction(s): Topical  Other reaction(s): Rash    Morphine      Other reaction(s): Hallucinations     Past Medical History   Diagnosis Date    Anticoagulant long-term use     Arthritis     Atrial fibrillation     Atrial flutter     Carcinoma of right breast, estrogen receptor negative and progesterone receptor positive 02/2008    Chronic combined systolic and diastolic congestive heart failure     Colon cancer     Diabetes mellitus     Dilated cardiomyopathy 12/01/2008     adriamycin toxicity    Dyslipidemia     Encounter for blood transfusion     Hyperlipidemia     Hypertension     Supraventricular tachycardia      PAF, AFL     Past Surgical History   Procedure Laterality Date    Colon surgery  2000     upper colon removal    Cataract extraction  8/5/09     both eyes (Dr. Adam)    Cardioversion  04/24/2014    Total abdominal hysterectomy w/ bilateral salpingoophorectomy  1973    Mastectomy, partial Right 03/14/2008     R right    Total hip arthroplasty  03/05/2010     anuerysm in hip/right hip    Hysterectomy      Eye surgery       Scheduled Meds:   albuterol sulfate  5 mg Nebulization Q4H    furosemide  40 mg Intravenous ED 1 Time    heparin (porcine)  5,000 Units Subcutaneous Q12H    metronidazole  500 mg Intravenous Q8H    piperacillin-tazobactam (ZOSYN)  IVPB  4.5 g Intravenous Q12H    pravastatin  40 mg Oral Daily    sodium chloride 0.9%  3 mL Intravenous Q8H    sodium polystyrene  30 g Oral Q8H     Continuous Infusions:   sodium chloride 0.9% 50 mL/hr at 02/04/17 0921    norepinephrine bitartrate-D5W 0.07 mcg/kg/min (02/04/17 0921)     PRN Meds:.acetaminophen, bisacodyl, dextrose 50%, glucagon (human recombinant), flu vacc [id number] 65yr up(PF), insulin aspart, mineral oil, ondansetron, polyethylene glycol, promethazine, ramelteon, vancomycin (VANCOCIN) IVPB  Family History   Problem Relation Age of Onset    Heart disease Mother     Hypertension Mother     Diabetes Mother     Glaucoma Mother     Glaucoma Maternal Aunt      Social History   Substance Use Topics    Smoking status: Never Smoker    Smokeless tobacco: Never Used    Alcohol use No     ROS - unable to able to obtain due to patient's condition  OBJECTIVE:     Vital Signs:  Temp:  [93.4 °F (34.1 °C)-94.9 °F (34.9 °C)]   Pulse:  []   Resp:  [13-36]   BP: ()/()   SpO2:  [88 %-100 %]     Physical Exam   Constitutional: No distress.   HENT:   Head: Normocephalic and atraumatic.   Eyes: EOM are normal.   Cardiovascular: Normal rate and regular rhythm.    Pulmonary/Chest: Effort normal and breath sounds normal.   Abdominal: Soft. Bowel sounds are normal.     Laboratory:  CBC:   Recent Labs  Lab 02/04/17  0310   WBC 16.68*   RBC 4.50   HGB 10.8*   HCT 37.7      MCV 84   MCH 24.0*   MCHC 28.6*     CMP:   Recent Labs  Lab 02/03/17  1925  02/04/17  0310   *  < > 217*   CALCIUM 9.2  < > 8.4*   ALBUMIN 2.7*  --   --    PROT 6.4  --   --    *  < > 136   K 7.5*  < > 5.7*   CO2 8*  < > 13*   CL 99  < > 101   BUN 77*  < > 76*   CREATININE 3.4*  < > 3.4*   ALKPHOS 134  --   --    ALT 31  --   --    AST 45*  --   --    BILITOT 2.6*  --   --    < > = values in this interval not displayed.        ASSESSMENT/PLAN:     JOSE MIGUEL - ATN due to hypotension, clinically she appears  prerenal  CKD stage unknown  Sepsis  UTI  Metabolic acidosis   H/o breast CA  DM type 2  HTN  Anemia of chronic disease      Plan:     Continue Abx   Vancomycin prn based on level  IVF with bicarb  Watch renal function closely  We'll follow    Thanks for the consultation.

## 2017-02-04 NOTE — ASSESSMENT & PLAN NOTE
· Unknown etiology.  Environmental?  The temperature was in the 40s and 50s today   But unable to determine what her living conditions are like.  · Preston lundberg  · Warm fluids

## 2017-02-04 NOTE — H&P
[facility] Medicine  History & Physical    Patient Name: Myra Murphy  MRN: [id number]  Admission Date: 02/04/2017  Attending Physician: Delano Huizar MD, MPH      PCP:     Lupe Delatorre MD    CC:     Chief Complaint   Patient presents with    Abdominal Pain     LUQ and emesis x2    Hypotension     initial BP 52/34 now 157/136. ems getting varying bp in each arm       HISTORY OF PRESENT ILLNESS:     Myra Murphy is a 82 y.o. female that (in part)  has a past medical history of Anticoagulant long-term use; Arthritis; Atrial fibrillation; Atrial flutter; Carcinoma of right breast, estrogen receptor negative and progesterone receptor positive (02/2008); Chronic combined systolic and diastolic congestive heart failure; Colon cancer; Diabetes mellitus; Dilated cardiomyopathy (12/01/2008); Dyslipidemia; Encounter for blood transfusion; Hyperlipidemia; Hypertension; and Supraventricular tachycardia. Presents to [facility] Emergency Department from nursing home with multiple issues including hypotension, left upper quadrant pain, and emesis ×2.    EMS reports an initial blood pressure 52/34 and a blood glucose of 137.  She was confused upon admission and was barely arousable to sternal rub.  The history is otherwise limited due to the condition of the patient.     In the emergency department she underwent routine laboratory studies, CT imaging, and EKG.  She had transient episodes of hypoglycemia and she had discordant low-pressure readings in her upper extremities although this is thought to be secondary to persistent mastectomy.  Multiple abnormalities on laboratory studies were identified including acute renal failure, hyperkalemia, and severe lactic acid acidosis with a lactate of greater than 12 × 2 measures.  She was also found to be hypothermic.  After fluid resuscitation and treatment for hypokalemia as well as administration of D50 her mental status  improved.    Hospital medicine has been asked to admit for further evaluation and treatment.       REVIEW OF SYSTEMS:     The available review of system is addressed in the HPI and is otherwise limited due to the condition of the patient because of metabolic encephalopathy.       PAST MEDICAL / SURGICAL HISTORY:     Past Medical History   Diagnosis Date    Anticoagulant long-term use     Arthritis     Atrial fibrillation     Atrial flutter     Carcinoma of right breast, estrogen receptor negative and progesterone receptor positive 02/2008    Chronic combined systolic and diastolic congestive heart failure     Colon cancer     Diabetes mellitus     Dilated cardiomyopathy 12/01/2008     adriamycin toxicity    Dyslipidemia     Encounter for blood transfusion     Hyperlipidemia     Hypertension     Supraventricular tachycardia      PAF, AFL     Past Surgical History   Procedure Laterality Date    Colon surgery  2000     upper colon removal    Cataract extraction  8/5/09     both eyes (Dr. Adam)    Cardioversion  04/24/2014    Total abdominal hysterectomy w/ bilateral salpingoophorectomy  1973    Mastectomy, partial Right 03/14/2008     R right    Total hip arthroplasty  03/05/2010     anuerysm in hip/right hip    Hysterectomy           FAMILY HISTORY:     Family History   Problem Relation Age of Onset    Heart disease Mother     Hypertension Mother     Diabetes Mother     Glaucoma Mother     Glaucoma Maternal Aunt          SOCIAL HISTORY:     Social History   Substance Use Topics    Smoking status: Never Smoker    Smokeless tobacco: Never Used    Alcohol use No     Social History     Social History    Marital status:      Spouse name: N/A    Number of children: N/A    Years of education: N/A     Social History Main Topics    Smoking status: Never Smoker    Smokeless tobacco: Never Used    Alcohol use No    Drug use: No    Sexual activity: Not Asked     Other Topics Concern     None     Social History Narrative         ALLERGIES:       Review of patient's allergies indicates:   Allergen Reactions    Shellfish containing products Itching     Can eat shrimp    Adhesive      Other reaction(s): blisters    Iodinated contrast media - iv dye      Other reaction(s): Vomiting    Iodine      Other reaction(s): Topical  Other reaction(s): Rash    Morphine      Other reaction(s): Hallucinations         HEALTH SCREENING:     -- Prevnar 13 pneumonia vaccine = evidence of previous vaccination found in the medical record  -- Influenza vaccine = no evidence of current flu vaccination found in the medical record for this flu season      HOME MEDICATIONS:     Prior to Admission medications    Medication Sig Start Date End Date Taking? Authorizing Provider   allopurinol (ZYLOPRIM) 100 MG tablet Take 1 tablet (100 mg total) by mouth once daily. 1/23/17  Yes Denys Zuñiga MD   fluticasone (FLONASE) 50 mcg/actuation nasal spray 1 spray by Each Nare route once daily. 11/28/16  Yes Lupe Delatorre MD   furosemide (LASIX) 40 MG tablet Take 2 tablets (80 mg total) by mouth once daily. 1/24/17  Yes Denys Zuñiga MD   letrozole (FEMARA) 2.5 mg Tab Take 1 tablet (2.5 mg total) by mouth once daily. 11/28/16  Yes Lupe Delatorre MD   metoprolol succinate (TOPROL-XL) 100 MG 24 hr tablet Take 1 tablet (100 mg total) by mouth once daily. 1/24/17  Yes Denys Zuñiga MD   omeprazole (PRILOSEC) 20 MG capsule Take 1 capsule (20 mg total) by mouth once daily. 11/28/16  Yes Lupe Delatorre MD   potassium chloride SA (K-DUR,KLOR-CON) 20 MEQ tablet Take 20 mEq by mouth once daily.   Yes Historical Provider, MD   pravastatin (PRAVACHOL) 40 MG tablet Take 1 tablet (40 mg total) by mouth once daily. 11/28/16  Yes Lupe Delatorre MD   acetaminophen (TYLENOL) 325 MG tablet Take 2 tablets (650 mg total) by mouth every 6 (six) hours as needed for Pain. Do NOT exceed 3000 mg in a 24 hour time period and do not take with other  medications containing acetaminophen  Do NOT drink alcohol when taking this medication 5/20/14   Stephany Carter NP   senna-docusate 8.6-50 mg (PERICOLACE) 8.6-50 mg per tablet Take 1 tablet by mouth daily as needed for Constipation. 6/4/14   Stephany Carter NP   warfarin (COUMADIN) 1 MG tablet Take 1 tablet (1 mg total) by mouth Daily. Adjust to keep INR between 2.0-3.0  Patient taking differently: Take 1 mg by mouth Daily. Adjust to keep INR between 2.0-3.0. Take as directed at the [facility]. 1/5/17   Moses Banuelos MD         [facility] MEDICATIONS:     Scheduled Meds:   sodium chloride 0.9%  500 mL Intravenous ED 1 Time    furosemide  40 mg Intravenous ED 1 Time    piperacillin-tazobactam (ZOSYN) IVPB  4.5 g Intravenous Q12H    sodium chloride 0.9%  3 mL Intravenous Q8H    vancomycin (VANCOCIN) IVPB  15 mg/kg Intravenous Q24H     Continuous Infusions:   sodium chloride 0.9% 75 mL/hr at 02/04/17 0135    norepinephrine bitartrate-D5W 0.02 mcg/kg/min (02/04/17 0209)     PRN Meds:.ondansetron      PHYSICAL EXAM:     Body mass index is 30.61 kg/(m^2).  Wt Readings from Last 1 Encounters:   02/04/17 0141 71.1 kg (156 lb 12 oz)   02/03/17 1835 65.8 kg (145 lb)       Vitals:    02/04/17 0130 02/04/17 0139 02/04/17 0141 02/04/17 0145   BP: (!) 72/51 (!) 78/51     BP Location:  Left arm     Patient Position:  Lying     BP Method:  Automatic     Pulse: 80 81  80   Resp: (!) 30 17  (!) 21   Temp:  (!) 94.9 °F (34.9 °C)     TempSrc:  Rectal     SpO2:  (!) 92%  (!) 94%   Weight:   71.1 kg (156 lb 12 oz)    Height:   5' (1.524 m)           -- General appearance: Chronically ill-appearing elderly female who is well developed. appears stated age .  Somnolent.  Confused.  -- Head: normocephalic, atraumatic   -- Eyes: conjunctivae clear. Extraocular muscles intact  -- Nose: Nares normal. Septum midline.   -- Throat: Dry lips, mucosa, and tongue . no throat erythema.   -- Neck: supple, symmetrical,  trachea midline, no JVD and thyroid not grossly enlarged, appears symmetric  -- Lungs: clear to auscultation bilaterally.  Increased respiratory rate with normal respiratory effort. No use of accessory muscles.   -- Chest wall: no tenderness. equal bilateral chest rise   -- Heart: regular rate and rhythm. S1, S2 normal.  no click, rub or gallop   -- Abdomen: soft, non-tender, non-distended, non-tympanic; bowel sounds normal; no masses  -- Extremities: no cyanosis, clubbing or edema.   -- Pulses: 2+ and symmetric   -- Skin: Cool.  color normal, texture normal, turgor decreased.  Chronic venous stasis changes in bilateral lower extremities  -- Neurologic: Encephalopathic.  Globally decreased muscle strength and tone. No focal numbness or weakness. CNII-XII intact. Blayne coma scale: eyes open spontaneously-3, oriented & converses-3, obeys commands-4 = 10      LABORATORY STUDIES:     Recent Results (from the past 36 hour(s))   POCT glucose    Collection Time: 02/03/17  6:56 PM   Result Value Ref Range    POCT Glucose 256 (H) 70 - 110 mg/dL   POCT glucose    Collection Time: 02/03/17  6:57 PM   Result Value Ref Range    POCT Glucose 302 (H) 70 - 110 mg/dL   CBC W/ AUTO DIFFERENTIAL    Collection Time: 02/03/17  7:25 PM   Result Value Ref Range    WBC 10.94 3.90 - 12.70 K/uL    RBC 4.67 4.00 - 5.40 M/uL    Hemoglobin 11.2 (L) 12.0 - 16.0 g/dL    Hematocrit 39.1 37.0 - 48.5 %    MCV 84 82 - 98 fL    MCH 24.0 (L) 27.0 - 31.0 pg    MCHC 28.6 (L) 32.0 - 36.0 %    RDW 20.8 (H) 11.5 - 14.5 %    Platelets 203 150 - 350 K/uL    MPV 10.6 9.2 - 12.9 fL    Gran # 8.0 (H) 1.8 - 7.7 K/uL    Lymph # 1.7 1.0 - 4.8 K/uL    Mono # 0.7 0.3 - 1.0 K/uL    Eos # 0.0 0.0 - 0.5 K/uL    Baso # 0.01 0.00 - 0.20 K/uL    Gran% 77.6 (H) 38.0 - 73.0 %    Lymph% 16.1 (L) 18.0 - 48.0 %    Mono% 6.5 4.0 - 15.0 %    Eosinophil% 0.0 0.0 - 8.0 %    Basophil% 0.1 0.0 - 1.9 %    Aniso Slight     Poik Slight     Poly Occasional     Hypo Occasional      Ovalocytes Occasional     Differential Method Automated    Comp. Metabolic Panel    Collection Time: 02/03/17  7:25 PM   Result Value Ref Range    Sodium 133 (L) 136 - 145 mmol/L    Potassium 7.5 (HH) 3.5 - 5.1 mmol/L    Chloride 99 95 - 110 mmol/L    CO2 8 (LL) 23 - 29 mmol/L    Glucose 152 (H) 70 - 110 mg/dL    BUN, Bld 77 (H) 8 - 23 mg/dL    Creatinine 3.4 (H) 0.5 - 1.4 mg/dL    Calcium 9.2 8.7 - 10.5 mg/dL    Total Protein 6.4 6.0 - 8.4 g/dL    Albumin 2.7 (L) 3.5 - 5.2 g/dL    Total Bilirubin 2.6 (H) 0.1 - 1.0 mg/dL    Alkaline Phosphatase 134 55 - 135 U/L    AST 45 (H) 10 - 40 U/L    ALT 31 10 - 44 U/L    Anion Gap 26 (H) 8 - 16 mmol/L    eGFR if African American 14 (A) >60 mL/min/1.73 m^2    eGFR if non African American 12 (A) >60 mL/min/1.73 m^2   Lipase    Collection Time: 02/03/17  7:25 PM   Result Value Ref Range    Lipase 69 (H) 4 - 60 U/L   Troponin I    Collection Time: 02/03/17  7:25 PM   Result Value Ref Range    Troponin I 0.018 0.000 - 0.026 ng/mL   Urinalysis    Collection Time: 02/03/17  8:10 PM   Result Value Ref Range    Specimen UA Urine, Clean Catch     Color, UA Siobhan Yellow, Straw, Siobhan    Appearance, UA Cloudy (A) Clear    pH, UA 5.0 5.0 - 8.0    Specific Gravity, UA 1.015 1.005 - 1.030    Protein, UA 2+ (A) Negative    Glucose, UA Negative Negative    Ketones, UA Negative Negative    Bilirubin (UA) Negative Negative    Occult Blood UA 1+ (A) Negative    Nitrite, UA Negative Negative    Urobilinogen, UA 2.0-3.0 (A) <2.0 EU/dL    Leukocytes, UA Negative Negative   Urinalysis Microscopic    Collection Time: 02/03/17  8:10 PM   Result Value Ref Range    RBC, UA 3 0 - 4 /hpf    WBC, UA 5 0 - 5 /hpf    Bacteria, UA Moderate (A) None-Occ /hpf    Squam Epithel, UA 2 /hpf    Non-Squam Epith 3 (A) <1/hpf /hpf    Hyaline Casts, UA 10 (A) 0-1/lpf /lpf    Granular Casts, UA 2 (A) None /lpf    Amorphous, UA Few None-Moderate    Microscopic Comment SEE COMMENT    Lactic acid, plasma    Collection Time:  02/03/17  8:30 PM   Result Value Ref Range    Lactate (Lactic Acid) >12.0 (HH) 0.5 - 2.2 mmol/L   Protime-INR    Collection Time: 02/03/17  9:20 PM   Result Value Ref Range    Prothrombin Time 51.5 (H) 9.0 - 12.5 sec    INR 5.2 (HH) 0.8 - 1.2   Basic metabolic panel    Collection Time: 02/03/17 10:50 PM   Result Value Ref Range    Sodium 136 136 - 145 mmol/L    Potassium 7.2 (HH) 3.5 - 5.1 mmol/L    Chloride 101 95 - 110 mmol/L    CO2 8 (LL) 23 - 29 mmol/L    Glucose 107 70 - 110 mg/dL    BUN, Bld 76 (H) 8 - 23 mg/dL    Creatinine 3.3 (H) 0.5 - 1.4 mg/dL    Calcium 8.8 8.7 - 10.5 mg/dL    Anion Gap 27 (H) 8 - 16 mmol/L    eGFR if African American 14 (A) >60 mL/min/1.73 m^2    eGFR if non African American 12 (A) >60 mL/min/1.73 m^2   Lactic acid, plasma    Collection Time: 02/04/17 12:30 AM   Result Value Ref Range    Lactate (Lactic Acid) >12.0 (HH) 0.5 - 2.2 mmol/L   POCT glucose    Collection Time: 02/04/17  1:56 AM   Result Value Ref Range    POCT Glucose 186 (H) 70 - 110 mg/dL       Lab Results   Component Value Date    INR 5.2 (HH) 02/03/2017    INR 1.8 (H) 01/24/2017    INR 1.9 (H) 01/23/2017     Lab Results   Component Value Date    HGBA1C 6.1 12/27/2016     Recent Labs      02/03/17   1856  02/03/17   1857  02/04/17   0156   POCTGLUCOSE  256*  302*  186*           MICROBIOLOGY DATA:     Urine Culture, Routine   Date Value Ref Range Status   01/19/2017 ESCHERICHIA COLI  >100,000 cfu/ml    Final   06/03/2014 No growth  Final   04/27/2014 No growth  Final   04/22/2014 No growth  Final   10/05/2012   Final    >100,000 ORGANISMS/ML -ESCHERICHIA COLI  Ampicillin/Sulbactam 16/8       INTERMEDIATE  Amikacin             <=16       SENSITIVE  Amox/K Clav          <=8/4      SENSITIVE  Ceftriaxone          <=8        SENSITIVE  Ceftazidime          <=1        SENSITIVE  Cefazolin            <=8        SENSITIVE  Ciprofloxacin        <=1        SENSITIVE  Nitrofurantoin       <=32       SENSITIVE  Gentamicin            <=4        SENSITIVE  Meropenem            <=4        SENSITIVE  Piperacillin/Tazobactam <=16       SENSITIVE  Trimeth/Sulfamethoxazole >2/38      RESISTANT  Tetracycline         >8         RESISTANT  Timentin             <=16       SENSITIVE  Tobramycin           <=4        SENSITIVE       Microbiology x 7d:   Microbiology Results (last 7 days)     Procedure Component Value Units Date/Time    Blood culture [[id number]] Collected:  02/03/17 2120    Order Status:  Sent Specimen:  Blood from Peripheral, Hand, Right Updated:  02/03/17 2130    Blood culture [[id number]] Collected:  02/03/17 2055    Order Status:  Sent Specimen:  Blood from Peripheral, Hand, Left Updated:  02/03/17 2130            IMAGING:     Imaging Results         X-Ray Chest 1 View (Final result) Result time:  02/04/17 01:24:14    Final result by Sandro Qureshi MD (02/04/17 01:24:14)    Impression:       Interval placement of right-sided internal jugular access catheter with the tip in the SVC.  No acute complications.    Cardiomegaly with associated obscuration of the left hemidiaphragm with airspace opacity and small pleural effusion suspected.              Electronically signed by: SANDRO QURESHI MD  Date:     02/04/17  Time:    01:24     Narrative:    Exam: [id number]  02/04/17  00:59:00 IMG34 ([facility]) : XR CHEST 1 VIEW    Technique:    Single frontal chest x-ray    Comparison:    2/3/17 at 1936 hrs.    Findings:      Monitoring EKG leads are present.  Percutaneous pacer wire has been removed.  There has been interval insertion of a right-sided internal jugular access catheter with the tip in the SVC.  No evidence of a pneumothorax is present.  There are post surgical changes in the right axillary region.    The trachea is unremarkable.  There are calcifications of the aortic knob.  There is stable global enlargement of the cardiac silhouette.  The right hemidiaphragm is unremarkable.  There is obscuration of the left hemidiaphragm.  There is stable  increased attenuation of the pulmonary parenchyma.   There are degenerative changes in the osseous structures.            X-Ray Chest 1 View (Final result) Result time:  02/03/17 19:45:52    Final result by Aj Walters MD (02/03/17 19:45:52)    Impression:     Improving lung consolidation      Electronically signed by: AJ WALTERS MD  Date:     02/03/17  Time:    19:45     Narrative:    Portable AP view of the chest was obtained.  Comparison -- 1/19/17. Surgical clips are again seen at the right axilla. A tube or wire projects near the midline of the upper abdomen and lower chest, uncertain function. The heart remains mildly enlarged. Mediastinum shows aortic atherosclerosis. Lungs are better expanded and aerated. Right lung is relatively clear. Consolidation persists in the left lower lung zone . Small pleural effusions are probably present. No pneumothorax.            CT Head Without Contrast (Final result) Result time:  02/03/17 19:22:01    Final result by Milo Diaz MD (02/03/17 19:22:01)    Impression:         No intracranial hemorrhage or mass effect.     *Unusual pattern of air at the level of the carotid vessels and also at the level of the pterygomaxillary fossa on the RIGHT.  Etiology unknown.  In the absence of a history of trauma alternative etiologies including intravascular air or infectious etiology must be considered.  Correlation advised.  At a minimum, 6 hour followup CT examination recommended for further evaluation.*        Dr. Milo Diaz discussed the findings with Dr. Alvarez at 19:21:17 02/03/17          Electronically signed by: Dr. Milo Diaz MD  Date:     02/03/17  Time:    19:22     Narrative:    CT OF THE HEAD WITHOUT CONTRAST:     Technique: 5 mm axial images were obtained from the vertex to the skullbase, without administration of contrast.    Comparison: None.    Findings:    No intracranial hemorrhage, extra-axial fluid collection, midline shift, or  mass effect.  No evidence of an acute cortical infarct or of an infarct in a major vascular territory.  Prominence of ventricular system cortical sulci likely reflects underlying neuronal loss.  Normal pressure hydrocephalus cannot excluded. If there is additional clinical concern for acute ischemia, MRI with diffusion-weighted imaging could be obtained.     There is, however, air at the level of the carotid vessels, and RIGHT pterygomaxillary fossa, etiology of which is not known.  In the absence of history of trauma, whether this is due to air within an IV line, or infectious etiology cannot be determined with certainty.  6 hour followup examination recommended as a minimum.  Correlation advised.    The calvarium is unremarkable. Visualized portions of the paranasal sinuses are clear. Visualized mastoid air cells are clear. Visualized orbits are unremarkable.                CONSULTS:     IP CONSULT TO NEPHROLOGY       ASSESSMENT & PLAN:     Primary Diagnosis:  Acute on chronic renal failure    Active Hospital Problems    Diagnosis  POA    *Acute on chronic renal failure [N17.9, N18.9]  Yes     Priority: 1 - High    Hyperkalemia, diminished renal excretion [E87.5]  Yes     Priority: 2     Encephalopathy, metabolic [G93.41]  Yes     Priority: 3     Chronic kidney disease, stage 4, severely decreased GFR [N18.4]  Yes     Priority: 3      Chronic    Lactic acid acidosis [E87.2]  Yes     Priority: 4     Hypothermia [T68.XXXA]  Yes     Priority: 5     Chronic combined systolic and diastolic CHF, NYHA class 4 [I50.42]  Yes     Priority: 6      Chronic    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]  Yes     Priority: 6      Chronic     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 LVEF 20% 12/28/16.      Severe mitral regurgitation [I34.0]  Yes     Priority: 6      Chronic     MODERATE-SEVERE      Supratherapeutic INR [R79.1]  Yes     Priority: 7     Type 2 diabetes mellitus without complication [E11.9]  Yes     Long term current use of anticoagulant therapy [Z79.01]  Not Applicable    Peripheral vascular disease [I73.9]  Yes     Chronic    Chronic venous stasis dermatitis of both lower extremities [I83.11, I83.12]  Yes     Chronic    Paroxysmal atrial flutter [I48.92]  Yes     Chronic    HTN (hypertension) [I10]  Yes    Hyperlipidemia [E78.5]  Yes    Paroxysmal atrial fibrillation [I48.0]  Yes     Chronic     WITH LA THROMBUS 10/12        Resolved Hospital Problems    Diagnosis Date Resolved POA   No resolved problems to display.         Acute on chronic renal failure  Acute renal failure with resulting hyperkalemia and lactic acidosis  · As evidenced by decrease in GFR.  Baseline creatinine approx. 2.0  · Will evaluate for pre-renal, intrarenal, and post-obstructive etiology.  · Obtain:  1.  protein/creatinine ratio  2. urine and serum osmolalities  3. urine electrolytes (Na, Cl, K)  4. LDH  5. Complement  6. Florian's stain for osinophils  · Renal ultrasound  · Monitor with serial Cr / GFR levels closely with serial labs  · Avoid nephrotoxic medications such as NSAIDs, IV contrast, or RAAS blockade  · Nephrology consult    Hyperkalemia, diminished renal excretion  · Elevated due to renal failure, spironolactone use, and supplemental potassium use  · Given calcium, Kayexalate, insulin, D50, albuterol, IV fluids  · Monitor with serial labs  · No evidence of EKG changes / peaked Ts  · Monitor on telemetry    Chronic kidney disease, stage 4, severely decreased GFR  · Renal dose medications  · Avoid nephrotoxic agents  · Maintain euvolemic state      Lactic acid acidosis  · Secondary to acute renal failure as above.    · Also suspicious for a hypotensive episode prior to admission.  · May require  Acute dialysis for clearance.      Hypothermia  · Unknown etiology.  Environmental?  The temperature was in the 40s and 50s today   But unable to determine what her living conditions are like.  · Preston hugger  · Warm  fluids      Nonischemic dilated cardiomyopathy  Compensated CHF   · Evidenced by history,   · No evidence of pulmonary edema, peripheral edema  · Provide diuresis w/ by mouth medication  · Maintain w/ beta-blocker, ACE inhibitor once renal function has stabilized  · Daily Weights  · Strict I/O  · Low-sodium cardiac diet  · Chest X-ray  · Check TSH, albumin, UA, and renal function  · Obtain 2D echo if <6 months     EF   Date Value Ref Range Status   12/28/2016 20 (A) 55 - 65    08/22/2016 25 (A) 55 - 65      · EKG and cardiac enzymes PRN  · DVT prophylaxis w/ pharmacological and/or mechanical measures  · Oxygen supplementation support PRN    Instructions given to patient/family:  Monitor daily weight.  Regular activity within patient's limitations.  Low salt, low fat and low choleterol diet and restrict fluid < 2L per day.  Call MD if SOB, chest pain, weight gain > 2-3 lbs per day and/or 5-6 lbs per week.   No smoking. Annual influenza vaccine required.        Severe mitral regurgitation  Management of CHF as above      Chronic combined systolic and diastolic CHF, NYHA class 4  Management of CHF as above      Supratherapeutic INR  Holding Xarelto      Paroxysmal atrial fibrillation  onitor with telemetry      Paroxysmal atrial flutter  monitor with telemetry      Peripheral vascular disease  Peripheral vascular disease   · No evidence of acute issue at this time  · Maintain adequate blood pressure control  · Heart healthy diet  · Aspirin  · Statin            Chronic venous stasis dermatitis of both lower extremities  Supportive care.  Wound care as needed.      HTN (hypertension)        Hyperlipidemia        Long term current use of anticoagulant therapy        Type 2 diabetes mellitus without complication  · BG in acceptable range at this time  · Maintain w/ subcutaneous insulin management order set  · Hold oral diabetic meds  · ADA 1800 kcal diet  · BG goal while in patient is <180mg/dL  · HgA1c =  Pending      Encephalopathy, metabolic  · Multifactorial etiology: Underlying dementia with hypoglycemia, hypothermia, acute renal failure, lactic acid acidosis  · Treat underlying conditions as outlined above        VTE Risk Mitigation     None            Adult PRN medications available   DVT prophylaxis given       DISPOSITION:     Will admit to the Hospital Medicine service for further evaluation and treatment.    Chart reviewed and updated where applicable.    High Risk Conditions:  Patient has a condition that poses threat to life and bodily function: Acute Renal Failure  Patient has an abrupt change in neurologic status: Metabolic encephalopathy      ===============================================================    Delano Huizar MD, MPH  Department of Hospital Medicine   [facility]  [phone]  (7pm - 6am)          This note is dictated using Dragon Medical 360 voice recognition software.  There are word recognition mistakes that are occasionally missed on review.          MDM   Reviewed: previous chart and vitals  Reviewed previous: labs, x-ray, CT scan  Interpretation: labs, x-ray, and CT scan    Total Critical Care time: 35 minutes. This excludes time spent performing separately reportable procedures and services.  Counseling/Conference Time: 15 minutes

## 2017-02-04 NOTE — CONSULTS
Reason for Consult:      Bloody stools    HPI:     Pt is a 82 y.o. year old female admitted in the hospital for altered mental status, hypoglycemia and hypotension.  She is pressors and antibiotics.  She had two bowel movement with very small amount of blood.  No dark blood.  She is confused.  No history is available. Patient has chronic renal insufficieny and hyperkalemia.      Past Medical History   Diagnosis Date    Anticoagulant long-term use     Arthritis     Atrial fibrillation     Atrial flutter     Carcinoma of right breast, estrogen receptor negative and progesterone receptor positive 02/2008    Chronic combined systolic and diastolic congestive heart failure     Colon cancer     Diabetes mellitus     Dilated cardiomyopathy 12/01/2008     adriamycin toxicity    Dyslipidemia     Encounter for blood transfusion     Hyperlipidemia     Hypertension     Supraventricular tachycardia      PAF, AFL       Past Surgical History   Procedure Laterality Date    Colon surgery  2000     upper colon removal    Cataract extraction  8/5/09     both eyes (Dr. Adam)    Cardioversion  04/24/2014    Total abdominal hysterectomy w/ bilateral salpingoophorectomy  1973    Mastectomy, partial Right 03/14/2008     R right    Total hip arthroplasty  03/05/2010     anuerysm in hip/right hip    Hysterectomy      Eye surgery         Review of patient's allergies indicates:   Allergen Reactions    Shellfish containing products Itching     Can eat shrimp    Adhesive      Other reaction(s): blisters    Iodinated contrast media - iv dye      Other reaction(s): Vomiting    Iodine      Other reaction(s): Topical  Other reaction(s): Rash    Morphine      Other reaction(s): Hallucinations       No current facility-administered medications on file prior to encounter.      Current Outpatient Prescriptions on File Prior to Encounter   Medication Sig Dispense Refill    allopurinol (ZYLOPRIM) 100 MG tablet Take 1 tablet  (100 mg total) by mouth once daily. 270 tablet 1    fluticasone (FLONASE) 50 mcg/actuation nasal spray 1 spray by Each Nare route once daily. 16 g 0    furosemide (LASIX) 40 MG tablet Take 2 tablets (80 mg total) by mouth once daily. 180 tablet 3    letrozole (FEMARA) 2.5 mg Tab Take 1 tablet (2.5 mg total) by mouth once daily. 90 tablet 1    metoprolol succinate (TOPROL-XL) 100 MG 24 hr tablet Take 1 tablet (100 mg total) by mouth once daily.      omeprazole (PRILOSEC) 20 MG capsule Take 1 capsule (20 mg total) by mouth once daily. 90 capsule 1    potassium chloride SA (K-DUR,KLOR-CON) 20 MEQ tablet Take 20 mEq by mouth once daily.      pravastatin (PRAVACHOL) 40 MG tablet Take 1 tablet (40 mg total) by mouth once daily. 90 tablet 1    acetaminophen (TYLENOL) 325 MG tablet Take 2 tablets (650 mg total) by mouth every 6 (six) hours as needed for Pain. Do NOT exceed 3000 mg in a 24 hour time period and do not take with other medications containing acetaminophen  Do NOT drink alcohol when taking this medication  0    senna-docusate 8.6-50 mg (PERICOLACE) 8.6-50 mg per tablet Take 1 tablet by mouth daily as needed for Constipation.      warfarin (COUMADIN) 1 MG tablet Take 1 tablet (1 mg total) by mouth Daily. Adjust to keep INR between 2.0-3.0 (Patient taking differently: Take 1 mg by mouth Daily. Adjust to keep INR between 2.0-3.0. Take as directed at [facility].) 240 tablet 6     Scheduled Meds:   metronidazole  500 mg Intravenous Q8H    piperacillin-tazobactam (ZOSYN) IVPB  4.5 g Intravenous Q12H    pravastatin  40 mg Oral Daily    sodium chloride 0.9%  3 mL Intravenous Q8H    sodium polystyrene  30 g Oral Q8H     Continuous Infusions:   norepinephrine bitartrate-D5W 0.15 mcg/kg/min (02/04/17 1300)    sodium bicarbonate drip 125 mL/hr at 02/04/17 1300     PRN Meds:.acetaminophen, bisacodyl, dextrose 50%, glucagon (human recombinant), flu vacc [id number] 65yr up(PF), insulin aspart, mineral  oil, ondansetron, polyethylene glycol, promethazine, ramelteon, vancomycin (VANCOCIN) IVPB    Social History     Social History    Marital status:      Spouse name: N/A    Number of children: N/A    Years of education: N/A     Occupational History    Not on file.     Social History Main Topics    Smoking status: Never Smoker    Smokeless tobacco: Never Used    Alcohol use No    Drug use: No    Sexual activity: No     Other Topics Concern    Not on file     Social History Narrative       Family history:  Family History   Problem Relation Age of Onset    Heart disease Mother     Hypertension Mother     Diabetes Mother     Glaucoma Mother     Glaucoma Maternal Aunt          Endoscopic History:  none    Review of Systems -     Could not be done as the patient is confused  Physical Exam -    General: Well developed, well nourished, no apparent distress  Vital Signs Range (Last 24H):  Temp:  [93.4 °F (34.1 °C)-98 °F (36.7 °C)]   Pulse:  []   Resp:  [13-40]   BP: ()/()   SpO2:  [84 %-100 %]   HEENT: Anicteric, PERRLA  CVS: Irregularly irregular heartbeats.  Lungs: CTA-B, normal excursion  Abdomen: Soft, NT, ND, normal BS's  Extremities: No c/c/e, 1+ DP pulses to BLE's  Skin: No rashes/lesions.    Labs:    Recent Labs  Lab 02/03/17  1925  02/04/17  0310 02/04/17  1315   CALCIUM 9.2  < > 8.4*  --    PROT 6.4  --   --   --    *  < > 136  --    K 7.5*  < > 5.7* 5.6*   CO2 8*  < > 13*  --    CL 99  < > 101  --    BUN 77*  < > 76*  --    CREATININE 3.4*  < > 3.4*  --    ALKPHOS 134  --   --   --    ALT 31  --   --   --    AST 45*  --   --   --    BILITOT 2.6*  --   --   --    < > = values in this interval not displayed.  Recent Results (from the past 336 hour(s))   CBC auto differential    Collection Time: 02/04/17  3:10 AM   Result Value Ref Range    WBC 16.68 (H) 3.90 - 12.70 K/uL    Hemoglobin 10.8 (L) 12.0 - 16.0 g/dL    Hematocrit 37.7 37.0 - 48.5 %    Platelets 182 150 - 350  K/uL   CBC W/ AUTO DIFFERENTIAL    Collection Time: 02/03/17  7:25 PM   Result Value Ref Range    WBC 10.94 3.90 - 12.70 K/uL    Hemoglobin 11.2 (L) 12.0 - 16.0 g/dL    Hematocrit 39.1 37.0 - 48.5 %    Platelets 203 150 - 350 K/uL   CBC auto differential    Collection Time: 01/23/17  3:00 AM   Result Value Ref Range    WBC 6.63 3.90 - 12.70 K/uL    Hemoglobin 10.7 (L) 12.0 - 16.0 g/dL    Hematocrit 37.5 37.0 - 48.5 %    Platelets 256 150 - 350 K/uL       Recent Labs  Lab 02/04/17  0310   INR 5.7*       Imaging:      Assessment:  Patient Active Problem List: Septic shock  Loose stools with small amount of blood x 2   Diagnosis    Paroxysmal atrial fibrillation    Anticoagulation monitoring by pharmacist    Nonischemic dilated cardiomyopathy    MANE (obstructive sleep apnea)    Severe mitral regurgitation    HTN (hypertension)    Hyperlipidemia    Paroxysmal atrial flutter    Allergic rhinitis due to allergen    Hip pain    History of breast cancer    Peripheral vascular disease    Chronic venous stasis dermatitis of both lower extremities    Pyogenic arthritis, forearm    Malignant neoplasm of colon    Hypertensive heart disease with heart failure    Paroxysmal ventricular tachycardia    Right heart failure    Tricuspid regurgitation    Debility    Chronic combined systolic and diastolic CHF, NYHA class 4    Chronic kidney disease, stage 4, severely decreased GFR    Long term current use of anticoagulant therapy    Bilateral leg edema    Tinea pedis    Dystrophic nail    Vitamin D deficiency    Type 2 diabetes mellitus without complication    Cardiorenal syndrome    Pulmonary edema    Long term current use of aromatase inhibitor    Iron deficiency anemia    Memory deficit    Acute on chronic renal failure    Hyperkalemia, diminished renal excretion    Supratherapeutic INR    Lactic acid acidosis    Hypothermia    Encephalopathy, metabolic       Recommendations:  1. Patient  admitted with septic shock and hypotension.  She had two loose stools with small amount of blood. NO more bleeding in the last bowel movement. Her abdominal examination is benign. No tenderness or distension noted.  Her H & H is stable.  Stool for C diff is negative and culture has been ordered. No new recommendations from GI standpoint. Will follow with you.      I would like to take this opportunity to thank you for this consult.  If you have any questions or concerns, please do not hesitate to contact me.       Rancho Godwin MD

## 2017-02-04 NOTE — ASSESSMENT & PLAN NOTE
· BG in acceptable range at this time  · Maintain w/ subcutaneous insulin management order set  · Hold oral diabetic meds  · ADA 1800 kcal diet  · BG goal while in patient is <180mg/dL  · HgA1c = Pending

## 2017-02-04 NOTE — PLAN OF CARE
Problem: Patient Care Overview  Goal: Plan of Care Review  Outcome: Ongoing (interventions implemented as appropriate)  Recommendations  1. When medically able advance diet to renal   2. If unable to advance oral diet within 48 hrs consider NG or OG continuous feeding Novasource renal. Initiate at 10ml/hr increase by 10 ml q 3 hr until final goal rate of 25ml/hr is reached. Final goal rate will provide 1200kcal(91%EEN), 54g pro (100%EPN), 110gCHO, 1230ml water. Hold for N/V , residuals >400ml.  3. RD to monitor  Goals: provide nutrition within 48 hrs  Nutrition Goal Status: new        Continuum of Care Plan  D/C planning: unable to determine at this time

## 2017-02-04 NOTE — NURSING
"Called Dr. Huizar to report that pt. is in and out of alertness. therefore she can't take the kayeolate and she can't have a ngt placed or to give it rectally due to inr is 5.2.  Also, that she had lasix ordered when she came up however, i had to hold it due  to starting levo drip for low b/p( was in the 60-70"s)  When she came up.  He verbalized ok.   "

## 2017-02-04 NOTE — ED NOTES
Patient on continuous cardiac monitor, automatic blood pressure cuff and continuous pulse oximeter.

## 2017-02-04 NOTE — PLAN OF CARE
Problem: Patient Care Overview  Goal: Plan of Care Review  Outcome: Ongoing (interventions implemented as appropriate)  Last temp 93.9f axillary. Pt maint on warming blanket. Pt awake and alert x1 now. Was in and out of it since admit.  Started levo drip to maint map>65.  ivf of NS at 75 ml/hr. cvp monitoring started . Running 22-24.  Held lasix due to low b/p. md aware. K down to 5.7 this am.Inr is up to 5.7 and pt received a dose of vitamin k.  Pt has excoriated gluteal cleft barrier being maint. Also, has some abrasions to l ant hip -barrier applied. Pt has unna boots maint- terry lower extremities. No falls, injury, or further skin breakdown thru shift.

## 2017-02-04 NOTE — ED TRIAGE NOTES
Patient comes to ED by ambulance from [facility]. Nursing home sent patient here for multiple vomiting episodes and lower blood pressure. EMS states patient was answering questions appropriately and responding before patient arrived. Patient arrived incoherent, only responds to sternal rub, cool to touch, blood sugar lower less than 20, and hypotensive. Patient appears to be having agonal breathing. Normal sinus rhythm.  Patient is poor historian. Patient appears to have had right sided mastectomy. Left sided hip abrasion. EMS states family is on there way. Will monitor.

## 2017-02-04 NOTE — NURSING
Pt transferred from er to room 269 via stretcher accompanied by er nurse. Placed in bed. Attached to tele monitor. Assessment per flowsheet

## 2017-02-04 NOTE — PROGRESS NOTES
Reviewed H and P by my colleague and agree with A and P. All labs/data reviewed. Discussed with nursing. Layed eyes on the patient. She presents with hypotension/hypothermia and acute on chronic renal failure with hyperkalemia.  Nephrology consulted.  On Abx- noted UTI. Blood cultures pending. Noted WBC count this am.  On pressor support. Will follow in ICU today.

## 2017-02-04 NOTE — ASSESSMENT & PLAN NOTE
Acute renal failure with resulting hyperkalemia and lactic acidosis  · As evidenced by decrease in GFR.  Baseline creatinine approx. 2.0  · Will evaluate for pre-renal, intrarenal, and post-obstructive etiology.  · Obtain:  1.  protein/creatinine ratio  2. urine and serum osmolalities  3. urine electrolytes (Na, Cl, K)  4. LDH  5. Complement  6. Florian's stain for osinophils  · Renal ultrasound  · Monitor with serial Cr / GFR levels closely with serial labs  · Avoid nephrotoxic medications such as NSAIDs, IV contrast, or RAAS blockade  · Nephrology consult

## 2017-02-04 NOTE — NURSING
Informed Dr. Huizar of pt's inr of 5.7 asked if he wanted vitamin k given. He gave order for 5 mg. Also, that serum osmolality is 326 gave order to change rate of ivf down to 50ml/hr, since, cvp is still 20-22 and remains on levo drip.

## 2017-02-04 NOTE — ED PROVIDER NOTES
Encounter Date: 2/3/2017    SCRIBE #1 NOTE: I, Delano Alvarez, am scribing for, and in the presence of,  Richar Calle MD. I have scribed the following portions of the note - Other sections scribed: ROS, HPI.       History     Chief Complaint   Patient presents with    Abdominal Pain     LUQ and emesis x2    Hypotension     initial BP 52/34 now 157/136. ems getting varying bp in each arm     Review of patient's allergies indicates:   Allergen Reactions    Shellfish containing products Itching     Can eat shrimp    Adhesive      Other reaction(s): blisters    Iodinated contrast media - iv dye      Other reaction(s): Vomiting    Iodine      Other reaction(s): Topical  Other reaction(s): Rash    Morphine      Other reaction(s): Hallucinations     HPI Comments: CC: AMS    HPI: This 82 y.o. F, who has a past medical history of Anticoagulant long-term use; Arthritis; Atrial fibrillation; Atrial flutter; Carcinoma of right breast, estrogen receptor negative and progesterone receptor positive (02/2008); Chronic combined systolic and diastolic congestive heart failure; Colon cancer; Diabetes mellitus; Dilated cardiomyopathy (12/01/2008); Dyslipidemia; Encounter for blood transfusion; Hyperlipidemia; Hypertension; and Supraventricular tachycardia, presents to the ED for evaluation of AMS since today. Per EMS she was confused but able to answer questions en route. On exam she is barely responsive to sternal rub. Per EMS BP in right arm was 150 systolic while the left was 90's systolic. Per NH she was vomiting prior to EMS arrival. CBG of 137 en route. Hx is otherwise limited due AMS.    The history is provided by the patient.     Past Medical History   Diagnosis Date    Anticoagulant long-term use     Arthritis     Atrial fibrillation     Atrial flutter     Carcinoma of right breast, estrogen receptor negative and progesterone receptor positive 02/2008    Chronic combined systolic and diastolic congestive heart  failure     Colon cancer     Diabetes mellitus     Dilated cardiomyopathy 12/01/2008     adriamycin toxicity    Dyslipidemia     Encounter for blood transfusion     Hyperlipidemia     Hypertension     Supraventricular tachycardia      PAF, AFL     Past Medical History Pertinent Negatives   Diagnosis Date Noted    Asthma 1/16/2017    COPD (chronic obstructive pulmonary disease) 1/16/2017    Coronary artery disease 1/16/2017    Seizures 1/16/2017    Stroke 1/16/2017    Thyroid disease 1/16/2017     Past Surgical History   Procedure Laterality Date    Colon surgery  2000     upper colon removal    Cataract extraction  8/5/09     both eyes (Dr. Adam)    Cardioversion  04/24/2014    Total abdominal hysterectomy w/ bilateral salpingoophorectomy  1973    Mastectomy, partial Right 03/14/2008     R right    Total hip arthroplasty  03/05/2010     anuerysm in hip/right hip    Hysterectomy       Family History   Problem Relation Age of Onset    Heart disease Mother     Hypertension Mother     Diabetes Mother     Glaucoma Mother     Glaucoma Maternal Aunt      Social History   Substance Use Topics    Smoking status: Never Smoker    Smokeless tobacco: Never Used    Alcohol use No     Review of Systems   Unable to perform ROS: Mental status change       Physical Exam   Initial Vitals   BP Pulse Resp Temp SpO2   02/03/17 1835 02/03/17 1835 02/03/17 1835 -- 02/03/17 1835   157/136 92 18  99 %     Physical Exam    Nursing note and vitals reviewed.  Constitutional: She appears well-developed and well-nourished. She is not diaphoretic. No distress.   HENT:   Head: Normocephalic and atraumatic.   Nose: Nose normal.   Mouth/Throat: Oropharynx is clear and moist. No oropharyngeal exudate.   Eyes: Conjunctivae are normal. Pupils are equal, round, and reactive to light. No scleral icterus.   Neck: Normal range of motion. Neck supple. No thyromegaly present. No tracheal deviation present.   Cardiovascular:  "Normal rate and normal heart sounds. Exam reveals no gallop and no friction rub.    No murmur heard.  Irregularly irregular   Pulmonary/Chest: Breath sounds normal. No respiratory distress. She has no wheezes. She has no rhonchi. She has no rales.   Kussmaul respiration   Abdominal: Soft. Bowel sounds are normal. She exhibits no distension and no mass. There is no tenderness. There is no rebound and no guarding.   Musculoskeletal: Normal range of motion. She exhibits no edema or tenderness.   Lymphadenopathy:     She has no cervical adenopathy.   Neurological: She is alert. She has normal strength. No cranial nerve deficit or sensory deficit. GCS eye subscore is 4. GCS verbal subscore is 2. GCS motor subscore is 4.   Skin: Skin is warm and dry. No rash noted. No erythema. There is pallor.         ED Course   Central Line  Date/Time: 2/4/2017 2:22 AM  Performed by: SARA RASHID III  Consent Done: Yes  Time out: Immediately prior to procedure a "time out" was called to verify the correct patient, procedure, equipment, support staff and site/side marked as required.  Indications: hemodynamic monitoring  Anesthesia: local infiltration    Anesthesia:  Anesthesia: local infiltration  Local Anesthetic: lidocaine 1% without epinephrine   Anesthetic total: 1 mL  Preparation: skin prepped with ChloraPrep  Skin prep agent dried: skin prep agent completely dried prior to procedure  Sterile barriers: all five maximum sterile barriers used - cap, mask, sterile gown, sterile gloves, and large sterile sheet  Hand hygiene: hand hygiene performed prior to central venous catheter insertion  Location details: right internal jugular  Catheter type: triple lumen  Catheter size: 7 Fr  Catheter Length: 13cm    Ultrasound guidance: yes  Vessel Caliber: large, patent, compressibility poor  Vascular Doppler: flow caudad, flow cephalad  Needle advanced into vessel with real time Ultrasound guidance.  Number of attempts: 1  Assessment: " placement verified by x-ray  Complications: none  Post-procedure: line sutured,  chlorhexidine patch,  blood return through all ports and sterile dressing applied  Complications: No    Critical Care  Date/Time: 2/4/2017 2:24 AM  Performed by: SARA RASHID III  Authorized by: SARA RASHID III   Direct patient critical care time: 60 minutes  Additional history critical care time: 10 minutes  Ordering / reviewing critical care time: 15 minutes  Documentation critical care time: 10 minutes  Consulting other physicians critical care time: 10 minutes  Consult with family critical care time: 10 minutes  Total critical care time (exclusive of procedural time) : 115 minutes  Critical care time was exclusive of teaching time and separately billable procedures and treating other patients.  Critical care was necessary to treat or prevent imminent or life-threatening deterioration of the following conditions: CNS failure or compromise, shock, renal failure and metabolic crisis.        Labs Reviewed   CBC W/ AUTO DIFFERENTIAL - Abnormal; Notable for the following:        Result Value    Hemoglobin 11.2 (*)     MCH 24.0 (*)     MCHC 28.6 (*)     RDW 20.8 (*)     Gran # 8.0 (*)     Gran% 77.6 (*)     Lymph% 16.1 (*)     All other components within normal limits   COMPREHENSIVE METABOLIC PANEL - Abnormal; Notable for the following:     Sodium 133 (*)     Potassium 7.5 (*)     CO2 8 (*)     Glucose 152 (*)     BUN, Bld 77 (*)     Creatinine 3.4 (*)     Albumin 2.7 (*)     Total Bilirubin 2.6 (*)     AST 45 (*)     Anion Gap 26 (*)     eGFR if  14 (*)     eGFR if non  12 (*)     All other components within normal limits    Narrative:        Potassium and CO2 critical result(s) called and verbal readback   obtained from Umm George RN, 02/03/2017 19:59   LIPASE - Abnormal; Notable for the following:     Lipase 69 (*)     All other components within normal limits   LACTIC ACID, PLASMA - Abnormal;  Notable for the following:     Lactate (Lactic Acid) >12.0 (*)     All other components within normal limits    Narrative:      Lactic Acid  critical result(s) called and verbal readback obtained   from Brandi Sullivan Rn, 02/03/2017 21:05   URINALYSIS - Abnormal; Notable for the following:     Appearance, UA Cloudy (*)     Protein, UA 2+ (*)     Occult Blood UA 1+ (*)     Urobilinogen, UA 2.0-3.0 (*)     All other components within normal limits   PROTIME-INR - Abnormal; Notable for the following:     Prothrombin Time 51.5 (*)     INR 5.2 (*)     All other components within normal limits    Narrative:     Recoll. [id number] by LB1 at 02/03/2017 19:45, reason: Insufficient   specimen 02/03/2017  19:45   URINALYSIS MICROSCOPIC - Abnormal; Notable for the following:     Bacteria, UA Moderate (*)     Non-Squam Epith 3 (*)     Hyaline Casts, UA 10 (*)     Granular Casts, UA 2 (*)     All other components within normal limits   BASIC METABOLIC PANEL - Abnormal; Notable for the following:     Potassium 7.2 (*)     CO2 8 (*)     BUN, Bld 76 (*)     Creatinine 3.3 (*)     Anion Gap 27 (*)     eGFR if  14 (*)     eGFR if non  12 (*)     All other components within normal limits    Narrative:        Potassium and CO2 critical result(s) called and verbal readback   obtained from Pete Coleman Rn, 02/03/2017 23:53   LACTIC ACID, PLASMA - Abnormal; Notable for the following:     Lactate (Lactic Acid) >12.0 (*)     All other components within normal limits    Narrative:       Lactic Acid  critical result(s) called and verbal readback obtained   from Umm George Rn, 02/04/2017 01:00   POCT GLUCOSE - Abnormal; Notable for the following:     POCT Glucose 256 (*)     All other components within normal limits   POCT GLUCOSE - Abnormal; Notable for the following:     POCT Glucose 302 (*)     All other components within normal limits   CULTURE, BLOOD   CULTURE, BLOOD   TROPONIN I   POCT GLUCOSE MONITORING  "CONTINUOUS             Medical Decision Making:   Initial Assessment:   82-year-old female brought in by EMS from a skilled nursing facility after she developed vomiting, abdominal pain, and hypotension, with reported blood pressure 70s/50s on SNF paperwork.  EMS reports that on their arrival the patient was GCS 14, answering questions mostly appropriately, with a systolic blood pressure in the 150s.  Blood glucose and nursing facility 170s.  Blood glucose for EMS 130s.  However, on arrival the patient had a GCS of E4V2M4, with rapid worsening over appr 5 min, eventually developing right-sided gaze preference, no spontaneous movement apart from respiration, GCS E4V1M1.  Blood glucose obtained at that time was <20.  Patient did have gradual return to GCS 14 over the ensuing 20-30 minutes.  Differential Diagnosis:   Medication effect, sepsis, metabolic abnormality, renal failure, stroke, intracranial hemorrhage  Independently Interpreted Test(s):   I have ordered and independently interpreted X-rays - see summary below.       <> Summary of X-Ray Reading(s): Chest x-ray: No acute abnormality, improvement of abnormality seen on previous  I have ordered and independently interpreted EKG Reading(s) - see summary below       <> Summary of EKG Reading(s): Atrial fibrillation at 91 beats per minute, right axis deviation, otherwise normal intervals, no acute ischemic change  ED Management:  Patient found to be hypothermic.  Warming blanket applied.  Patient is now answering questions appropriately, but continues to have some psychomotor slowing.  Her sister is at the bedside and reports that the patient is normally "very sharp" and that this is very different from her baseline.     CT head shows what appears to be some air tracking along the inside of the basilar skull in the area of the pituitary and ethmoid air cells.  Significance of this initially unclear. I have discussed this with Dr. Scherer, neurosurgery at [facility], " "who agrees that this is somewhat nonspecific and thinks this may well represent very low density fatty attenuation.  He reports that this does not necessarily require any further emergent evaluation, unless the patient's mental status fails to return to normal after correction of her underlying metabolic abnormalities, in which case he recommends an MRI.  He also specifically recommends follow-up in neurosurgery clinic as an outpatient after this hospitalization, without a need for repeat imaging during this admission if her mental status returns to baseline.     Patient has had a single blood pressure of 80s over 50s, pressures mostly running systolic 100s.  Laboratory evaluation reveals hyperkalemia, severe lactic acidosis, acute on chronic renal insufficiency.  Have treated aggressively for hyperkalemia.  1 L of normal saline administered, will order another 500 cc.  Last ejection fraction 20%.  Chest x-ray without acute abnormality.  Urinalysis not suggestive of infection.  Mental status continues to improve.  At this time she reports that she feels "bad", with continued nausea and generalized weakness.  She specifically denies chest pain, shortness of breath, headache, abdominal pain.    Spoken with Dr. Huizar regarding this patient's presentation and workup and will now place admission orders to the ICU.  Will also place a central line at his request. No pressors at this time.     During central line placement, the patient's right internal jugular vein was noted to be quite distended, noncompressible, with some pulsatile flow, presumably due to very elevated right heart pressures.  No evidence of clot in the IJ.  Central line was successfully placed in this vessel and confirmed with CXR.  Patient has not had another episode of relative hypotension, with blood pressure 80s over 50s.  Status remains stable.  Patient now reporting that she feels much better, with no nausea or abdominal pain.    Repeat lactate " and BMP immediately prior to transfer reveal persistently elevated potassium and lactate level.  I have updated Dr. Huizar regarding the patient's status, and we have decided to administer another fluid bolus, IV Lasix, insulin, and D50.              Scribe Attestation:   Scribe #1: I performed the above scribed service and the documentation accurately describes the services I performed. I attest to the accuracy of the note.    Attending Attestation:           Physician Attestation for Scribe:  Physician Attestation Statement for Scribe #1: I, Richar Calle MD, reviewed documentation, as scribed by Delano Alvarez in my presence, and it is both accurate and complete.                 ED Course     Clinical Impression:   The primary encounter diagnosis was Acute on chronic renal failure. Diagnoses of Lactic acidosis, Hypoglycemia, Hyperkalemia, Abnormal CT of the head, Supratherapeutic INR, and Hypothermia, initial encounter were also pertinent to this visit.          Richar Calle III, MD  02/04/17 0226

## 2017-02-04 NOTE — ED NOTES
Assumed care of pt. Pt alert and oriented to self. Family at bedside. EKG performed. Will continue to monitor.

## 2017-02-04 NOTE — ASSESSMENT & PLAN NOTE
Compensated CHF   · Evidenced by history,   · No evidence of pulmonary edema, peripheral edema  · Provide diuresis w/ by mouth medication  · Maintain w/ beta-blocker, ACE inhibitor once renal function has stabilized  · Daily Weights  · Strict I/O  · Low-sodium cardiac diet  · Chest X-ray  · Check TSH, albumin, UA, and renal function  · Obtain 2D echo if <6 months     EF   Date Value Ref Range Status   12/28/2016 20 (A) 55 - 65    08/22/2016 25 (A) 55 - 65      · EKG and cardiac enzymes PRN  · DVT prophylaxis w/ pharmacological and/or mechanical measures  · Oxygen supplementation support PRN    Instructions given to patient/family:  Monitor daily weight.  Regular activity within patient's limitations.  Low salt, low fat and low choleterol diet and restrict fluid < 2L per day.  Call MD if SOB, chest pain, weight gain > 2-3 lbs per day and/or 5-6 lbs per week.   No smoking. Annual influenza vaccine required.

## 2017-02-04 NOTE — ED TRIAGE NOTES
Pt presents to the ER via EMS from [facility] with low blood pressure and abd pain. Pt has been vomiting today as well.

## 2017-02-04 NOTE — PLAN OF CARE
Problem: Patient Care Overview  Goal: Plan of Care Review  Outcome: Ongoing (interventions implemented as appropriate)  Plan of care reviewed with sisters and pt. Pt remains confused. Skin excoriation, daniel and flexiseal placed. Bicarb gtt infusing. Afebrile, temp improved. Glucose improved. Seen by consults. Echo and ultrasound completed.

## 2017-02-04 NOTE — NURSING
Sister called and once she gave me the code. Updated her on pts. Status and answered all questions

## 2017-02-04 NOTE — PROGRESS NOTES
0900 pt with stool, foul odor, liquid, on kayexalate. Stool with bloody streaks. New orders placed per md.  0930 md anita cha on unit, aware of minimal urine output and vanc order and ivf infusing. md will assess and place new orders.  1300 spoke with md pa, informed pt with diarrhea, 2 remaining doses of kayexalate due. New orders received to check potassium level.  1400 potassium level 5.6, md agrees to continue with current treatment.   1450 spoke with md roberts, informed of 2 bm's with blood tinged streaks, diarrhea, pt receiving kayexalate. On levo and bicarb drip. Severe excoriation to perineum/rectal area due to incontinence. New orders received. md informed cdiff results negative, + occult blood in stool.

## 2017-02-04 NOTE — ASSESSMENT & PLAN NOTE
· Secondary to acute renal failure as above.    · Also suspicious for a hypotensive episode prior to admission.  · May require  Acute dialysis for clearance.

## 2017-02-04 NOTE — ASSESSMENT & PLAN NOTE
· Multifactorial etiology: Underlying dementia with hypoglycemia, hypothermia, acute renal failure, lactic acid acidosis  · Treat underlying conditions as outlined above

## 2017-02-04 NOTE — ASSESSMENT & PLAN NOTE
· Elevated due to renal failure, spironolactone use, and supplemental potassium use  · Given calcium, Kayexalate, insulin, D50, albuterol, IV fluids  · Monitor with serial labs  · No evidence of EKG changes / peaked Ts  · Monitor on telemetry

## 2017-02-04 NOTE — CONSULTS
[facility]  Adult Nutrition  Consult Note    SUMMARY     Recommendations  1. When medically able advance diet to renal   2. If unable to advance oral diet within 48 hrs consider NG or OG continuous feeding Novasource renal. Initiate at 10ml/hr increase by 10 ml q 3 hr until final goal rate of 25ml/hr is reached. Final goal rate will provide 1200kcal(91%EEN), 54g pro (100%EPN), 110gCHO, 1230ml water. Hold for N/V , residuals >400ml.  3. RD to monitor  Goals: provide nutrition within 48 hrs  Nutrition Goal Status: new       Continuum of Care Plan  D/C planning: unable to determine at this time       Reason for Assessment  Reason for Assessment: physician consult  Diagnosis:  (acute on chronic renal failure)  Relevent Medical History: anticoagulant use long term, h/o breast cancer, h/ocolon cancer, dyslipidemia, HLD, DM   Interdisciplinary Rounds: did not attend     Nutrition Prescription Ordered  Current Diet Order: NPO     Nutrition Risk Screen   Nutrition Risk Screen: no indicators present    Nutrition/Diet History  Patient Reported Diet/Restrictions/Preferences: low salt  Factors Affecting Nutritional Intake: abdominal pain, NPO, nausea/vomiting    Labs/Tests/Procedures/Meds  Pertinent Labs Reviewed: reviewed  Pertinent Labs Comments: BUN 76, CR 3.4 K+ 5.7  Pertinent Medications Reviewed: reviewed  Pertinent Medications Comments: insulin, ondansetron, abx    Physical Findings  Skin: intact    Anthropometrics  Height (inches): 60 in  Weight Method: Stated  Weight (kg): 71.1 kg  Ideal Body Weight (IBW), Female: 100 lb  % Ideal Body Weight, Female (lb): 156.75 lb  BMI (kg/m2): 30.61  BMI Grade: 30 - 34.9- obesity - grade I       Estimated/Assessed Needs  Weight Used For Calorie Calculations: 71.1 kg (156 lb 12 oz)   Height (cm): 152.4 cm  Energy Need Method: [place]-St Jeor (1317kcal/day (x 1.2))  RMR (Lajas-St. Jeor Equation): 1098.35   Weight Used For Protein Calculations: 71.1 kg (156 lb 12  oz)  Protein Requirements: 43-71g/day  0.6 gm Protein (gm): 42.75 and 0.8 gm Protein (gm): 57  Fluid Need Method: RDA Method (1 ml per kcal or per MD)  Fiber Requirement: 164g CHO/day     Nutrition diagnosis   Problem: Inadequate energy intake  Etiology: Nausea/vomiting  Symptoms: NPO  Status: new    Monitor and Evaluation  Food and Nutrient Intake: energy intake, enteral nutrition intake  Food and Nutrient Adminstration: diet order, enteral and parenteral nutrition administration  Knowledge/Beliefs/Attitudes: food and nutrition knowledge/skill  Physical Activity and Function: nutrition-related ADLs and IADLs  Anthropometric Measurements: weight, weight change  Biochemical Data, Medical Tests and Procedures: electrolyte and renal panel, gastrointestinal profile, glucose/endocrine profile, lipid profile, inflammatory profile  Nutrition-Focused Physical Findings: overall appearance    Nutrition Risk    Level of Risk: moderate    Nutrition Follow-Up         Assessment and Plan    No new Assessment & Plan notes have been filed under this hospital service since the last note was generated.  Service: Nutrition

## 2017-02-05 PROBLEM — N39.0 UTI (URINARY TRACT INFECTION): Status: ACTIVE | Noted: 2017-01-01

## 2017-02-05 PROBLEM — K92.2 GI BLEED: Status: ACTIVE | Noted: 2017-01-01

## 2017-02-05 NOTE — PROGRESS NOTES
0850 Spoke with md Godwin, informed of x 3 vomiting episodes, nausea and belching. New orders received.  1020 md pa at bedside, plan of care discussed. New orders received. Run of vtach on monitor. Intermittent bigeminy, pvc's more frequent. md will place consult to cardiology.  1547 pt pulled out ng tube, replaced ng tube, 16 fr, same length at 65 cm. Pt tolerated. Pt is confused and does not understand education. Called and spoke with md pa. New orders received.

## 2017-02-05 NOTE — ASSESSMENT & PLAN NOTE
UTI with sepsis with hypothermia and hypotension requiring pressor support.  Zosyn for now. Blood cultures neg. Unfortunately, no urine culture ordered on admit. Ordered one now for what it is worth.  ID consulted.

## 2017-02-05 NOTE — CONSULTS
CARDIOVASCULAR CONSULTATION      Consult Requested By: Yash Benavidez MD  PCP: Lupe Delatorre MD  Primary Cardiologist: ?[facility] [place] [facility]     Reason for Consult: NSVT, AF    Admitting Diagnosis:  Hyperkalemia [E87.5]  Lactic acidosis [E87.2]  Hypoglycemia [E16.2]  Abnormal CT of the head [R93.0]  Acute on chronic renal failure [N17.9, N18.9]  Supratherapeutic INR [R79.1]  Hypothermia, initial encounter [T68.XXXA]      SUBJECTIVE:   Pt seen in ICU    History of Present Illness:    82 y.o. female that (in part) has a past medical history of Anticoagulant long-term use; Arthritis; Atrial fibrillation; Atrial flutter; Carcinoma of right breast, estrogen receptor negative and progesterone receptor positive (02/2008); Chronic combined systolic and diastolic congestive heart failure; Colon cancer; Diabetes mellitus; Dilated cardiomyopathy (12/01/2008); Dyslipidemia; Encounter for blood transfusion; Hyperlipidemia; Hypertension; and Supraventricular tachycardia. Presents to [facility] Emergency Department from nursing home with multiple issues including hypotension, left upper quadrant pain, and emesis ×2. EMS reports an initial blood pressure 52/34 and a blood glucose of 137. She was confused upon admission and was barely arousable to sternal rub. The history is otherwise limited due to the condition of the patient.    In the emergency department she underwent routine laboratory studies, CT imaging, and EKG. She had transient episodes of hypoglycemia and she had discordant low-pressure readings in her upper extremities although this is thought to be secondary to persistent mastectomy. Multiple abnormalities on laboratory studies were identified including acute renal failure, hyperkalemia, and severe lactic acid acidosis with a lactate of greater than 12 × 2 measures. She was also found to be hypothermic. After fluid resuscitation and treatment for hypokalemia as well as administration of D50 her  mental status improved.   Hospital medicine has been asked to admit for further evaluation and treatment.   Hospital Course:   The patient was admitted to the hospital for sepsis thought secondary to a UTI. The patient initially required pressor support. ID was consulted on 2/5 to help with management. Heme + stools were reported by nursing staff and GI was consulted and started patient on a PPI BID.    Cardiology consulted for NSVT, AF/RVR.  Pt unable to provide any useful hx.        Past Medical History   Diagnosis Date    Anticoagulant long-term use     Arthritis     Atrial fibrillation     Atrial flutter     Carcinoma of right breast, estrogen receptor negative and progesterone receptor positive 02/2008    Chronic combined systolic and diastolic congestive heart failure     Colon cancer     Diabetes mellitus     Dilated cardiomyopathy 12/01/2008     adriamycin toxicity    Dyslipidemia     Encounter for blood transfusion     Hyperlipidemia     Hypertension     Supraventricular tachycardia      PAF, AFL     Past Surgical History   Procedure Laterality Date    Colon surgery  2000     upper colon removal    Cataract extraction  8/5/09     both eyes (Dr. Adam)    Cardioversion  04/24/2014    Total abdominal hysterectomy w/ bilateral salpingoophorectomy  1973    Mastectomy, partial Right 03/14/2008     R right    Total hip arthroplasty  03/05/2010     anuerysm in hip/right hip    Hysterectomy      Eye surgery         Prescriptions Prior to Admission   Medication Sig Dispense Refill Last Dose    allopurinol (ZYLOPRIM) 100 MG tablet Take 1 tablet (100 mg total) by mouth once daily. 270 tablet 1 2/3/2017    fluticasone (FLONASE) 50 mcg/actuation nasal spray 1 spray by Each Nare route once daily. 16 g 0 2/3/2017    furosemide (LASIX) 40 MG tablet Take 2 tablets (80 mg total) by mouth once daily. 180 tablet 3 2/3/2017    letrozole (FEMARA) 2.5 mg Tab Take 1 tablet (2.5 mg total) by mouth once  daily. 90 tablet 1 2/3/2017    metoprolol succinate (TOPROL-XL) 100 MG 24 hr tablet Take 1 tablet (100 mg total) by mouth once daily.   2/3/2017    omeprazole (PRILOSEC) 20 MG capsule Take 1 capsule (20 mg total) by mouth once daily. 90 capsule 1 2/3/2017    potassium chloride SA (K-DUR,KLOR-CON) 20 MEQ tablet Take 20 mEq by mouth once daily.   2/3/2017    pravastatin (PRAVACHOL) 40 MG tablet Take 1 tablet (40 mg total) by mouth once daily. 90 tablet 1 2/3/2017    acetaminophen (TYLENOL) 325 MG tablet Take 2 tablets (650 mg total) by mouth every 6 (six) hours as needed for Pain. Do NOT exceed 3000 mg in a 24 hour time period and do not take with other medications containing acetaminophen  Do NOT drink alcohol when taking this medication  0 1/15/2017 at Unknown time    senna-docusate 8.6-50 mg (PERICOLACE) 8.6-50 mg per tablet Take 1 tablet by mouth daily as needed for Constipation.   Unknown at Unknown time    warfarin (COUMADIN) 1 MG tablet Take 1 tablet (1 mg total) by mouth Daily. Adjust to keep INR between 2.0-3.0 (Patient taking differently: Take 1 mg by mouth Daily. Adjust to keep INR between 2.0-3.0. Take as directed at the [facility].) 240 tablet 6 Unknown       Current Medications:   metronidazole  500 mg Intravenous Q8H    pantoprazole  40 mg Intravenous BID    piperacillin-tazobactam (ZOSYN) IVPB  4.5 g Intravenous Q12H    pravastatin  40 mg Oral Daily    sodium chloride 0.9%  3 mL Intravenous Q8H      norepinephrine bitartrate-D5W 0.1 mcg/kg/min (02/05/17 1000)    sodium bicarbonate drip 125 mL/hr at 02/05/17 1000     acetaminophen, bisacodyl, dextrose 50%, glucagon (human recombinant), flu vacc [id number] 65yr up(PF), insulin aspart, mineral oil, ondansetron, polyethylene glycol, promethazine, ramelteon, vancomycin (VANCOCIN) IVPB    Family History   Problem Relation Age of Onset    Heart disease Mother     Hypertension Mother     Diabetes Mother     Glaucoma Mother     Glaucoma  Maternal Aunt        Social History   Substance Use Topics    Smoking status: Never Smoker    Smokeless tobacco: Never Used    Alcohol use No       Review of patient's allergies indicates:   Allergen Reactions    Shellfish containing products Itching     Can eat shrimp    Adhesive      Other reaction(s): blisters    Iodinated contrast media - iv dye      Other reaction(s): Vomiting    Iodine      Other reaction(s): Topical  Other reaction(s): Rash    Morphine      Other reaction(s): Hallucinations        Review of Systems   Unable to perform ROS: Acuity of condition         OBJECTIVE:     Vital Signs Range (Last 24H):  Temp:  [96 °F (35.6 °C)-98.1 °F (36.7 °C)]   Pulse:  [100-122]   Resp:  [18-47]   BP: ()/(47-84)   SpO2:  [73 %-100 %]     Body mass index is 30.78 kg/(m^2).    Physical Exam   Constitutional:   Chr ill elderly woman, NAD   HENT:   Head: Normocephalic and atraumatic.   Eyes: Conjunctivae and EOM are normal. Pupils are equal, round, and reactive to light. Right eye exhibits no discharge. Left eye exhibits no discharge. No scleral icterus.   Neck: Normal range of motion. Neck supple. No JVD present.   Cardiovascular: S1 normal and S2 normal.  An irregularly irregular rhythm present. Tachycardia present.  Exam reveals distant heart sounds.    Pulmonary/Chest: Effort normal and breath sounds normal.   Abdominal: Soft. Bowel sounds are normal.   Musculoskeletal: She exhibits no edema.   Neurological:   UTO   Skin: Skin is warm and dry.   Psychiatric:   UTO       Laboratory:  CBC:    Recent Labs  Lab 02/03/17  1925 02/04/17  0310 02/05/17  0418   WHITE BLOOD CELL COUNT 10.94 16.68 H 15.74 H   HEMOGLOBIN 11.2 L 10.8 L 11.0 L   HEMATOCRIT 39.1 37.7 36.1 L   PLATELETS 203 182 170       CHEMISTRIES:    Recent Labs  Lab 01/23/17  0300 01/24/17  0311  02/03/17  2250 02/04/17  0310 02/04/17  1315 02/05/17  0418   GLUCOSE 122 H 123 H  < > 107 217 H  --  177 H   SODIUM 139 136  < > 136 136  --  138  "  POTASSIUM 3.8 3.5  < > 7.2 HH 5.7 H 5.6 H 4.2   BUN BLD 51 H 40 H  < > 76 H 76 H  --  78 H   CREATININE 2.0 H 1.7 H  < > 3.3 H 3.4 H  --  3.5 H   EGFR IF  26 A 32 A  < > 14 A 14 A  --  13 A   EGFR IF NON- 23 A 28 A  < > 12 A 12 A  --  12 A   CALCIUM 9.1 8.9  < > 8.8 8.4 L  --  8.2 L   MAGNESIUM 2.0 1.8  --   --   --   --  2.2   < > = values in this interval not displayed.    CARDIAC BIOMARKERS:    Recent Labs  Lab 01/16/17  2358 01/17/17  0633 02/03/17  1925   TROPONIN I 0.034 H 0.035 H 0.018       COAGS:    Recent Labs  Lab 02/03/17  2120 02/04/17  0310 02/05/17  0418   INR 5.2 HH 5.7 HH 4.8 H       LIPIDS/LFTS:    Recent Labs  Lab 05/01/14  0700  03/09/15  1110  08/22/16  1307  01/20/17  0539 01/21/17  0257 02/03/17  1925   CHOLESTEROL  --   --  131  --  119 L  --   --   --   --    TRIGLYCERIDES 83  --  83  --  98  --   --   --   --    HDL  --   --  51  --  35 L  --   --   --   --    LDL CHOLESTEROL  --   --  63.4  --  64.4  --   --   --   --    NON-HDL CHOLESTEROL  --   --  80  --  84  --   --   --   --    AST 22  < >  --   < > 16  < > 21 16 45 H   ALT 17  < >  --   < > 12  < > 10 12 31   < > = values in this interval not displayed.        Diagnostic Results:  ECG (personally reviewed tracings):   2/3/17 AF 91, PRWP    Chest X-Ray (personally reviewed image(s)): 2/4/17 CMeg, L eff, R TLC    Echo: 2/4/17 (images pers rev)    1 - Moderately depressed left ventricular systolic function (EF 30-35%).  Septal dyskinesis vs "right ventricularization" of septum.     2 - Concentric hypertrophy.     3 - Left ventricular diastolic dysfunction.     4 - Right ventricular enlargement with moderately depressed systolic function.     5 - Moderate mitral regurgitation.     6 - Severe tricuspid regurgitation.  Note made of malcoaptation of TV leaflets.     7 - Pulmonary hypertension. The estimated PA systolic pressure is 43 mmHg.     8 - Trivial pericardial effusion without hemodynamic " compromise.      ASSESSMENT:   # NSVT  # Known severe CMP, presumed nonischemic  # MR/TR  # Chr AF on warfarin  # Supratherapeutic INR  # ARF/CRI, POA  # Hypoglycemia  # lactic acidosis  # Mental status changes      Active Hospital Problems    Diagnosis  POA    *Acute on chronic renal failure [N17.9, N18.9]  Yes    GI bleed [K92.2]  Yes    UTI (urinary tract infection) [N39.0]  Yes    Hyperkalemia, diminished renal excretion [E87.5]  Yes    Supratherapeutic INR [R79.1]  Yes    Lactic acid acidosis [E87.2]  Yes    Hypothermia [T68.XXXA]  Yes    Encephalopathy, metabolic [G93.41]  Yes    Type 2 diabetes mellitus without complication [E11.9]  Yes    Long term current use of anticoagulant therapy [Z79.01]  Not Applicable    Chronic combined systolic and diastolic CHF, NYHA class 4 [I50.42]  Yes     Chronic    Chronic kidney disease, stage 4, severely decreased GFR [N18.4]  Yes     Chronic    Peripheral vascular disease [I73.9]  Yes     Chronic    Chronic venous stasis dermatitis of both lower extremities [I83.11, I83.12]  Yes     Chronic    Paroxysmal atrial flutter [I48.92]  Yes     Chronic    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 LVEF 20% 12/28/16.      Severe mitral regurgitation [I34.0]  Yes     Chronic     MODERATE-SEVERE      HTN (hypertension) [I10]  Yes    Hyperlipidemia [E78.5]  Yes    Paroxysmal atrial fibrillation [I48.0]  Yes     Chronic     WITH LA THROMBUS 10/12        Resolved Hospital Problems    Diagnosis Date Resolved POA   No resolved problems to display.       PLAN:   Conservative rx  Keep K+>4.0 and Mg++>2.0  No plan for isch eval  Consider code status discussion with family  DDX of above presentation (hypotension) and evidence of RV dysfxn includes pulm emb.  However, given the supratherapeutic INR, this is less likely.    ICU time 30 min      Delano Leos MD, [facility]

## 2017-02-05 NOTE — ASSESSMENT & PLAN NOTE
· Unknown etiology.  Environmental?  The temperature was in the 40s and 50s today   But unable to determine what her living conditions are like.  · Preston lundberg  · Warm fluids    Resolved.

## 2017-02-05 NOTE — ASSESSMENT & PLAN NOTE
· Elevated due to renal failure, spironolactone use, and supplemental potassium use  · Given calcium, Kayexalate, insulin, D50, albuterol, IV fluids  · Monitor with serial labs  · No evidence of EKG changes / peaked Ts  · Monitor on telemetry  · Resolved.

## 2017-02-05 NOTE — PLAN OF CARE
Problem: Patient Care Overview  Goal: Plan of Care Review  Outcome: Ongoing (interventions implemented as appropriate)  Afebrile. Still using warming blanket to keep temp up. Pt more confused tonight then the night before.  Pt maint on 4l/nc to maint sats >92%.  Pt still on levo to keeps map>65. Pt vomited x2.  No bm. U/o 635 ml/shift. No falls, injury, or further skin breakdown thru shift.

## 2017-02-05 NOTE — PROGRESS NOTES
[facility] Hospital Medicine  Progress Note    Patient Name: Myar Murphy  MRN: [id number]  Patient Class: IP- Inpatient   Admission Date: 2/3/2017  Length of Stay: 1 days  Attending Physician: Yash Benavidez MD  Primary Care Provider: Lupe Delatorre MD        Subjective:     Principal Problem:Acute on chronic renal failure    HPI:  Myra Murphy is a 82 y.o. female that (in part)  has a past medical history of Anticoagulant long-term use; Arthritis; Atrial fibrillation; Atrial flutter; Carcinoma of right breast, estrogen receptor negative and progesterone receptor positive (02/2008); Chronic combined systolic and diastolic congestive heart failure; Colon cancer; Diabetes mellitus; Dilated cardiomyopathy (12/01/2008); Dyslipidemia; Encounter for blood transfusion; Hyperlipidemia; Hypertension; and Supraventricular tachycardia. Presents to [facility] Emergency Department from nursing home with multiple issues including hypotension, left upper quadrant pain, and emesis ×2. EMS reports an initial blood pressure 52/34 and a blood glucose of 137. She was confused upon admission and was barely arousable to sternal rub. The history is otherwise limited due to the condition of the patient.      In the emergency department she underwent routine laboratory studies, CT imaging, and EKG. She had transient episodes of hypoglycemia and she had discordant low-pressure readings in her upper extremities although this is thought to be secondary to persistent mastectomy. Multiple abnormalities on laboratory studies were identified including acute renal failure, hyperkalemia, and severe lactic acid acidosis with a lactate of greater than 12 × 2 measures. She was also found to be hypothermic. After fluid resuscitation and treatment for hypokalemia as well as administration of D50 her mental status improved.     Hospital medicine has been asked to admit for further evaluation and  treatment.       Hospital Course:  The patient was admitted to the hospital for sepsis thought secondary to a UTI.  The patient initially required pressor support.  ID was consulted on 2/5 to help with management.  Heme + stools were reported by nursing staff and GI was consulted and started patient on a PPI BID     Interval History:  No new issues. About the same today     Review of Systems   Unable to perform ROS: Dementia     Objective:     Vital Signs (Most Recent):  Temp: 96 °F (35.6 °C) (02/05/17 0800)  Pulse: 106 (02/05/17 1015)  Resp: (!) 22 (02/05/17 1015)  BP: 109/73 (02/05/17 1000)  SpO2: (!) 94 % (02/05/17 1015) Vital Signs (24h Range):  Temp:  [96 °F (35.6 °C)-98.1 °F (36.7 °C)] 96 °F (35.6 °C)  Pulse:  [100-122] 106  Resp:  [18-47] 22  SpO2:  [73 %-100 %] 94 %  BP: ()/(47-84) 109/73     Weight: 71.5 kg (157 lb 10.1 oz)  Body mass index is 30.78 kg/(m^2).    Intake/Output Summary (Last 24 hours) at 02/05/17 1016  Last data filed at 02/05/17 1000   Gross per 24 hour   Intake          4190.44 ml   Output             1220 ml   Net          2970.44 ml      Physical Exam   Constitutional: She appears well-developed and well-nourished.   Cardiovascular: Normal rate.    Pulmonary/Chest: Effort normal and breath sounds normal. No respiratory distress. She has no wheezes.   Abdominal: Soft.   Hypoactive BS. NT/ND.        Significant Labs:   BMP:   Recent Labs  Lab 02/05/17  0418   *      K 4.2   CL 97   CO2 24   BUN 78*   CREATININE 3.5*   CALCIUM 8.2*   MG 2.2     CBC:   Recent Labs  Lab 02/03/17  1925 02/04/17  0310 02/05/17  0418   WBC 10.94 16.68* 15.74*   HGB 11.2* 10.8* 11.0*   HCT 39.1 37.7 36.1*    182 170       Significant Imaging:     Assessment/Plan:      * Acute on chronic renal failure  Acute renal failure with resulting hyperkalemia and lactic acidosis  · As evidenced by decrease in GFR.  Baseline creatinine approx. 2.0  · Will evaluate for pre-renal, intrarenal, and  post-obstructive etiology.  · Obtain:  1.  protein/creatinine ratio  2. urine and serum osmolalities  3. urine electrolytes (Na, Cl, K)  4. LDH  5. Complement  6. Florian's stain for osinophils  · Renal ultrasound  · Monitor with serial Cr / GFR levels closely with serial labs  · Avoid nephrotoxic medications such as NSAIDs, IV contrast, or RAAS blockade  · Nephrology consult    Paroxysmal atrial fibrillation  monitor with telemetry  Will consult cards.      Nonischemic dilated cardiomyopathy  Compensated CHF   · Evidenced by history,   · No evidence of pulmonary edema, peripheral edema  · Provide diuresis w/ by mouth medication  · Maintain w/ beta-blocker, ACE inhibitor once renal function has stabilized  · Daily Weights  · Strict I/O  · Low-sodium cardiac diet  · Chest X-ray  · Check TSH, albumin, UA, and renal function  · Obtain 2D echo if <6 months     EF   Date Value Ref Range Status   02/04/2017 30 (A) 55 - 65    12/28/2016 20 (A) 55 - 65      · EKG and cardiac enzymes PRN  · DVT prophylaxis w/ pharmacological and/or mechanical measures  · Oxygen supplementation support PRN    Instructions given to patient/family:  Monitor daily weight.  Regular activity within patient's limitations.  Low salt, low fat and low choleterol diet and restrict fluid < 2L per day.  Call MD if SOB, chest pain, weight gain > 2-3 lbs per day and/or 5-6 lbs per week.   No smoking. Annual influenza vaccine required.        Severe mitral regurgitation  Management of CHF as above      HTN (hypertension)  Benign essential.       Hyperlipidemia  No acute issues       Paroxysmal atrial flutter  monitor with telemetry      Peripheral vascular disease  Peripheral vascular disease   · No evidence of acute issue at this time  · Maintain adequate blood pressure control  · Heart healthy diet  · Aspirin  · Statin            Chronic venous stasis dermatitis of both lower extremities  Supportive care.  Wound care as needed.      Chronic combined  systolic and diastolic CHF, NYHA class 4  Management of CHF as above      Chronic kidney disease, stage 4, severely decreased GFR  · Renal dose medications  · Avoid nephrotoxic agents  · Maintain euvolemic state      Long term current use of anticoagulant therapy        Hyperkalemia, diminished renal excretion  · Elevated due to renal failure, spironolactone use, and supplemental potassium use  · Given calcium, Kayexalate, insulin, D50, albuterol, IV fluids  · Monitor with serial labs  · No evidence of EKG changes / peaked Ts  · Monitor on telemetry  · Resolved.     Supratherapeutic INR  Holding coumadin.  INR 4.8.       Lactic acid acidosis  · Secondary to acute renal failure as above.    · Also suspicious for a hypotensive episode prior to admission.  ·       Hypothermia  · Unknown etiology.  Environmental?  The temperature was in the 40s and 50s today   But unable to determine what her living conditions are like.  · Preston hugger  · Warm fluids    Resolved.       Encephalopathy, metabolic  · Multifactorial etiology: Underlying dementia with hypoglycemia, hypothermia, acute renal failure, lactic acid acidosis and possibly from elevated ammonia   ·   · Treat underlying conditions as outlined above    GI bleed  GI evaluated. H/H stable. PPI for now.        UTI (urinary tract infection)  UTI with sepsis with hypothermia and hypotension requiring pressor support.  Zosyn for now. Blood cultures neg. Unfortunately, no urine culture ordered on admit. Ordered one now for what it is worth.  ID consulted.       VTE Risk Mitigation         Ordered     Medium Risk of VTE  Once      02/04/17 0233      Very ill patient with multiple medical issues. ID, GI,  nephrology and now cards cosulted to follow.     AG improved.  Guarded prognosis. Close ICU monitoring. Ammonia level pending this am.     Critical care time spent 50 minutes.     Yash Martinez MD  Department of Hospital Medicine   [facility]

## 2017-02-05 NOTE — CONSULTS
Consult Note  Infectious Disease    Consult Requested By: Yash Benavidez MD    Reason for Consult: hypotension and hypothermia.    SUBJECTIVE:     History of Present Illness:  Patient is a 82 y.o. female presents with hypotension and hypothermia. 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 She was recently admitted with chf in januray 2017 and was placed in a snf 1/28/17. She is now here obtunded and hypothermic. Blood cultures are negative, urine and stool cultures pending.c diff toxin is negative and she has diarrhea.    Past Medical History   Diagnosis Date    Anticoagulant long-term use     Arthritis     Atrial fibrillation     Atrial flutter     Carcinoma of right breast, estrogen receptor negative and progesterone receptor positive 02/2008    Chronic combined systolic and diastolic congestive heart failure     Colon cancer     Diabetes mellitus     Dilated cardiomyopathy 12/01/2008     adriamycin toxicity    Dyslipidemia     Encounter for blood transfusion     Hyperlipidemia     Hypertension     Supraventricular tachycardia      PAF, AFL     Past Surgical History   Procedure Laterality Date    Colon surgery  2000     upper colon removal    Cataract extraction  8/5/09     both eyes (Dr. Adam)    Cardioversion  04/24/2014    Total abdominal hysterectomy w/ bilateral salpingoophorectomy  1973    Mastectomy, partial Right 03/14/2008     R right    Total hip arthroplasty  03/05/2010     anuerysm in hip/right hip    Hysterectomy      Eye surgery       Family History   Problem Relation Age of Onset    Heart disease Mother     Hypertension Mother     Diabetes Mother     Glaucoma Mother     Glaucoma Maternal Aunt      Social History   Substance Use Topics    Smoking status: Never Smoker    Smokeless tobacco: Never Used    Alcohol use No       Review of patient's allergies indicates:   Allergen Reactions    Shellfish  containing products Itching     Can eat shrimp    Adhesive      Other reaction(s): blisters    Iodinated contrast media - iv dye      Other reaction(s): Vomiting    Iodine      Other reaction(s): Topical  Other reaction(s): Rash    Morphine      Other reaction(s): Hallucinations        Antibiotics     Start     Stop Route Frequency Ordered    02/03/17 2300  piperacillin-tazobactam 4.5 g in dextrose 5 % 100 mL IVPB (ready to mix system)      -- IV Every 12 hours 02/03/17 2246    02/04/17 1015  metronidazole IVPB 500 mg      -- IV Every 8 hours (non-standard times) 02/04/17 0900    02/04/17 1257  vancomycin 1 g in dextrose 5 % 250 mL IVPB (ready to mix system)      -- IV Daily PRN 02/04/17 1257          Review of Systems:  Review of systems not obtained due to patient factors unresponsive.    OBJECTIVE:     Vital Signs (Most Recent)  Temp: 96 °F (35.6 °C) (02/05/17 0800)  Pulse: 102 (02/05/17 1300)  Resp: 20 (02/05/17 1300)  BP: 99/72 (02/05/17 1300)  SpO2: 100 % (02/05/17 1300)    Temperature Range Min/Max (Last 24H):  Temp:  [96 °F (35.6 °C)-98.1 °F (36.7 °C)]     Physical Exam:  General: cachectic  Eyes: conjunctivae/corneas clear. PERRL..  HENT: Head:normocephalic, atraumatic. Ears:not examined. Nose: Nares normal. Septum midline. Mucosa normal. No drainage or sinus tenderness., no discharge. Throat: lips, mucosa, and tongue normal; teeth and gums normal and no throat erythema.  Neck: supple, symmetrical, trachea midline, no JVD and thyroid not enlarged, symmetric, no tenderness/mass/nodules  Lungs:  clear to auscultation bilaterally and normal respiratory effort  Cardiovascular: Heart: regular rate and rhythm, S1, S2 normal, no murmur, click, rub or gallop. Chest Wall: no tenderness. Extremities: excoriated skin. Pulses: not examined.  Abdomen/Rectal: Abdomen: soft, non-tender non-distented; bowel sounds normal; no masses,  no organomegaly. Rectal: not examined  Skin: as above  Musculoskeletal:no clubbing,  cyanosis    Laboratory:  CBC    Recent Labs  Lab 02/05/17 0418   WBC 15.74*   RBC 4.47   HGB 11.0*   HCT 36.1*        BMP    Recent Labs  Lab 02/05/17 0418   CO2 24   BUN 78*   CREATININE 3.5*   CALCIUM 8.2*       Recent Labs  Lab 02/03/17 2010   COLORU Siobhan   SPECGRAV 1.015   PHUR 5.0   PROTEINUA 2+*   BACTERIA Moderate*   NITRITE Negative   LEUKOCYTESUR Negative   UROBILINOGEN 2.0-3.0*   HYALINECASTS 10*     Microbiology Results (last 7 days)     Procedure Component Value Units Date/Time    Urine culture [[id number]] Collected:  02/05/17 1035    Order Status:  Sent Specimen:  Urine from Urine, Catheterized Updated:  02/05/17 1039    Stool culture [[id number]] Collected:  02/05/17 0912    Order Status:  Sent Specimen:  Stool from Stool Updated:  02/05/17 0921    E. coli 0157 antigen [[id number]] Collected:  02/05/17 0912    Order Status:  No result Specimen:  Stool from Stool Updated:  02/05/17 0921    Blood culture [[id number]] Collected:  02/03/17 2055    Order Status:  Completed Specimen:  Blood from Peripheral, Hand, Left Updated:  02/04/17 2303     Blood Culture, Routine No growth to date     Blood Culture, Routine No Growth to date    Blood culture [[id number]] Collected:  02/03/17 2120    Order Status:  Completed Specimen:  Blood from Peripheral, Hand, Right Updated:  02/04/17 2303     Blood Culture, Routine No growth to date     Blood Culture, Routine No Growth to date    Clostridium difficile EIA [[id number]] Collected:  02/04/17 0910    Order Status:  Completed Specimen:  Stool from Stool Updated:  02/04/17 1237     C. diff Antigen Negative     C difficile Toxins A+B, EIA Negative      Testing not recommended for children <24 months old.       Gram stain [[id number]] Collected:  02/04/17 0420    Order Status:  Completed Specimen:  Urine from Urine Updated:  02/04/17 0817     Gram Stain Result Few WBC's      Few epithelial cells      Gram positive cocci in pairs          Diagnostic Results:  Labs:  Reviewed  X-Ray: Reviewed    ASSESSMENT/PLAN:     Active Hospital Problems    Diagnosis  POA    *Acute on chronic renal failure [N17.9, N18.9]  Yes    GI bleed [K92.2]  Yes    UTI (urinary tract infection) [N39.0]  Yes    Hyperkalemia, diminished renal excretion [E87.5]  Yes    Supratherapeutic INR [R79.1]  Yes    Lactic acid acidosis [E87.2]  Yes    Hypothermia [T68.XXXA]  Yes    Encephalopathy, metabolic [G93.41]  Yes    Type 2 diabetes mellitus without complication [E11.9]  Yes    Long term current use of anticoagulant therapy [Z79.01]  Not Applicable    Chronic combined systolic and diastolic CHF, NYHA class 4 [I50.42]  Yes     Chronic    Chronic kidney disease, stage 4, severely decreased GFR [N18.4]  Yes     Chronic    Peripheral vascular disease [I73.9]  Yes     Chronic    Chronic venous stasis dermatitis of both lower extremities [I83.11, I83.12]  Yes     Chronic    Paroxysmal atrial flutter [I48.92]  Yes     Chronic    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 LVEF 20% 12/28/16.      Severe mitral regurgitation [I34.0]  Yes     Chronic     MODERATE-SEVERE      HTN (hypertension) [I10]  Yes    Hyperlipidemia [E78.5]  Yes    Paroxysmal atrial fibrillation [I48.0]  Yes     Chronic     WITH LA THROMBUS 10/12        Resolved Hospital Problems    Diagnosis Date Resolved POA   No resolved problems to display.       1.hypothermic hypotension. Sepsis vs dilated cardiomyopathy and shock.  Ct abdomen and pelvis  Adjust abx   No meningeal signs  2.dilated cardiomyopathy

## 2017-02-05 NOTE — PROGRESS NOTES
Myra Murphy is a 82 y.o. female patient.    Follow for JOSE MIGUEL    More alert today  Comfortable    Scheduled Meds:   metronidazole  500 mg Intravenous Q8H    piperacillin-tazobactam (ZOSYN) IVPB  4.5 g Intravenous Q12H    pravastatin  40 mg Oral Daily    sodium chloride 0.9%  3 mL Intravenous Q8H       Review of patient's allergies indicates:   Allergen Reactions    Shellfish containing products Itching     Can eat shrimp    Adhesive      Other reaction(s): blisters    Iodinated contrast media - iv dye      Other reaction(s): Vomiting    Iodine      Other reaction(s): Topical  Other reaction(s): Rash    Morphine      Other reaction(s): Hallucinations         Vital Signs Range (Last 24H):  Temp:  [96.7 °F (35.9 °C)-98.1 °F (36.7 °C)]   Pulse:  [100-122]   Resp:  [14-47]   BP: ()/(49-82)   SpO2:  [73 %-100 %]     I & O (Last 24H):  Intake/Output Summary (Last 24 hours) at 02/05/17 0713  Last data filed at 02/05/17 0626   Gross per 24 hour   Intake          3757.25 ml   Output             1080 ml   Net          2677.25 ml           Physical Exam:  General appearance: well developed, well nourished, no distress  Lungs:  coarse breath sounds (B)  Heart: regular rate and rhythm  Abdomen: soft, non-tender non-distented; bowel sounds normal; no masses,  no organomegaly  Extremities: no cyanosis or edema, or clubbing    Laboratory:  CBC:   Recent Labs  Lab 02/05/17  0418   WBC 15.74*   RBC 4.47   HGB 11.0*   HCT 36.1*      MCV 81*   MCH 24.6*   MCHC 30.5*     CMP:   Recent Labs  Lab 02/03/17  1925  02/05/17  0418   *  < > 177*   CALCIUM 9.2  < > 8.2*   ALBUMIN 2.7*  --   --    PROT 6.4  --   --    *  < > 138   K 7.5*  < > 4.2   CO2 8*  < > 24   CL 99  < > 97   BUN 77*  < > 78*   CREATININE 3.4*  < > 3.5*   ALKPHOS 134  --   --    ALT 31  --   --    AST 45*  --   --    BILITOT 2.6*  --   --    < > = values in this interval not displayed.  Microbiology Results (last 7 days)     Procedure  Component Value Units Date/Time    Blood culture [[id number]] Collected:  02/03/17 2055    Order Status:  Completed Specimen:  Blood from Peripheral, Hand, Left Updated:  02/04/17 2303     Blood Culture, Routine No growth to date     Blood Culture, Routine No Growth to date    Blood culture [[id number]] Collected:  02/03/17 2120    Order Status:  Completed Specimen:  Blood from Peripheral, Hand, Right Updated:  02/04/17 2303     Blood Culture, Routine No growth to date     Blood Culture, Routine No Growth to date    Stool culture [[id number]]     Order Status:  No result Specimen:  Stool from Stool     Clostridium difficile EIA [[id number]] Collected:  02/04/17 0910    Order Status:  Completed Specimen:  Stool from Stool Updated:  02/04/17 1237     C. diff Antigen Negative     C difficile Toxins A+B, EIA Negative      Testing not recommended for children <24 months old.       Gram stain [[id number]] Collected:  02/04/17 0420    Order Status:  Completed Specimen:  Urine from Urine Updated:  02/04/17 0817     Gram Stain Result Few WBC's      Few epithelial cells      Gram positive cocci in pairs          Recent Labs  Lab 02/03/17 2010   COLORU Siobhan   SPECGRAV 1.015   PHUR 5.0   PROTEINUA 2+*   BACTERIA Moderate*   NITRITE Negative   LEUKOCYTESUR Negative   UROBILINOGEN 2.0-3.0*   HYALINECASTS 10*       Imp/Plan    JOSE MIGUEL - creatinine stable, u/o has been improving  Sepsis  UTI  Metabolic acidosis - improving  HTN  DM type 2  Anemia of chronic disease    Continue present Rx  Vancomycin as per level  CMP in am      Trac T Le  2/5/2017

## 2017-02-05 NOTE — ASSESSMENT & PLAN NOTE
· Multifactorial etiology: Underlying dementia with hypoglycemia, hypothermia, acute renal failure, lactic acid acidosis and possibly from elevated ammonia   ·   · Treat underlying conditions as outlined above

## 2017-02-05 NOTE — ASSESSMENT & PLAN NOTE
· Secondary to acute renal failure as above.    · Also suspicious for a hypotensive episode prior to admission.  ·

## 2017-02-05 NOTE — ASSESSMENT & PLAN NOTE
Compensated CHF   · Evidenced by history,   · No evidence of pulmonary edema, peripheral edema  · Provide diuresis w/ by mouth medication  · Maintain w/ beta-blocker, ACE inhibitor once renal function has stabilized  · Daily Weights  · Strict I/O  · Low-sodium cardiac diet  · Chest X-ray  · Check TSH, albumin, UA, and renal function  · Obtain 2D echo if <6 months     EF   Date Value Ref Range Status   02/04/2017 30 (A) 55 - 65    12/28/2016 20 (A) 55 - 65      · EKG and cardiac enzymes PRN  · DVT prophylaxis w/ pharmacological and/or mechanical measures  · Oxygen supplementation support PRN    Instructions given to patient/family:  Monitor daily weight.  Regular activity within patient's limitations.  Low salt, low fat and low choleterol diet and restrict fluid < 2L per day.  Call MD if SOB, chest pain, weight gain > 2-3 lbs per day and/or 5-6 lbs per week.   No smoking. Annual influenza vaccine required.

## 2017-02-05 NOTE — PLAN OF CARE
02/04/17 1640   Discharge Assessment   Assessment Type Discharge Planning Assessment   Patient/Family In Agreement With Plan unable to assess  (Attempted d/c assessment in ICU, no family at bedside)

## 2017-02-05 NOTE — PLAN OF CARE
02/05/17 1652   Discharge Assessment   Assessment Type Discharge Planning Assessment   Confirmed/corrected address and phone number on facesheet? Yes   Assessment information obtained from? Caregiver   Expected Length of Stay (days) (undetermined at this time)   Communicated expected length of stay with patient/caregiver yes   If Healthcare Directive is received, is it scanned into Epic? no (comment)   Prior to hospitilization cognitive status: Alert/Oriented   Prior to hospitalization functional status: Needs Assistance;Assistive Equipment   Current cognitive status: (asleep)   Current Functional Status: Assistive Equipment;Needs Assistance   Arrived From home or self-care   Lives With alone   Able to Return to Prior Arrangements yes   Is patient able to care for self after discharge? Unable to determine at this time (comments)   How many people do you have in your home that can help with your care after discharge? 1   Who are your caregiver(s) and their phone number(s)? sisterCaty   Patient's perception of discharge disposition home health   Readmission Within The Last 30 Days current reason for admission unrelated to previous admission   Patient currently being followed by outpatient case management? No   Patient currently receives home health services? No   Does the patient currently use HME? Yes   Name and contact number for HME provider: [facility]   Patient currently receives private duty nursing? No   Patient currently receives any other outside agency services? No   Equipment Currently Used at Home raised toilet;wheelchair;walker, rolling;CPAP;glucometer   Do you have any problems affording any of your prescribed medications? Yes  (family has had to assist)   If yes, what medications? none have been left at the pharmacy   Is the patient taking medications as prescribed? yes   Do you have any financial concerns preventing you from receiving the healthcare you need? No   Does the patient have  transportation to healthcare appointments? Yes   Transportation Available family or friend will provide   On Dialysis? No   Does the patient receive services at the Coumadin Clinic? No   Are there any open cases? No   Discharge Plan A Home Health   Discharge Plan B Home   Patient/Family In Agreement With Plan yes     [facility] [id number] - [place], [place] - [address] AT [facility] [address]  [address] [facility] [address]  Phone: [phone] Fax: [phone]    [facility] - [facility] [address] [facility] [address]  [address] [facility] [address]  Phone: [phone] Fax: [phone]    TN/RACHELL roll explained to pt.

## 2017-02-05 NOTE — PLAN OF CARE
Problem: Patient Care Overview  Goal: Plan of Care Review  Outcome: Ongoing (interventions implemented as appropriate)  Plan of care reviewed. No falls/injuries this shift. Skin assessed. Spoke with sister via phone to update. Urine output improved. Ng tube placed. CT scan ordered. md evans consulted. Pt remains confused. NPO. PRN vanc given x1. Remains on bicarb drip.

## 2017-02-05 NOTE — SUBJECTIVE & OBJECTIVE
Interval History:  No new issues. About the same today     Review of Systems   Unable to perform ROS: Dementia     Objective:     Vital Signs (Most Recent):  Temp: 96 °F (35.6 °C) (02/05/17 0800)  Pulse: 106 (02/05/17 1015)  Resp: (!) 22 (02/05/17 1015)  BP: 109/73 (02/05/17 1000)  SpO2: (!) 94 % (02/05/17 1015) Vital Signs (24h Range):  Temp:  [96 °F (35.6 °C)-98.1 °F (36.7 °C)] 96 °F (35.6 °C)  Pulse:  [100-122] 106  Resp:  [18-47] 22  SpO2:  [73 %-100 %] 94 %  BP: ()/(47-84) 109/73     Weight: 71.5 kg (157 lb 10.1 oz)  Body mass index is 30.78 kg/(m^2).    Intake/Output Summary (Last 24 hours) at 02/05/17 1016  Last data filed at 02/05/17 1000   Gross per 24 hour   Intake          4190.44 ml   Output             1220 ml   Net          2970.44 ml      Physical Exam   Constitutional: She appears well-developed and well-nourished.   Cardiovascular: Normal rate.    Pulmonary/Chest: Effort normal and breath sounds normal. No respiratory distress. She has no wheezes.   Abdominal: Soft.   Hypoactive BS. NT/ND.        Significant Labs:   BMP:   Recent Labs  Lab 02/05/17  0418   *      K 4.2   CL 97   CO2 24   BUN 78*   CREATININE 3.5*   CALCIUM 8.2*   MG 2.2     CBC:   Recent Labs  Lab 02/03/17  1925 02/04/17  0310 02/05/17  0418   WBC 10.94 16.68* 15.74*   HGB 11.2* 10.8* 11.0*   HCT 39.1 37.7 36.1*    182 170       Significant Imaging:

## 2017-02-05 NOTE — PROGRESS NOTES
Progress Note    Admit Date: 2/3/2017   LOS: 1 day     SUBJECTIVE:     Chief Complaint:  Following patient for blood tinged stools    HPI: Patient is more stable today.  She was in septic shock, renal insufficiency, hypotension and had change in mental status.  Her hyperkalemia has been corrected. She was nauseated last night and vomited x 3. NO hematemesis. No more rectal bleeding. Her hypotension has also improved and she is off pressors.     Scheduled Meds:   metronidazole  500 mg Intravenous Q8H    pantoprazole  40 mg Intravenous BID    piperacillin-tazobactam (ZOSYN) IVPB  4.5 g Intravenous Q12H    pravastatin  40 mg Oral Daily    sodium chloride 0.9%  3 mL Intravenous Q8H     Continuous Infusions:   norepinephrine bitartrate-D5W 0.1 mcg/kg/min (02/05/17 0600)    sodium bicarbonate drip 125 mL/hr at 02/05/17 0623     PRN Meds:acetaminophen, bisacodyl, dextrose 50%, glucagon (human recombinant), flu vacc [id number] 65yr up(PF), insulin aspart, mineral oil, ondansetron, polyethylene glycol, promethazine, ramelteon, vancomycin (VANCOCIN) IVPB    Review of patient's allergies indicates:   Allergen Reactions    Shellfish containing products Itching     Can eat shrimp    Adhesive      Other reaction(s): blisters    Iodinated contrast media - iv dye      Other reaction(s): Vomiting    Iodine      Other reaction(s): Topical  Other reaction(s): Rash    Morphine      Other reaction(s): Hallucinations       Review of Systems  Patient did not answer any questions.    OBJECTIVE:     Vital Signs (Most Recent)  Temp: 96.7 °F (35.9 °C) (02/05/17 0327)  Pulse: 104 (02/05/17 0730)  Resp: (!) 21 (02/05/17 0730)  BP: 90/70 (02/05/17 0730)  SpO2: 100 % (02/05/17 0730)    Vital Signs Range (Last 24H):  Temp:  [96.7 °F (35.9 °C)-98.1 °F (36.7 °C)]   Pulse:  [100-122]   Resp:  [18-47]   BP: ()/(47-82)   SpO2:  [73 %-100 %]     I & O (Last 24H):  Intake/Output Summary (Last 24 hours) at 02/05/17 9375  Last data filed  at 02/05/17 0626   Gross per 24 hour   Intake          3688.55 ml   Output             1080 ml   Net          2608.55 ml       Physical Exam:  General- Patient appears awake but does not answer any questions.  HEENT- PERRLA, EOMI, OP clear, MMM  Neck- No JVD, Lymphadenopathy, Thyromegaly  CV- Irregular heartbeats. No Murmur/belkis/rubs  Resp- Lungs CTA Bilaterally, No increased WOB  Abdomen- Non tender/non-distended, Bowel sounds are markedly reduced.  Extrem- No cyanosis, clubbing, edema. Pulses 2+ and symmetric      Laboratory:  CBC:   Recent Labs  Lab 02/05/17 0418   WBC 15.74*   RBC 4.47   HGB 11.0*   HCT 36.1*      MCV 81*   MCH 24.6*   MCHC 30.5*     CMP:   Recent Labs  Lab 02/03/17 1925 02/05/17 0418   *  < > 177*   CALCIUM 9.2  < > 8.2*   ALBUMIN 2.7*  --   --    PROT 6.4  --   --    *  < > 138   K 7.5*  < > 4.2   CO2 8*  < > 24   CL 99  < > 97   BUN 77*  < > 78*   CREATININE 3.4*  < > 3.5*   ALKPHOS 134  --   --    ALT 31  --   --    AST 45*  --   --    BILITOT 2.6*  --   --    < > = values in this interval not displayed.  LFTs:   Recent Labs  Lab 02/03/17 1925   ALT 31   AST 45*   ALKPHOS 134   BILITOT 2.6*   PROT 6.4   ALBUMIN 2.7*     Coagulation:   Recent Labs  Lab 02/05/17 0418   INR 4.8*       Diagnostic Results:  Labs: Reviewed  H & H is stable. acidosis getting better. Co2 24    ASSESSMENT:     Patient Active Problem List   Diagnosis    Paroxysmal atrial fibrillation    Anticoagulation monitoring by pharmacist    Nonischemic dilated cardiomyopathy    MANE (obstructive sleep apnea)    Severe mitral regurgitation    HTN (hypertension)    Hyperlipidemia    Paroxysmal atrial flutter    Allergic rhinitis due to allergen    Hip pain    History of breast cancer    Peripheral vascular disease    Chronic venous stasis dermatitis of both lower extremities    Pyogenic arthritis, forearm    Malignant neoplasm of colon    Hypertensive heart disease with heart failure     Paroxysmal ventricular tachycardia    Right heart failure    Tricuspid regurgitation    Debility    Chronic combined systolic and diastolic CHF, NYHA class 4    Chronic kidney disease, stage 4, severely decreased GFR    Long term current use of anticoagulant therapy    Bilateral leg edema    Tinea pedis    Dystrophic nail    Vitamin D deficiency    Type 2 diabetes mellitus without complication    Cardiorenal syndrome    Pulmonary edema    Long term current use of aromatase inhibitor    Iron deficiency anemia    Memory deficit    Acute on chronic renal failure    Hyperkalemia, diminished renal excretion    Supratherapeutic INR    Lactic acid acidosis    Hypothermia    Encephalopathy, metabolic         PLAN:     Continue management for septic shock and renal insufficiency.  Symptomatic treatment for nausea with zofran.  Will also start her on Pantoprazole 40 mg IVP bid.  Stool cultures are pending.

## 2017-02-06 PROBLEM — R57.9 SHOCK: Status: ACTIVE | Noted: 2017-01-01

## 2017-02-06 PROBLEM — K56.609 SBO (SMALL BOWEL OBSTRUCTION): Status: ACTIVE | Noted: 2017-01-01

## 2017-02-06 NOTE — CONSULTS
Consult Note  Infectious Disease    Consult Requested By: Yash Benavidez MD    Reason for Consult: hypotension and hypothermia.    SUBJECTIVE:     History of Present Illness:  Patient is a 82 y.o. female presents with hypotension and hypothermia. 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 She was recently admitted with chf in januray 2017 and was placed in a snf 1/28/17. She is now here obtunded and hypothermic. Blood cultures are negative, urine and stool cultures negative form 2/3/17.c diff toxin is negative and she has no diarrhea now. Ct abdomen shows a sbo and rt inguinal hernia.    Past Medical History   Diagnosis Date    Anticoagulant long-term use     Arthritis     Atrial fibrillation     Atrial flutter     Carcinoma of right breast, estrogen receptor negative and progesterone receptor positive 02/2008    Chronic combined systolic and diastolic congestive heart failure     Colon cancer     Diabetes mellitus     Dilated cardiomyopathy 12/01/2008     adriamycin toxicity    Dyslipidemia     Encounter for blood transfusion     Hyperlipidemia     Hypertension     Supraventricular tachycardia      PAF, AFL     Past Surgical History   Procedure Laterality Date    Colon surgery  2000     upper colon removal    Cataract extraction  8/5/09     both eyes (Dr. Adam)    Cardioversion  04/24/2014    Total abdominal hysterectomy w/ bilateral salpingoophorectomy  1973    Mastectomy, partial Right 03/14/2008     R right    Total hip arthroplasty  03/05/2010     anuerysm in hip/right hip    Hysterectomy      Eye surgery       Family History   Problem Relation Age of Onset    Heart disease Mother     Hypertension Mother     Diabetes Mother     Glaucoma Mother     Glaucoma Maternal Aunt      Social History   Substance Use Topics    Smoking status: Never Smoker    Smokeless tobacco: Never Used    Alcohol use No       Review of  patient's allergies indicates:   Allergen Reactions    Shellfish containing products Itching     Can eat shrimp    Adhesive      Other reaction(s): blisters    Iodinated contrast media - iv dye      Other reaction(s): Vomiting    Iodine      Other reaction(s): Topical  Other reaction(s): Rash    Morphine      Other reaction(s): Hallucinations        Antibiotics     Start     Stop Route Frequency Ordered    02/03/17 2300  piperacillin-tazobactam 4.5 g in dextrose 5 % 100 mL IVPB (ready to mix system)      -- IV Every 12 hours 02/03/17 2246    02/04/17 1257  vancomycin 1 g in dextrose 5 % 250 mL IVPB (ready to mix system)      -- IV Daily PRN 02/04/17 1257          Review of Systems:  Review of systems not obtained due to patient factors unresponsive.    OBJECTIVE:     Vital Signs (Most Recent)  Temp: 97.5 °F (36.4 °C) (02/06/17 0830)  Pulse: 102 (02/06/17 1018)  Resp: (!) 21 (02/06/17 1018)  BP: 97/69 (02/06/17 1017)  SpO2: 97 % (02/06/17 1018)    Temperature Range Min/Max (Last 24H):  Temp:  [95 °F (35 °C)-97.5 °F (36.4 °C)]     Physical Exam:  General: cachectic  Eyes: conjunctivae/corneas clear. PERRL..  HENT: Head:normocephalic, atraumatic. Ears:not examined. Nose: Nares normal. Septum midline. Mucosa normal. No drainage or sinus tenderness., no discharge. Throat: lips, mucosa, and tongue normal; teeth and gums normal and no throat erythema.  Neck: supple, symmetrical, trachea midline, no JVD and thyroid not enlarged, symmetric, no tenderness/mass/nodules  Lungs:  clear to auscultation bilaterally and normal respiratory effort  Cardiovascular: Heart: regular rate and rhythm, S1, S2 normal, no murmur, click, rub or gallop. Chest Wall: no tenderness. Extremities: excoriated skin. Pulses: not examined.  Abdomen/Rectal: Abdomen: soft, non-tender non-distented; bowel sounds normal; rt inguinal region with reducible hernia but tender  Skin: as above  Musculoskeletal:no clubbing,  cyanosis    Laboratory:  CBC    Recent Labs  Lab 02/06/17 0330   WBC 11.63   RBC 4.50   HGB 10.9*   HCT 35.7*        BMP    Recent Labs  Lab 02/06/17 0330   CO2 30*   BUN 64*   CREATININE 3.1*   CALCIUM 8.1*       Recent Labs  Lab 02/03/17 2010   COLORU Siobhan   SPECGRAV 1.015   PHUR 5.0   PROTEINUA 2+*   BACTERIA Moderate*   NITRITE Negative   LEUKOCYTESUR Negative   UROBILINOGEN 2.0-3.0*   HYALINECASTS 10*     Microbiology Results (last 7 days)     Procedure Component Value Units Date/Time    Urine culture [[id number]] Collected:  02/05/17 1035    Order Status:  Completed Specimen:  Urine from Urine, Catheterized Updated:  02/06/17 0748     Urine Culture, Routine No growth to date    Stool culture [[id number]] Collected:  02/05/17 0912    Order Status:  Completed Specimen:  Stool from Stool Updated:  02/06/17 0739     Stool Culture Culture in progress    Blood culture [[id number]] Collected:  02/03/17 2055    Order Status:  Completed Specimen:  Blood from Peripheral, Hand, Left Updated:  02/05/17 2303     Blood Culture, Routine No growth to date     Blood Culture, Routine No Growth to date     Blood Culture, Routine No Growth to date    Blood culture [[id number]] Collected:  02/03/17 2120    Order Status:  Completed Specimen:  Blood from Peripheral, Hand, Right Updated:  02/05/17 2303     Blood Culture, Routine No growth to date     Blood Culture, Routine No Growth to date     Blood Culture, Routine No Growth to date    E. coli 0157 antigen [[id number]] Collected:  02/05/17 0912    Order Status:  No result Specimen:  Stool from Stool Updated:  02/05/17 0921    Clostridium difficile EIA [[id number]] Collected:  02/04/17 0910    Order Status:  Completed Specimen:  Stool from Stool Updated:  02/04/17 1237     C. diff Antigen Negative     C difficile Toxins A+B, EIA Negative      Testing not recommended for children <24 months old.       Gram stain [[id number]] Collected:  02/04/17 0420    Order Status:  Completed  Specimen:  Urine from Urine Updated:  02/04/17 0817     Gram Stain Result Few WBC's      Few epithelial cells      Gram positive cocci in pairs          Diagnostic Results:  Labs: Reviewed  X-Ray: Reviewed    ASSESSMENT/PLAN:     Active Hospital Problems    Diagnosis  POA    *Acute on chronic renal failure [N17.9, N18.9]  Yes    Shock [R57.9]  Yes    SBO (small bowel obstruction) [K56.69]  Yes    GI bleed [K92.2]  Yes    UTI (urinary tract infection) [N39.0]  Yes    Hyperkalemia, diminished renal excretion [E87.5]  Yes    Supratherapeutic INR [R79.1]  Yes    Lactic acid acidosis [E87.2]  Yes    Hypothermia [T68.XXXA]  Yes    Encephalopathy, metabolic [G93.41]  Yes    Type 2 diabetes mellitus without complication [E11.9]  Yes    Long term current use of anticoagulant therapy [Z79.01]  Not Applicable    Chronic combined systolic and diastolic CHF, NYHA class 4 [I50.42]  Yes     Chronic    Chronic kidney disease, stage 4, severely decreased GFR [N18.4]  Yes     Chronic    Peripheral vascular disease [I73.9]  Yes     Chronic    Chronic venous stasis dermatitis of both lower extremities [I83.11, I83.12]  Yes     Chronic    Paroxysmal atrial flutter [I48.92]  Yes     Chronic    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 LVEF 20% 12/28/16.      Severe mitral regurgitation [I34.0]  Yes     Chronic     MODERATE-SEVERE      HTN (hypertension) [I10]  Yes    Hyperlipidemia [E78.5]  Yes    Paroxysmal atrial fibrillation [I48.0]  Yes     Chronic     WITH LA THROMBUS 10/12        Resolved Hospital Problems    Diagnosis Date Resolved POA   No resolved problems to display.       1.hypothermic hypotension. Sepsis vs dilated cardiomyopathy and shock.  Ct abdomen and pelvis with sbo and rt inguinal hernia. Surgical review pending  Adjust abx   No meningeal signs  2.dilated cardiomyopathy  Will dc vancomycin

## 2017-02-06 NOTE — PROGRESS NOTES
[facility] Hospital Medicine  Progress Note    Patient Name: Myra Murphy  MRN: [id number]  Patient Class: IP- Inpatient   Admission Date: 2/3/2017  Length of Stay: 2 days  Attending Physician: Yash Benavidez MD  Primary Care Provider: Lupe Delatorre MD        Subjective:     Principal Problem:Acute on chronic renal failure    HPI:  Myra Murphy is a 82 y.o. female that (in part)  has a past medical history of Anticoagulant long-term use; Arthritis; Atrial fibrillation; Atrial flutter; Carcinoma of right breast, estrogen receptor negative and progesterone receptor positive (02/2008); Chronic combined systolic and diastolic congestive heart failure; Colon cancer; Diabetes mellitus; Dilated cardiomyopathy (12/01/2008); Dyslipidemia; Encounter for blood transfusion; Hyperlipidemia; Hypertension; and Supraventricular tachycardia. Presents to [facility] Emergency Department from nursing home with multiple issues including hypotension, left upper quadrant pain, and emesis ×2. EMS reports an initial blood pressure 52/34 and a blood glucose of 137. She was confused upon admission and was barely arousable to sternal rub. The history is otherwise limited due to the condition of the patient.      In the emergency department she underwent routine laboratory studies, CT imaging, and EKG. She had transient episodes of hypoglycemia and she had discordant low-pressure readings in her upper extremities although this is thought to be secondary to persistent mastectomy. Multiple abnormalities on laboratory studies were identified including acute renal failure, hyperkalemia, and severe lactic acid acidosis with a lactate of greater than 12 × 2 measures. She was also found to be hypothermic. After fluid resuscitation and treatment for hypokalemia as well as administration of D50 her mental status improved.     Hospital medicine has been asked to admit for further evaluation and  treatment.       Hospital Course:  The patient was admitted to the hospital for sepsis thought secondary to a UTI.  The patient initially required pressor support.  ID was consulted on 2/5 to help with management.  Heme + stools were reported by nursing staff and GI was consulted and started patient on a PPI BID. Nephrology was consulted for renal failure.  Interestingly, her urine culture was no growth. The patient had a CT of abdomen and pelvis on 2/6 that showed a SBO that may be related to R inguinal hernia. NG tube was placed and surgery was consulted.  Cards was consulted as well to follow as the patient has a cardiac history. She remained in the ICU on pressor support.      Interval History:  No new issues. A little more alert today.     Review of Systems   Unable to perform ROS: Acuity of condition     Objective:     Vital Signs (Most Recent):  Temp: 97.5 °F (36.4 °C) (02/06/17 0400)  Pulse: 102 (02/06/17 0918)  Resp: (!) 24 (02/06/17 0918)  BP: (!) 84/62 (02/06/17 0917)  SpO2: 99 % (02/06/17 0917) Vital Signs (24h Range):  Temp:  [95 °F (35 °C)-97.5 °F (36.4 °C)] 97.5 °F (36.4 °C)  Pulse:  [] 102  Resp:  [10-35] 24  SpO2:  [94 %-100 %] 99 %  BP: ()/(51-85) 84/62     Weight: 72.8 kg (160 lb 7.9 oz)  Body mass index is 31.34 kg/(m^2).    Intake/Output Summary (Last 24 hours) at 02/06/17 0944  Last data filed at 02/06/17 0900   Gross per 24 hour   Intake           4000.1 ml   Output             2155 ml   Net           1845.1 ml      Physical Exam   Constitutional: She appears well-developed and well-nourished.   Cardiovascular: Normal rate.    Pulmonary/Chest: Effort normal and breath sounds normal. No respiratory distress. She has no wheezes.   Abdominal: Soft. She exhibits no distension. There is no tenderness.   Skin: Skin is warm and dry.   Nursing note and vitals reviewed.      Significant Labs:   CBC:   Recent Labs  Lab 02/05/17  0418 02/06/17  0330   WBC 15.74* 11.63   HGB 11.0* 10.9*   HCT  36.1* 35.7*    162     CMP:   Recent Labs  Lab 02/04/17  1315 02/05/17  0418 02/06/17  0330   NA  --  138 133*   K 5.6* 4.2 3.4*   CL  --  97 90*   CO2  --  24 30*   GLU  --  177* 138*   BUN  --  78* 64*   CREATININE  --  3.5* 3.1*   CALCIUM  --  8.2* 8.1*   ANIONGAP  --  17* 13   EGFRNONAA  --  12* 13*       Significant Imaging: CT of abdomen reviewed.      Assessment/Plan:      * Acute on chronic renal failure  Acute renal failure with resulting hyperkalemia and lactic acidosis  · As evidenced by decrease in GFR.   · Will evaluate for pre-renal, intrarenal, and post-obstructive etiology.  · Obtain:  1.  protein/creatinine ratio  2. urine and serum osmolalities  3. urine electrolytes (Na, Cl, K)  4. LDH  5. Complement  6. Florian's stain for osinophils  · Renal ultrasound  · Monitor with serial Cr / GFR levels closely with serial labs  · Avoid nephrotoxic medications such as NSAIDs, IV contrast, or RAAS blockade  · Nephrology consulted and following    Her baseline CRT is 1.7 from 1/24.      Paroxysmal atrial fibrillation  monitor with telemetry  Will consult cards.      Nonischemic dilated cardiomyopathy  Compensated CHF   · Evidenced by history,   · No evidence of pulmonary edema, peripheral edema  · Provide diuresis w/ by mouth medication  · Maintain w/ beta-blocker, ACE inhibitor once renal function has stabilized  · Daily Weights  · Strict I/O  · Low-sodium cardiac diet  · Chest X-ray  · Check TSH, albumin, UA, and renal function  · Obtain 2D echo if <6 months     EF   Date Value Ref Range Status   02/04/2017 30 (A) 55 - 65    12/28/2016 20 (A) 55 - 65      · EKG and cardiac enzymes PRN  · DVT prophylaxis w/ pharmacological and/or mechanical measures  · Oxygen supplementation support PRN    Instructions given to patient/family:  Monitor daily weight.  Regular activity within patient's limitations.  Low salt, low fat and low choleterol diet and restrict fluid < 2L per day.  Call MD if SOB, chest pain,  weight gain > 2-3 lbs per day and/or 5-6 lbs per week.   No smoking. Annual influenza vaccine required.        Severe mitral regurgitation  Management of CHF as above      HTN (hypertension)  Benign essential- meds on hold for hypotension       Hyperlipidemia  No acute issues       Paroxysmal atrial flutter  monitor with telemetry      Peripheral vascular disease  Peripheral vascular disease   · No evidence of acute issue at this time  · Maintain adequate blood pressure control  · Heart healthy diet  · Aspirin  · Statin            Chronic venous stasis dermatitis of both lower extremities  Supportive care.  Wound care as needed.      Chronic combined systolic and diastolic CHF, NYHA class 4  Management of CHF as above      Chronic kidney disease, stage 4, severely decreased GFR  · Renal dose medications  · Avoid nephrotoxic agents  · Maintain euvolemic state      Long term current use of anticoagulant therapy        Type 2 diabetes mellitus without complication  · BG in acceptable range at this time  · Maintain w/ subcutaneous insulin management order set  · Hold oral diabetic meds  · ADA 1800 kcal diet  · BG goal while in patient is <180mg/dL  · HgA1c = Pending      Hyperkalemia, diminished renal excretion  · Elevated due to renal failure, spironolactone use, and supplemental potassium use  · Given calcium, Kayexalate, insulin, D50, albuterol, IV fluids  · Monitor with serial labs  · No evidence of EKG changes / peaked Ts  · Monitor on telemetry  · Resolved.     Supratherapeutic INR  Holding coumadin.  INR down not to 2.4.  Will follow. Hold coumadin again today.       Lactic acid acidosis  · Secondary to acute renal failure as above.    · Also suspicious for a hypotensive episode prior to admission.  ·       Hypothermia  · Unknown etiology.  Environmental?  The temperature was in the 40s and 50s today   But unable to determine what her living conditions are like.  · Preston hugger  · Warm fluids    Resolved.        Encephalopathy, metabolic  · Multifactorial etiology: Underlying dementia with hypoglycemia, hypothermia, acute renal failure, lactic acid acidosis and possibly from elevated ammonia   ·   · Treat underlying conditions as outlined above    GI bleed  GI evaluated. H/H stable. PPI for now.        UTI (urinary tract infection)  UTI with sepsis with hypothermia and hypotension requiring pressor support.  Zosyn for now. Blood cultures neg. Unfortunately, no urine culture ordered on admit. Ordered one now for what it is worth.  ID consulted.       Shock  Unclear of cause. I don't think there is infection. Urine culture is negative. Will let ID comment.       SBO (small bowel obstruction)  Will consult surgery.  NG tube placed.  May be secondary to R inguinal hernia.       VTE Risk Mitigation         Ordered     Medium Risk of VTE  Once      02/04/17 0233        Clinically stable.  Appreciate consult help.  Remains on pressor support. ID, GI, cards. surgery and nephrology following. Will d/c bicarb drip and place on NS    Critical care time spent 50 minutes     May need to start TPN in next day or two.     Yash Martinez MD  Department of Hospital Medicine   [facility]

## 2017-02-06 NOTE — ASSESSMENT & PLAN NOTE
Acute renal failure with resulting hyperkalemia and lactic acidosis  · As evidenced by decrease in GFR.   · Will evaluate for pre-renal, intrarenal, and post-obstructive etiology.  · Obtain:  1.  protein/creatinine ratio  2. urine and serum osmolalities  3. urine electrolytes (Na, Cl, K)  4. LDH  5. Complement  6. Florian's stain for osinophils  · Renal ultrasound  · Monitor with serial Cr / GFR levels closely with serial labs  · Avoid nephrotoxic medications such as NSAIDs, IV contrast, or RAAS blockade  · Nephrology consulted and following    Her baseline CRT is 1.7 from 1/24.

## 2017-02-06 NOTE — CONSULTS
An 82 year old female admitted 2/3/17 from [facility] with acute on chronic renal failure; hyperkalemia; encephalopathy, metabolic; CKD Stage 4; lactic acid acidosis; hypothermia; chronic combined systolic and diastolic CHF; cardiomyopathy; severe mitral regurgitation; supratherapeutic INR; DM II; long term current use of anticoagulant therapy; PVD; chronic venous stasis dermatitis of both lower extremities; Afib; HTN; hyperlipidemia  Treatment in past by Podiatry and [facility] with unna boots. Arrived to [facility] wearing unna boots.   2/6 WBC 11.63 Hgb 10.9 Hct 35.7    2/3 Alb 2.7  Assessment:  Photodocumentation    Left lower leg- Dry dark peeling skin on lower legs. Two denuded areas 4 cm on upper lateral leg with bloody drainage. No edema in lower leg.     Right lower leg- Small amount dry peeling dark skin. Light erythema along anterior tibia. No edema.   Weak palpable DP pulses.   Treatment:  Replaced bilateral lower extremity unna boots for continuity of care. Nursing to change unna boots weekly and prn.

## 2017-02-06 NOTE — PROGRESS NOTES
Awake but confused    NAD    ROS unable to obtain    Past Medical History   Diagnosis Date    Anticoagulant long-term use     Arthritis     Atrial fibrillation     Atrial flutter     Carcinoma of right breast, estrogen receptor negative and progesterone receptor positive 02/2008    Chronic combined systolic and diastolic congestive heart failure     Colon cancer     Diabetes mellitus     Dilated cardiomyopathy 12/01/2008     adriamycin toxicity    Dyslipidemia     Encounter for blood transfusion     Hyperlipidemia     Hypertension     Supraventricular tachycardia      PAF, AFL     Review of patient's allergies indicates:   Allergen Reactions    Shellfish containing products Itching     Can eat shrimp    Adhesive      Other reaction(s): blisters    Iodinated contrast media - iv dye      Other reaction(s): Vomiting    Iodine      Other reaction(s): Topical  Other reaction(s): Rash    Morphine      Other reaction(s): Hallucinations       Current Facility-Administered Medications   Medication    acetaminophen tablet 650 mg    bisacodyl suppository 10 mg    dextrose 50% injection 12.5 g    glucagon (human recombinant) injection 1 mg    influenza vaccine 180 mcg/0.5 mL (for patients > 65) injection    insulin aspart pen 0-5 Units    mineral oil enema 1 enema    norepinephrine 4 mg in dextrose 5% 250 mL infusion (premix) (titrating)    ondansetron injection 4 mg    pantoprazole injection 40 mg    piperacillin-tazobactam 4.5 g in dextrose 5 % 100 mL IVPB (ready to mix system)    polyethylene glycol packet 17 g    pravastatin tablet 40 mg    promethazine suppository 25 mg    ramelteon tablet 8 mg    sodium bicarbonate 1 mEq/mL (8.4 %) 100 mEq in sterile water 1,000 mL infusion    sodium chloride 0.9% flush 3 mL    vancomycin 1 g in dextrose 5 % 250 mL IVPB (ready to mix system)       LABS    Recent Results (from the past 24 hour(s))   Urine culture    Collection Time: 02/05/17 10:35 AM    Result Value Ref Range    Urine Culture, Routine No growth to date    Ammonia    Collection Time: 02/05/17 10:59 AM   Result Value Ref Range    Ammonia 41 10 - 50 umol/L   POCT glucose    Collection Time: 02/05/17 12:17 PM   Result Value Ref Range    POCT Glucose 193 (H) 70 - 110 mg/dL   POCT glucose    Collection Time: 02/05/17  5:23 PM   Result Value Ref Range    POCT Glucose 169 (H) 70 - 110 mg/dL   POCT glucose    Collection Time: 02/05/17 11:32 PM   Result Value Ref Range    POCT Glucose 134 (H) 70 - 110 mg/dL   Basic metabolic panel    Collection Time: 02/06/17  3:30 AM   Result Value Ref Range    Sodium 133 (L) 136 - 145 mmol/L    Potassium 3.4 (L) 3.5 - 5.1 mmol/L    Chloride 90 (L) 95 - 110 mmol/L    CO2 30 (H) 23 - 29 mmol/L    Glucose 138 (H) 70 - 110 mg/dL    BUN, Bld 64 (H) 8 - 23 mg/dL    Creatinine 3.1 (H) 0.5 - 1.4 mg/dL    Calcium 8.1 (L) 8.7 - 10.5 mg/dL    Anion Gap 13 8 - 16 mmol/L    eGFR if African American 15 (A) >60 mL/min/1.73 m^2    eGFR if non African American 13 (A) >60 mL/min/1.73 m^2   CBC auto differential    Collection Time: 02/06/17  3:30 AM   Result Value Ref Range    WBC 11.63 3.90 - 12.70 K/uL    RBC 4.50 4.00 - 5.40 M/uL    Hemoglobin 10.9 (L) 12.0 - 16.0 g/dL    Hematocrit 35.7 (L) 37.0 - 48.5 %    MCV 79 (L) 82 - 98 fL    MCH 24.2 (L) 27.0 - 31.0 pg    MCHC 30.5 (L) 32.0 - 36.0 %    RDW 19.3 (H) 11.5 - 14.5 %    Platelets 162 150 - 350 K/uL    MPV 10.0 9.2 - 12.9 fL    Gran # 9.7 (H) 1.8 - 7.7 K/uL    Lymph # 1.2 1.0 - 4.8 K/uL    Mono # 0.8 0.3 - 1.0 K/uL    Eos # 0.0 0.0 - 0.5 K/uL    Baso # 0.01 0.00 - 0.20 K/uL    Gran% 83.0 (H) 38.0 - 73.0 %    Lymph% 10.1 (L) 18.0 - 48.0 %    Mono% 6.8 4.0 - 15.0 %    Eosinophil% 0.0 0.0 - 8.0 %    Basophil% 0.1 0.0 - 1.9 %    Differential Method Automated    Protime-INR    Collection Time: 02/06/17  3:30 AM   Result Value Ref Range    Prothrombin Time 24.8 (H) 9.0 - 12.5 sec    INR 2.4 (H) 0.8 - 1.2   Vancomycin, random    Collection  Time: 02/06/17  3:30 AM   Result Value Ref Range    Vancomycin, Random 18.8 Not established ug/mL   POCT glucose    Collection Time: 02/06/17  5:32 AM   Result Value Ref Range    POCT Glucose 150 (H) 70 - 110 mg/dL   ]    I/O last 3 completed shifts:  In: 6341.3 [I.V.:5251.3; NG/GT:340; IV Piggyback:750]  Out: 2765 [Urine:2500; Emesis/NG output:225; Drains:40]    Vitals:    02/06/17 0800 02/06/17 0827 02/06/17 0835 02/06/17 0836   BP:   (!) 89/60    Pulse: 110 110  106   Resp: (!) 22 (!) 23  17   Temp:       TempSrc:       SpO2: 100% 100% 100%    Weight:       Height:           No Jvd, Thyromegaly or Lymphadenopathy  Lungs: Fairly clear anteriorly and laterally  Cor: IRR no G or rubs  Abd: Soft benign good bowel sounds non tender seems full   Ext: No E C C both LE wrapped and painful to touch    A)  JOSE MIGUEL creat better, good uo  Sepsis seems stable  UTI  MET Acidosis AGap 13  Hx of HTN now hypotension  T2DM  Anemia  Proteinuria of ?? Significance  CMP  NSVT    P)    Continue rx asis

## 2017-02-06 NOTE — PROGRESS NOTES
0835 CT scan resulted, results reported to MD Benavidez. Ng tube in, placed to MD RODRIGO will place orders and consult surgery.

## 2017-02-06 NOTE — PLAN OF CARE
Problem: Patient Care Overview  Goal: Plan of Care Review  Outcome: Ongoing (interventions implemented as appropriate)  Plan of care reviewed. This evening pt more alert, appropriate conversation, remains confused, low voice. IVF adjusted to ns at 100 ml/hr, urine output remains adequate. NPO. Seen by dietician for recommendations. Surgery consulted for SBO per CT results. NG tube to low continuous suction, minimal output. Skin assessment complete. Pulse ox probe site changed. Leda Wallace consulted for bilat leg dressings, will see pt. Pt attempting to pull at medical equipment, education provided. Family updated.

## 2017-02-06 NOTE — SUBJECTIVE & OBJECTIVE
Interval History:  No new issues. A little more alert today.     Review of Systems   Unable to perform ROS: Acuity of condition     Objective:     Vital Signs (Most Recent):  Temp: 97.5 °F (36.4 °C) (02/06/17 0400)  Pulse: 102 (02/06/17 0918)  Resp: (!) 24 (02/06/17 0918)  BP: (!) 84/62 (02/06/17 0917)  SpO2: 99 % (02/06/17 0917) Vital Signs (24h Range):  Temp:  [95 °F (35 °C)-97.5 °F (36.4 °C)] 97.5 °F (36.4 °C)  Pulse:  [] 102  Resp:  [10-35] 24  SpO2:  [94 %-100 %] 99 %  BP: ()/(51-85) 84/62     Weight: 72.8 kg (160 lb 7.9 oz)  Body mass index is 31.34 kg/(m^2).    Intake/Output Summary (Last 24 hours) at 02/06/17 0944  Last data filed at 02/06/17 0900   Gross per 24 hour   Intake           4000.1 ml   Output             2155 ml   Net           1845.1 ml      Physical Exam   Constitutional: She appears well-developed and well-nourished.   Cardiovascular: Normal rate.    Pulmonary/Chest: Effort normal and breath sounds normal. No respiratory distress. She has no wheezes.   Abdominal: Soft. She exhibits no distension. There is no tenderness.   Skin: Skin is warm and dry.   Nursing note and vitals reviewed.      Significant Labs:   CBC:   Recent Labs  Lab 02/05/17  0418 02/06/17  0330   WBC 15.74* 11.63   HGB 11.0* 10.9*   HCT 36.1* 35.7*    162     CMP:   Recent Labs  Lab 02/04/17  1315 02/05/17  0418 02/06/17  0330   NA  --  138 133*   K 5.6* 4.2 3.4*   CL  --  97 90*   CO2  --  24 30*   GLU  --  177* 138*   BUN  --  78* 64*   CREATININE  --  3.5* 3.1*   CALCIUM  --  8.2* 8.1*   ANIONGAP  --  17* 13   EGFRNONAA  --  12* 13*       Significant Imaging: CT of abdomen reviewed.

## 2017-02-06 NOTE — PROGRESS NOTES
The nurse denies any bleeding or emesis.  The patient is back on levophed.    Vitals:    02/06/17 0551 02/06/17 0600 02/06/17 0602 02/06/17 0605   BP: 106/85  94/68    BP Location:       Patient Position:       BP Method:       Pulse:  108  110   Resp:  20  (!) 22   Temp:       TempSrc:       SpO2:  99%  100%   Weight:       Height:          pantoprazole  40 mg Intravenous BID    piperacillin-tazobactam (ZOSYN) IVPB  4.5 g Intravenous Q12H    pravastatin  40 mg Oral Daily    sodium chloride 0.9%  3 mL Intravenous Q8H     P.E.:  GEN: Alert, not oriented, NAD  HEENT: EOMI, PERRL, anicteric sclera  CV: RRR, no M/R/G  Chest: CTA B  Abdomen: soft, NTND, normoactive BS  Ext: No C/C/E. 2+ dorsalis pedis pulses B    Labs:  Recent Results (from the past 336 hour(s))   CBC auto differential    Collection Time: 02/06/17  3:30 AM   Result Value Ref Range    WBC 11.63 3.90 - 12.70 K/uL    Hemoglobin 10.9 (L) 12.0 - 16.0 g/dL    Hematocrit 35.7 (L) 37.0 - 48.5 %    Platelets 162 150 - 350 K/uL   CBC auto differential    Collection Time: 02/05/17  4:18 AM   Result Value Ref Range    WBC 15.74 (H) 3.90 - 12.70 K/uL    Hemoglobin 11.0 (L) 12.0 - 16.0 g/dL    Hematocrit 36.1 (L) 37.0 - 48.5 %    Platelets 170 150 - 350 K/uL   CBC auto differential    Collection Time: 02/04/17  3:10 AM   Result Value Ref Range    WBC 16.68 (H) 3.90 - 12.70 K/uL    Hemoglobin 10.8 (L) 12.0 - 16.0 g/dL    Hematocrit 37.7 37.0 - 48.5 %    Platelets 182 150 - 350 K/uL     CMP  Sodium   Date Value Ref Range Status   02/06/2017 133 (L) 136 - 145 mmol/L Final     Potassium   Date Value Ref Range Status   02/06/2017 3.4 (L) 3.5 - 5.1 mmol/L Final     Chloride   Date Value Ref Range Status   02/06/2017 90 (L) 95 - 110 mmol/L Final     CO2   Date Value Ref Range Status   02/06/2017 30 (H) 23 - 29 mmol/L Final     Glucose   Date Value Ref Range Status   02/06/2017 138 (H) 70 - 110 mg/dL Final     BUN, Bld   Date Value Ref Range Status   02/06/2017 64 (H) 8 -  23 mg/dL Final     Creatinine   Date Value Ref Range Status   02/06/2017 3.1 (H) 0.5 - 1.4 mg/dL Final     Calcium   Date Value Ref Range Status   02/06/2017 8.1 (L) 8.7 - 10.5 mg/dL Final     Total Protein   Date Value Ref Range Status   02/03/2017 6.4 6.0 - 8.4 g/dL Final     Albumin   Date Value Ref Range Status   02/03/2017 2.7 (L) 3.5 - 5.2 g/dL Final     Total Bilirubin   Date Value Ref Range Status   02/03/2017 2.6 (H) 0.1 - 1.0 mg/dL Final     Comment:     For infants and newborns, interpretation of results should be based  on gestational age, weight and in agreement with clinical  observations.  Premature Infant recommended reference ranges:  Up to 24 hours.............<8.0 mg/dL  Up to 48 hours............<12.0 mg/dL  3-5 days..................<15.0 mg/dL  6-29 days.................<15.0 mg/dL       Alkaline Phosphatase   Date Value Ref Range Status   02/03/2017 134 55 - 135 U/L Final     AST   Date Value Ref Range Status   02/03/2017 45 (H) 10 - 40 U/L Final     ALT   Date Value Ref Range Status   02/03/2017 31 10 - 44 U/L Final     Anion Gap   Date Value Ref Range Status   02/06/2017 13 8 - 16 mmol/L Final     eGFR if    Date Value Ref Range Status   02/06/2017 15 (A) >60 mL/min/1.73 m^2 Final     eGFR if non    Date Value Ref Range Status   02/06/2017 13 (A) >60 mL/min/1.73 m^2 Final     Comment:     Calculation used to obtain the estimated glomerular filtration  rate (eGFR) is the CKD-EPI equation. Since race is unknown   in our information system, the eGFR values for   -American and Non--American patients are given   for each creatinine result.           Recent Labs  Lab 02/06/17  0330   INR 2.4*       A/P:  The patient is an 82 year old woman with a history of HTN, CHF, supraventricular tachycardia, atrial fibrillation/atrial flutter, DM, chronic renal insufficiency, right breast cancer, and colon cancer presenting with altered mental status,  hypoglycemia, septic shock, and rectal bleeding.  1.  Rectal Bleeding - a few days ago, she had two small episodes of blood tinged stools.  This has not reoccurred and she has not had any further episodes of nonbloody emesis.  The patient remains on pressors and antibiotics.  Clostridium difficile toxin is negative.  Protonix and zofran can be continued.  She could have bled from a supratherapeutic INR.  Her INR has improved.  Given that she has septic shock, rectal bleeding, and a lactic acid greater than 12 on two readings, there is some concern for ischemic bowel.  A CT scan is pending, but unfortunately, it cannot be performed with contrast due to her renal insufficiency.  Her abdomen is soft and she denies pain, which would suggest against ischemic bowel.  Based on the CT scan findings, there should be a low threshold to consult Surgery.  Infectious Disease is following the patient.  She has a very guarded prognosis.

## 2017-02-06 NOTE — PROGRESS NOTES
[facility] [place]  Adult Nutrition  Consult Note    SUMMARY     Recommendations    Recommendation/Intervention:   1. If unable to advance diet in 24-48hrs:  PPN: Clinimex 4.25/10 @ 65 ml/hr + IVFE Daily       - To provide 1295 kcal, 66 g pro, 1560 ml fluid  GIR =1.5  CHO: 156         -Monitor TG weekly with lipid infusions   2. Advance diet as able to 1600 ADA/Renal  3. If able to utilize GI system but unable to advance diet: Rec Novasource Renal initiated @ 10 ml and advanced 10 ml q 3 hrs to goal of 30 ml/hr.        - Hold for residuals > 300 ml      - Fluid flushes of 30 ml q 4 hrs for  Patency or per MD      - To provide 1440 kcal, 65 g pro, 516 ml fluid.  4.RD to monitor  Goals: provide nutrition within 48 hrs  Nutrition Goal Status: goal not met  Communication of RD Recs: reviewed with RN    Continuum of Care Plan  D/C planning: To soon to determine    Reason for Assessment    Reason for Assessment: RD follow-up  Diagnosis:  (acute on chronic renal failure)  Relevent Medical History: anticoagulant use long term, h/o breast cancer, h/ocolon cancer, dyslipidemia, HLD, DM   Interdisciplinary Rounds: did not attend     General Information Comments: Pt. continues NPO x 3 days. NGT in place. GI/Cards following pt.    Nutrition Prescription Ordered    Current Diet Order: NPO     Evaluation of Received Nutrients/Fluid Intake    % Intake of Estimated Needs: 0%  % Intake of Meals: NPO      Nutrition Risk Screen     Nutrition Risk Screen: no indicators present    Nutrition/Diet History    Patient Reported Diet/Restrictions/Preferences: low salt  Typical Food/Fluid Intake: Pt. asleep at time of visit  Food Preferences: GIORGI      Factors Affecting Nutritional Intake: abdominal pain, NPO, SBO      Labs/Tests/Procedures/Meds     Pertinent Labs Reviewed: reviewed  Pertinent Labs Comments: B-302; K 3.4; BUN 64; Cr 3.1; Phos 5.8; GFR 13  Pertinent Medications Reviewed: reviewed  Pertinent Medications Comments:  norepi, statin, abx, pantoprazole    Physical Findings    Overall Physical Appearance: overweight  Tubes: nasogastric tube  Oral/Mouth Cavity: WDL  Skin: other (see comments) (dermatitis, abrasion, 2 x ulcers chronic venous insufficienc)    Anthropometrics     Height (inches): 60 in  Weight Method: Bed Scale  Weight (kg): 71.1 kg     Ideal Body Weight (IBW), Female: 100 lb     % Ideal Body Weight, Female (lb): 156.75 lb  BMI (kg/m2): 30.61  BMI Grade: 30 - 34.9- obesity - grade I     Estimated/Assessed Needs    Weight Used For Calorie Calculations: 71.1 kg (156 lb 12 oz)   Height (cm): 152.4 cm     Energy Need Method: [place]-St Jeor (0073-9327 kcal)     RMR ([place]-St. Jeor Equation): 1098.35      Weight Used For Protein Calculations: 71.1 kg (156 lb 12 oz)  Protein Requirements: 57-85 g    Fluid Need Method: RDA Method (1 ml per kcal or per MD)       Fiber Requirement: 164g CHO/day     Nutrition Diagnosis    Problem: Inadequate Energy Intake  Etiology: Altered GI function (SBO)  As Evidenced by: NPO x 3 days  Nutrition Diagnosis Status: Continues    Monitor and Evaluation    Food and Nutrient Intake: energy intake, enteral nutrition intake  Food and Nutrient Adminstration: diet order, enteral and parenteral nutrition administration  Knowledge/Beliefs/Attitudes: food and nutrition knowledge/skill  Physical Activity and Function: nutrition-related ADLs and IADLs  Anthropometric Measurements: weight, weight change  Biochemical Data, Medical Tests and Procedures: electrolyte and renal panel, gastrointestinal profile, glucose/endocrine profile, lipid profile, inflammatory profile  Nutrition-Focused Physical Findings: overall appearance    Nutrition Risk    Level of Risk: moderate    Nutrition Follow-Up    RD Follow-up?: Yes    Assessment and Plan    No new Assessment & Plan notes have been filed under this hospital service since the last note was generated.  Service: Nutrition

## 2017-02-06 NOTE — ASSESSMENT & PLAN NOTE
Unclear of cause. I don't think there is infection. Urine culture is negative. Will let ID comment.

## 2017-02-06 NOTE — CONSULTS
83 yo lady severe cardiac disease. Asked to evaluate for sbo. Has easily reducible right inguinal hernia.Not sure if this is cause of obstruction. Will follow. Hopefully will not need surgery

## 2017-02-06 NOTE — PROGRESS NOTES
1555 spoke with Leda Wallace concerning dylon boots, MD evans assessed site, drsg's removed bilat  And pt legs cleansed with soap and water.

## 2017-02-07 PROBLEM — E87.20 LACTIC ACID ACIDOSIS: Status: RESOLVED | Noted: 2017-01-01 | Resolved: 2017-01-01

## 2017-02-07 PROBLEM — E87.5 HYPERKALEMIA, DIMINISHED RENAL EXCRETION: Status: RESOLVED | Noted: 2017-01-01 | Resolved: 2017-01-01

## 2017-02-07 NOTE — PROGRESS NOTES
Continues to c/o of abd pain. Hernia remains reducible.Still on pressors. Very poor surgical candidate. ? Ischemic bowel

## 2017-02-07 NOTE — PROGRESS NOTES
Much more alert oriented x 1 NAD    CO being hungry and having a deep LLQ abd pain     Denies CNS ENT CP  RHEUM OR DERM SX  Past Medical History   Diagnosis Date    Anticoagulant long-term use     Arthritis     Atrial fibrillation     Atrial flutter     Carcinoma of right breast, estrogen receptor negative and progesterone receptor positive 02/2008    Chronic combined systolic and diastolic congestive heart failure     Colon cancer     Diabetes mellitus     Dilated cardiomyopathy 12/01/2008     adriamycin toxicity    Dyslipidemia     Encounter for blood transfusion     Hyperlipidemia     Hypertension     Supraventricular tachycardia      PAF, AFL     Review of patient's allergies indicates:   Allergen Reactions    Shellfish containing products Itching     Can eat shrimp    Adhesive      Other reaction(s): blisters    Iodinated contrast media - iv dye      Other reaction(s): Vomiting    Iodine      Other reaction(s): Topical  Other reaction(s): Rash    Morphine      Other reaction(s): Hallucinations       Current Facility-Administered Medications   Medication    0.9%  NaCl infusion    acetaminophen tablet 650 mg    bisacodyl suppository 10 mg    dextrose 50% injection 12.5 g    glucagon (human recombinant) injection 1 mg    influenza vaccine 180 mcg/0.5 mL (for patients > 65) injection    insulin aspart pen 0-5 Units    mineral oil enema 1 enema    norepinephrine 4 mg in dextrose 5% 250 mL infusion (premix) (titrating)    ondansetron injection 4 mg    pantoprazole injection 40 mg    piperacillin-tazobactam 4.5 g in dextrose 5 % 100 mL IVPB (ready to mix system)    polyethylene glycol packet 17 g    promethazine suppository 25 mg    ramelteon tablet 8 mg    sodium chloride 0.9% flush 3 mL       LABS    Recent Results (from the past 24 hour(s))   POCT glucose    Collection Time: 02/06/17 11:59 AM   Result Value Ref Range    POCT Glucose 108 70 - 110 mg/dL   POCT glucose     Collection Time: 02/06/17  6:17 PM   Result Value Ref Range    POCT Glucose 91 70 - 110 mg/dL   POCT glucose    Collection Time: 02/07/17 12:07 AM   Result Value Ref Range    POCT Glucose 98 70 - 110 mg/dL   Basic metabolic panel    Collection Time: 02/07/17  4:00 AM   Result Value Ref Range    Sodium 135 (L) 136 - 145 mmol/L    Potassium 3.1 (L) 3.5 - 5.1 mmol/L    Chloride 95 95 - 110 mmol/L    CO2 30 (H) 23 - 29 mmol/L    Glucose 84 70 - 110 mg/dL    BUN, Bld 53 (H) 8 - 23 mg/dL    Creatinine 2.8 (H) 0.5 - 1.4 mg/dL    Calcium 7.9 (L) 8.7 - 10.5 mg/dL    Anion Gap 10 8 - 16 mmol/L    eGFR if African American 17 (A) >60 mL/min/1.73 m^2    eGFR if non African American 15 (A) >60 mL/min/1.73 m^2   CBC auto differential    Collection Time: 02/07/17  4:00 AM   Result Value Ref Range    WBC 8.77 3.90 - 12.70 K/uL    RBC 4.36 4.00 - 5.40 M/uL    Hemoglobin 10.6 (L) 12.0 - 16.0 g/dL    Hematocrit 34.6 (L) 37.0 - 48.5 %    MCV 79 (L) 82 - 98 fL    MCH 24.3 (L) 27.0 - 31.0 pg    MCHC 30.6 (L) 32.0 - 36.0 %    RDW 19.2 (H) 11.5 - 14.5 %    Platelets 145 (L) 150 - 350 K/uL    MPV 9.3 9.2 - 12.9 fL    Gran # 7.2 1.8 - 7.7 K/uL    Lymph # 0.9 (L) 1.0 - 4.8 K/uL    Mono # 0.6 0.3 - 1.0 K/uL    Eos # 0.0 0.0 - 0.5 K/uL    Baso # 0.01 0.00 - 0.20 K/uL    Gran% 82.4 (H) 38.0 - 73.0 %    Lymph% 10.4 (L) 18.0 - 48.0 %    Mono% 7.1 4.0 - 15.0 %    Eosinophil% 0.0 0.0 - 8.0 %    Basophil% 0.1 0.0 - 1.9 %    Differential Method Automated    Protime-INR    Collection Time: 02/07/17  4:00 AM   Result Value Ref Range    Prothrombin Time 19.3 (H) 9.0 - 12.5 sec    INR 1.9 (H) 0.8 - 1.2   POCT glucose    Collection Time: 02/07/17  5:23 AM   Result Value Ref Range    POCT Glucose 91 70 - 110 mg/dL   ]    I/O last 3 completed shifts:  In: 4350.4 [I.V.:4050.4; IV Piggyback:300]  Out: 3365 [Urine:3325; Drains:40]    Vitals:    02/07/17 0448 02/07/17 0500 02/07/17 0503 02/07/17 0507   BP: (!) 79/62  (!) 71/59    Pulse:  (!) 120  (!) 116   Resp:   (!) 28  (!) 34   Temp:       TempSrc:       SpO2:  (!) 92%  (!) 93%   Weight:  75 kg (165 lb 5.5 oz)     Height:           No Jvd, Thyromegaly or Lymphadenopathy  Lungs: Fairly clear anteriorly and laterally  Cor: RRR no G or rubs  Abd: Soft benign good bowel sounds non tender  Ext: No E C C    A)    JOSE MIGUEL creat better, good uo  Sepsis seems stable  Hypotension  ABD PAIN ?? Ischemic   UTI  MET Acidosis AGap 10  Hx of HTN now hypotension  T2DM  Anemia  Proteinuria of ?? Significance  CMP ef 20-30 in over 4 years  NSVT    P)  Check cvp  Consider tpn  Might need fluid resusitation  Check mag  Replace K

## 2017-02-07 NOTE — CONSULTS
[facility]  Adult Nutrition  Consult Note    SUMMARY     Recommendations    Recommendation/Intervention: 1) PPN recs:  4.25/10 @ 75 cc/hr with 250 mL 20% IV lipids daily, provides 1418 calories/day, 76.5 grams of protein/day, 1800 cc IV fluid/day. GIR = 1.7. Dextrose = 180 g. 2) Monitor Triglycerides weekly. Hold if > 300 mg/dL 3) RD to monitor/follow-up  Goals: Provide nutrition support within 24 hours  Nutrition Goal Status: new  Communication of RD Recs: other (comment) (physician's sticky note)    Continuum of Care Plan     D/C Planning:  Too soon to determine. Will monitor/follow-up.     Reason for Assessment    Reason for Assessment: new TPN, physician consult, RD follow-up  Diagnosis:  (acute on chronic renal failure)  Relevent Medical History: anticoagulant use long term, h/o breast cancer, h/ocolon cancer, dyslipidemia, HLD, DM   Interdisciplinary Rounds: attended  General Information Comments: End of Life discussion pending. possible sbo. +ARF    Nutrition Prescription Ordered    Current Diet Order: NPO    Evaluation of Received Nutrients/Fluid Intake    Energy Calories Required: not meeting needs    Protein Required: not meeting needs    IV Fluid (mL): 2400     Comments: Net I/O +429.9    Nutrition Risk Screen     Nutrition Risk Screen: no indicators present    Nutrition/Diet History    Patient Reported Diet/Restrictions/Preferences: low salt  Typical Food/Fluid Intake: GIORGI  Food Preferences: GIORGI  Factors Affecting Nutritional Intake: altered gastrointestinal function, NPO    Labs/Tests/Procedures/Meds       Pertinent Labs Reviewed: reviewed  Pertinent Labs Comments: B - 138  BMP  Lab Results   Component Value Date     (L) 2017    K 3.1 (L) 2017    CL 95 2017    CO2 30 (H) 2017    BUN 53 (H) 2017    CREATININE 2.8 (H) 2017    CALCIUM 7.9 (L) 2017    ANIONGAP 10 2017    ESTGFRAFRICA 17 (A) 2017    EGFRNONAA 15 (A)  02/07/2017     Lab Results   Component Value Date    CALCIUM 7.9 (L) 02/07/2017    PHOS 5.8 (H) 02/05/2017     Pertinent Medications Reviewed: reviewed  Pertinent Medications Comments: norepi, statin, abx, pantoprazole    Physical Findings    Overall Physical Appearance: overweight  Tubes: nasogastric tube  Oral/Mouth Cavity: WDL  Skin: other (see comments) (dermatitis, abrasion, 2 x ulcers chronic venous insufficienc)    Anthropometrics    Height (inches): 60 in  Weight Method: Bed Scale  Weight (kg): 71.1 kg  Ideal Body Weight (IBW), Female: 100 lb  % Ideal Body Weight, Female (lb): 156.75 lb  BMI (kg/m2): 30.61  BMI Grade: 30 - 34.9- obesity - grade I     Estimated/Assessed Needs    Weight Used For Calorie Calculations: 71.1 kg (156 lb 12 oz)   Height (cm): 152.4 cm  Energy Need Method: [place]-St Jeor (9352-1195 kcal)  RMR ([place]-St. Jeor Equation): 1098.35  Weight Used For Protein Calculations: 71.1 kg (156 lb 12 oz)  Protein Requirements: 57-85 g  Fluid Need Method: RDA Method (1 ml per kcal or per MD)  Fiber Requirement: 164g CHO/day     Malnutrition (undernutrition) Diagnosis  % of EEN:  0-25%  % Meal Intake:  NPO    Nutrition Diagnosis     Problem: Inadequate Energy Intake  Etiology/Related To: Altered GI function (SBO)  As Evidenced by: NPO x 3 days  Nutrition Diagnosis Status: Continues    Monitor and Evaluation    Food and Nutrient Intake: energy intake, food and beverage intake, enteral nutrition intake, parenteral nutrition intake  Food and Nutrient Adminstration: diet order, enteral and parenteral nutrition administration  Knowledge/Beliefs/Attitudes: food and nutrition knowledge/skill  Physical Activity and Function: nutrition-related ADLs and IADLs  Anthropometric Measurements: weight, weight change  Biochemical Data, Medical Tests and Procedures: electrolyte and renal panel, gastrointestinal profile, glucose/endocrine profile, lipid profile, inflammatory profile  Nutrition-Focused Physical  Findings: overall appearance, skin    Nutrition Risk    Level of Risk: high (2x/week)    Nutrition Follow-Up    RD Follow-up?: Yes    Assessment and Plan    No new Assessment & Plan notes have been filed under this hospital service since the last note was generated.  Service: Nutrition

## 2017-02-07 NOTE — PROGRESS NOTES
The nurse denies any bleeding or emesis.  The patient is on levophed.  Only about 100 cc of fluid were aspirated through her NG tube.    Vitals:    02/07/17 0448 02/07/17 0500 02/07/17 0503 02/07/17 0507   BP: (!) 79/62  (!) 71/59    BP Location:       Patient Position:       BP Method:       Pulse:  (!) 120  (!) 116   Resp:  (!) 28  (!) 34   Temp:       TempSrc:       SpO2:  (!) 92%  (!) 93%   Weight:  75 kg (165 lb 5.5 oz)     Height:          pantoprazole  40 mg Intravenous BID    piperacillin-tazobactam (ZOSYN) IVPB  4.5 g Intravenous Q12H    sodium chloride 0.9%  3 mL Intravenous Q8H     P.E.:  GEN: Alert, not oriented, NAD  HEENT: EOMI, PERRL, anicteric sclera  CV: RRR, no M/R/G  Chest: CTA B  Abdomen: soft, NTND, normoactive BS  Ext: No C/C/E. 2+ dorsalis pedis pulses B    Labs:  Recent Results (from the past 336 hour(s))   CBC auto differential    Collection Time: 02/07/17  4:00 AM   Result Value Ref Range    WBC 8.77 3.90 - 12.70 K/uL    Hemoglobin 10.6 (L) 12.0 - 16.0 g/dL    Hematocrit 34.6 (L) 37.0 - 48.5 %    Platelets 145 (L) 150 - 350 K/uL   CBC auto differential    Collection Time: 02/06/17  3:30 AM   Result Value Ref Range    WBC 11.63 3.90 - 12.70 K/uL    Hemoglobin 10.9 (L) 12.0 - 16.0 g/dL    Hematocrit 35.7 (L) 37.0 - 48.5 %    Platelets 162 150 - 350 K/uL   CBC auto differential    Collection Time: 02/05/17  4:18 AM   Result Value Ref Range    WBC 15.74 (H) 3.90 - 12.70 K/uL    Hemoglobin 11.0 (L) 12.0 - 16.0 g/dL    Hematocrit 36.1 (L) 37.0 - 48.5 %    Platelets 170 150 - 350 K/uL     CMP  Sodium   Date Value Ref Range Status   02/07/2017 135 (L) 136 - 145 mmol/L Final     Potassium   Date Value Ref Range Status   02/07/2017 3.1 (L) 3.5 - 5.1 mmol/L Final     Chloride   Date Value Ref Range Status   02/07/2017 95 95 - 110 mmol/L Final     CO2   Date Value Ref Range Status   02/07/2017 30 (H) 23 - 29 mmol/L Final     Glucose   Date Value Ref Range Status   02/07/2017 84 70 - 110 mg/dL Final      BUN, Bld   Date Value Ref Range Status   02/07/2017 53 (H) 8 - 23 mg/dL Final     Creatinine   Date Value Ref Range Status   02/07/2017 2.8 (H) 0.5 - 1.4 mg/dL Final     Calcium   Date Value Ref Range Status   02/07/2017 7.9 (L) 8.7 - 10.5 mg/dL Final     Total Protein   Date Value Ref Range Status   02/03/2017 6.4 6.0 - 8.4 g/dL Final     Albumin   Date Value Ref Range Status   02/03/2017 2.7 (L) 3.5 - 5.2 g/dL Final     Total Bilirubin   Date Value Ref Range Status   02/03/2017 2.6 (H) 0.1 - 1.0 mg/dL Final     Comment:     For infants and newborns, interpretation of results should be based  on gestational age, weight and in agreement with clinical  observations.  Premature Infant recommended reference ranges:  Up to 24 hours.............<8.0 mg/dL  Up to 48 hours............<12.0 mg/dL  3-5 days..................<15.0 mg/dL  6-29 days.................<15.0 mg/dL       Alkaline Phosphatase   Date Value Ref Range Status   02/03/2017 134 55 - 135 U/L Final     AST   Date Value Ref Range Status   02/03/2017 45 (H) 10 - 40 U/L Final     ALT   Date Value Ref Range Status   02/03/2017 31 10 - 44 U/L Final     Anion Gap   Date Value Ref Range Status   02/07/2017 10 8 - 16 mmol/L Final     eGFR if    Date Value Ref Range Status   02/07/2017 17 (A) >60 mL/min/1.73 m^2 Final     eGFR if non    Date Value Ref Range Status   02/07/2017 15 (A) >60 mL/min/1.73 m^2 Final     Comment:     Calculation used to obtain the estimated glomerular filtration  rate (eGFR) is the CKD-EPI equation. Since race is unknown   in our information system, the eGFR values for   -American and Non--American patients are given   for each creatinine result.           Recent Labs  Lab 02/07/17  0400   INR 1.9*     CT of Abdomen/Pelvis without Contrast:  1. There are CT findings small bowel obstruction. The patient does have a right inguinal hernia that does contain with small bowel. It is not definite  that this is the cause of the small bowel obstruction is loops of bowel are dilated but entering and exiting the hernia.    A/P:  The patient is an 82 year old woman with a history of HTN, CHF, supraventricular tachycardia, atrial fibrillation/atrial flutter, DM, chronic renal insufficiency, right breast cancer, and colon cancer presenting with altered mental status, hypoglycemia, septic shock, and rectal bleeding.  1.  Rectal Bleeding - several days ago, she had two small episodes of blood tinged stools.  This has not reoccurred and she has not had any further episodes of nonbloody emesis.  The patient remains on pressors and antibiotics.  Clostridium difficile toxin was negative.  Protonix and zofran can be continued.  She could have bled from a supratherapeutic INR.  Her INR has improved.  Given that she has septic shock, rectal bleeding, and a lactic acid greater than 12 on two readings, there was some concern for ischemic bowel.  A CT scan showed a right inguinal hernia containing small bowel, but Surgery is conservatively managing her as this is reducible.  An NG tube is on intermittent suction, but she had very little fluid aspirated.  Infectious Disease is following the patient.  She has a very guarded prognosis.

## 2017-02-07 NOTE — CONSULTS
Consult Note  Infectious Disease    Consult Requested By: Swathi Mason MD    Reason for Consult: hypotension and hypothermia.    SUBJECTIVE:     History of Present Illness:  Patient is a 82 y.o. female presents with hypotension and hypothermia. 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 She was recently admitted with chf in januray 2017 and was placed in a snf 1/28/17. She is now here obtunded and hypothermic. Blood cultures are negative, urine and stool cultures negative form 2/3/17.c diff toxin is negative and she has no diarrhea now. Ct abdomen shows a sbo and rt inguinal hernia.    Past Medical History   Diagnosis Date    Anticoagulant long-term use     Arthritis     Atrial fibrillation     Atrial flutter     Carcinoma of right breast, estrogen receptor negative and progesterone receptor positive 02/2008    Chronic combined systolic and diastolic congestive heart failure     Colon cancer     Diabetes mellitus     Dilated cardiomyopathy 12/01/2008     adriamycin toxicity    Dyslipidemia     Encounter for blood transfusion     Hyperlipidemia     Hypertension     Supraventricular tachycardia      PAF, AFL     Past Surgical History   Procedure Laterality Date    Colon surgery  2000     upper colon removal    Cataract extraction  8/5/09     both eyes (Dr. Adam)    Cardioversion  04/24/2014    Total abdominal hysterectomy w/ bilateral salpingoophorectomy  1973    Mastectomy, partial Right 03/14/2008     R right    Total hip arthroplasty  03/05/2010     anuerysm in hip/right hip    Hysterectomy      Eye surgery       Family History   Problem Relation Age of Onset    Heart disease Mother     Hypertension Mother     Diabetes Mother     Glaucoma Mother     Glaucoma Maternal Aunt      Social History   Substance Use Topics    Smoking status: Never Smoker    Smokeless tobacco: Never Used    Alcohol use No       Review of patient's  allergies indicates:   Allergen Reactions    Shellfish containing products Itching     Can eat shrimp    Adhesive      Other reaction(s): blisters    Iodinated contrast media - iv dye      Other reaction(s): Vomiting    Iodine      Other reaction(s): Topical  Other reaction(s): Rash    Morphine      Other reaction(s): Hallucinations        Antibiotics     Start     Stop Route Frequency Ordered    02/03/17 2300  piperacillin-tazobactam 4.5 g in dextrose 5 % 100 mL IVPB (ready to mix system)      -- IV Every 12 hours 02/03/17 0626          Review of Systems:  Review of systems not obtained due to patient factors unresponsive.    OBJECTIVE:     Vital Signs (Most Recent)  Temp: 97.1 °F (36.2 °C) (02/07/17 1500)  Pulse: 110 (02/07/17 1745)  Resp: (!) 35 (02/07/17 1745)  BP: (!) 82/60 (02/07/17 1745)  SpO2: 96 % (02/07/17 1745)    Temperature Range Min/Max (Last 24H):  Temp:  [97 °F (36.1 °C)-98.6 °F (37 °C)]     Physical Exam:  General: cachectic  Eyes: conjunctivae/corneas clear. PERRL..  HENT: Head:normocephalic, atraumatic. Ears:not examined. Nose: Nares normal. Septum midline. Mucosa normal. No drainage or sinus tenderness., no discharge. Throat: lips, mucosa, and tongue normal; teeth and gums normal and no throat erythema.  Neck: supple, symmetrical, trachea midline, no JVD and thyroid not enlarged, symmetric, no tenderness/mass/nodules  Lungs:  clear to auscultation bilaterally and normal respiratory effort  Cardiovascular: Heart: regular rate and rhythm, S1, S2 normal, no murmur, click, rub or gallop. Chest Wall: no tenderness. Extremities: excoriated skin. Pulses: not examined.  Abdomen/Rectal: Abdomen: soft, non-tender non-distented; bowel sounds normal; rt inguinal region with reducible hernia but tender  Skin: as above  Musculoskeletal:no clubbing, cyanosis    Laboratory:  CBC    Recent Labs  Lab 02/07/17  0400   WBC 8.77   RBC 4.36   HGB 10.6*   HCT 34.6*   *     BMP    Recent Labs  Lab  02/07/17  0400   CO2 30*   BUN 53*   CREATININE 2.8*   CALCIUM 7.9*       Recent Labs  Lab 02/03/17  2010   COLORU Siobhan   SPECGRAV 1.015   PHUR 5.0   PROTEINUA 2+*   BACTERIA Moderate*   NITRITE Negative   LEUKOCYTESUR Negative   UROBILINOGEN 2.0-3.0*   HYALINECASTS 10*     Microbiology Results (last 7 days)     Procedure Component Value Units Date/Time    Urine culture [[id number]] Collected:  02/05/17 1035    Order Status:  Completed Specimen:  Urine from Urine, Catheterized Updated:  02/07/17 0742     Urine Culture, Routine No growth    Stool culture [[id number]] Collected:  02/05/17 0912    Order Status:  Completed Specimen:  Stool from Stool Updated:  02/07/17 0736     Stool Culture Nothing significant to date    Blood culture [[id number]] Collected:  02/03/17 2055    Order Status:  Completed Specimen:  Blood from Peripheral, Hand, Left Updated:  02/06/17 2303     Blood Culture, Routine No growth to date     Blood Culture, Routine No Growth to date     Blood Culture, Routine No Growth to date     Blood Culture, Routine No Growth to date    Blood culture [[id number]] Collected:  02/03/17 2120    Order Status:  Completed Specimen:  Blood from Peripheral, Hand, Right Updated:  02/06/17 2303     Blood Culture, Routine No growth to date     Blood Culture, Routine No Growth to date     Blood Culture, Routine No Growth to date     Blood Culture, Routine No Growth to date    E. coli 0157 antigen [[id number]] Collected:  02/05/17 0912    Order Status:  Completed Specimen:  Stool from Stool Updated:  02/06/17 1222     Shiga Toxin 1 E.coli Negative     Shiga Toxin 2 E.coli Negative    Clostridium difficile EIA [[id number]] Collected:  02/04/17 0910    Order Status:  Completed Specimen:  Stool from Stool Updated:  02/04/17 1237     C. diff Antigen Negative     C difficile Toxins A+B, EIA Negative      Testing not recommended for children <24 months old.       Gram stain [[id number]] Collected:  02/04/17 0420    Order Status:   Completed Specimen:  Urine from Urine Updated:  02/04/17 0817     Gram Stain Result Few WBC's      Few epithelial cells      Gram positive cocci in pairs          Diagnostic Results:  Labs: Reviewed  X-Ray: Reviewed    ASSESSMENT/PLAN:     Active Hospital Problems    Diagnosis  POA    *Acute on chronic renal failure [N17.9, N18.9]  Yes    Shock [R57.9]  Yes    SBO (small bowel obstruction) [K56.69]  Yes    GI bleed [K92.2]  Yes    UTI (urinary tract infection) [N39.0]  Yes    Hyperkalemia, diminished renal excretion [E87.5]  Yes    Supratherapeutic INR [R79.1]  Yes    Lactic acid acidosis [E87.2]  Yes    Hypothermia [T68.XXXA]  Yes    Encephalopathy, metabolic [G93.41]  Yes    Type 2 diabetes mellitus without complication [E11.9]  Yes    Long term current use of anticoagulant therapy [Z79.01]  Not Applicable    Chronic combined systolic and diastolic CHF, NYHA class 4 [I50.42]  Yes     Chronic    Chronic kidney disease, stage 4, severely decreased GFR [N18.4]  Yes     Chronic    Peripheral vascular disease [I73.9]  Yes     Chronic    Chronic venous stasis dermatitis of both lower extremities [I83.11, I83.12]  Yes     Chronic    Paroxysmal atrial flutter [I48.92]  Yes     Chronic    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 LVEF 20% 12/28/16.      Severe mitral regurgitation [I34.0]  Yes     Chronic     MODERATE-SEVERE      HTN (hypertension) [I10]  Yes    Hyperlipidemia [E78.5]  Yes    Paroxysmal atrial fibrillation [I48.0]  Yes     Chronic     WITH LA THROMBUS 10/12        Resolved Hospital Problems    Diagnosis Date Resolved POA   No resolved problems to display.       1.hypothermic hypotension. Sepsis vs dilated cardiomyopathy and shock.  Ct abdomen and pelvis with sbo and rt inguinal hernia. Surgical review appreciated and ? Ischemic bowel  Adjusedt abx   No meningeal signs  2.dilated cardiomyopathy  She is more awake and her  creatinine is better

## 2017-02-07 NOTE — PROGRESS NOTES
Progress Note  Cardiology    Admit Date: 2/3/2017   LOS: 3 days     SUBJECTIVE:     Interval History: pt seen in ICU, case d/w RN.  No cp/sob.  Complains of abd pain.    Review of Systems:  UTO    Telemetry: AF VR 130s, 8 beats NSVT 2/6/17 (personally reviewed)    OBJECTIVE:     Vital Signs (Most Recent)  Temp: 98.6 °F (37 °C) (02/07/17 0400)  Pulse: (!) 116 (02/07/17 0507)  Resp: (!) 34 (02/07/17 0507)  BP: (!) 71/59 (02/07/17 0503)  SpO2: (!) 93 % (02/07/17 0507)    Vital Signs Range (Last 24H):  Temp:  [95 °F (35 °C)-98.6 °F (37 °C)]   Pulse:  [100-128]   Resp:  [17-34]   BP: ()/(50-79)   SpO2:  [56 %-100 %]     I & O (Last 24H):    Intake/Output Summary (Last 24 hours) at 02/07/17 1023  Last data filed at 02/07/17 0754   Gross per 24 hour   Intake          2300.93 ml   Output             1540 ml   Net           760.93 ml       Physical Exam:  Gen: chr ill elderly woman, in NAD.  HEENT: NCAT  Neck: Supple, JVP normal  Chest: CTAB, no wheeze, respirations unlabored, no kyphoscoliosis  Heart:  irreg irreg, tachy, normal S1 S2, no R/G, 2/6 syst murmur  Abd: Soft, dec BS, tender to palp with ?rebound  Ext: Warm, no C/C/E.  Appear well perfused.  Skin: no rash/ulcers  CNS: UTO  Psych: UTO    Laboratory:  CBC:    Recent Labs  Lab 02/05/17  0418 02/06/17  0330 02/07/17  0400   WHITE BLOOD CELL COUNT 15.74 H 11.63 8.77   HEMOGLOBIN 11.0 L 10.9 L 10.6 L   HEMATOCRIT 36.1 L 35.7 L 34.6 L   PLATELETS 170 162 145 L       CHEMISTRIES:    Recent Labs  Lab 01/24/17  0311  02/05/17  0418 02/06/17  0330 02/07/17  0400   GLUCOSE 123 H  < > 177 H 138 H 84   SODIUM 136  < > 138 133 L 135 L   POTASSIUM 3.5  < > 4.2 3.4 L 3.1 L   BUN BLD 40 H  < > 78 H 64 H 53 H   CREATININE 1.7 H  < > 3.5 H 3.1 H 2.8 H   EGFR IF  32 A  < > 13 A 15 A 17 A   EGFR IF NON- 28 A  < > 12 A 13 A 15 A   CALCIUM 8.9  < > 8.2 L 8.1 L 7.9 L   MAGNESIUM 1.8  --  2.2  --  1.7   < > = values in this interval not  "displayed.    CARDIAC BIOMARKERS:    Recent Labs  Lab 01/16/17  2358 01/17/17  0633 02/03/17  1925   TROPONIN I 0.034 H 0.035 H 0.018       COAGS:    Recent Labs  Lab 02/05/17  0418 02/06/17  0330 02/07/17  0400   INR 4.8 H 2.4 H 1.9 H       LIPIDS/LFTS:    Recent Labs  Lab 05/01/14  0700  03/09/15  1110  08/22/16  1307  01/20/17  0539 01/21/17  0257 02/03/17  1925   CHOLESTEROL  --   --  131  --  119 L  --   --   --   --    TRIGLYCERIDES 83  --  83  --  98  --   --   --   --    HDL  --   --  51  --  35 L  --   --   --   --    LDL CHOLESTEROL  --   --  63.4  --  64.4  --   --   --   --    NON-HDL CHOLESTEROL  --   --  80  --  84  --   --   --   --    AST 22  < >  --   < > 16  < > 21 16 45 H   ALT 17  < >  --   < > 12  < > 10 12 31   < > = values in this interval not displayed.    Lab Results   Component Value Date    TSH 17.586 (H) 02/03/2017   Free T4 0.93 (2/3/17)      Diagnostic Results:  ECG (personally reviewed tracings):   2/3/17 AF 91, PRWP.  AF noted on tracing 1/16/17     Chest X-Ray (personally reviewed image(s)): 2/4/17 CMeg, L eff, R TLC     CT abd 2/5/17  There are CT findings small bowel obstruction. The patient does have a right inguinal hernia that does contain with small bowel. It is not definite that this is the cause of the small bowel obstruction is loops of bowel are dilated but entering and exiting the hernia.    Echo: 2/4/17 (images pers rev)    1 - Moderately depressed left ventricular systolic function (EF 30-35%).  Septal dyskinesis vs "right ventricularization" of septum.     2 - Concentric hypertrophy.     3 - Left ventricular diastolic dysfunction.     4 - Right ventricular enlargement with moderately depressed systolic function.     5 - Moderate mitral regurgitation.     6 - Severe tricuspid regurgitation.  Note made of malcoaptation of TV leaflets.     7 - Pulmonary hypertension. The estimated PA systolic pressure is 43 mmHg.     8 - Trivial pericardial effusion without hemodynamic " compromise.        ASSESSMENT:   # Sepsis/septic shock.  # lactic acidosis  # SBO, ?incarcerated hernia/ischemic bowel, pt continues to complain of abd pain.  # NSVT  # Chr AF on warfarin  # Known severe CMP, presumed nonischemic, pt appears euvolemic and well perfused.  I do not believe the low blood pressure is related to her CMP or AF.  # MR/TR  # Supratherapeutic INR, now in range  # ARF/CRI, POA  # Mental status changes      Active Hospital Problems    Diagnosis  POA    *Acute on chronic renal failure [N17.9, N18.9]  Yes    Shock [R57.9]  Yes    SBO (small bowel obstruction) [K56.69]  Yes    GI bleed [K92.2]  Yes    UTI (urinary tract infection) [N39.0]  Yes    Hyperkalemia, diminished renal excretion [E87.5]  Yes    Supratherapeutic INR [R79.1]  Yes    Lactic acid acidosis [E87.2]  Yes    Hypothermia [T68.XXXA]  Yes    Encephalopathy, metabolic [G93.41]  Yes    Type 2 diabetes mellitus without complication [E11.9]  Yes    Long term current use of anticoagulant therapy [Z79.01]  Not Applicable    Chronic combined systolic and diastolic CHF, NYHA class 4 [I50.42]  Yes     Chronic    Chronic kidney disease, stage 4, severely decreased GFR [N18.4]  Yes     Chronic    Peripheral vascular disease [I73.9]  Yes     Chronic    Chronic venous stasis dermatitis of both lower extremities [I83.11, I83.12]  Yes     Chronic    Paroxysmal atrial flutter [I48.92]  Yes     Chronic    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 LVEF 20% 12/28/16.      Severe mitral regurgitation [I34.0]  Yes     Chronic     MODERATE-SEVERE      HTN (hypertension) [I10]  Yes    Hyperlipidemia [E78.5]  Yes    Paroxysmal atrial fibrillation [I48.0]  Yes     Chronic     WITH LA THROMBUS 10/12        Resolved Hospital Problems    Diagnosis Date Resolved POA   No resolved problems to display.       Scheduled Meds:   pantoprazole  40 mg Intravenous BID     piperacillin-tazobactam (ZOSYN) IVPB  4.5 g Intravenous Q12H    sodium chloride 0.9%  3 mL Intravenous Q8H     Continuous Infusions:   sodium chloride 0.9% 100 mL/hr at 02/07/17 0600    norepinephrine bitartrate-D5W 0.05 mcg/kg/min (02/07/17 0600)     PRN Meds:acetaminophen, bisacodyl, dextrose 50%, glucagon (human recombinant), flu vacc [id number] 65yr up(PF), insulin aspart, mineral oil, ondansetron, polyethylene glycol, promethazine, ramelteon    Review of patient's allergies indicates:   Allergen Reactions    Shellfish containing products Itching     Can eat shrimp    Adhesive      Other reaction(s): blisters    Iodinated contrast media - iv dye      Other reaction(s): Vomiting    Iodine      Other reaction(s): Topical  Other reaction(s): Rash    Morphine      Other reaction(s): Hallucinations       SUGGEST:   Add amio gtt to aid with rate control of AF (iodine allergy noted, pt has received amio in the past on review of the EMR in EPIC)  Keep K+>4.0 and Mg++>2.0  No plan for isch eval  Consider code status discussion with family  DDX of above presentation (hypotension) and evidence of RV dysfxn includes pulm emb. However, given the supratherapeutic INR, this is less likely.     ICU time 30 min      Delano Leos MD, [facility]

## 2017-02-07 NOTE — PLAN OF CARE
Problem: Patient Care Overview  Goal: Plan of Care Review  02/07/2017     Recommendations     Recommendation/Intervention: 1) PPN recs:  4.25/10 @ 75 cc/hr with 250 mL 20% IV lipids daily, provides 1418 calories/day, 76.5 grams of protein/day, 1800 cc IV fluid/day. GIR = 1.7. Dextrose = 180 g. 2) Monitor Triglycerides weekly. Hold if > 300 mg/dL 3) RD to monitor/follow-up  Goals: Provide nutrition support within 24 hours  Nutrition Goal Status: new  Communication of RD Recs: other (comment) (physician's sticky note)     Kiya Cutler, MPH, RD, LDN

## 2017-02-07 NOTE — PLAN OF CARE
02/07/17 1126   Discharge Reassessment   Assessment Type Discharge Planning Reassessment   Discharge plan remains the same: Yes   Discharge Plan A Return to nursing home  ([facility])     Pt remains in ICU. Pt prognosis is guarded. Still being monitored if surgery is needed. Plan is for pt to return to [facility] once discharged.

## 2017-02-08 PROBLEM — T68.XXXA HYPOTHERMIA: Status: RESOLVED | Noted: 2017-01-01 | Resolved: 2017-01-01

## 2017-02-08 NOTE — PROGRESS NOTES
Progress Note  Cardiology    Admit Date: 2/3/2017   LOS: 4 days     SUBJECTIVE:     Interval History: pt seen in ICU, case d/w Dr. Mason.  No cp/sob.  Complains of abd pain, but abd exam relatively benign.    Review of Systems:  UTO    Telemetry: AF VR 110s, 8 beats NSVT 2/6/17 (personally reviewed)    OBJECTIVE:     Vital Signs (Most Recent)  Temp: 96.6 °F (35.9 °C) (02/08/17 0715)  Pulse: (!) 118 (02/08/17 1000)  Resp: (!) 33 (02/08/17 1000)  BP: 100/76 (02/08/17 1000)  SpO2: 97 % (02/08/17 1000)    Vital Signs Range (Last 24H):  Temp:  [96.6 °F (35.9 °C)-97.4 °F (36.3 °C)]   Pulse:  [104-122]   Resp:  [19-80]   BP: ()/(52-78)   SpO2:  [90 %-97 %]     I & O (Last 24H):    Intake/Output Summary (Last 24 hours) at 02/08/17 1147  Last data filed at 02/08/17 1000   Gross per 24 hour   Intake          3316.84 ml   Output              785 ml   Net          2531.84 ml       Physical Exam:  Gen: chr ill elderly woman, in NAD.  HEENT: NCAT  Neck: Supple, JVP normal  Chest: CTAB, no wheeze, respirations unlabored, no kyphoscoliosis  Heart:  irreg irreg, tachy, normal S1 S2, no R/G, 2/6 syst murmur  Abd: Soft, dec BS, NTND  Ext: Warm, no C/C/E.  Toes cool bilaterally (on norepi)  Skin: no rash/ulcers  CNS: UTO  Psych: UTO    Laboratory:  CBC:    Recent Labs  Lab 02/06/17  0330 02/07/17  0400 02/08/17  0405   WHITE BLOOD CELL COUNT 11.63 8.77 7.44   HEMOGLOBIN 10.9 L 10.6 L 10.9 L   HEMATOCRIT 35.7 L 34.6 L 36.1 L   PLATELETS 162 145 L 180       CHEMISTRIES:    Recent Labs  Lab 01/24/17  0311  02/05/17  0418 02/06/17  0330 02/07/17  0400 02/08/17  0405   GLUCOSE 123 H  < > 177 H 138 H 84 92   SODIUM 136  < > 138 133 L 135 L 136   POTASSIUM 3.5  < > 4.2 3.4 L 3.1 L 3.4 L   BUN BLD 40 H  < > 78 H 64 H 53 H 50 H   CREATININE 1.7 H  < > 3.5 H 3.1 H 2.8 H 2.8 H   EGFR IF  32 A  < > 13 A 15 A 17 A 17 A   EGFR IF NON- 28 A  < > 12 A 13 A 15 A 15 A   CALCIUM 8.9  < > 8.2 L 8.1 L 7.9 L 8.2 L  "  MAGNESIUM 1.8  --  2.2  --  1.7  --    < > = values in this interval not displayed.    CARDIAC BIOMARKERS:    Recent Labs  Lab 01/16/17  2358 01/17/17  0633 02/03/17  1925   TROPONIN I 0.034 H 0.035 H 0.018       COAGS:    Recent Labs  Lab 02/06/17  0330 02/07/17  0400 02/08/17  0405   INR 2.4 H 1.9 H 1.9 H       LIPIDS/LFTS:    Recent Labs  Lab 05/01/14  0700  03/09/15  1110  08/22/16  1307  01/20/17  0539 01/21/17  0257 02/03/17  1925   CHOLESTEROL  --   --  131  --  119 L  --   --   --   --    TRIGLYCERIDES 83  --  83  --  98  --   --   --   --    HDL  --   --  51  --  35 L  --   --   --   --    LDL CHOLESTEROL  --   --  63.4  --  64.4  --   --   --   --    NON-HDL CHOLESTEROL  --   --  80  --  84  --   --   --   --    AST 22  < >  --   < > 16  < > 21 16 45 H   ALT 17  < >  --   < > 12  < > 10 12 31   < > = values in this interval not displayed.    Lab Results   Component Value Date    TSH 17.586 (H) 02/03/2017   Free T4 0.93 (2/3/17)    Venous Lactate 2/8/17 2.6 (improved from >12 on 2/4/17)    Diagnostic Results:  ECG (personally reviewed tracings):   2/3/17 AF 91, PRWP.  AF noted on tracing 1/16/17     Chest X-Ray (personally reviewed image(s)): 2/4/17 CMeg, L eff, R TLC     CT abd 2/5/17  There are CT findings small bowel obstruction. The patient does have a right inguinal hernia that does contain with small bowel. It is not definite that this is the cause of the small bowel obstruction is loops of bowel are dilated but entering and exiting the hernia.    Echo: 2/4/17 (images pers rev)    1 - Moderately depressed left ventricular systolic function (EF 30-35%).  Septal dyskinesis vs "right ventricularization" of septum.     2 - Concentric hypertrophy.     3 - Left ventricular diastolic dysfunction.     4 - Right ventricular enlargement with moderately depressed systolic function.     5 - Moderate mitral regurgitation.     6 - Severe tricuspid regurgitation.  Note made of malcoaptation of TV leaflets.     7 - " Pulmonary hypertension. The estimated PA systolic pressure is 43 mmHg.     8 - Trivial pericardial effusion without hemodynamic compromise.        ASSESSMENT:   # Sepsis/septic shock.  # lactic acidosis  # SBO, ?incarcerated hernia/ischemic bowel, pt continues to complain of abd pain.  # NSVT  # Chr AF on warfarin, HR better controlled on amio gtt but still >100 BPM.  # Known severe CMP, presumed nonischemic, pt appears euvolemic and well perfused.  # MR/TR  # Supratherapeutic INR, now in range  # ARF/CRI, POA  # Mental status changes      Active Hospital Problems    Diagnosis  POA    *Shock [R57.9]  Yes    SBO (small bowel obstruction) [K56.69]  Yes    GI bleed [K92.2]  Yes    UTI (urinary tract infection) [N39.0]  Yes    Acute on chronic renal failure [N17.9, N18.9]  Yes    Supratherapeutic INR [R79.1]  Yes    Hypothermia [T68.XXXA]  Yes    Encephalopathy, metabolic [G93.41]  Yes    Type 2 diabetes mellitus without complication [E11.9]  Yes    Long term current use of anticoagulant therapy [Z79.01]  Not Applicable    Chronic combined systolic and diastolic CHF, NYHA class 4 [I50.42]  Yes     Chronic    Chronic kidney disease, stage 4, severely decreased GFR [N18.4]  Yes     Chronic    Peripheral vascular disease [I73.9]  Yes     Chronic    Chronic venous stasis dermatitis of both lower extremities [I83.11, I83.12]  Yes     Chronic    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 LVEF 20% 12/28/16.      Severe mitral regurgitation [I34.0]  Yes     Chronic     MODERATE-SEVERE      HTN (hypertension) [I10]  Yes    Hyperlipidemia [E78.5]  Yes    Paroxysmal atrial fibrillation [I48.0]  Yes     Chronic     WITH LA THROMBUS 10/12        Resolved Hospital Problems    Diagnosis Date Resolved POA    Hyperkalemia, diminished renal excretion [E87.5] 02/07/2017 Yes    Lactic acid acidosis [E87.2] 02/07/2017 Yes       Scheduled Meds:   fat  emulsion  250 mL Intravenous Daily    pantoprazole  40 mg Intravenous BID    piperacillin-tazobactam (ZOSYN) IVPB  4.5 g Intravenous Q12H    sodium chloride 0.9%  3 mL Intravenous Q8H     Continuous Infusions:   sodium chloride 0.9% 100 mL/hr at 02/08/17 1113    Amino acid 4.25% - dextrose 10% (CLINIMIX-E) solution with additives (1L provides 42.5 gm AA, 100 gm CHO (340 kcal/L dextrose), Na 35, K 30, Mg 5, Ca 4.5, Acetate 70, Cl 39, Phos 15)      amiodarone 0.5 mg/min (02/08/17 1000)    norepinephrine bitartrate-D5W 0.05 mcg/kg/min (02/08/17 1000)     PRN Meds:acetaminophen, bisacodyl, dextrose 50%, glucagon (human recombinant), flu vacc [id number] 65yr up(PF), insulin aspart, mineral oil, ondansetron, polyethylene glycol, promethazine, ramelteon    Review of patient's allergies indicates:   Allergen Reactions    Shellfish containing products Itching     Can eat shrimp    Adhesive      Other reaction(s): blisters    Iodinated contrast media - iv dye      Other reaction(s): Vomiting    Iodine      Other reaction(s): Topical  Other reaction(s): Rash    Morphine      Other reaction(s): Hallucinations       SUGGEST:   Cont amio gtt to aid with rate control of AF (iodine allergy noted, pt has received amio in the past on review of the EMR in EP[employer])  Will start IV digoxin  Keep K+>4.0 and Mg++>2.0  No plan for isch eval  Consider code status discussion with family  DDX of above presentation (hypotension) and evidence of RV dysfxn includes pulm emb. However, given the supratherapeutic INR, this is less likely.     ICU time 30 min      Delano Leos MD, FACC

## 2017-02-08 NOTE — ASSESSMENT & PLAN NOTE
Multifactorial etiology: Underlying dementia with hypoglycemia, hypothermia, acute renal failure, lactic acid acidosis and possibly from elevated ammonia. Improved with treatment of underlying conditions.

## 2017-02-08 NOTE — ASSESSMENT & PLAN NOTE
UTI with sepsis with hypothermia and hypotension requiring pressor support. Blood cultures neg. Unfortunately, no urine culture ordered on admit. Appreciate ID recs for abx; on zosyn.

## 2017-02-08 NOTE — ASSESSMENT & PLAN NOTE
Thought to be 2/2 adriamycin.  EF   Date Value Ref Range Status   02/04/2017 30 (A) 55 - 65    12/28/2016 20 (A) 55 - 65

## 2017-02-08 NOTE — NURSING
Dr. Marks notified of pts decreased urine output. No new orders given, will cont to monitor.      1245: Dr. Mason notified of pt having K+ of 3.4 with digoxin 125mcg x2 ordered for this shift. Dr. Mason said ok to give. No new orders given, will cont to monitor.

## 2017-02-08 NOTE — PROGRESS NOTES
[facility] Hospital Medicine  Progress Note    Patient Name: Myra Murphy  MRN: [id number]  Patient Class: IP- Inpatient   Admission Date: 2/3/2017  Length of Stay: 3 days  Attending Physician: Swathi Mason MD  Primary Care Provider: Lupe Delatorre MD        Subjective:     Principal Problem:Shock    HPI:  Myra Murphy is a 82 y.o. female that (in part)  has a past medical history of Anticoagulant long-term use; Arthritis; Atrial fibrillation; Atrial flutter; Carcinoma of right breast, estrogen receptor negative and progesterone receptor positive (02/2008); Chronic combined systolic and diastolic congestive heart failure; Colon cancer; Diabetes mellitus; Dilated cardiomyopathy (12/01/2008); Dyslipidemia; Encounter for blood transfusion; Hyperlipidemia; Hypertension; and Supraventricular tachycardia. Presents to [facility] Emergency Department from nursing home with multiple issues including hypotension, left upper quadrant pain, and emesis ×2. EMS reports an initial blood pressure 52/34 and a blood glucose of 137. She was confused upon admission and was barely arousable to sternal rub. The history is otherwise limited due to the condition of the patient.      In the emergency department she underwent routine laboratory studies, CT imaging, and EKG. She had transient episodes of hypoglycemia and she had discordant low-pressure readings in her upper extremities although this is thought to be secondary to persistent mastectomy. Multiple abnormalities on laboratory studies were identified including acute renal failure, hyperkalemia, and severe lactic acid acidosis with a lactate of greater than 12 × 2 measures. She was also found to be hypothermic. After fluid resuscitation and treatment for hypokalemia as well as administration of D50 her mental status improved.     Hospital medicine has been asked to admit for further evaluation and treatment.       Hospital Course:  The  patient was admitted to the hospital for sepsis thought secondary to a UTI.  The patient initially required pressor support.  ID was consulted on 2/5 to help with management.  Heme + stools were reported by nursing staff and GI was consulted and started patient on a PPI BID. Nephrology was consulted for renal failure.  Interestingly, her urine culture was no growth. The patient had a CT of abdomen and pelvis on 2/6 that showed a SBO that may be related to R inguinal hernia. NG tube was placed and surgery was consulted.  Cards was consulted as well to follow as the patient has a cardiac history. She remained in the ICU on pressor support.      Review of Systems   Unable to perform ROS: Acuity of condition     Objective:     Vital Signs (Most Recent):  Temp: 97.4 °F (36.3 °C) (02/07/17 1915)  Pulse: 110 (02/07/17 1951)  Resp: (!) 80 (02/07/17 1951)  BP: (!) 78/63 (02/07/17 1948)  SpO2: (!) 94 % (02/07/17 1951) Vital Signs (24h Range):  Temp:  [97.1 °F (36.2 °C)-98.6 °F (37 °C)] 97.4 °F (36.3 °C)  Pulse:  [104-128] 110  Resp:  [17-80] 80  SpO2:  [56 %-100 %] 94 %  BP: ()/(49-71) 78/63     Weight: 75 kg (165 lb 5.5 oz)  Body mass index is 32.29 kg/(m^2).    Intake/Output Summary (Last 24 hours) at 02/07/17 2038  Last data filed at 02/07/17 1800   Gross per 24 hour   Intake             3108 ml   Output             1575 ml   Net             1533 ml      Physical Exam   Constitutional: She appears well-developed and well-nourished.   Cardiovascular: Normal rate.    Pulmonary/Chest: Effort normal and breath sounds normal. No respiratory distress. She has no wheezes.   Abdominal: Soft. She exhibits no distension. There is no tenderness.   Skin: Skin is warm and dry.   Nursing note and vitals reviewed.      Significant Labs:   CBC:   Recent Labs  Lab 02/06/17  0330 02/07/17  0400   WBC 11.63 8.77   HGB 10.9* 10.6*   HCT 35.7* 34.6*    145*     CMP:   Recent Labs  Lab 02/06/17  0330 02/07/17  0400   * 135*    K 3.4* 3.1*   CL 90* 95   CO2 30* 30*   * 84   BUN 64* 53*   CREATININE 3.1* 2.8*   CALCIUM 8.1* 7.9*   ANIONGAP 13 10   EGFRNONAA 13* 15*     Coagulation:   Recent Labs  Lab 02/07/17  0400   INR 1.9*     POCT Glucose:   Recent Labs  Lab 02/07/17  0523 02/07/17  1311 02/07/17  1716   POCTGLUCOSE 91 104 109         Assessment/Plan:      * Shock  Sepsis vs dilated cardiomyopathy and shock.    SBO (small bowel obstruction)  Appreciate surgery recs. NG tube placed. Fully redicuble R inguinal hernia.    Paroxysmal atrial fibrillation  Appreciate cards recs. Add amio gtt to aid with rate control of AF. Monitor lytes. Cont telemetry.    Acute on chronic renal failure  Her baseline CRT is 1.7 from 1/24. Cr improving and increased UOP.     UTI (urinary tract infection)  UTI with sepsis with hypothermia and hypotension requiring pressor support. Blood cultures neg. Unfortunately, no urine culture ordered on admit. Appreciate ID recs for abx; on zosyn.    Encephalopathy, metabolic  Multifactorial etiology: Underlying dementia with hypoglycemia, hypothermia, acute renal failure, lactic acid acidosis and possibly from elevated ammonia. Treat underlying conditions     Nonischemic dilated cardiomyopathy  Thought to be 2/2 adriamycin.  EF   Date Value Ref Range Status   02/04/2017 30 (A) 55 - 65    12/28/2016 20 (A) 55 - 65        Severe mitral regurgitation  Management of CHF as above      HTN (hypertension)  Benign essential- meds on hold for hypotension       Hyperlipidemia  No acute issues. Statin when tolerating PO.    Peripheral vascular disease  Stable. ASA & statin when tolerating PO.    Chronic venous stasis dermatitis of both lower extremities  Supportive care.  Wound care as needed.      Chronic combined systolic and diastolic CHF, NYHA class 4  Home meds on hold 2/2 SBO    Chronic kidney disease, stage 4, severely decreased GFR  As above.    Type 2 diabetes mellitus without complication  SSI  PRN    Hypothermia  Preston hugger PRN. Cardiac vs septic shock?    GI bleed  GI evaluated. H/H stable. PPI for now.      Lactic acid acidosis, resolved as of 2/7/2017  · Secondary to acute renal failure as above.    · Also suspicious for a hypotensive episode prior to admission.  ·       VTE Risk Mitigation         Ordered     Medium Risk of VTE  Once      02/04/17 0233          wSathi Mason MD  Department of Hospital Medicine   [facility]

## 2017-02-08 NOTE — PLAN OF CARE
Problem: Patient Care Overview  Goal: Plan of Care Review  Outcome: Ongoing (interventions implemented as appropriate)  Pt remains in ICU awake and alert, oriented to self only, NAD. Pt remains on Levo to maintain map >65. BG being checked and TX prn. Pt being turned Q2, skin being monitored for breakdown. No new injuries reported, will cont to  Monitor.

## 2017-02-08 NOTE — SUBJECTIVE & OBJECTIVE
Review of Systems   Unable to perform ROS: Acuity of condition     Objective:     Vital Signs (Most Recent):  Temp: 97.4 °F (36.3 °C) (02/07/17 1915)  Pulse: 110 (02/07/17 1951)  Resp: (!) 80 (02/07/17 1951)  BP: (!) 78/63 (02/07/17 1948)  SpO2: (!) 94 % (02/07/17 1951) Vital Signs (24h Range):  Temp:  [97.1 °F (36.2 °C)-98.6 °F (37 °C)] 97.4 °F (36.3 °C)  Pulse:  [104-128] 110  Resp:  [17-80] 80  SpO2:  [56 %-100 %] 94 %  BP: ()/(49-71) 78/63     Weight: 75 kg (165 lb 5.5 oz)  Body mass index is 32.29 kg/(m^2).    Intake/Output Summary (Last 24 hours) at 02/07/17 2038  Last data filed at 02/07/17 1800   Gross per 24 hour   Intake             3108 ml   Output             1575 ml   Net             1533 ml      Physical Exam   Constitutional: She appears well-developed and well-nourished.   Cardiovascular: Normal rate.    Pulmonary/Chest: Effort normal and breath sounds normal. No respiratory distress. She has no wheezes.   Abdominal: Soft. She exhibits no distension. There is no tenderness.   Skin: Skin is warm and dry.   Nursing note and vitals reviewed.      Significant Labs:   CBC:   Recent Labs  Lab 02/06/17  0330 02/07/17  0400   WBC 11.63 8.77   HGB 10.9* 10.6*   HCT 35.7* 34.6*    145*     CMP:   Recent Labs  Lab 02/06/17  0330 02/07/17  0400   * 135*   K 3.4* 3.1*   CL 90* 95   CO2 30* 30*   * 84   BUN 64* 53*   CREATININE 3.1* 2.8*   CALCIUM 8.1* 7.9*   ANIONGAP 13 10   EGFRNONAA 13* 15*     Coagulation:   Recent Labs  Lab 02/07/17  0400   INR 1.9*     POCT Glucose:   Recent Labs  Lab 02/07/17  0523 02/07/17  1311 02/07/17  1716   POCTGLUCOSE 91 104 109

## 2017-02-08 NOTE — PLAN OF CARE
Problem: Patient Care Overview  Goal: Plan of Care Review  Outcome: Ongoing (interventions implemented as appropriate)  Pt oriented to self only.  Follows commands.  Remains on low dose levophed for MAP >65.  Amiodarone drip also infusing; pt remains in A-fib per monitor with rate controlled.  NGT to LIWS with clear, yellow output.  Pt denies abdominal pain; no N/V.  Bowel sounds audible by auscultation; no BM overnight.  No new skin breakdown or injury observed; safety precautions maintained.

## 2017-02-08 NOTE — ASSESSMENT & PLAN NOTE
Appreciate surgery recs. NG tube placed. Fully redicuble R inguinal hernia. Pain improved. Lactic acid improved.

## 2017-02-08 NOTE — PROGRESS NOTES
Subjective:  Per RN, patient remains on same dose of levophed. No bleeding or BMs. 200cc aspirated via NGT on night shift. Patient had some complaints of abdominal pain this morning, but is resting comfortably at present.      Objective:  Vitals:    02/08/17 0648 02/08/17 0700 02/08/17 0703 02/08/17 0715   BP: 97/65  92/72    BP Location:       Patient Position:       BP Method:       Pulse:  (!) 120  (!) 116   Resp:  (!) 32  (!) 32   Temp:       TempSrc:       SpO2:  95%  96%   Weight:       Height:         GEN: Alert, no acute distress   HEENT: EOMI, anicteric sclera  Cardiovascular: Irreg irreg. Systolic murmur  Chest: Non-labored respirations, breath sounds equal, nasal cannula in place  Abdomen: soft, NTND, hypoactive BS  Extermities: No C/C/E. 2+ dorsalis pedis pulses bilaterally    Lab Results   Component Value Date    WBC 7.44 02/08/2017    HGB 10.9 (L) 02/08/2017    HCT 36.1 (L) 02/08/2017    MCV 79 (L) 02/08/2017     02/08/2017     Lab Results   Component Value Date     02/08/2017    K 3.4 (L) 02/08/2017    CL 97 02/08/2017    CO2 25 02/08/2017    BUN 50 (H) 02/08/2017    CREATININE 2.8 (H) 02/08/2017    CALCIUM 8.2 (L) 02/08/2017    ANIONGAP 14 02/08/2017    ESTGFRAFRICA 17 (A) 02/08/2017    EGFRNONAA 15 (A) 02/08/2017     Imaging:  CT abdomen/pelvis without contrast (2/6/17):  Impression: There are CT findings small bowel obstruction. The patient does have a right inguinal hernia that does contain with small bowel. It is not definite that this is the cause of the small bowel obstruction is loops of bowel are dilated but entering and exiting the hernia.      Assessment:  82 year old female with a history of HTN, DM, CHF, SVT, atrial fibrillation/atrial flutter, CKD, PVD, right breast cancer, and colon cancer presenting with altered mental status, hypoglycemia, septic shock and rectal bleeding.    Plan:  1. Rectal bleeding - appears to have resolved. CBC stable. C-diff negative. CT remarkable  for R inguinal hernia containing SB. Some concern for ischemic bowel. Surgery is on board and managing conservatively. Continue antibiotics per ID. Abd exam is benign. Will monitor briefly.

## 2017-02-08 NOTE — ASSESSMENT & PLAN NOTE
Multifactorial etiology: Underlying dementia with hypoglycemia, hypothermia, acute renal failure, lactic acid acidosis and possibly from elevated ammonia. Treat underlying conditions

## 2017-02-08 NOTE — SUBJECTIVE & OBJECTIVE
Review of Systems   Constitutional: Negative for chills and fever.   Respiratory: Negative for cough and shortness of breath.    Cardiovascular: Negative for chest pain and leg swelling.   Gastrointestinal: Positive for abdominal pain. Negative for abdominal distention and vomiting.   Skin: Negative for rash and wound.     Objective:     Vital Signs (Most Recent):  Temp: 96.6 °F (35.9 °C) (02/08/17 0715)  Pulse: (!) 118 (02/08/17 1000)  Resp: (!) 33 (02/08/17 1000)  BP: 100/76 (02/08/17 1000)  SpO2: 97 % (02/08/17 1000) Vital Signs (24h Range):  Temp:  [96.6 °F (35.9 °C)-97.4 °F (36.3 °C)] 96.6 °F (35.9 °C)  Pulse:  [104-122] 118  Resp:  [20-80] 33  SpO2:  [92 %-97 %] 97 %  BP: ()/(52-78) 100/76     Weight: 77.4 kg (170 lb 10.2 oz)  Body mass index is 33.33 kg/(m^2).    Intake/Output Summary (Last 24 hours) at 02/08/17 1453  Last data filed at 02/08/17 1100   Gross per 24 hour   Intake          3346.84 ml   Output              710 ml   Net          2636.84 ml      Physical Exam   Constitutional: She appears well-developed and well-nourished. No distress.   Elderly   HENT:   Right Ear: External ear normal.   Left Ear: External ear normal.   Nose: Nose normal.   Eyes: Conjunctivae and EOM are normal. Pupils are equal, round, and reactive to light.   Neck: Normal range of motion. Neck supple. No JVD present.   Cardiovascular: Normal rate and normal heart sounds.    No murmur heard.  Pulmonary/Chest: Effort normal and breath sounds normal. No respiratory distress. She has no wheezes.   Abdominal: Soft. Bowel sounds are normal. She exhibits no distension. There is no tenderness.   Musculoskeletal: Normal range of motion. She exhibits no edema, tenderness or deformity.   Neurological: She is alert.   Skin: Skin is warm and dry.   Nursing note and vitals reviewed.      Significant Labs:   CBC:   Recent Labs  Lab 02/07/17  0400 02/08/17  0405   WBC 8.77 7.44   HGB 10.6* 10.9*   HCT 34.6* 36.1*   * 180     CMP:    Recent Labs  Lab 02/07/17  0400 02/08/17  0405   * 136   K 3.1* 3.4*   CL 95 97   CO2 30* 25   GLU 84 92   BUN 53* 50*   CREATININE 2.8* 2.8*   CALCIUM 7.9* 8.2*   ANIONGAP 10 14   EGFRNONAA 15* 15*     Coagulation:   Recent Labs  Lab 02/08/17  0405   INR 1.9*     Lactic Acid:   Recent Labs  Lab 02/08/17  0925   LACTATE 2.6*     POCT Glucose:   Recent Labs  Lab 02/08/17  0619 02/08/17  1106 02/08/17  1107   POCTGLUCOSE 113* 189* 175*

## 2017-02-08 NOTE — ASSESSMENT & PLAN NOTE
Appreciate cards recs. Add amio gtt to aid with rate control of AF. Monitor lytes. Cont telemetry.

## 2017-02-08 NOTE — PROGRESS NOTES
[facility] Medicine  Progress Note    Patient Name: Myra Murphy  MRN: [id number]  Patient Class: IP- Inpatient   Admission Date: 2/3/2017  Length of Stay: 4 days  Attending Physician: Swathi Mason MD  Primary Care Provider: Luep Delatorre MD        Subjective:     Principal Problem:Shock    HPI:  Myra Murphy is a 81 yo woman with atrial fibrillation on OAC, chronic combined systolic and diastolic congestive heart failure (LVEF30% +DD 2/2017), h/o ER-/NM+ right breast cancer in 2008, HLD, HTN, obesity, and moderate protein calorie malnutrition who presented 2/3/2017 from [facility] with multiple issues including hypotension, left upper quadrant pain, and emesis ×2. EMS reports an initial blood pressure 52/34 and a blood glucose of 137. She was confused upon admission and was barely arousable to sternal rub.     Hospital Course:  In the emergency department she underwent routine laboratory studies, CT imaging, and EKG. She had transient episodes of hypoglycemia and she had discordant low-pressure readings in her upper extremities although this is thought to be secondary to persistent mastectomy. Multiple abnormalities on laboratory studies were identified including acute renal failure, hyperkalemia, and severe lactic acid acidosis with a lactate of greater than 12 × 2 measures. She was also found to be hypothermic. After fluid resuscitation and treatment for hypokalemia as well as administration of D50 her mental status improved.     She was admitted to the hospital for sepsis thought secondary to a UTI.  The patient initially required pressor support.  ID was consulted on 2/5 to help with management.  Heme + stools were reported by nursing staff and GI was consulted and started patient on a PPI BID. Nephrology was consulted for renal failure.  Interestingly, her urine culture was no growth. The patient had a CT of abdomen and pelvis on 2/6 that showed a SBO that may be  related to R inguinal hernia. NG tube was placed and surgery was consulted.  Cards was consulted as well to follow as the patient has a cardiac history. She remained in the ICU on pressor support. Her abdominal pain is improved but she is not making stool. Her lactic acid on 2/8 was 2.6 but she continued to require levophed.      Review of Systems   Constitutional: Negative for chills and fever.   Respiratory: Negative for cough and shortness of breath.    Cardiovascular: Negative for chest pain and leg swelling.   Gastrointestinal: Positive for abdominal pain. Negative for abdominal distention and vomiting.   Skin: Negative for rash and wound.     Objective:     Vital Signs (Most Recent):  Temp: 96.6 °F (35.9 °C) (02/08/17 0715)  Pulse: (!) 118 (02/08/17 1000)  Resp: (!) 33 (02/08/17 1000)  BP: 100/76 (02/08/17 1000)  SpO2: 97 % (02/08/17 1000) Vital Signs (24h Range):  Temp:  [96.6 °F (35.9 °C)-97.4 °F (36.3 °C)] 96.6 °F (35.9 °C)  Pulse:  [104-122] 118  Resp:  [20-80] 33  SpO2:  [92 %-97 %] 97 %  BP: ()/(52-78) 100/76     Weight: 77.4 kg (170 lb 10.2 oz)  Body mass index is 33.33 kg/(m^2).    Intake/Output Summary (Last 24 hours) at 02/08/17 1453  Last data filed at 02/08/17 1100   Gross per 24 hour   Intake          3346.84 ml   Output              710 ml   Net          2636.84 ml      Physical Exam   Constitutional: She appears well-developed and well-nourished. No distress.   Elderly   HENT:   Right Ear: External ear normal.   Left Ear: External ear normal.   Nose: Nose normal.   Eyes: Conjunctivae and EOM are normal. Pupils are equal, round, and reactive to light.   Neck: Normal range of motion. Neck supple. No JVD present.   Cardiovascular: Normal rate and normal heart sounds.    No murmur heard.  Pulmonary/Chest: Effort normal and breath sounds normal. No respiratory distress. She has no wheezes.   Abdominal: Soft. Bowel sounds are normal. She exhibits no distension. There is no tenderness.    Musculoskeletal: Normal range of motion. She exhibits no edema, tenderness or deformity.   Neurological: She is alert.   Skin: Skin is warm and dry.   Nursing note and vitals reviewed.      Significant Labs:   CBC:   Recent Labs  Lab 02/07/17  0400 02/08/17  0405   WBC 8.77 7.44   HGB 10.6* 10.9*   HCT 34.6* 36.1*   * 180     CMP:   Recent Labs  Lab 02/07/17  0400 02/08/17  0405   * 136   K 3.1* 3.4*   CL 95 97   CO2 30* 25   GLU 84 92   BUN 53* 50*   CREATININE 2.8* 2.8*   CALCIUM 7.9* 8.2*   ANIONGAP 10 14   EGFRNONAA 15* 15*     Coagulation:   Recent Labs  Lab 02/08/17  0405   INR 1.9*     Lactic Acid:   Recent Labs  Lab 02/08/17  0925   LACTATE 2.6*     POCT Glucose:   Recent Labs  Lab 02/08/17  0619 02/08/17  1106 02/08/17  1107   POCTGLUCOSE 113* 189* 175*     Assessment/Plan:      * Shock  Sepsis vs dilated cardiomyopathy    Paroxysmal atrial fibrillation  Appreciate cards recs. Add amio gtt to aid with rate control of AF. Monitor lytes. Cont telemetry. On Coumadin as an outpatient.    UTI (urinary tract infection)  UTI with sepsis with hypothermia and hypotension requiring pressor support. Blood cultures neg. Unfortunately, no urine culture ordered on admit. Appreciate ID recs for abx; on zosyn.    SBO (small bowel obstruction)  Appreciate surgery recs. NG tube placed. Fully redicuble R inguinal hernia. Pain improved. Lactic acid improved.     Acute on chronic renal failure  Her baseline Cr is 1.7 from 1/24. Cr improving.    Encephalopathy, metabolic  Multifactorial etiology: Underlying dementia with hypoglycemia, hypothermia, acute renal failure, lactic acid acidosis and possibly from elevated ammonia. Improved with treatment of underlying conditions.    Nonischemic dilated cardiomyopathy  Thought to be 2/2 adriamycin.  EF   Date Value Ref Range Status   02/04/2017 30 (A) 55 - 65    12/28/2016 20 (A) 55 - 65        Severe mitral regurgitation  Management of CHF as above    HTN  (hypertension)  Benign essential- meds on hold for hypotension     Hyperlipidemia  No acute issues. Statin when tolerating PO.    Peripheral vascular disease  Stable. ASA & statin when tolerating PO.    Chronic venous stasis dermatitis of both lower extremities  Supportive care.  Wound care as needed.    Chronic combined systolic and diastolic CHF, NYHA class 4  Home meds on hold 2/2 SBO    Chronic kidney disease, stage 4, severely decreased GFR  As above.    Long term current use of anticoagulant therapy  PAF    Type 2 diabetes mellitus without complication  SSI PRN    GI bleed  GI evaluated. H/H stable. PPI for now.      VTE Risk Mitigation         Ordered     Medium Risk of VTE  Once      02/04/17 0233          Swathi Mason MD  Department of Hospital Medicine   [facility]

## 2017-02-08 NOTE — CONSULTS
Consult Note  Infectious Disease    Consult Requested By: Swathi Mason MD    Reason for Consult: hypotension and hypothermia.    SUBJECTIVE:     History of Present Illness:  Patient is a 82 y.o. female presents with hypotension and hypothermia. 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 She was recently admitted with chf in januray 2017 and was placed in a snf 1/28/17. She is now here obtunded and hypothermic. Blood cultures are negative, urine and stool cultures negative form 2/3/17.c diff toxin is negative and she has no diarrhea now. Ct abdomen shows a sbo and rt inguinal hernia.    Past Medical History   Diagnosis Date    Anticoagulant long-term use     Arthritis     Atrial fibrillation     Atrial flutter     Carcinoma of right breast, estrogen receptor negative and progesterone receptor positive 02/2008    Chronic combined systolic and diastolic congestive heart failure     Colon cancer     Diabetes mellitus     Dilated cardiomyopathy 12/01/2008     adriamycin toxicity    Dyslipidemia     Encounter for blood transfusion     Hyperlipidemia     Hypertension     Supraventricular tachycardia      PAF, AFL     Past Surgical History   Procedure Laterality Date    Colon surgery  2000     upper colon removal    Cataract extraction  8/5/09     both eyes (Dr. Adam)    Cardioversion  04/24/2014    Total abdominal hysterectomy w/ bilateral salpingoophorectomy  1973    Mastectomy, partial Right 03/14/2008     R right    Total hip arthroplasty  03/05/2010     anuerysm in hip/right hip    Hysterectomy      Eye surgery       Family History   Problem Relation Age of Onset    Heart disease Mother     Hypertension Mother     Diabetes Mother     Glaucoma Mother     Glaucoma Maternal Aunt      Social History   Substance Use Topics    Smoking status: Never Smoker    Smokeless tobacco: Never Used    Alcohol use No       Review of patient's  allergies indicates:   Allergen Reactions    Shellfish containing products Itching     Can eat shrimp    Adhesive      Other reaction(s): blisters    Iodinated contrast media - iv dye      Other reaction(s): Vomiting    Iodine      Other reaction(s): Topical  Other reaction(s): Rash    Morphine      Other reaction(s): Hallucinations        Antibiotics     Start     Stop Route Frequency Ordered    02/03/17 2300  piperacillin-tazobactam 4.5 g in dextrose 5 % 100 mL IVPB (ready to mix system)      -- IV Every 12 hours 02/03/17 0966          Review of Systems:  Review of systems not obtained due to patient factors unresponsive.    OBJECTIVE:     Vital Signs (Most Recent)  Temp: 96.6 °F (35.9 °C) (02/08/17 0715)  Pulse: (!) 118 (02/08/17 1000)  Resp: (!) 33 (02/08/17 1000)  BP: 100/76 (02/08/17 1000)  SpO2: 97 % (02/08/17 1000)    Temperature Range Min/Max (Last 24H):  Temp:  [96.6 °F (35.9 °C)-97.4 °F (36.3 °C)]     Physical Exam:  General: cachectic  Eyes: conjunctivae/corneas clear. PERRL..  HENT: Head:normocephalic, atraumatic. Ears:not examined. Nose: Nares normal. Septum midline. Mucosa normal. No drainage or sinus tenderness., no discharge. Throat: lips, mucosa, and tongue normal; teeth and gums normal and no throat erythema.  Neck: supple, symmetrical, trachea midline, no JVD and thyroid not enlarged, symmetric, no tenderness/mass/nodules  Lungs:  clear to auscultation bilaterally and normal respiratory effort  Cardiovascular: Heart: regular rate and rhythm, S1, S2 normal, no murmur, click, rub or gallop. Chest Wall: no tenderness. Extremities: excoriated skin. Pulses: not examined.  Abdomen/Rectal: Abdomen: soft, non-tender non-distented; bowel sounds normal; rt inguinal region with reducible hernia but tender  Skin: as above  Musculoskeletal:no clubbing, cyanosis    Laboratory:  CBC    Recent Labs  Lab 02/08/17  0405   WBC 7.44   RBC 4.57   HGB 10.9*   HCT 36.1*        BMP    Recent Labs  Lab  02/08/17  0405   CO2 25   BUN 50*   CREATININE 2.8*   CALCIUM 8.2*       Recent Labs  Lab 02/03/17  2010   COLORU Siobhan   SPECGRAV 1.015   PHUR 5.0   PROTEINUA 2+*   BACTERIA Moderate*   NITRITE Negative   LEUKOCYTESUR Negative   UROBILINOGEN 2.0-3.0*   HYALINECASTS 10*     Microbiology Results (last 7 days)     Procedure Component Value Units Date/Time    Stool culture [[id number]] Collected:  02/05/17 0912    Order Status:  Completed Specimen:  Stool from Stool Updated:  02/08/17 0807     Stool Culture No Salmonella,Shigella,Vibrio,Campylobacter,Yersinia isolated.    Blood culture [[id number]] Collected:  02/03/17 2055    Order Status:  Completed Specimen:  Blood from Peripheral, Hand, Left Updated:  02/07/17 2303     Blood Culture, Routine No growth to date     Blood Culture, Routine No Growth to date     Blood Culture, Routine No Growth to date     Blood Culture, Routine No Growth to date     Blood Culture, Routine No Growth to date    Blood culture [[id number]] Collected:  02/03/17 2120    Order Status:  Completed Specimen:  Blood from Peripheral, Hand, Right Updated:  02/07/17 2303     Blood Culture, Routine No growth to date     Blood Culture, Routine No Growth to date     Blood Culture, Routine No Growth to date     Blood Culture, Routine No Growth to date     Blood Culture, Routine No Growth to date    Urine culture [[id number]] Collected:  02/05/17 1035    Order Status:  Completed Specimen:  Urine from Urine, Catheterized Updated:  02/07/17 0742     Urine Culture, Routine No growth    E. coli 0157 antigen [[id number]] Collected:  02/05/17 0912    Order Status:  Completed Specimen:  Stool from Stool Updated:  02/06/17 1222     Shiga Toxin 1 E.coli Negative     Shiga Toxin 2 E.coli Negative    Clostridium difficile EIA [[id number]] Collected:  02/04/17 0910    Order Status:  Completed Specimen:  Stool from Stool Updated:  02/04/17 1237     C. diff Antigen Negative     C difficile Toxins A+B, EIA Negative       Testing not recommended for children <24 months old.       Gram stain [[id number]] Collected:  02/04/17 0420    Order Status:  Completed Specimen:  Urine from Urine Updated:  02/04/17 0817     Gram Stain Result Few WBC's      Few epithelial cells      Gram positive cocci in pairs          Diagnostic Results:  Labs: Reviewed  X-Ray: Reviewed    ASSESSMENT/PLAN:     Active Hospital Problems    Diagnosis  POA    *Shock [R57.9]  Yes    SBO (small bowel obstruction) [K56.69]  Yes    GI bleed [K92.2]  Yes    UTI (urinary tract infection) [N39.0]  Yes    Acute on chronic renal failure [N17.9, N18.9]  Yes    Supratherapeutic INR [R79.1]  Yes    Encephalopathy, metabolic [G93.41]  Yes    Type 2 diabetes mellitus without complication [E11.9]  Yes    Long term current use of anticoagulant therapy [Z79.01]  Not Applicable    Chronic combined systolic and diastolic CHF, NYHA class 4 [I50.42]  Yes     Chronic    Chronic kidney disease, stage 4, severely decreased GFR [N18.4]  Yes     Chronic    Peripheral vascular disease [I73.9]  Yes     Chronic    Chronic venous stasis dermatitis of both lower extremities [I83.11, I83.12]  Yes     Chronic    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 LVEF 20% 12/28/16.      Severe mitral regurgitation [I34.0]  Yes     Chronic     MODERATE-SEVERE      HTN (hypertension) [I10]  Yes    Hyperlipidemia [E78.5]  Yes    Paroxysmal atrial fibrillation [I48.0]  Yes     Chronic     WITH LA THROMBUS 10/12        Resolved Hospital Problems    Diagnosis Date Resolved POA    Hyperkalemia, diminished renal excretion [E87.5] 02/07/2017 Yes    Lactic acid acidosis [E87.2] 02/07/2017 Yes    Hypothermia [T68.XXXA] 02/08/2017 Yes       1.hypothermic hypotension. Sepsis vs dilated cardiomyopathy and shock.  Ct abdomen and pelvis with sbo and rt inguinal hernia. Surgical review appreciated and ? Ischemic bowel  Adjusedt abx   No  meningeal signs  2.dilated cardiomyopathy  She is more awake and her creatinine is better  She is very frail  Needs her code status addressed with her poa

## 2017-02-09 PROBLEM — Z71.89 ADVANCED CARE PLANNING/COUNSELING DISCUSSION: Status: ACTIVE | Noted: 2017-01-01

## 2017-02-09 PROBLEM — R79.1 SUPRATHERAPEUTIC INR: Status: RESOLVED | Noted: 2017-01-01 | Resolved: 2017-01-01

## 2017-02-09 NOTE — SUBJECTIVE & OBJECTIVE
Interval History: off levophed. Stopped IVF and given one dose of Lasix. She continues to have low UOP but her Cr continues to slowly improve.    Review of Systems   Constitutional: Negative for chills and fever.   Respiratory: Negative for cough and shortness of breath.    Cardiovascular: Negative for chest pain and leg swelling.   Gastrointestinal: Negative for abdominal distention, abdominal pain and vomiting.   Skin: Negative for rash and wound.     Objective:     Vital Signs (Most Recent):  Temp: 97.6 °F (36.4 °C) (02/09/17 0315)  Pulse: 98 (02/09/17 0915)  Resp: (!) 24 (02/09/17 0915)  BP: 95/76 (02/09/17 0915)  SpO2: 95 % (02/09/17 0915) Vital Signs (24h Range):  Temp:  [97.1 °F (36.2 °C)-98 °F (36.7 °C)] 97.6 °F (36.4 °C)  Pulse:  [] 98  Resp:  [19-55] 24  SpO2:  [91 %-98 %] 95 %  BP: ()/(59-87) 95/76     Weight: 77.4 kg (170 lb 10.2 oz)  Body mass index is 33.33 kg/(m^2).    Intake/Output Summary (Last 24 hours) at 02/09/17 1342  Last data filed at 02/09/17 0600   Gross per 24 hour   Intake          2823.32 ml   Output              325 ml   Net          2498.32 ml      Physical Exam   Constitutional: She appears well-developed and well-nourished. No distress.   Elderly   HENT:   Right Ear: External ear normal.   Left Ear: External ear normal.   Nose: Nose normal.   Eyes: Conjunctivae and EOM are normal. Pupils are equal, round, and reactive to light.   Neck: Normal range of motion. Neck supple. No JVD present.   Cardiovascular: Normal rate and normal heart sounds.    No murmur heard.  Pulmonary/Chest: Effort normal and breath sounds normal. No respiratory distress. She has no wheezes.   Abdominal: Soft. Bowel sounds are normal. She exhibits no distension. There is no tenderness.   Musculoskeletal: Normal range of motion. She exhibits no edema, tenderness or deformity.   Neurological: She is alert.   Skin: Skin is warm and dry.   Nursing note and vitals reviewed.      Significant Labs:   CBC:    Recent Labs  Lab 02/08/17  0405 02/09/17  0330   WBC 7.44 7.13   HGB 10.9* 10.5*   HCT 36.1* 34.9*    163     CMP:   Recent Labs  Lab 02/08/17  0405 02/09/17  0330    135*   K 3.4* 3.6   CL 97 98   CO2 25 22*   GLU 92 224*   BUN 50* 51*   CREATININE 2.8* 2.9*   CALCIUM 8.2* 8.1*   ANIONGAP 14 15   EGFRNONAA 15* 15*     POCT Glucose:   Recent Labs  Lab 02/09/17  0027 02/09/17  0549 02/09/17  1148   POCTGLUCOSE 178* 310* 254*     CXR reviewed

## 2017-02-09 NOTE — PROGRESS NOTES
[facility]  Progress Note    Patient Name: Myra Murphy  MRN: [id number]  Patient Class: IP- Inpatient   Admission Date: 2/3/2017  Length of Stay: 5 days  Attending Physician: Swathi Mason MD  Primary Care Provider: Lupe Delatorre MD        Subjective:     Principal Problem:Shock    HPI:  Myra Murphy is a 83 yo woman with atrial fibrillation on OAC, chronic combined systolic and diastolic congestive heart failure (LVEF30% +DD 2/2017), h/o ER-/IA+ right breast cancer in 2008, HLD, HTN, obesity, and moderate protein calorie malnutrition who presented 2/3/2017 from [facility] with multiple issues including hypotension, left upper quadrant pain, and emesis ×2. EMS reports an initial blood pressure 52/34 and a blood glucose of 137. She was confused upon admission and was barely arousable to sternal rub.     Hospital Course:  In the emergency department she underwent routine laboratory studies, CT imaging, and EKG. She had transient episodes of hypoglycemia and she had discordant low-pressure readings in her upper extremities although this is thought to be secondary to persistent mastectomy. Multiple abnormalities on laboratory studies were identified including acute renal failure, hyperkalemia, and severe lactic acid acidosis with a lactate of greater than 12 × 2 measures. She was also found to be hypothermic. After fluid resuscitation and treatment for hypokalemia as well as administration of D50 her mental status improved.     She was admitted to the hospital for sepsis thought secondary to a UTI.  The patient initially required pressor support.  ID was consulted on 2/5 to help with management.  Heme + stools were reported by nursing staff and GI was consulted and started patient on a PPI BID. Nephrology was consulted for renal failure.  Interestingly, her urine culture was no growth. The patient had a CT of abdomen and pelvis on 2/6 that showed a SBO that may be  related to R inguinal hernia. NG tube was placed and surgery was consulted.  Cards was consulted as well to follow as the patient has a cardiac history. The hernia was easily reducible and she is a very poor candidate for operation given her comorbidities. Her abdominal pain improved and her lactic acid on 2/8 had decreased to 2.6. She remained in the ICU on pressor support which was weaned off 2/8/2017. Amiodarone infusion was transitioned to PO as her bowel function was improving. She continues to have renal insufficiency. She had some pulmonary edema on CXR 2/9/2017 though oxygenation was stable. She was given was given one dose of Lasix 20 IV 2/9. She was transferred to telemetry for further monitoring 2/9/2017.      Interval History: off levophed. Stopped IVF and given one dose of Lasix. She continues to have low UOP but her Cr continues to slowly improve.    Review of Systems   Constitutional: Negative for chills and fever.   Respiratory: Negative for cough and shortness of breath.    Cardiovascular: Negative for chest pain and leg swelling.   Gastrointestinal: Negative for abdominal distention, abdominal pain and vomiting.   Skin: Negative for rash and wound.     Objective:     Vital Signs (Most Recent):  Temp: 97.6 °F (36.4 °C) (02/09/17 0315)  Pulse: 98 (02/09/17 0915)  Resp: (!) 24 (02/09/17 0915)  BP: 95/76 (02/09/17 0915)  SpO2: 95 % (02/09/17 0915) Vital Signs (24h Range):  Temp:  [97.1 °F (36.2 °C)-98 °F (36.7 °C)] 97.6 °F (36.4 °C)  Pulse:  [] 98  Resp:  [19-55] 24  SpO2:  [91 %-98 %] 95 %  BP: ()/(59-87) 95/76     Weight: 77.4 kg (170 lb 10.2 oz)  Body mass index is 33.33 kg/(m^2).    Intake/Output Summary (Last 24 hours) at 02/09/17 1342  Last data filed at 02/09/17 0600   Gross per 24 hour   Intake          2823.32 ml   Output              325 ml   Net          2498.32 ml      Physical Exam   Constitutional: She appears well-developed and well-nourished. No distress.   Elderly   HENT:    Right Ear: External ear normal.   Left Ear: External ear normal.   Nose: Nose normal.   Eyes: Conjunctivae and EOM are normal. Pupils are equal, round, and reactive to light.   Neck: Normal range of motion. Neck supple. No JVD present.   Cardiovascular: Normal rate and normal heart sounds.    No murmur heard.  Pulmonary/Chest: Effort normal and breath sounds normal. No respiratory distress. She has no wheezes.   Abdominal: Soft. Bowel sounds are normal. She exhibits no distension. There is no tenderness.   Musculoskeletal: Normal range of motion. She exhibits no edema, tenderness or deformity.   Neurological: She is alert.   Skin: Skin is warm and dry.   Nursing note and vitals reviewed.      Significant Labs:   CBC:   Recent Labs  Lab 02/08/17  0405 02/09/17  0330   WBC 7.44 7.13   HGB 10.9* 10.5*   HCT 36.1* 34.9*    163     CMP:   Recent Labs  Lab 02/08/17  0405 02/09/17  0330    135*   K 3.4* 3.6   CL 97 98   CO2 25 22*   GLU 92 224*   BUN 50* 51*   CREATININE 2.8* 2.9*   CALCIUM 8.2* 8.1*   ANIONGAP 14 15   EGFRNONAA 15* 15*     POCT Glucose:   Recent Labs  Lab 02/09/17  0027 02/09/17  0549 02/09/17  1148   POCTGLUCOSE 178* 310* 254*     CXR reviewed    Assessment/Plan:      * Shock  Sepsis vs dilated cardiomyopathy    Paroxysmal atrial fibrillation  Appreciate cards recs. On amio gtt while NPO, changed to PO 2/9/2017. Monitor lytes. Cont telemetry. On Coumadin as an outpatient. OAC held 2/2 GI bleed.    UTI (urinary tract infection)  UTI with sepsis with hypothermia and hypotension requiring pressor support. Blood cultures neg. Unfortunately, no urine culture ordered on admit. Appreciate ID recs for abx; on zosyn.    SBO (small bowel obstruction)  Appreciate surgery recs. NG tube placed. Fully redicuble R inguinal hernia. Pain improved. Lactic acid improved.     Acute on chronic renal failure  Her baseline Cr is 1.7 from 1/24. Cr improving.    Encephalopathy, metabolic  Multifactorial etiology:  Underlying dementia with hypoglycemia, hypothermia, acute renal failure, lactic acid acidosis and possibly from elevated ammonia. Improved with treatment of underlying conditions.    Advanced care planning/counseling discussion  Appreciate Palliative Care's assistance.    Nonischemic dilated cardiomyopathy  Thought to be 2/2 adriamycin.  EF   Date Value Ref Range Status   02/04/2017 30 (A) 55 - 65    12/28/2016 20 (A) 55 - 65        Severe mitral regurgitation  Management of CHF as above    HTN (hypertension)  Benign essential- meds on hold for hypotension     Hyperlipidemia  No acute issues. Statin when tolerating PO.    Peripheral vascular disease  Stable. ASA & statin when tolerating PO.    Chronic venous stasis dermatitis of both lower extremities  Supportive care.  Wound care as needed.    Chronic combined systolic and diastolic CHF, NYHA class 4  Home meds on hold 2/2 SBO    Chronic kidney disease, stage 4, severely decreased GFR  As above.    Long term current use of anticoagulant therapy  PAF    Type 2 diabetes mellitus without complication  SSI PRN    GI bleed  GI evaluated. H/H stable. PPI for now.      Supratherapeutic INR, resolved as of 2/9/2017  Holding coumadin. Decreased  Will follow. Holding coumadin.       VTE Risk Mitigation         Ordered     Medium Risk of VTE  Once      02/04/17 0233          Swtahi Mason MD  Department of Hospital Medicine   [facility]

## 2017-02-09 NOTE — ASSESSMENT & PLAN NOTE
Appreciate cards recs. On amio gtt while NPO, changed to PO 2/9/2017. Monitor lytes. Cont telemetry. On Coumadin as an outpatient. OAC held 2/2 GI bleed.

## 2017-02-09 NOTE — PLAN OF CARE
Problem: Patient Care Overview  Goal: Plan of Care Review  Outcome: Ongoing (interventions implemented as appropriate)  Pt remains in ICU, awake and alert, oriented to self only., VSS, NAD. Pt weaned off of Levo, maintaining Map goal. Pt being turned Q2, skin being monitored for breakdown. BG being checked and TX prn. No new injuries noted, will cont to monitor.

## 2017-02-09 NOTE — PLAN OF CARE
Problem: Patient Care Overview  Goal: Plan of Care Review  Outcome: Ongoing (interventions implemented as appropriate)  Pt remain stable in the ICU. NADN. Remain free from injury. King to self only. Remain in bilateral wrist restraints. NG remains in place with no output noted. Minimal urine output noted;nephrology aware. Remain on Amiodarone gtt and IVF. Remains NPO. Patient started on TPN and lipids this shift. Plan of care reviewed with patient.

## 2017-02-09 NOTE — CONSULTS
Consult Note  Infectious Disease    Consult Requested By: Swathi Mason MD    Reason for Consult: hypotension and hypothermia.    SUBJECTIVE:     History of Present Illness:  Patient is a 82 y.o. female presents with hypotension and hypothermia. 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 She was recently admitted with chf in januray 2017 and was placed in a snf 1/28/17. She is now here obtunded and hypothermic. Blood cultures are negative, urine and stool cultures negative form 2/3/17.c diff toxin is negative and she has no diarrhea now. Ct abdomen shows a sbo and rt inguinal hernia.    Past Medical History   Diagnosis Date    Anticoagulant long-term use     Arthritis     Atrial fibrillation     Atrial flutter     Carcinoma of right breast, estrogen receptor negative and progesterone receptor positive 02/2008    Chronic combined systolic and diastolic congestive heart failure     Colon cancer     Diabetes mellitus     Dilated cardiomyopathy 12/01/2008     adriamycin toxicity    Dyslipidemia     Encounter for blood transfusion     Hyperlipidemia     Hypertension     Supraventricular tachycardia      PAF, AFL     Past Surgical History   Procedure Laterality Date    Colon surgery  2000     upper colon removal    Cataract extraction  8/5/09     both eyes (Dr. Adam)    Cardioversion  04/24/2014    Total abdominal hysterectomy w/ bilateral salpingoophorectomy  1973    Mastectomy, partial Right 03/14/2008     R right    Total hip arthroplasty  03/05/2010     anuerysm in hip/right hip    Hysterectomy      Eye surgery       Family History   Problem Relation Age of Onset    Heart disease Mother     Hypertension Mother     Diabetes Mother     Glaucoma Mother     Glaucoma Maternal Aunt      Social History   Substance Use Topics    Smoking status: Never Smoker    Smokeless tobacco: Never Used    Alcohol use No       Review of patient's  allergies indicates:   Allergen Reactions    Shellfish containing products Itching     Can eat shrimp    Adhesive      Other reaction(s): blisters    Iodinated contrast media - iv dye      Other reaction(s): Vomiting    Iodine      Other reaction(s): Topical  Other reaction(s): Rash    Morphine      Other reaction(s): Hallucinations        Antibiotics     Start     Stop Route Frequency Ordered    02/03/17 2300  piperacillin-tazobactam 4.5 g in dextrose 5 % 100 mL IVPB (ready to mix system)      -- IV Every 12 hours 02/03/17 2706          Review of Systems:  Review of systems not obtained due to patient factors unresponsive.    OBJECTIVE:     Vital Signs (Most Recent)  Temp: 97.6 °F (36.4 °C) (02/09/17 0315)  Pulse: 98 (02/09/17 0915)  Resp: (!) 24 (02/09/17 0915)  BP: 95/76 (02/09/17 0915)  SpO2: 95 % (02/09/17 0915)    Temperature Range Min/Max (Last 24H):  Temp:  [96.4 °F (35.8 °C)-98 °F (36.7 °C)]     Physical Exam:  General: cachectic  Eyes: conjunctivae/corneas clear. PERRL..  HENT: Head:normocephalic, atraumatic. Ears:not examined. Nose: Nares normal. Septum midline. Mucosa normal. No drainage or sinus tenderness., no discharge. Throat: lips, mucosa, and tongue normal; teeth and gums normal and no throat erythema.  Neck: supple, symmetrical, trachea midline, no JVD and thyroid not enlarged, symmetric, no tenderness/mass/nodules  Lungs:  clear to auscultation bilaterally and normal respiratory effort  Cardiovascular: Heart: regular rate and rhythm, S1, S2 normal, no murmur, click, rub or gallop. Chest Wall: no tenderness. Extremities: excoriated skin. Pulses: not examined.  Abdomen/Rectal: Abdomen: soft, non-tender non-distented; bowel sounds normal; rt inguinal region with reducible hernia but tender  Skin: as above  Musculoskeletal:no clubbing, cyanosis    Laboratory:  CBC    Recent Labs  Lab 02/09/17  0330   WBC 7.13   RBC 4.38   HGB 10.5*   HCT 34.9*        BMP    Recent Labs  Lab 02/09/17 0330    CO2 22*   BUN 51*   CREATININE 2.9*   CALCIUM 8.1*       Recent Labs  Lab 02/03/17 2010   COLORU Siobhan   SPECGRAV 1.015   PHUR 5.0   PROTEINUA 2+*   BACTERIA Moderate*   NITRITE Negative   LEUKOCYTESUR Negative   UROBILINOGEN 2.0-3.0*   HYALINECASTS 10*     Microbiology Results (last 7 days)     Procedure Component Value Units Date/Time    Blood culture [[id number]] Collected:  02/03/17 2055    Order Status:  Completed Specimen:  Blood from Peripheral, Hand, Left Updated:  02/08/17 2303     Blood Culture, Routine No growth after 5 days.    Blood culture [[id number]] Collected:  02/03/17 2120    Order Status:  Completed Specimen:  Blood from Peripheral, Hand, Right Updated:  02/08/17 2303     Blood Culture, Routine No growth after 5 days.    Stool culture [[id number]] Collected:  02/05/17 0912    Order Status:  Completed Specimen:  Stool from Stool Updated:  02/08/17 0807     Stool Culture No Salmonella,Shigella,Vibrio,Campylobacter,Yersinia isolated.    Urine culture [[id number]] Collected:  02/05/17 1035    Order Status:  Completed Specimen:  Urine from Urine, Catheterized Updated:  02/07/17 0742     Urine Culture, Routine No growth    E. coli 0157 antigen [[id number]] Collected:  02/05/17 0912    Order Status:  Completed Specimen:  Stool from Stool Updated:  02/06/17 1222     Shiga Toxin 1 E.coli Negative     Shiga Toxin 2 E.coli Negative    Clostridium difficile EIA [[id number]] Collected:  02/04/17 0910    Order Status:  Completed Specimen:  Stool from Stool Updated:  02/04/17 1237     C. diff Antigen Negative     C difficile Toxins A+B, EIA Negative      Testing not recommended for children <24 months old.       Gram stain [[id number]] Collected:  02/04/17 0420    Order Status:  Completed Specimen:  Urine from Urine Updated:  02/04/17 0817     Gram Stain Result Few WBC's      Few epithelial cells      Gram positive cocci in pairs          Diagnostic Results:  Labs: Reviewed  X-Ray: Reviewed    ASSESSMENT/PLAN:      Active Hospital Problems    Diagnosis  POA    *Shock [R57.9]  Yes    SBO (small bowel obstruction) [K56.69]  Yes    GI bleed [K92.2]  Yes    UTI (urinary tract infection) [N39.0]  Yes    Acute on chronic renal failure [N17.9, N18.9]  Yes    Supratherapeutic INR [R79.1]  Yes    Encephalopathy, metabolic [G93.41]  Yes    Type 2 diabetes mellitus without complication [E11.9]  Yes    Long term current use of anticoagulant therapy [Z79.01]  Not Applicable    Chronic combined systolic and diastolic CHF, NYHA class 4 [I50.42]  Yes     Chronic    Chronic kidney disease, stage 4, severely decreased GFR [N18.4]  Yes     Chronic    Peripheral vascular disease [I73.9]  Yes     Chronic    Chronic venous stasis dermatitis of both lower extremities [I83.11, I83.12]  Yes     Chronic    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 LVEF 20% 12/28/16.      Severe mitral regurgitation [I34.0]  Yes     Chronic     MODERATE-SEVERE      HTN (hypertension) [I10]  Yes    Hyperlipidemia [E78.5]  Yes    Paroxysmal atrial fibrillation [I48.0]  Yes     Chronic     WITH LA THROMBUS 10/12        Resolved Hospital Problems    Diagnosis Date Resolved POA    Hyperkalemia, diminished renal excretion [E87.5] 02/07/2017 Yes    Lactic acid acidosis [E87.2] 02/07/2017 Yes    Hypothermia [T68.XXXA] 02/08/2017 Yes       1.hypothermic hypotension. Sepsis vs dilated cardiomyopathy and shock.  Ct abdomen and pelvis with sbo and rt inguinal hernia. Surgical review appreciated and ? Ischemic bowel  Adjusedt abx   No meningeal signs  2.dilated cardiomyopathy  She is more awake and her creatinine is better  She is very frail  Needs her code status addressed with her poa  An ef of 20 to 30 % and a creatinine of 2.9 do not ronal well for recovery at age 82

## 2017-02-09 NOTE — PROGRESS NOTES
Progress Note  Cardiology    Admit Date: 2/3/2017   LOS: 5 days     SUBJECTIVE:     Interval History: pt seen in ICU, case d/w Dr. Mason.  No cp/sob.  Abd pain improved.    Review of Systems:  UTO    Telemetry: AF VR 90-100s, 8 beats NSVT 2/6/17 (personally reviewed)    OBJECTIVE:     Vital Signs (Most Recent)  Temp: 97.6 °F (36.4 °C) (02/09/17 0315)  Pulse: 98 (02/09/17 0915)  Resp: (!) 24 (02/09/17 0915)  BP: 95/76 (02/09/17 0915)  SpO2: 95 % (02/09/17 0915)    Vital Signs Range (Last 24H):  Temp:  [97.1 °F (36.2 °C)-98 °F (36.7 °C)]   Pulse:  []   Resp:  [19-55]   BP: ()/(59-87)   SpO2:  [91 %-98 %]     I & O (Last 24H):    Intake/Output Summary (Last 24 hours) at 02/09/17 1104  Last data filed at 02/09/17 0600   Gross per 24 hour   Intake          3090.44 ml   Output              325 ml   Net          2765.44 ml       Physical Exam:  Gen: chr ill elderly woman, in NAD.  HEENT: NCAT  Neck: Supple, JVP normal  Chest: CTAB, no wheeze, respirations unlabored, no kyphoscoliosis  Heart:  irreg irreg, normal S1 S2, no R/G, 2/6 syst murmur  Abd: Soft, dec BS, NTND  Ext: Warm, no C/C/E.  Skin: no rash/ulcers  CNS: CN 2-12 grossly intact, exam non-focal  Psych: normal mood and affect    Laboratory:  CBC:    Recent Labs  Lab 02/07/17  0400 02/08/17  0405 02/09/17  0330   WHITE BLOOD CELL COUNT 8.77 7.44 7.13   HEMOGLOBIN 10.6 L 10.9 L 10.5 L   HEMATOCRIT 34.6 L 36.1 L 34.9 L   PLATELETS 145 L 180 163       CHEMISTRIES:    Recent Labs  Lab 01/24/17  0311  02/05/17  0418  02/07/17  0400 02/08/17  0405 02/09/17  0330   GLUCOSE 123 H  < > 177 H  < > 84 92 224 H   SODIUM 136  < > 138  < > 135 L 136 135 L   POTASSIUM 3.5  < > 4.2  < > 3.1 L 3.4 L 3.6   BUN BLD 40 H  < > 78 H  < > 53 H 50 H 51 H   CREATININE 1.7 H  < > 3.5 H  < > 2.8 H 2.8 H 2.9 H   EGFR IF  32 A  < > 13 A  < > 17 A 17 A 17 A   EGFR IF NON- 28 A  < > 12 A  < > 15 A 15 A 15 A   CALCIUM 8.9  < > 8.2 L  < > 7.9 L 8.2 L 8.1 L  "  MAGNESIUM 1.8  --  2.2  --  1.7  --   --    < > = values in this interval not displayed.    CARDIAC BIOMARKERS:    Recent Labs  Lab 01/16/17  2358 01/17/17  0633 02/03/17  1925   TROPONIN I 0.034 H 0.035 H 0.018       COAGS:    Recent Labs  Lab 02/07/17  0400 02/08/17  0405 02/09/17  0330   INR 1.9 H 1.9 H 1.8 H       LIPIDS/LFTS:    Recent Labs  Lab 03/09/15  1110  08/22/16  1307  01/20/17  0539 01/21/17  0257 02/03/17  1925 02/09/17  0330   CHOLESTEROL 131  --  119 L  --   --   --   --   --    TRIGLYCERIDES 83  --  98  --   --   --   --  247 H   HDL 51  --  35 L  --   --   --   --   --    LDL CHOLESTEROL 63.4  --  64.4  --   --   --   --   --    NON-HDL CHOLESTEROL 80  --  84  --   --   --   --   --    AST  --   < > 16  < > 21 16 45 H  --    ALT  --   < > 12  < > 10 12 31  --    < > = values in this interval not displayed.    Lab Results   Component Value Date    TSH 17.586 (H) 02/03/2017   Free T4 0.93 (2/3/17)    Venous Lactate 2/8/17 2.6 (improved from >12 on 2/4/17)    Diagnostic Results:  ECG (personally reviewed tracings):   2/3/17 AF 91, PRWP.  AF noted on tracing 1/16/17     Chest X-Ray (personally reviewed image(s)): 2/4/17 CMeg, L eff, R TLC     CT abd 2/5/17  There are CT findings small bowel obstruction. The patient does have a right inguinal hernia that does contain with small bowel. It is not definite that this is the cause of the small bowel obstruction is loops of bowel are dilated but entering and exiting the hernia.    Echo: 2/4/17 (images pers rev)    1 - Moderately depressed left ventricular systolic function (EF 30-35%).  Septal dyskinesis vs "right ventricularization" of septum.     2 - Concentric hypertrophy.     3 - Left ventricular diastolic dysfunction.     4 - Right ventricular enlargement with moderately depressed systolic function.     5 - Moderate mitral regurgitation.     6 - Severe tricuspid regurgitation.  Note made of malcoaptation of TV leaflets.     7 - Pulmonary hypertension. " The estimated PA systolic pressure is 43 mmHg.     8 - Trivial pericardial effusion without hemodynamic compromise.        ASSESSMENT:   # Sepsis/septic shock.  # lactic acidosis  # SBO, ?incarcerated hernia/ischemic bowel, pt continues to complain of abd pain.  # NSVT  # Chr AF on warfarin, HR better controlled.  # Known severe CMP, presumed nonischemic, pt appears euvolemic and well perfused.  # MR/TR  # Supratherapeutic INR, now in range  # ARF/CRI, POA  # Mental status changes      Active Hospital Problems    Diagnosis  POA    *Shock [R57.9]  Yes    SBO (small bowel obstruction) [K56.69]  Yes    GI bleed [K92.2]  Yes    UTI (urinary tract infection) [N39.0]  Yes    Acute on chronic renal failure [N17.9, N18.9]  Yes    Supratherapeutic INR [R79.1]  Yes    Encephalopathy, metabolic [G93.41]  Yes    Type 2 diabetes mellitus without complication [E11.9]  Yes    Long term current use of anticoagulant therapy [Z79.01]  Not Applicable    Chronic combined systolic and diastolic CHF, NYHA class 4 [I50.42]  Yes     Chronic    Chronic kidney disease, stage 4, severely decreased GFR [N18.4]  Yes     Chronic    Peripheral vascular disease [I73.9]  Yes     Chronic    Chronic venous stasis dermatitis of both lower extremities [I83.11, I83.12]  Yes     Chronic    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 LVEF 20% 12/28/16.      Severe mitral regurgitation [I34.0]  Yes     Chronic     MODERATE-SEVERE      HTN (hypertension) [I10]  Yes    Hyperlipidemia [E78.5]  Yes    Paroxysmal atrial fibrillation [I48.0]  Yes     Chronic     WITH LA THROMBUS 10/12        Resolved Hospital Problems    Diagnosis Date Resolved POA    Hyperkalemia, diminished renal excretion [E87.5] 02/07/2017 Yes    Lactic acid acidosis [E87.2] 02/07/2017 Yes    Hypothermia [T68.XXXA] 02/08/2017 Yes       Scheduled Meds:   digoxin  125 mcg Intravenous Daily    fat emulsion  250  mL Intravenous Daily    pantoprazole  40 mg Intravenous BID    piperacillin-tazobactam (ZOSYN) IVPB  4.5 g Intravenous Q12H    sodium chloride 0.9%  3 mL Intravenous Q8H     Continuous Infusions:   sodium chloride 0.9% 100 mL/hr at 02/09/17 0600    Amino acid 4.25% - dextrose 10% (CLINIMIX-E) solution with additives (1L provides 42.5 gm AA, 100 gm CHO (340 kcal/L dextrose), Na 35, K 30, Mg 5, Ca 4.5, Acetate 70, Cl 39, Phos 15) 75 mL/hr at 02/09/17 0600    amiodarone 0.5 mg/min (02/09/17 0600)    norepinephrine bitartrate-D5W Stopped (02/08/17 1700)     PRN Meds:acetaminophen, albuterol-ipratropium 2.5mg-0.5mg/3mL, bisacodyl, dextrose 50%, glucagon (human recombinant), flu vacc [id number] 65yr up(PF), insulin aspart, mineral oil, ondansetron, polyethylene glycol, promethazine, ramelteon    Review of patient's allergies indicates:   Allergen Reactions    Shellfish containing products Itching     Can eat shrimp    Adhesive      Other reaction(s): blisters    Iodinated contrast media - iv dye      Other reaction(s): Vomiting    Iodine      Other reaction(s): Topical  Other reaction(s): Rash    Morphine      Other reaction(s): Hallucinations       SUGGEST:   Change amio to PO  Change digoxin to PO  Check dig level  Keep K+>4.0 and Mg++>2.0  No plan for isch eval  Consider code status discussion with family  OK for transfer to [facility]     ICU time 30 min      Delano Leos MD, FACC

## 2017-02-09 NOTE — PROGRESS NOTES
Seems more lethargic today    Non verbal    UO dropping      Past Medical History   Diagnosis Date    Anticoagulant long-term use     Arthritis     Atrial fibrillation     Atrial flutter     Carcinoma of right breast, estrogen receptor negative and progesterone receptor positive 02/2008    Chronic combined systolic and diastolic congestive heart failure     Colon cancer     Diabetes mellitus     Dilated cardiomyopathy 12/01/2008     adriamycin toxicity    Dyslipidemia     Encounter for blood transfusion     Hyperlipidemia     Hypertension     Supraventricular tachycardia      PAF, AFL     Review of patient's allergies indicates:   Allergen Reactions    Shellfish containing products Itching     Can eat shrimp    Adhesive      Other reaction(s): blisters    Iodinated contrast media - iv dye      Other reaction(s): Vomiting    Iodine      Other reaction(s): Topical  Other reaction(s): Rash    Morphine      Other reaction(s): Hallucinations       Current Facility-Administered Medications   Medication    0.9%  NaCl infusion    acetaminophen tablet 650 mg    Amino acid 4.25% - dextrose 10% (CLINIMIX-E) solution with additives (1L provides 42.5 gm AA, 100 gm CHO (340 kcal/L dextrose), Na 35, K 30, Mg 5, Ca 4.5, Acetate 70, Cl 39, Phos 15)    amiodarone 360 mg/200 mL (1.8 mg/mL) infusion    bisacodyl suppository 10 mg    dextrose 50% injection 12.5 g    digoxin injection 125 mcg    fat emulsion 20 % infusion 250 mL    glucagon (human recombinant) injection 1 mg    influenza vaccine 180 mcg/0.5 mL (for patients > 65) injection    insulin aspart pen 0-5 Units    mineral oil enema 1 enema    norepinephrine 4 mg in dextrose 5% 250 mL infusion (premix) (titrating)    ondansetron injection 4 mg    pantoprazole injection 40 mg    piperacillin-tazobactam 4.5 g in dextrose 5 % 100 mL IVPB (ready to mix system)    polyethylene glycol packet 17 g    promethazine suppository 25 mg    ramelteon  tablet 8 mg    sodium chloride 0.9% flush 3 mL       LABS    Recent Results (from the past 24 hour(s))   Lactic acid, plasma    Collection Time: 02/08/17  9:25 AM   Result Value Ref Range    Lactate (Lactic Acid) 2.6 (H) 0.5 - 2.2 mmol/L   POCT glucose    Collection Time: 02/08/17 11:06 AM   Result Value Ref Range    POCT Glucose 189 (H) 70 - 110 mg/dL   POCT glucose    Collection Time: 02/08/17 11:07 AM   Result Value Ref Range    POCT Glucose 175 (H) 70 - 110 mg/dL   POCT glucose    Collection Time: 02/08/17  5:30 PM   Result Value Ref Range    POCT Glucose 134 (H) 70 - 110 mg/dL   POCT glucose    Collection Time: 02/09/17 12:27 AM   Result Value Ref Range    POCT Glucose 178 (H) 70 - 110 mg/dL   Basic metabolic panel    Collection Time: 02/09/17  3:30 AM   Result Value Ref Range    Sodium 135 (L) 136 - 145 mmol/L    Potassium 3.6 3.5 - 5.1 mmol/L    Chloride 98 95 - 110 mmol/L    CO2 22 (L) 23 - 29 mmol/L    Glucose 224 (H) 70 - 110 mg/dL    BUN, Bld 51 (H) 8 - 23 mg/dL    Creatinine 2.9 (H) 0.5 - 1.4 mg/dL    Calcium 8.1 (L) 8.7 - 10.5 mg/dL    Anion Gap 15 8 - 16 mmol/L    eGFR if African American 17 (A) >60 mL/min/1.73 m^2    eGFR if non African American 15 (A) >60 mL/min/1.73 m^2   CBC auto differential    Collection Time: 02/09/17  3:30 AM   Result Value Ref Range    WBC 7.13 3.90 - 12.70 K/uL    RBC 4.38 4.00 - 5.40 M/uL    Hemoglobin 10.5 (L) 12.0 - 16.0 g/dL    Hematocrit 34.9 (L) 37.0 - 48.5 %    MCV 80 (L) 82 - 98 fL    MCH 24.0 (L) 27.0 - 31.0 pg    MCHC 30.1 (L) 32.0 - 36.0 %    RDW 20.1 (H) 11.5 - 14.5 %    Platelets 163 150 - 350 K/uL    MPV 10.0 9.2 - 12.9 fL    Gran # 5.4 1.8 - 7.7 K/uL    Lymph # 0.9 (L) 1.0 - 4.8 K/uL    Mono # 0.8 0.3 - 1.0 K/uL    Eos # 0.0 0.0 - 0.5 K/uL    Baso # 0.02 0.00 - 0.20 K/uL    Gran% 75.6 (H) 38.0 - 73.0 %    Lymph% 13.0 (L) 18.0 - 48.0 %    Mono% 10.8 4.0 - 15.0 %    Eosinophil% 0.0 0.0 - 8.0 %    Basophil% 0.3 0.0 - 1.9 %    Differential Method Automated     Protime-INR    Collection Time: 02/09/17  3:30 AM   Result Value Ref Range    Prothrombin Time 19.0 (H) 9.0 - 12.5 sec    INR 1.8 (H) 0.8 - 1.2   Triglycerides    Collection Time: 02/09/17  3:30 AM   Result Value Ref Range    Triglycerides 247 (H) 30 - 150 mg/dL   POCT glucose    Collection Time: 02/09/17  5:49 AM   Result Value Ref Range    POCT Glucose 310 (H) 70 - 110 mg/dL     I/O last 3 completed shifts:  In: 5417.2 [I.V.:4348.2; NG/GT:60; IV Piggyback:300]  Out: 860 [Urine:660; Drains:200]    Vitals:    02/09/17 0615 02/09/17 0630 02/09/17 0645 02/09/17 0707   BP: 95/73 94/77 117/76    Pulse: 104 104 108    Resp: (!) 32 (!) 30 (!) 29    Temp:       TempSrc:       SpO2: 97% 97% 97% 98%   Weight:       Height:           No Jvd, Thyromegaly or Lymphadenopathy  Lungs: Fairly clear anteriorly and laterally  Cor: RRR no G or rubs  Abd: Soft benign decreased bowel sounds non tender  Ext: Pos edema    A)  JOSE MIGUEL creat holding @ 2.8, good uo  Sepsis seems stable  Hypotension on pressors  Hypok corrected  ABD PAIN ?? Ischemic Does not seem to be bothering her too much today  UTI  MET Acidosis AGap 14 up from 10  Hx of HTN now hypotension  T2DM  Anemia  Proteinuria of ?? Significance  CMP ef 20-30 in over 4 years  NSVT  Slightly pos lactic acid     Noted I>>>O with more edema    P)  Would decrease the rate on all ivf and double concentrate everything that we can.    Would update the family about the lack of positive progression

## 2017-02-10 NOTE — PLAN OF CARE
Problem: Fall Risk (Adult)  Goal: Absence of Falls  Patient will demonstrate the desired outcomes by discharge/transition of care.   Outcome: Ongoing (interventions implemented as appropriate)  Pt free of injury, bed in low position with alarms on. Pt confused.

## 2017-02-10 NOTE — PROGRESS NOTES
Spoke with physician about current cardiac status which included a 12 beat run of V-Tach and also to renew order for restraints. New orders entered by physician. Willl update patient on plan of care.

## 2017-02-10 NOTE — PLAN OF CARE
Problem: Patient Care Overview  Goal: Individualization & Mutuality  Outcome: Ongoing (interventions implemented as appropriate)  Pt resting well, denies discomforts. Side rails up X 3. Respiration even and nonlabored. Pt confused, no injury. bilat heeps elevated on pillow.

## 2017-02-10 NOTE — PROGRESS NOTES
Received Patient from ICU per bed with escort. Awake and alert. Oriented to room and surroundings. Oriented to call light. Restraints intact. TPN in progress. Bilateral legs wrapped in unna boots . Right nare with NG tube to NYASIA Chin patent. Has right IJ TLC. Cardiac monitoring in progress and tele monitor notified with box number [id number]. Visible on monitor. NPO except for medications.

## 2017-02-10 NOTE — PLAN OF CARE
Problem: Infection, Risk/Actual (Adult)  Goal: Infection Prevention/Resolution  Patient will demonstrate the desired outcomes by discharge/transition of care.   Outcome: Ongoing (interventions implemented as appropriate)  Receiving iv abx as ordered.    Problem: Fall Risk (Adult)  Goal: Absence of Falls  Patient will demonstrate the desired outcomes by discharge/transition of care.   Outcome: Ongoing (interventions implemented as appropriate)  Located close to nursing station, bed alarm on with bilateral ue restraints.    Problem: Patient Care Overview  Goal: Plan of Care Review  Outcome: Ongoing (interventions implemented as appropriate)  Patient resting quietly at intervals, continues with tpn/lipids as ordered. No report of pain or discomfort, ng to right nare, small amt of drainage.   Blood glucose 164 mg/dl , no ss coverage required. Cardiac monitoring continued.    Problem: Renal Failure/Kidney Injury, Acute (Adult)  Goal: Signs and Symptoms of Listed Potential Problems Will be Absent, Minimized or Managed (Renal Failure/Kidney Injury, Acute)  Signs and symptoms of listed potential problems will be absent, minimized or managed by discharge/transition of care (reference Renal Failure/Kidney Injury, Acute (Adult) CPG).   Outcome: Ongoing (interventions implemented as appropriate)  Chin catheter in place.    Problem: Pressure Ulcer Risk (Yung Scale) (Adult,Obstetrics,Pediatric)  Goal: Skin Integrity  Patient will demonstrate the desired outcomes by discharge/transition of care.   Outcome: Ongoing (interventions implemented as appropriate)  Repositioning q2 hours, keeping patient clean and dry.    Problem: Restraint, Nonbehavioral (nonviolent)  Goal: Clinical Justification  Outcome: Ongoing (interventions implemented as appropriate)  Bilateral ue restraints in place to prevent for pulling out ngt and other medical devices.

## 2017-02-10 NOTE — PROGRESS NOTES
Upon rounding with patient, nurse rechecked all parameters for restraints. Patient was educated and instructed to not remove medical devices and to use call light when in need of any assistance. Bed is lock and in lowest position, restraints are in place, bed alarm is set and armed. Nurse and staff will continue hourly rounding.

## 2017-02-10 NOTE — PROGRESS NOTES
Awake alert oriented x2 NAD    Denies CNS ENT CP  RHEUM OR DERM SX    CO sore abd and being hungry     Past Medical History   Diagnosis Date    Anticoagulant long-term use     Arthritis     Atrial fibrillation     Atrial flutter     Carcinoma of right breast, estrogen receptor negative and progesterone receptor positive 02/2008    Chronic combined systolic and diastolic congestive heart failure     Colon cancer     Diabetes mellitus     Dilated cardiomyopathy 12/01/2008     adriamycin toxicity    Dyslipidemia     Encounter for blood transfusion     Hyperlipidemia     Hypertension     Supraventricular tachycardia      PAF, AFL     Review of patient's allergies indicates:   Allergen Reactions    Shellfish containing products Itching     Can eat shrimp    Adhesive      Other reaction(s): blisters    Iodinated contrast media - iv dye      Other reaction(s): Vomiting    Iodine      Other reaction(s): Topical  Other reaction(s): Rash    Morphine      Other reaction(s): Hallucinations       Current Facility-Administered Medications   Medication    acetaminophen tablet 650 mg    albuterol-ipratropium 2.5mg-0.5mg/3mL nebulizer solution 3 mL    albuterol-ipratropium 2.5mg-0.5mg/3mL nebulizer solution 3 mL    Amino acid 4.25% - dextrose 10% (CLINIMIX-E) solution with additives (1L provides 42.5 gm AA, 100 gm CHO (340 kcal/L dextrose), Na 35, K 30, Mg 5, Ca 4.5, Acetate 70, Cl 39, Phos 15)    Amino acid 4.25% - dextrose 10% (CLINIMIX-E) solution with additives (1L provides 42.5 gm AA, 100 gm CHO (340 kcal/L dextrose), Na 35, K 30, Mg 5, Ca 4.5, Acetate 70, Cl 39, Phos 15)    amiodarone tablet 400 mg    Followed by    [START ON 2/16/2017] amiodarone tablet 200 mg    Followed by    [START ON 2/23/2017] amiodarone tablet 200 mg    bisacodyl suppository 10 mg    dextrose 50% injection 12.5 g    digoxin tablet 0.125 mg    fat emulsion 20 % infusion 250 mL    glucagon (human recombinant) injection 1  mg    influenza vaccine 180 mcg/0.5 mL (for patients > 65) injection    insulin aspart pen 0-5 Units    mineral oil enema 1 enema    ondansetron injection 4 mg    pantoprazole injection 40 mg    piperacillin-tazobactam 4.5 g in dextrose 5 % 100 mL IVPB (ready to mix system)    polyethylene glycol packet 17 g    potassium chloride 20 mEq in 100 mL IVPB (FOR CENTRAL LINE ADMINISTRATION ONLY)    promethazine suppository 25 mg    ramelteon tablet 8 mg    sodium chloride 0.9% flush 3 mL       LABS    Recent Results (from the past 24 hour(s))   POCT glucose    Collection Time: 02/09/17 11:48 AM   Result Value Ref Range    POCT Glucose 254 (H) 70 - 110 mg/dL   POCT glucose    Collection Time: 02/09/17  5:09 PM   Result Value Ref Range    POCT Glucose 169 (H) 70 - 110 mg/dL   POCT glucose    Collection Time: 02/10/17 12:01 AM   Result Value Ref Range    POCT Glucose 164 (H) 70 - 110 mg/dL   Basic metabolic panel    Collection Time: 02/10/17  4:13 AM   Result Value Ref Range    Sodium 136 136 - 145 mmol/L    Potassium 3.1 (L) 3.5 - 5.1 mmol/L    Chloride 98 95 - 110 mmol/L    CO2 23 23 - 29 mmol/L    Glucose 128 (H) 70 - 110 mg/dL    BUN, Bld 60 (H) 8 - 23 mg/dL    Creatinine 2.9 (H) 0.5 - 1.4 mg/dL    Calcium 8.1 (L) 8.7 - 10.5 mg/dL    Anion Gap 15 8 - 16 mmol/L    eGFR if African American 17 (A) >60 mL/min/1.73 m^2    eGFR if non African American 15 (A) >60 mL/min/1.73 m^2   CBC auto differential    Collection Time: 02/10/17  4:13 AM   Result Value Ref Range    WBC 9.17 3.90 - 12.70 K/uL    RBC 4.60 4.00 - 5.40 M/uL    Hemoglobin 11.0 (L) 12.0 - 16.0 g/dL    Hematocrit 35.6 (L) 37.0 - 48.5 %    MCV 77 (L) 82 - 98 fL    MCH 23.9 (L) 27.0 - 31.0 pg    MCHC 30.9 (L) 32.0 - 36.0 %    RDW 20.0 (H) 11.5 - 14.5 %    Platelets 178 150 - 350 K/uL    MPV 10.4 9.2 - 12.9 fL    Gran # 7.2 1.8 - 7.7 K/uL    Lymph # 0.9 (L) 1.0 - 4.8 K/uL    Mono # 1.1 (H) 0.3 - 1.0 K/uL    Eos # 0.0 0.0 - 0.5 K/uL    Baso # 0.01 0.00 - 0.20  K/uL    Gran% 78.0 (H) 38.0 - 73.0 %    Lymph% 10.1 (L) 18.0 - 48.0 %    Mono% 11.5 4.0 - 15.0 %    Eosinophil% 0.0 0.0 - 8.0 %    Basophil% 0.1 0.0 - 1.9 %    Differential Method Automated    Protime-INR    Collection Time: 02/10/17  4:13 AM   Result Value Ref Range    Prothrombin Time 20.3 (H) 9.0 - 12.5 sec    INR 2.0 (H) 0.8 - 1.2   Digoxin level    Collection Time: 02/10/17  4:13 AM   Result Value Ref Range    Digoxin Lvl 1.0 0.8 - 2.0 ng/mL   Magnesium    Collection Time: 02/10/17  4:13 AM   Result Value Ref Range    Magnesium 1.6 1.6 - 2.6 mg/dL   POCT glucose    Collection Time: 02/10/17  5:26 AM   Result Value Ref Range    POCT Glucose 174 (H) 70 - 110 mg/dL   ]    I/O last 3 completed shifts:  In: 4038.1 [I.V.:1400.6; IV Piggyback:200]  Out: 1160 [Urine:1100; Drains:60]    Vitals:    02/09/17 1934 02/10/17 0025 02/10/17 0405 02/10/17 0837   BP: 115/61 (!) 166/72 121/71    Pulse: 88 99 92 93   Resp: 20 18 16    Temp: 98.4 °F (36.9 °C) 98.5 °F (36.9 °C) 97.8 °F (36.6 °C)    TempSrc: Oral Oral Oral    SpO2: 97% 95% 95%    Weight:       Height:           No Jvd, Thyromegaly or Lymphadenopathy  Lungs: Fairly clear anteriorly and laterally  Cor: RRR no G or rubs  Abd: Soft benign good bowel sounds mild  tenderness  Ext: Pos edema    A)    JOSE MIGUEL creat holding @ 2.9, good uo  Sepsis seems stable  Hypotension on pressors  Hypok corrected  ABD PAIN ?? Ischemic Does not seem to be bothering her too much today  UTI  MET Acidosis AGap 14 up from 10  Hx of HTN now hypotension  T2DM  Anemia  Proteinuria of ?? Significance  CMP ef 20-30 in over 4 years  NSVT  Slightly pos lactic acid  I>>O     P)    Same rx

## 2017-02-10 NOTE — CONSULTS
Consult Note  Palliative Care      Consult Requested By: Gisselle Sevilla MD  Reason for Consult:      Advance Directives    SUBJECTIVE:     History of Present Illness:  Disease Process:CC: AMS     HPI: This 82 y.o. F, who has a past medical history of Anticoagulant long-term use; Arthritis; Atrial fibrillation; Atrial flutter; Carcinoma of right breast, estrogen receptor negative and progesterone receptor positive (02/2008); Chronic combined systolic and diastolic congestive heart failure; Colon cancer; Diabetes mellitus; Dilated cardiomyopathy (12/01/2008); Dyslipidemia; Encounter for blood transfusion; Hyperlipidemia; Hypertension; and Supraventricular tachycardia, presents to the ED for evaluation of AMS since today. Per EMS she was confused but able to answer questions en route. On exam she is barely responsive to sternal rub. Per EMS BP in right arm was 150 systolic while the left was 90's systolic. Per NH she was vomiting prior to EMS arrival. CBG of 137 en route. Hx is otherwise limited due AMS.     The history is provided by the patient.       Patient was admitted with acute on chronic renal failure, lactic acidosis, hypoglycemia, hyperkalemia, abnormal CT of head, supratherapeutic INR and hypothermia.  She was admitted to ICU for management of septic shock thought to be secondary to UTI.   and RI, required vasopressor support.    Consult was placed to nephrology.  Consult was placed to GI (had 2 loose stools with small amount of blood.) Consult was also placed to I.D. Consult was also plalced to Cardiology.  During hospital stay there was some concern for ischemic bowel/SBO, NGT was placed to LIWS and consult was placed to General Surgery (found easily reducible right inguinal hernia).  Wound Care also consulted for extensive BLE wounds that had been under treatment with unna boots previously with HH and podiatry.      Patient transferred out of ICU to telemetry 2/9/17.  Palliative Care has been consulted  "to discuss code status with family.      Past Medical History   Diagnosis Date    Anticoagulant long-term use     Arthritis     Atrial fibrillation     Atrial flutter     Carcinoma of right breast, estrogen receptor negative and progesterone receptor positive 02/2008    Chronic combined systolic and diastolic congestive heart failure     Colon cancer     Diabetes mellitus     Dilated cardiomyopathy 12/01/2008     adriamycin toxicity    Dyslipidemia     Encounter for blood transfusion     Hyperlipidemia     Hypertension     Supraventricular tachycardia      PAF, AFL     Past Surgical History   Procedure Laterality Date    Colon surgery  2000     upper colon removal    Cataract extraction  8/5/09     both eyes (Dr. Adam)    Cardioversion  04/24/2014    Total abdominal hysterectomy w/ bilateral salpingoophorectomy  1973    Mastectomy, partial Right 03/14/2008     R right    Total hip arthroplasty  03/05/2010     anuerysm in hip/right hip    Hysterectomy      Eye surgery       Family History   Problem Relation Age of Onset    Heart disease Mother     Hypertension Mother     Diabetes Mother     Glaucoma Mother     Glaucoma Maternal Aunt      Social History   Substance Use Topics    Smoking status: Never Smoker    Smokeless tobacco: Never Used    Alcohol use No       Mental Status:   Drowsy, oriented to self, "hospital" and month, but not year.  Slow to answer.  Follows commmands.     Palliative Performance Score:  30 (on TPN)     OBJECTIVE:     Pain Assessment/symptom management:  Short assessment limited by patient's condition.  She denies SOB.  Reports abdominal pain, but does not qualify specifics.  Bedside RN present and aware, has prn medications.   Denies N/V.  Does not c/o of any other symptoms at present.  See MAR for medication regimen.     Decision-Making Capacity:   Patient unable to fully discuss her condition, diagnosis and prognosis along with options for treatment.  Needs " "help of family making decisions.      Advanced Directives:  Living Will:   La[place] dated 1/8/17, but copy in chart is not signed by the M.STEFFANY (original might be - will find out).  Do Not Resuscitate Status:  FULL CODE    Medical Power of :   YES, dated 7/10/14, naming her sister Caty Figueroa ([phone])      Living Arrangements:    Arrived from [facility]     Psychosocial, Spiritual, Cultural: Patient unable to answer complex questions at present.   Patient's most important priorities:  Patient's biggest concerns/fears:  Previous death/end of life care history:  Patient's goals/hopes:      ASSESSMENT/PLAN:     Patient lying supine, eyes closed, very drowsy and difficult to arouse initial.  Inquired of bedside RN if patient had been this lethargic and she stated no, came to bedside and patient did arouse to her voice, opened eyes and then began to answer some simple questions and follow commands.  She is oriented to self and "hospital", know the correct month, but could not state year.  She can squeeze hands and wiggle toes.  No acute distress noted.  She does have some transient tachypneic with verbal stimuli.      Respiratory effort even and unlabored, lungs are coarse bilaterally (worse rales on the right) with faint expiratory wheezes.  Heart tones are audible, irregular, faint murmur.  Abdomen soft, rounded, no complainat to very gentle palpation, + bowel sounds, has NGT to LIWS.  No peripheral edema to legs, 2-3+ hands.  IVF:  TPN @ 75 ml/h  Vital signs:  Afebrile with oral temp 98.9, HR 97, /68, RR 20, sats 97% on room air.      Patient unable to have complex conversation regarding goals and code status.  I attempted to reach her sister/POA Caty Figueroa ([phone]) but the phone is not set up to take messages.  Attempted the second number [phone], which appears to be a work number and left brief message for her to call me.    I left literature at the bedside, "Hard Choices For " "[employer]" along with a fact sheet on cpr in case the sister visits over the week end.  I will attempt to contact her again on Monday 2/13.      Plan:  Educate.  Literature.  Goals and code status discussion.  Support.  Consult .      Recommendations:   Continue supportive care.   Continue FULL CODE status for now.   Will set up family meeting with sister for Monday.   Consult Spiritual Care.  (done)    Thank you for this consult and the opportunity to participate in Ms. Murphy care.       Discussed with Dr. Weldon and Dr. Cam.    Rebeca Erwin, ZBIGNIEWN, RN, CCRN   Palliative Care Nurse Coordinator   [facility]  [phone]       Time Spent:   80 minutes     "

## 2017-02-10 NOTE — PLAN OF CARE
02/09/17 1603   Discharge Reassessment   Assessment Type Discharge Planning Reassessment   Can the patient answer the patient profile reliably? No, cognitively impaired   How does the patient rate their overall health at the present time? Fair  (per record)   Describe the patient's ability to walk at the present time. Does not walk or unable to take any steps at all   How often would a person be available to care for the patient? Whenever needed  (if returns to SNF as planned)   Number of comorbid conditions (as recorded on the chart) Five or more   During the past month, has the patient often been bothered by feeling down, depressed or hopeless? No  (per record)   During the past month, has the patient often been bothered by little interest or pleasure in doing things? No  (per record)   Discharge plan remains the same: Yes   Provided patient/caregiver education on the expected discharge date and the discharge plan No   Discharge Plan A Return to nursing home;Skilled Nursing Facility   Change in patient condition or support system Yes   Patient choice form signed by patient/caregiver No   Explained to the the patient/caregiver why the discharge planned changed: No   Involved the patient/caregiver in establishing a new discharge plan: No   patient progressing to move out of ICU today. Goal remains SNF at [facility].  Background: baseline lived alone in [place] with assistance from sister; came here from SNF at [facility] (1/15-1/24) following [place] hospitalization/discharge; prior to that was at [facility] (1/5-1/15) s/p hospital stay-  SW/CM will follow and assist as needed.

## 2017-02-10 NOTE — PROGRESS NOTES
[facility]  Adult Nutrition  Progress Note    SUMMARY     Recommendations    Recommendation/Intervention: 1) PPN recs:  4.25/10 @ 75 cc/hr with 250 mL 20% IV lipids daily, provides 1418 calories/day, 76.5 grams of protein/day, 1800 cc IV fluid/day. GIR = 1.7. Dextrose = 180 g. 2) Monitor Triglycerides weekly. Hold if > 300 mg/dL 3) RD to monitor/follow-up  Goals: 1) Provide nutrition support within 24 hours 2) Monitor Triglycerides   Nutrition Goal Status: goal met; new  Communication of RD Recs: reviewed with RN    Continuum of Care Plan     D/C Planning:  Too soon to determine. Will monitor/follow-up.     Reason for Assessment    Reason for Assessment: RD follow-up  Diagnosis:  (acute on chronic renal failure)  Relevent Medical History: anticoagulant use long term, h/o breast cancer, h/ocolon cancer, dyslipidemia, HLD, DM   General Information Comments: Waiting for bowel function to advance diet. Spoke with RN about consulting speech prior to diet advancement. TPN & IV Lipids infusing. Triglycerides running on higher end. Notified nurse to have labs ordered 1x/week. If continues to rise, recommend decreasing lipids to every other day. + AMS. + Restraints     Nutrition Prescription Ordered    Current Diet Order: NPO     Current Nutrition Support Formula Ordered: Clinimax 4.25/10 (+ daily IV lipids)  Current Nutrition Support Rate Ordered: 55 (ml)  Current Nutrition Support Frequency Ordered: continuous        Evaluation of Received Nutrients/Fluid Intake    Parenteral Calories (kcal): 1418  Parenteral Protein (gm): 76.5  Parenteral Fluid (mL): 1800     Energy Calories Required: meeting needs  Protein Required: meeting needs     IV Fluid (mL): 2400 (physician's sticky note)    Comments:  (Net I/O +985.5)  Tolerance: tolerating     Nutrition Risk Screen     Nutrition Risk Screen: no indicators present    Nutrition/Diet History    Patient Reported Diet/Restrictions/Preferences: low salt  Typical  Food/Fluid Intake: GIORGI  Food Preferences: GIORGI     Factors Affecting Nutritional Intake: altered gastrointestinal function, NPO    Labs/Tests/Procedures/Meds    Pertinent Labs Reviewed: reviewed, pertinent  Pertinent Labs Comments: Triglycerides 247,  - 224, BUN 60, Creat 2.9, GFR 17  Pertinent Medications Reviewed: reviewed, pertinent  Pertinent Medications Comments: norepi, statin, abx, pantoprazole    Physical Findings    Overall Physical Appearance: overweight  Tubes: nasogastric tube  Oral/Mouth Cavity: WDL  Skin: other (see comments) (dermatitis, abrasion, 2 x ulcers chronic venous insufficienc)    Anthropometrics     Height (inches): 60 in  Weight Method: Bed Scale  Weight (kg): 71.1 kg  Ideal Body Weight (IBW), Female: 100 lb  % Ideal Body Weight, Female (lb): 156.75 lb  BMI (kg/m2): 30.61  BMI Grade: 30 - 34.9- obesity - grade I    Estimated/Assessed Needs    Weight Used For Calorie Calculations: 71.1 kg (156 lb 12 oz)   Height (cm): 152.4 cm  Energy Need Method: [place] Jeor (6084-4632 kcal)  RMR ([place] Jeor Equation): 1098.35  Weight Used For Protein Calculations: 71.1 kg (156 lb 12 oz)  Protein Requirements: 57-85 g  Fluid Need Method: RDA Method (1 ml per kcal or per MD)    Fiber Requirement: 164g CHO/day     Malnutrition (undernutrition) Diagnosis  % of EEN: 75 - 100%  % Meal Intake: NPO     Nutrition Diagnosis      Problem: Inadequate Energy Intake  Etiology/Related To: Altered GI function (SBO)  As Evidenced by: NPO x 3 days  Nutrition Diagnosis Status: Continues    Monitor and Evaluation    Food and Nutrient Intake: energy intake, food and beverage intake, enteral nutrition intake, parenteral nutrition intake  Food and Nutrient Adminstration: diet order, enteral and parenteral nutrition administration  Knowledge/Beliefs/Attitudes: food and nutrition knowledge/skill  Physical Activity and Function: nutrition-related ADLs and IADLs  Anthropometric Measurements: weight, weight  change  Biochemical Data, Medical Tests and Procedures: electrolyte and renal panel, gastrointestinal profile, glucose/endocrine profile, lipid profile, inflammatory profile  Nutrition-Focused Physical Findings: overall appearance, skin    Nutrition Risk    Level of Risk: high (2x/week)    Nutrition Follow-Up    RD Follow-up?: Yes    Assessment and Plan    No new Assessment & Plan notes have been filed under this hospital service since the last note was generated.  Service: Nutrition

## 2017-02-10 NOTE — PROGRESS NOTES
Abdominal pain improved but not resolved.  No flatus, no BM.  Off pressors    Vitals:    02/09/17 1630 02/09/17 1700 02/09/17 1826 02/09/17 1934   BP: 92/72 107/69 131/83 115/61   BP Location:    Left arm   Patient Position:    Lying   BP Method:    Automatic   Pulse: 100 92 107 88   Resp: (!) 24 (!) 29 (!) 22 20   Temp:   98.1 °F (36.7 °C) 98.4 °F (36.9 °C)   TempSrc:   Oral Oral   SpO2: 97% 97% 97% 97%   Weight:       Height:         A/O x 3  RRR  Soft, paul tender.  Reducible hernia    Labs reviewed    A/P small bowel obstruction vs ileus.  Continue NPO, NGT until bowel function returns.  Minimize PO meds as absorption is unreliable and clamping tube will prolong course.  Will manage nonoperatively if possible due to increased risk of surgery  Ischemic bowel is likely but was more likely secondary to hypotension rather than the cause.  Pain is improving and lactate has decreased.  Continue NPO, IVF

## 2017-02-10 NOTE — PLAN OF CARE
Problem: Renal Failure/Kidney Injury, Acute (Adult)  Goal: Signs and Symptoms of Listed Potential Problems Will be Absent, Minimized or Managed (Renal Failure/Kidney Injury, Acute)  Signs and symptoms of listed potential problems will be absent, minimized or managed by discharge/transition of care (reference Renal Failure/Kidney Injury, Acute (Adult) CPG).   Outcome: Ongoing (interventions implemented as appropriate)  Pt has edema generalized unresposnive to Lasix. Low urinary output compared to input.

## 2017-02-10 NOTE — CONSULTS
Consult Note  Infectious Disease    Consult Requested By: Gisselle Sevilla MD    Reason for Consult: hypotension and hypothermia.    SUBJECTIVE:     History of Present Illness:  Patient is a 82 y.o. female presents with hypotension and hypothermia. 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 She was recently admitted with chf in januray 2017 and was placed in a snf 1/28/17. She is now here obtunded and hypothermic. Blood cultures are negative, urine and stool cultures negative form 2/3/17.c diff toxin is negative and she has no diarrhea now. Ct abdomen shows a sbo and rt inguinal hernia.    Past Medical History   Diagnosis Date    Anticoagulant long-term use     Arthritis     Atrial fibrillation     Atrial flutter     Carcinoma of right breast, estrogen receptor negative and progesterone receptor positive 02/2008    Chronic combined systolic and diastolic congestive heart failure     Colon cancer     Diabetes mellitus     Dilated cardiomyopathy 12/01/2008     adriamycin toxicity    Dyslipidemia     Encounter for blood transfusion     Hyperlipidemia     Hypertension     Supraventricular tachycardia      PAF, AFL     Past Surgical History   Procedure Laterality Date    Colon surgery  2000     upper colon removal    Cataract extraction  8/5/09     both eyes (Dr. Adam)    Cardioversion  04/24/2014    Total abdominal hysterectomy w/ bilateral salpingoophorectomy  1973    Mastectomy, partial Right 03/14/2008     R right    Total hip arthroplasty  03/05/2010     anuerysm in hip/right hip    Hysterectomy      Eye surgery       Family History   Problem Relation Age of Onset    Heart disease Mother     Hypertension Mother     Diabetes Mother     Glaucoma Mother     Glaucoma Maternal Aunt      Social History   Substance Use Topics    Smoking status: Never Smoker    Smokeless tobacco: Never Used    Alcohol use No       Review of  patient's allergies indicates:   Allergen Reactions    Shellfish containing products Itching     Can eat shrimp    Adhesive      Other reaction(s): blisters    Iodinated contrast media - iv dye      Other reaction(s): Vomiting    Iodine      Other reaction(s): Topical  Other reaction(s): Rash    Morphine      Other reaction(s): Hallucinations        Antibiotics     Start     Stop Route Frequency Ordered    02/03/17 2300  piperacillin-tazobactam 4.5 g in dextrose 5 % 100 mL IVPB (ready to mix system)      -- IV Every 12 hours 02/03/17 3736          Review of Systems:  Review of systems not obtained due to patient factors unresponsive.    OBJECTIVE:     Vital Signs (Most Recent)  Temp: 98.2 °F (36.8 °C) (02/10/17 1200)  Pulse: 96 (02/10/17 1200)  Resp: 18 (02/10/17 1200)  BP: 106/74 (02/10/17 1200)  SpO2: 98 % (02/10/17 1618)    Temperature Range Min/Max (Last 24H):  Temp:  [97.8 °F (36.6 °C)-98.5 °F (36.9 °C)]     Physical Exam:  General: cachectic  Eyes: conjunctivae/corneas clear. PERRL..  HENT: Head:normocephalic, atraumatic. Ears:not examined. Nose: Nares normal. Septum midline. Mucosa normal. No drainage or sinus tenderness., no discharge. Throat: lips, mucosa, and tongue normal; teeth and gums normal and no throat erythema.  Neck: supple, symmetrical, trachea midline, no JVD and thyroid not enlarged, symmetric, no tenderness/mass/nodules  Lungs:  clear to auscultation bilaterally and normal respiratory effort  Cardiovascular: Heart: regular rate and rhythm, S1, S2 normal, no murmur, click, rub or gallop. Chest Wall: no tenderness. Extremities: excoriated skin. Pulses: not examined.  Abdomen/Rectal: Abdomen: soft, non-tender non-distented; bowel sounds normal; rt inguinal region with reducible hernia but tender  Skin: as above  Musculoskeletal:no clubbing, cyanosis    Laboratory:  CBC    Recent Labs  Lab 02/10/17  0413   WBC 9.17   RBC 4.60   HGB 11.0*   HCT 35.6*        BMP    Recent Labs  Lab  02/10/17  0413   CO2 23   BUN 60*   CREATININE 2.9*   CALCIUM 8.1*       Recent Labs  Lab 02/03/17  2010   COLORU Siobhan   SPECGRAV 1.015   PHUR 5.0   PROTEINUA 2+*   BACTERIA Moderate*   NITRITE Negative   LEUKOCYTESUR Negative   UROBILINOGEN 2.0-3.0*   HYALINECASTS 10*     Microbiology Results (last 7 days)     Procedure Component Value Units Date/Time    Blood culture [[id number]] Collected:  02/03/17 2055    Order Status:  Completed Specimen:  Blood from Peripheral, Hand, Left Updated:  02/08/17 2303     Blood Culture, Routine No growth after 5 days.    Blood culture [[id number]] Collected:  02/03/17 2120    Order Status:  Completed Specimen:  Blood from Peripheral, Hand, Right Updated:  02/08/17 2303     Blood Culture, Routine No growth after 5 days.    Stool culture [[id number]] Collected:  02/05/17 0912    Order Status:  Completed Specimen:  Stool from Stool Updated:  02/08/17 0807     Stool Culture No Salmonella,Shigella,Vibrio,Campylobacter,Yersinia isolated.    Urine culture [[id number]] Collected:  02/05/17 1035    Order Status:  Completed Specimen:  Urine from Urine, Catheterized Updated:  02/07/17 0742     Urine Culture, Routine No growth    E. coli 0157 antigen [[id number]] Collected:  02/05/17 0912    Order Status:  Completed Specimen:  Stool from Stool Updated:  02/06/17 1222     Shiga Toxin 1 E.coli Negative     Shiga Toxin 2 E.coli Negative    Clostridium difficile EIA [[id number]] Collected:  02/04/17 0910    Order Status:  Completed Specimen:  Stool from Stool Updated:  02/04/17 1237     C. diff Antigen Negative     C difficile Toxins A+B, EIA Negative      Testing not recommended for children <24 months old.       Gram stain [[id number]] Collected:  02/04/17 0420    Order Status:  Completed Specimen:  Urine from Urine Updated:  02/04/17 0817     Gram Stain Result Few WBC's      Few epithelial cells      Gram positive cocci in pairs          Diagnostic Results:  Labs: Reviewed  X-Ray:  Reviewed    ASSESSMENT/PLAN:     Active Hospital Problems    Diagnosis  POA    *Shock [R57.9]  Yes    Advanced care planning/counseling discussion [Z71.89]  Not Applicable    SBO (small bowel obstruction) [K56.69]  Yes    GI bleed [K92.2]  Yes    UTI (urinary tract infection) [N39.0]  Yes    Acute on chronic renal failure [N17.9, N18.9]  Yes    Encephalopathy, metabolic [G93.41]  Yes    Type 2 diabetes mellitus without complication [E11.9]  Yes    Long term current use of anticoagulant therapy [Z79.01]  Not Applicable    Chronic combined systolic and diastolic CHF, NYHA class 4 [I50.42]  Yes     Chronic    Chronic kidney disease, stage 4, severely decreased GFR [N18.4]  Yes     Chronic    Peripheral vascular disease [I73.9]  Yes     Chronic    Chronic venous stasis dermatitis of both lower extremities [I83.11, I83.12]  Yes     Chronic    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 LVEF 20% 12/28/16.      Severe mitral regurgitation [I34.0]  Yes     Chronic     MODERATE-SEVERE      HTN (hypertension) [I10]  Yes    Hyperlipidemia [E78.5]  Yes    Paroxysmal atrial fibrillation [I48.0]  Yes     Chronic     WITH LA THROMBUS 10/12        Resolved Hospital Problems    Diagnosis Date Resolved POA    Hyperkalemia, diminished renal excretion [E87.5] 02/07/2017 Yes    Supratherapeutic INR [R79.1] 02/09/2017 Yes    Lactic acid acidosis [E87.2] 02/07/2017 Yes    Hypothermia [T68.XXXA] 02/08/2017 Yes       1.hypothermic hypotension. Sepsis vs dilated cardiomyopathy and shock.  Ct abdomen and pelvis with sbo and rt inguinal hernia. Surgical review appreciated and ? Ischemic bowel  Adjusedt abx   No meningeal signs  2.dilated cardiomyopathy  She is more awake and her creatinine is better  She is very frail  Needs her code status addressed with her poa-full code per sister  An ef of 20 to 30 % and a creatinine of 2.9 do not ronal well for recovery at  age 82  Discussed with sister

## 2017-02-10 NOTE — PLAN OF CARE
Problem: Infection, Risk/Actual (Adult)  Goal: Infection Prevention/Resolution  Patient will demonstrate the desired outcomes by discharge/transition of care.   Outcome: Ongoing (interventions implemented as appropriate)  Iv antibiotics administered

## 2017-02-11 PROBLEM — R57.9 SHOCK: Status: RESOLVED | Noted: 2017-01-01 | Resolved: 2017-01-01

## 2017-02-11 NOTE — ASSESSMENT & PLAN NOTE
Thought to be 2/2 adriamycin.  EF   Date Value Ref Range Status   02/04/2017 30 (A) 55 - 65    12/28/2016 20 (A) 55 - 65    Not candidate for invasive treatment. Only conservative. Aiming for HR control and diuresis if renal function permits. Holding all PO meds as she may not digest given current GI issues

## 2017-02-11 NOTE — PLAN OF CARE
Problem: Infection, Risk/Actual (Adult)  Goal: Infection Prevention/Resolution  Patient will demonstrate the desired outcomes by discharge/transition of care.   Outcome: Ongoing (interventions implemented as appropriate)    02/11/17 0414   Infection, Risk/Actual (Adult)   Infection Prevention/Resolution making progress toward outcome   Receiving iv abx as ordered.    Problem: Fall Risk (Adult)  Goal: Absence of Falls  Patient will demonstrate the desired outcomes by discharge/transition of care.   Outcome: Ongoing (interventions implemented as appropriate)    02/11/17 0414   Fall Risk (Adult)   Absence of Falls making progress toward outcome   Located close to nursing station, bed alarm on. Instructed patient to call for assistance.    Problem: Patient Care Overview  Goal: Plan of Care Review  Outcome: Ongoing (interventions implemented as appropriate)    02/11/17 0414   Coping/Psychosocial   Plan Of Care Reviewed With patient   Resting quietly at intervals, continues with tpn/lipids as ordered. No ss coverage required for blood glucose of 190 mg/dl at 12mn, will continue to monitor q hours as ordered. Medicated for nausea x1 in early am, continues with ngt to LIWS with small amount of drainage.    Problem: Renal Failure/Kidney Injury, Acute (Adult)  Goal: Signs and Symptoms of Listed Potential Problems Will be Absent, Minimized or Managed (Renal Failure/Kidney Injury, Acute)  Signs and symptoms of listed potential problems will be absent, minimized or managed by discharge/transition of care (reference Renal Failure/Kidney Injury, Acute (Adult) CPG).   Outcome: Ongoing (interventions implemented as appropriate)  Chin catheter with yellow colored urine.    Problem: Pressure Ulcer Risk (Yung Scale) (Adult,Obstetrics,Pediatric)  Goal: Skin Integrity  Patient will demonstrate the desired outcomes by discharge/transition of care.   Outcome: Ongoing (interventions implemented as appropriate)    02/11/17 0414   Pressure  Ulcer Risk (Yung Scale) (Adult,Obstetrics,Pediatric)   Skin Integrity making progress toward outcome   Turned repositioned q2 hours, kept clean and dry.

## 2017-02-11 NOTE — ASSESSMENT & PLAN NOTE
Renal function slowly improving with supportive therapy. Low EF contributing to advanced renal dysfunction.

## 2017-02-11 NOTE — ASSESSMENT & PLAN NOTE
Appreciate Palliative Care's assistance. Daughter up to date with patient's status and poor prognosis given advanced medical co-morbidities affecting recovery. Palliative is to formally meet with sister (currently next of kin) on Monday. Will remain full code for now.

## 2017-02-11 NOTE — SUBJECTIVE & OBJECTIVE
Interval History: hemodynamically stable but not oriented to situation, person or time. Is alert.     Review of Systems   Constitutional: Negative for chills and fever.   Respiratory: Negative for cough and shortness of breath.    Cardiovascular: Negative for chest pain and leg swelling.   Gastrointestinal: Negative for abdominal distention, abdominal pain and vomiting.   Skin: Negative for rash and wound.     Objective:     Vital Signs (Most Recent):  Temp: 98 °F (36.7 °C) (02/10/17 1929)  Pulse: 104 (02/10/17 1929)  Resp: 20 (02/10/17 1929)  BP: 116/75 (02/10/17 1929)  SpO2: 96 % (02/10/17 1929) Vital Signs (24h Range):  Temp:  [97.8 °F (36.6 °C)-98.9 °F (37.2 °C)] 98 °F (36.7 °C)  Pulse:  [] 104  Resp:  [16-20] 20  SpO2:  [94 %-98 %] 96 %  BP: (106-166)/(68-76) 116/75     Weight: 77.4 kg (170 lb 10.2 oz)  Body mass index is 33.33 kg/(m^2).    Intake/Output Summary (Last 24 hours) at 02/10/17 2005  Last data filed at 02/10/17 1200   Gross per 24 hour   Intake           997.98 ml   Output             1350 ml   Net          -352.02 ml      Physical Exam   Constitutional: She appears well-developed and well-nourished. No distress.   Elderly   HENT:   Right Ear: External ear normal.   Left Ear: External ear normal.   Nose: Nose normal.   Eyes: Conjunctivae and EOM are normal. Pupils are equal, round, and reactive to light.   Neck: Normal range of motion. Neck supple. No JVD present.   Cardiovascular: Normal rate and normal heart sounds.  An irregularly irregular rhythm present.   No murmur heard.  Pulmonary/Chest: Effort normal. No respiratory distress. She has no wheezes. She has rales (throughout. faint).   Abdominal: Soft. Bowel sounds are normal. She exhibits no distension. There is no tenderness.   Musculoskeletal: Normal range of motion. She exhibits no edema, tenderness or deformity.   Neurological: She is alert.   Skin: Skin is warm and dry.   Nursing note and vitals reviewed.      Significant Labs:    CBC:     Recent Labs  Lab 02/09/17  0330 02/10/17  0413   WBC 7.13 9.17   HGB 10.5* 11.0*   HCT 34.9* 35.6*    178     CMP:     Recent Labs  Lab 02/09/17  0330 02/10/17  0413   * 136   K 3.6 3.1*   CL 98 98   CO2 22* 23   * 128*   BUN 51* 60*   CREATININE 2.9* 2.9*   CALCIUM 8.1* 8.1*   ANIONGAP 15 15   EGFRNONAA 15* 15*     POCT Glucose:     Recent Labs  Lab 02/10/17  0001 02/10/17  0526 02/10/17  1202   POCTGLUCOSE 164* 174* 122*     CXR reviewed

## 2017-02-11 NOTE — ASSESSMENT & PLAN NOTE
Multifactorial etiology: Underlying dementia with hypoglycemia, hypothermia, acute renal failure, lactic acid acidosis and possibly from elevated ammonia. Improved with treatment of underlying conditions. However, still with confusion. New baseline? Reorienting frequently. Blinds open during the daytime.

## 2017-02-11 NOTE — PROGRESS NOTES
[facility] Medicine  Progress Note    Patient Name: Myra Murphy  MRN: [id number]  Patient Class: IP- Inpatient   Admission Date: 2/3/2017  Length of Stay: 6 days  Attending Physician: Gisselle Sevilla MD  Primary Care Provider: Lupe Delatorre MD        Subjective:     Principal Problem:Shock    HPI:  Myra Murphy is a 83 yo woman with atrial fibrillation on OAC, chronic combined systolic and diastolic congestive heart failure (LVEF30% +DD 2/2017), h/o ER-/[place]+ right breast cancer in 2008, HLD, HTN, obesity, and moderate protein calorie malnutrition who presented 2/3/2017 from [facility] with multiple issues including hypotension, left upper quadrant pain, and emesis ×2. EMS reports an initial blood pressure 52/34 and a blood glucose of 137. She was confused upon admission and was barely arousable to sternal rub.     Hospital Course:  In the emergency department she underwent routine laboratory studies, CT imaging, and EKG. She had transient episodes of hypoglycemia and she had discordant low-pressure readings in her upper extremities although this is thought to be secondary to persistent mastectomy. Multiple abnormalities on laboratory studies were identified including acute renal failure, hyperkalemia, and severe lactic acid acidosis with a lactate of greater than 12 × 2 measures. She was also found to be hypothermic. After fluid resuscitation and treatment for hypokalemia as well as administration of D50 her mental status improved.     She was admitted to the hospital for sepsis thought secondary to a UTI.  The patient initially required pressor support.  ID was consulted on 2/5 to help with management.  Heme + stools were reported by nursing staff and GI was consulted and started patient on a PPI BID. Nephrology was consulted for renal failure.  Interestingly, her urine culture was no growth. The patient had a CT of abdomen and pelvis on 2/6 that showed a SBO that may be  related to R inguinal hernia. NG tube was placed and surgery was consulted.  Cards was consulted as well to follow as the patient has a cardiac history. The hernia was easily reducible and she is a very poor candidate for operation given her comorbidities. Her abdominal pain improved and her lactic acid on 2/8 had decreased to 2.6. She remained in the ICU on pressor support which was weaned off 2/8/2017. Amiodarone infusion was transitioned to PO as her bowel function was improving. She continues to have renal insufficiency. She had some pulmonary edema on CXR 2/9/2017 though oxygenation was stable. She was given was given one dose of Lasix 20 IV 2/9. She was transferred to telemetry for further monitoring 2/9/2017.      Interval History: hemodynamically stable but not oriented to situation, person or time. Is alert.     Review of Systems   Constitutional: Negative for chills and fever.   Respiratory: Negative for cough and shortness of breath.    Cardiovascular: Negative for chest pain and leg swelling.   Gastrointestinal: Negative for abdominal distention, abdominal pain and vomiting.   Skin: Negative for rash and wound.     Objective:     Vital Signs (Most Recent):  Temp: 98 °F (36.7 °C) (02/10/17 1929)  Pulse: 104 (02/10/17 1929)  Resp: 20 (02/10/17 1929)  BP: 116/75 (02/10/17 1929)  SpO2: 96 % (02/10/17 1929) Vital Signs (24h Range):  Temp:  [97.8 °F (36.6 °C)-98.9 °F (37.2 °C)] 98 °F (36.7 °C)  Pulse:  [] 104  Resp:  [16-20] 20  SpO2:  [94 %-98 %] 96 %  BP: (106-166)/(68-76) 116/75     Weight: 77.4 kg (170 lb 10.2 oz)  Body mass index is 33.33 kg/(m^2).    Intake/Output Summary (Last 24 hours) at 02/10/17 2005  Last data filed at 02/10/17 1200   Gross per 24 hour   Intake           997.98 ml   Output             1350 ml   Net          -352.02 ml      Physical Exam   Constitutional: She appears well-developed and well-nourished. No distress.   Elderly   HENT:   Right Ear: External ear normal.   Left Ear:  External ear normal.   Nose: Nose normal.   Eyes: Conjunctivae and EOM are normal. Pupils are equal, round, and reactive to light.   Neck: Normal range of motion. Neck supple. No JVD present.   Cardiovascular: Normal rate and normal heart sounds.  An irregularly irregular rhythm present.   No murmur heard.  Pulmonary/Chest: Effort normal. No respiratory distress. She has no wheezes. She has rales (throughout. faint).   Abdominal: Soft. Bowel sounds are normal. She exhibits no distension. There is no tenderness.   Musculoskeletal: Normal range of motion. She exhibits no edema, tenderness or deformity.   Neurological: She is alert.   Skin: Skin is warm and dry.   Nursing note and vitals reviewed.      Significant Labs:   CBC:     Recent Labs  Lab 02/09/17  0330 02/10/17  0413   WBC 7.13 9.17   HGB 10.5* 11.0*   HCT 34.9* 35.6*    178     CMP:     Recent Labs  Lab 02/09/17  0330 02/10/17  0413   * 136   K 3.6 3.1*   CL 98 98   CO2 22* 23   * 128*   BUN 51* 60*   CREATININE 2.9* 2.9*   CALCIUM 8.1* 8.1*   ANIONGAP 15 15   EGFRNONAA 15* 15*     POCT Glucose:     Recent Labs  Lab 02/10/17  0001 02/10/17  0526 02/10/17  1202   POCTGLUCOSE 164* 174* 122*     CXR reviewed    Assessment/Plan:      * Shock  Sepsis vs dilated cardiomyopathy    Paroxysmal atrial fibrillation  Appreciate cards recs. On amio gtt while NPO, changed to PO 2/9/2017. Monitor lytes. Cont telemetry. On Coumadin as an outpatient. OAC held 2/2 GI bleed. INR at 2 without use of anticoagulation. Due to Abx use? Will get CMP to evaluate for liver function    Nonischemic dilated cardiomyopathy  Thought to be 2/2 adriamycin.  EF   Date Value Ref Range Status   02/04/2017 30 (A) 55 - 65    12/28/2016 20 (A) 55 - 65    Not candidate for invasive treatment. Only conservative. Aiming for HR control and diuresis if renal function permits. Holding all PO meds as she may not digest given current GI issues    Severe mitral regurgitation  Management  of CHF as above. Not candidate for invasive management    HTN (hypertension)  Benign essential. At goal    Hyperlipidemia  No acute issues. Statin when tolerating PO.    Peripheral vascular disease  Stable. ASA & statin when tolerating PO.    Chronic venous stasis dermatitis of both lower extremities  Supportive care.  Wound care as needed.    Chronic combined systolic and diastolic CHF, NYHA class 4  Home meds on hold 2/2 SBO    Chronic kidney disease, stage 4, severely decreased GFR  Renal function slowly improving with supportive therapy. Low EF contributing to advanced renal dysfunction.     Long term current use of anticoagulant therapy  On hold    Type 2 diabetes mellitus without complication  SSI PRN    Acute on chronic renal failure  Her baseline Cr is 1.7 from 1/24. Cr improving.    Encephalopathy, metabolic  Multifactorial etiology: Underlying dementia with hypoglycemia, hypothermia, acute renal failure, lactic acid acidosis and possibly from elevated ammonia. Improved with treatment of underlying conditions. However, still with confusion. New baseline? Reorienting frequently. Blinds open during the daytime.     GI bleed  GI evaluated. H/H stable. PPI for now.      UTI (urinary tract infection)  UTI with sepsis with hypothermia and hypotension requiring pressor support. Blood cultures neg. Unfortunately, no urine culture ordered on admit. Appreciate ID recs for abx; on zosyn.    SBO (small bowel obstruction)  Appreciate surgery recs. NG tube placed. Fully redicuble R inguinal hernia. Pain improved. Lactic acid improved.     Advanced care planning/counseling discussion  Appreciate Palliative Care's assistance. Daughter up to date with patient's status and poor prognosis given advanced medical co-morbidities affecting recovery. Palliative is to formally meet with sister (currently next of kin) on Monday. Will remain full code for now.     VTE Risk Mitigation         Ordered     Medium Risk of VTE  Once       02/04/17 0233        On parenteral nutrition. Changing TG to 3 times a week to adjust dose of fat emulsion. Continue current care. Appreciate all consulting services' recs. Palliative to meet with daughter on Monday. I will call tomorrow for updates.     Gisselle Weldon MD  Department of Hospital Medicine   [facility]

## 2017-02-11 NOTE — ASSESSMENT & PLAN NOTE
Appreciate cards recs. On amio gtt while NPO, changed to PO 2/9/2017. Monitor lytes. Cont telemetry. On Coumadin as an outpatient. OAC held 2/2 GI bleed. INR at 2 without use of anticoagulation. Due to Abx use? Will get CMP to evaluate for liver function

## 2017-02-11 NOTE — PROGRESS NOTES
Patient continues to show understanding when educated about safety. Restraints not need at this time.

## 2017-02-11 NOTE — PROGRESS NOTES
Ayasys monitor installed in room to continue to monitor patient behavior. Patient remains resting with no signs of pain or discomfort. Nurse and staff will continue hourly rounding.

## 2017-02-11 NOTE — PROGRESS NOTES
Patient understands to call nurse when in need of any assistance. Call light phone and bedside table remain within reach. Nurse and staff will continue hourly rounding. Bed alarm is set and armed. Avasys in use.

## 2017-02-12 NOTE — NURSING
Patient blood pressure unable to get with automatic monitor. Manually taken with a ready at 60-70 0ver 40's. Dr. Weldon notified and will transfer to ICU.

## 2017-02-12 NOTE — PROGRESS NOTES
Patient has had several cups of water and apple juice. No complaints of nausea and no vomiting.Will continue monitor.

## 2017-02-12 NOTE — ASSESSMENT & PLAN NOTE
Likely decompensated by heart failure. Cardiorenal syndrome? Diuresis may help but CKD will remain at around stage 4. Nephro on board. Recs very much appreciated

## 2017-02-12 NOTE — PLAN OF CARE
02/12/17 0919   Medicare Message   Important Message from Medicare regarding Discharge Appeal Rights Given to patient/caregiver;Explained to patient/caregiver;Signed/date by patient/caregiver   Patient expressed understanding; initialed; dated

## 2017-02-12 NOTE — PROGRESS NOTES
Renal Progress Note      Admission Date: 2/3/2017  Length of Stay: 7 days        Subjective:     n/a      Objective:     Current Facility-Administered Medications   Medication    acetaminophen tablet 650 mg    albuterol-ipratropium 2.5mg-0.5mg/3mL nebulizer solution 3 mL    albuterol-ipratropium 2.5mg-0.5mg/3mL nebulizer solution 3 mL    Amino acid 4.25% - dextrose 10% (CLINIMIX-E) solution with additives (1L provides 42.5 gm AA, 100 gm CHO (340 kcal/L dextrose), Na 35, K 30, Mg 5, Ca 4.5, Acetate 70, Cl 39, Phos 15)    Amino acid 4.25% - dextrose 10% (CLINIMIX-E) solution with additives (1L provides 42.5 gm AA, 100 gm CHO (340 kcal/L dextrose), Na 35, K 30, Mg 5, Ca 4.5, Acetate 70, Cl 39, Phos 15)    amiodarone tablet 400 mg    Followed by    [START ON 2/16/2017] amiodarone tablet 200 mg    Followed by    [START ON 2/23/2017] amiodarone tablet 200 mg    bisacodyl suppository 10 mg    dextrose 50% injection 12.5 g    digoxin tablet 0.125 mg    fat emulsion 20 % infusion 250 mL    glucagon (human recombinant) injection 1 mg    influenza vaccine 180 mcg/0.5 mL (for patients > 65) injection    insulin aspart pen 0-5 Units    mineral oil enema 1 enema    ondansetron injection 4 mg    pantoprazole injection 40 mg    piperacillin-tazobactam 4.5 g in dextrose 5 % 100 mL IVPB (ready to mix system)    polyethylene glycol packet 17 g    promethazine suppository 25 mg    ramelteon tablet 8 mg    sodium chloride 0.9% flush 3 mL     Vitals:    02/11/17 1457   BP:    Pulse: 95   Resp: 20   Temp:      I/O last 3 completed shifts:  In: 1987.7 [P.O.:240; IV Piggyback:100]  Out: 2450 [Urine:2100; Drains:350]  I/O this shift:  In: 265 [P.O.:265]  Out: 400 [Urine:400]    Physical Exam: n/a    Laboratories:      Recent Labs  Lab 02/11/17  0457   WBC 9.67   RBC 4.54   HGB 10.9*   HCT 34.9*      MCV 77*   MCH 24.0*   MCHC 31.2*       Recent Labs  Lab 02/11/17  0457   CALCIUM 7.9*   PROT 5.3*   *   K  3.3*   CO2 23   CL 99   BUN 61*   CREATININE 2.8*   ALKPHOS 89   ALT 23   AST 17   BILITOT 1.1*     Microbiology Results (last 7 days)     Procedure Component Value Units Date/Time    Blood culture [[id number]] Collected:  02/03/17 2055    Order Status:  Completed Specimen:  Blood from Peripheral, Hand, Left Updated:  02/08/17 2303     Blood Culture, Routine No growth after 5 days.    Blood culture [[id number]] Collected:  02/03/17 2120    Order Status:  Completed Specimen:  Blood from Peripheral, Hand, Right Updated:  02/08/17 2303     Blood Culture, Routine No growth after 5 days.    Stool culture [[id number]] Collected:  02/05/17 0912    Order Status:  Completed Specimen:  Stool from Stool Updated:  02/08/17 0807     Stool Culture No Salmonella,Shigella,Vibrio,Campylobacter,Yersinia isolated.    Urine culture [[id number]] Collected:  02/05/17 1035    Order Status:  Completed Specimen:  Urine from Urine, Catheterized Updated:  02/07/17 0742     Urine Culture, Routine No growth    E. coli 0157 antigen [[id number]] Collected:  02/05/17 0912    Order Status:  Completed Specimen:  Stool from Stool Updated:  02/06/17 1222     Shiga Toxin 1 E.coli Negative     Shiga Toxin 2 E.coli Negative            Assessment:      83 y/o AAF with:    - JOSE MIGUEL  - CKD-3  - Hypokalemia  - Protein malnutrition  - DM-2  - PAF  - MR  - Non ischemic dilated CMP        Plan:     Replace K+ as needed (pte. On Digoxin)  Advance diet  Stop parenteral nutrition?              Luis Mean MD

## 2017-02-12 NOTE — ASSESSMENT & PLAN NOTE
Appreciate cards recs. On amio gtt while NPO, changed to PO 2/9/2017. Monitor lytes. Cont telemetry. On Coumadin as an outpatient. OAC held 2/2 GI bleed. INR at 2 without use of anticoagulation. Due to Abx use? Better now, but she may have congestive hepatopathy from hear failure

## 2017-02-12 NOTE — PROGRESS NOTES
[facility]  Progress Note    Patient Name: Myra Murphy  MRN: [id number]  Patient Class: IP- Inpatient   Admission Date: 2/3/2017  Length of Stay: 8 days  Attending Physician: Gisselle Sevilla MD  Primary Care Provider: Lupe Delatorre MD        Subjective:     Principal Problem:Severe combined systolic and diastolic congestive heart failure    HPI:  Myra Murphy is a 83 yo woman with atrial fibrillation on OAC, chronic combined systolic and diastolic congestive heart failure (LVEF30% +DD 2/2017), h/o ER-/[place]+ right breast cancer in 2008, HLD, HTN, obesity, and moderate protein calorie malnutrition who presented 2/3/2017 from [facility] with multiple issues including hypotension, left upper quadrant pain, and emesis ×2. EMS reports an initial blood pressure 52/34 and a blood glucose of 137. She was confused upon admission and was barely arousable to sternal rub.     Hospital Course:  In the emergency department she underwent routine laboratory studies, CT imaging, and EKG. She had transient episodes of hypoglycemia and she had discordant low-pressure readings in her upper extremities although this is thought to be secondary to persistent mastectomy. Multiple abnormalities on laboratory studies were identified including acute renal failure, hyperkalemia, and severe lactic acid acidosis with a lactate of greater than 12 × 2 measures. She was also found to be hypothermic. After fluid resuscitation and treatment for hypokalemia as well as administration of D50 her mental status improved.     She was admitted to the hospital for sepsis thought secondary to a UTI.  The patient initially required pressor support.  ID was consulted on 2/5 to help with management.  Heme + stools were reported by nursing staff and GI was consulted and started patient on a PPI BID. Nephrology was consulted for renal failure.  Interestingly, her urine culture was no growth. The patient had a CT  "of abdomen and pelvis on 2/6 that showed a SBO that may be related to R inguinal hernia. NG tube was placed and surgery was consulted.  Cards was consulted as well to follow as the patient has a cardiac history. The hernia was easily reducible and she is a very poor candidate for operation given her comorbidities. Her abdominal pain improved and her lactic acid on 2/8 had decreased to 2.6. She remained in the ICU on pressor support which was weaned off 2/8/2017. Amiodarone infusion was transitioned to PO as her bowel function was improving. She continues to have renal insufficiency. She had some pulmonary edema on CXR 2/9/2017 though oxygenation was stable. She was given was given one dose of Lasix 20 IV 2/9. She was transferred to telemetry for further monitoring 2/9/2017.      Interval History: NG tube removed. So far tolerating a trial of clears. Patient denying SOB or any kind of discomfort. Stated "I just want to sit up".    Review of Systems   Constitutional: Negative for chills and fever.   Respiratory: Negative for cough and shortness of breath.    Cardiovascular: Negative for chest pain and leg swelling.   Gastrointestinal: Negative for abdominal distention, abdominal pain and vomiting.   Skin: Negative for rash and wound.     Objective:     Vital Signs (Most Recent):  Temp: 98.4 °F (36.9 °C) (02/12/17 0815)  Pulse: (!) 114 (02/12/17 0841)  Resp: 20 (02/12/17 0841)  BP: (!) 87/55 (02/12/17 0815)  SpO2: 100 % (02/12/17 0841) Vital Signs (24h Range):  Temp:  [97.5 °F (36.4 °C)-98.4 °F (36.9 °C)] 98.4 °F (36.9 °C)  Pulse:  [] 114  Resp:  [17-20] 20  SpO2:  [96 %-100 %] 100 %  BP: ()/(55-81) 87/55     Weight: 77.4 kg (170 lb 10.2 oz)  Body mass index is 33.33 kg/(m^2).    Intake/Output Summary (Last 24 hours) at 02/12/17 1157  Last data filed at 02/12/17 0700   Gross per 24 hour   Intake           1421.3 ml   Output             2200 ml   Net           -778.7 ml      Physical Exam   Constitutional: " She appears well-developed and well-nourished. No distress.   Elderly   HENT:   Right Ear: External ear normal.   Left Ear: External ear normal.   Nose: Nose normal.   Eyes: Conjunctivae and EOM are normal. Pupils are equal, round, and reactive to light.   Neck: Normal range of motion. Neck supple. No JVD present.   Cardiovascular: Normal rate and normal heart sounds.  An irregularly irregular rhythm present.   No murmur heard.  Pulmonary/Chest: Effort normal. No respiratory distress. She has no wheezes. She has rales (throughout. faint).   Abdominal: Soft. Bowel sounds are normal. She exhibits no distension. There is no tenderness.   Musculoskeletal: Normal range of motion. She exhibits no edema, tenderness or deformity.   Neurological: She is alert.   Skin: Skin is warm and dry.   Nursing note and vitals reviewed.      Significant Labs:   CBC:     Recent Labs  Lab 02/11/17 0457 02/12/17  0438   WBC 9.67 9.92   HGB 10.9* 10.3*   HCT 34.9* 33.4*    173     CMP:     Recent Labs  Lab 02/11/17 0457 02/12/17  0438   * 135*   K 3.3* 3.8   CL 99 99   CO2 23 24   * 114*   BUN 61* 62*   CREATININE 2.8* 2.5*   CALCIUM 7.9* 8.0*   PROT 5.3* 5.4*   ALBUMIN 1.8* 1.8*   BILITOT 1.1* 1.1*   ALKPHOS 89 97   AST 17 19   ALT 23 18   ANIONGAP 13 12   EGFRNONAA 15* 17*     POCT Glucose:     Recent Labs  Lab 02/11/17  1836 02/11/17  2251 02/12/17  0544   POCTGLUCOSE 175* 181* 152*     CXR reviewed    Assessment/Plan:      * Severe combined systolic and diastolic congestive heart failure  Patient's initial presentation very consistent with exacerbation of heart failure causing congestive hepatopathy. This resulted in coagulopathy, especially while on anticoagulants as well, shock, hypoglycemia and acute decompensation overall. Patient's advanced illness goes against invasive treatment for such significant cardiomyopathy. We are limited to conservative treatment only. So far, fairly stabilized from this. Aiming for  HR control and diuresis as tolerated by BP and renal function    Nonischemic dilated cardiomyopathy  Thought to be 2/2 adriamycin.      Paroxysmal atrial fibrillation  Appreciate cards recs. On amio gtt while NPO, changed to PO 2/9/2017. Also on digoxin. Monitor lytes. Cont telemetry. Was on Coumadin as an outpatient, which was held due to presenting GI bleed. INR slightly elevated without use of anticoagulation likely due to congestive hepatopathy from heart failure.     Acute on chronic renal failure  Likely decompensated by heart failure. Cardiorenal syndrome? Diuresis may help but CKD will remain at around stage 4. Nephro on board. Recs very much appreciated    SBO (small bowel obstruction)  May have a combination of bowel edema and slow transit. NG discontinued. Advance diet as tolerated. General surgery on board. Recs very much appreciated    Advanced care planning/counseling discussion  Appreciate Palliative Care's assistance. Sister (who is next of kin) up to date with patient's status and poor prognosis given advanced medical co-morbidities affecting recovery. Palliative is to formally meet with sister (currently next of kin) on Monday. Will remain full code for now.     Severe mitral regurgitation  Management of CHF as above. Not candidate for invasive management    Peripheral vascular disease  Stable. ASA & statin when tolerating PO. restart today?    Chronic venous stasis dermatitis of both lower extremities  Supportive care.  Wound care as needed.    Severe tricuspid regurgitation  Management of CHF as above. Not candidate for invasive management    Long term current use of anticoagulant therapy  On hold. She may not be a candidate to restart this due to congestive hepatopathy     Type 2 diabetes mellitus without complication  SSI PRN    Encephalopathy, metabolic  Much better, but still not oriented to situation. Reorienting frequently. Blinds open during the daytime.     GI bleed  GI evaluated.  Resolved. H/H stable. PPI for now.      UTI (urinary tract infection)  UTI with sepsis with hypothermia and hypotension requiring pressor support. Blood cultures neg. Unfortunately, no urine culture ordered on admit. Appreciate ID recs for abx; on zosyn.    VTE Risk Mitigation         Ordered     Medium Risk of VTE  Once      02/04/17 0233        Will get PT/OT tomorrow for passive ROM. Will attempt getting up in chair tomorrow. Continue PO trial. Palliative care to meet with sister tomorrow.     Addendum: Called by nurse stating SBP is in the 60s/40s with irregularly irregular tachycardia at 117 bpm. Patient is alert, but per nurse, seems different. I will bolus 500 cc now and transfer to ICU for inotropic vasopressor support as I am unable to support on floor given such severe heart failure. Will change amio to IV. Called sister at numbers listed in chart. No answer and voicemail is full. Patient is currently full code    Gisselle Weldon MD  Department of Hospital Medicine   [facility]

## 2017-02-12 NOTE — ASSESSMENT & PLAN NOTE
May have a combination of bowel edema and slow transit. NG discontinued. Advance diet as tolerated. General surgery on board. Recs very much appreciated

## 2017-02-12 NOTE — ASSESSMENT & PLAN NOTE
Patient's initial presentation very consistent with exacerbation of heart failure causing congestive hepatopathy. This resulted in coagulopathy, especially while on anticoagulants as well, shock, hypoglycemia and acute decompensation overall. Patient's advanced illness goes against invasive treatment for such significant cardiomyopathy. We are limited to conservative treatment only. So far, fairly stabilized from this. Aiming for HR control and diuresis as tolerated by BP and renal function

## 2017-02-12 NOTE — SUBJECTIVE & OBJECTIVE
Interval History: NG tube removed today. Trial of liquids. Called sister. No answer. Voice mail full    Review of Systems   Constitutional: Negative for chills and fever.   Respiratory: Negative for cough and shortness of breath.    Cardiovascular: Negative for chest pain and leg swelling.   Gastrointestinal: Negative for abdominal distention, abdominal pain and vomiting.   Skin: Negative for rash and wound.     Objective:     Vital Signs (Most Recent):  Temp: 98.1 °F (36.7 °C) (02/11/17 2018)  Pulse: 88 (02/11/17 2034)  Resp: 18 (02/11/17 2034)  BP: 132/71 (02/11/17 2018)  SpO2: 98 % (02/11/17 2034) Vital Signs (24h Range):  Temp:  [96.6 °F (35.9 °C)-98.2 °F (36.8 °C)] 98.1 °F (36.7 °C)  Pulse:  [] 88  Resp:  [17-20] 18  SpO2:  [96 %-98 %] 98 %  BP: ()/(62-81) 132/71     Weight: 77.4 kg (170 lb 10.2 oz)  Body mass index is 33.33 kg/(m^2).    Intake/Output Summary (Last 24 hours) at 02/11/17 2303  Last data filed at 02/11/17 1800   Gross per 24 hour   Intake            999.8 ml   Output             2500 ml   Net          -1500.2 ml      Physical Exam   Constitutional: She appears well-developed and well-nourished. No distress.   Elderly   HENT:   Right Ear: External ear normal.   Left Ear: External ear normal.   Nose: Nose normal.   Eyes: Conjunctivae and EOM are normal. Pupils are equal, round, and reactive to light.   Neck: Normal range of motion. Neck supple. No JVD present.   Cardiovascular: Normal rate and normal heart sounds.  An irregularly irregular rhythm present.   No murmur heard.  Pulmonary/Chest: Effort normal. No respiratory distress. She has no wheezes. She has rales (throughout. faint).   Abdominal: Soft. Bowel sounds are normal. She exhibits no distension. There is no tenderness.   Musculoskeletal: Normal range of motion. She exhibits no edema, tenderness or deformity.   Neurological: She is alert.   Skin: Skin is warm and dry.   Nursing note and vitals reviewed.      Significant Labs:    CBC:     Recent Labs  Lab 02/10/17  0413 02/11/17  0457   WBC 9.17 9.67   HGB 11.0* 10.9*   HCT 35.6* 34.9*    172     CMP:     Recent Labs  Lab 02/10/17  0413 02/11/17 0457    135*   K 3.1* 3.3*   CL 98 99   CO2 23 23   * 140*   BUN 60* 61*   CREATININE 2.9* 2.8*   CALCIUM 8.1* 7.9*   PROT  --  5.3*   ALBUMIN  --  1.8*   BILITOT  --  1.1*   ALKPHOS  --  89   AST  --  17   ALT  --  23   ANIONGAP 15 13   EGFRNONAA 15* 15*     POCT Glucose:     Recent Labs  Lab 02/11/17  0515 02/11/17  1131 02/11/17  1836   POCTGLUCOSE 187* 221* 175*     CXR reviewed

## 2017-02-12 NOTE — PROGRESS NOTES
Renal Progress Note      Admission Date: 2/3/2017  Length of Stay: 8 days        Subjective:     n/a      Objective:     Current Facility-Administered Medications   Medication    acetaminophen tablet 650 mg    albuterol-ipratropium 2.5mg-0.5mg/3mL nebulizer solution 3 mL    albuterol-ipratropium 2.5mg-0.5mg/3mL nebulizer solution 3 mL    amiodarone tablet 400 mg    Followed by    [START ON 2/16/2017] amiodarone tablet 200 mg    Followed by    [START ON 2/23/2017] amiodarone tablet 200 mg    bisacodyl suppository 10 mg    dextrose 50% injection 12.5 g    digoxin tablet 0.125 mg    glucagon (human recombinant) injection 1 mg    influenza vaccine 180 mcg/0.5 mL (for patients > 65) injection    insulin aspart pen 0-5 Units    mineral oil enema 1 enema    ondansetron injection 4 mg    pantoprazole injection 40 mg    piperacillin-tazobactam 4.5 g in dextrose 5 % 100 mL IVPB (ready to mix system)    polyethylene glycol packet 17 g    promethazine suppository 25 mg    ramelteon tablet 8 mg    sodium chloride 0.9% flush 3 mL     Vitals:    02/12/17 0841   BP:    Pulse: (!) 114   Resp: 20   Temp:      I/O last 3 completed shifts:  In: 2676 [P.O.:425; IV Piggyback:200]  Out: 3700 [Urine:3350; Drains:350]       Physical Exam: n/a    Laboratories:      Recent Labs  Lab 02/12/17  0438   WBC 9.92   RBC 4.36   HGB 10.3*   HCT 33.4*      MCV 77*   MCH 23.6*   MCHC 30.8*       Recent Labs  Lab 02/12/17  0438   CALCIUM 8.0*   PROT 5.4*   *   K 3.8   CO2 24   CL 99   BUN 62*   CREATININE 2.5*   ALKPHOS 97   ALT 18   AST 19   BILITOT 1.1*     Microbiology Results (last 7 days)     Procedure Component Value Units Date/Time    Blood culture [[id number]] Collected:  02/03/17 2055    Order Status:  Completed Specimen:  Blood from Peripheral, Hand, Left Updated:  02/08/17 2303     Blood Culture, Routine No growth after 5 days.    Blood culture [[id number]] Collected:  02/03/17 2120    Order Status:  Completed  Specimen:  Blood from Peripheral, Hand, Right Updated:  02/08/17 2303     Blood Culture, Routine No growth after 5 days.    Stool culture [[id number]] Collected:  02/05/17 0912    Order Status:  Completed Specimen:  Stool from Stool Updated:  02/08/17 0807     Stool Culture No Salmonella,Shigella,Vibrio,Campylobacter,Yersinia isolated.    Urine culture [[id number]] Collected:  02/05/17 1035    Order Status:  Completed Specimen:  Urine from Urine, Catheterized Updated:  02/07/17 0742     Urine Culture, Routine No growth    E. coli 0157 antigen [[id number]] Collected:  02/05/17 0912    Order Status:  Completed Specimen:  Stool from Stool Updated:  02/06/17 1222     Shiga Toxin 1 E.coli Negative     Shiga Toxin 2 E.coli Negative            Assessment:      83 y/o AAF with:    - JOSE MIGUEL  - CKD-3  - Hypokalemia  - Protein malnutrition  - DM-2  - PAF  - MR  - Non ischemic dilated CMP        Plan:     Replace K+ as needed (pte. On Digoxin)  Advance diet  F/u BMP              Luis Mena MD

## 2017-02-12 NOTE — PROGRESS NOTES
Patient NG tube removed per nurse. No signs of pain or discomfort. Catheter intact and discarded in trash. Patient updated with care that diet has been advanced to clear liquids.

## 2017-02-12 NOTE — ASSESSMENT & PLAN NOTE
Much better today, but still not oriented to situation. Reorienting frequently. Blinds open during the daytime.

## 2017-02-12 NOTE — ASSESSMENT & PLAN NOTE
Appreciate cards recs. On amio gtt while NPO, changed to PO 2/9/2017. Also on digoxin. Monitor lytes. Cont telemetry. Was on Coumadin as an outpatient, which was held due to presenting GI bleed. INR slightly elevated without use of anticoagulation likely due to congestive hepatopathy from heart failure.

## 2017-02-12 NOTE — ASSESSMENT & PLAN NOTE
Thought to be 2/2 adriamycin.  EF   Date Value Ref Range Status   02/04/2017 30 (A) 55 - 65    12/28/2016 20 (A) 55 - 65    Not candidate for invasive treatment. Only conservative. Aiming for HR control and diuresis if renal function permits. Holding all PO meds as she may not digest given current GI issues. Seems like she may tolerate clears today

## 2017-02-12 NOTE — SUBJECTIVE & OBJECTIVE
"Interval History: NG tube removed. So far tolerating a trial of clears. Patient denying SOB or any kind of discomfort. Stated "I just want to sit up".    Review of Systems   Constitutional: Negative for chills and fever.   Respiratory: Negative for cough and shortness of breath.    Cardiovascular: Negative for chest pain and leg swelling.   Gastrointestinal: Negative for abdominal distention, abdominal pain and vomiting.   Skin: Negative for rash and wound.     Objective:     Vital Signs (Most Recent):  Temp: 98.4 °F (36.9 °C) (02/12/17 0815)  Pulse: (!) 114 (02/12/17 0841)  Resp: 20 (02/12/17 0841)  BP: (!) 87/55 (02/12/17 0815)  SpO2: 100 % (02/12/17 0841) Vital Signs (24h Range):  Temp:  [97.5 °F (36.4 °C)-98.4 °F (36.9 °C)] 98.4 °F (36.9 °C)  Pulse:  [] 114  Resp:  [17-20] 20  SpO2:  [96 %-100 %] 100 %  BP: ()/(55-81) 87/55     Weight: 77.4 kg (170 lb 10.2 oz)  Body mass index is 33.33 kg/(m^2).    Intake/Output Summary (Last 24 hours) at 02/12/17 1157  Last data filed at 02/12/17 0700   Gross per 24 hour   Intake           1421.3 ml   Output             2200 ml   Net           -778.7 ml      Physical Exam   Constitutional: She appears well-developed and well-nourished. No distress.   Elderly   HENT:   Right Ear: External ear normal.   Left Ear: External ear normal.   Nose: Nose normal.   Eyes: Conjunctivae and EOM are normal. Pupils are equal, round, and reactive to light.   Neck: Normal range of motion. Neck supple. No JVD present.   Cardiovascular: Normal rate and normal heart sounds.  An irregularly irregular rhythm present.   No murmur heard.  Pulmonary/Chest: Effort normal. No respiratory distress. She has no wheezes. She has rales (throughout. faint).   Abdominal: Soft. Bowel sounds are normal. She exhibits no distension. There is no tenderness.   Musculoskeletal: Normal range of motion. She exhibits no edema, tenderness or deformity.   Neurological: She is alert.   Skin: Skin is warm and dry. "   Nursing note and vitals reviewed.      Significant Labs:   CBC:     Recent Labs  Lab 02/11/17  0457 02/12/17  0438   WBC 9.67 9.92   HGB 10.9* 10.3*   HCT 34.9* 33.4*    173     CMP:     Recent Labs  Lab 02/11/17  0457 02/12/17  0438   * 135*   K 3.3* 3.8   CL 99 99   CO2 23 24   * 114*   BUN 61* 62*   CREATININE 2.8* 2.5*   CALCIUM 7.9* 8.0*   PROT 5.3* 5.4*   ALBUMIN 1.8* 1.8*   BILITOT 1.1* 1.1*   ALKPHOS 89 97   AST 17 19   ALT 23 18   ANIONGAP 13 12   EGFRNONAA 15* 17*     POCT Glucose:     Recent Labs  Lab 02/11/17  1836 02/11/17  2251 02/12/17  0544   POCTGLUCOSE 175* 181* 152*     CXR reviewed

## 2017-02-12 NOTE — ASSESSMENT & PLAN NOTE
Appreciate Palliative Care's assistance. Sister (who is next of kin) up to date with patient's status and poor prognosis given advanced medical co-morbidities affecting recovery. Palliative is to formally meet with sister (currently next of kin) on Monday. Will remain full code for now.

## 2017-02-12 NOTE — PROGRESS NOTES
[facility] Medicine  Progress Note    Patient Name: Myra Murphy  MRN: [id number]  Patient Class: IP- Inpatient   Admission Date: 2/3/2017  Length of Stay: 7 days  Attending Physician: Gisselle Sevilla MD  Primary Care Provider: Lupe Delatorre MD        Subjective:     Principal Problem:Shock    HPI:  Myra Murphy is a 81 yo woman with atrial fibrillation on OAC, chronic combined systolic and diastolic congestive heart failure (LVEF30% +DD 2/2017), h/o ER-/AZ+ right breast cancer in 2008, HLD, HTN, obesity, and moderate protein calorie malnutrition who presented 2/3/2017 from [facility] with multiple issues including hypotension, left upper quadrant pain, and emesis ×2. EMS reports an initial blood pressure 52/34 and a blood glucose of 137. She was confused upon admission and was barely arousable to sternal rub.     Hospital Course:  In the emergency department she underwent routine laboratory studies, CT imaging, and EKG. She had transient episodes of hypoglycemia and she had discordant low-pressure readings in her upper extremities although this is thought to be secondary to persistent mastectomy. Multiple abnormalities on laboratory studies were identified including acute renal failure, hyperkalemia, and severe lactic acid acidosis with a lactate of greater than 12 × 2 measures. She was also found to be hypothermic. After fluid resuscitation and treatment for hypokalemia as well as administration of D50 her mental status improved.     She was admitted to the hospital for sepsis thought secondary to a UTI.  The patient initially required pressor support.  ID was consulted on 2/5 to help with management.  Heme + stools were reported by nursing staff and GI was consulted and started patient on a PPI BID. Nephrology was consulted for renal failure.  Interestingly, her urine culture was no growth. The patient had a CT of abdomen and pelvis on 2/6 that showed a SBO that may be  related to R inguinal hernia. NG tube was placed and surgery was consulted.  Cards was consulted as well to follow as the patient has a cardiac history. The hernia was easily reducible and she is a very poor candidate for operation given her comorbidities. Her abdominal pain improved and her lactic acid on 2/8 had decreased to 2.6. She remained in the ICU on pressor support which was weaned off 2/8/2017. Amiodarone infusion was transitioned to PO as her bowel function was improving. She continues to have renal insufficiency. She had some pulmonary edema on CXR 2/9/2017 though oxygenation was stable. She was given was given one dose of Lasix 20 IV 2/9. She was transferred to telemetry for further monitoring 2/9/2017.      Interval History: NG tube removed today. Trial of liquids. Called sister. No answer. Voice mail full    Review of Systems   Constitutional: Negative for chills and fever.   Respiratory: Negative for cough and shortness of breath.    Cardiovascular: Negative for chest pain and leg swelling.   Gastrointestinal: Negative for abdominal distention, abdominal pain and vomiting.   Skin: Negative for rash and wound.     Objective:     Vital Signs (Most Recent):  Temp: 98.1 °F (36.7 °C) (02/11/17 2018)  Pulse: 88 (02/11/17 2034)  Resp: 18 (02/11/17 2034)  BP: 132/71 (02/11/17 2018)  SpO2: 98 % (02/11/17 2034) Vital Signs (24h Range):  Temp:  [96.6 °F (35.9 °C)-98.2 °F (36.8 °C)] 98.1 °F (36.7 °C)  Pulse:  [] 88  Resp:  [17-20] 18  SpO2:  [96 %-98 %] 98 %  BP: ()/(62-81) 132/71     Weight: 77.4 kg (170 lb 10.2 oz)  Body mass index is 33.33 kg/(m^2).    Intake/Output Summary (Last 24 hours) at 02/11/17 8724  Last data filed at 02/11/17 1800   Gross per 24 hour   Intake            999.8 ml   Output             2500 ml   Net          -1500.2 ml      Physical Exam   Constitutional: She appears well-developed and well-nourished. No distress.   Elderly   HENT:   Right Ear: External ear normal.   Left  Ear: External ear normal.   Nose: Nose normal.   Eyes: Conjunctivae and EOM are normal. Pupils are equal, round, and reactive to light.   Neck: Normal range of motion. Neck supple. No JVD present.   Cardiovascular: Normal rate and normal heart sounds.  An irregularly irregular rhythm present.   No murmur heard.  Pulmonary/Chest: Effort normal. No respiratory distress. She has no wheezes. She has rales (throughout. faint).   Abdominal: Soft. Bowel sounds are normal. She exhibits no distension. There is no tenderness.   Musculoskeletal: Normal range of motion. She exhibits no edema, tenderness or deformity.   Neurological: She is alert.   Skin: Skin is warm and dry.   Nursing note and vitals reviewed.      Significant Labs:   CBC:     Recent Labs  Lab 02/10/17  0413 02/11/17  0457   WBC 9.17 9.67   HGB 11.0* 10.9*   HCT 35.6* 34.9*    172     CMP:     Recent Labs  Lab 02/10/17  0413 02/11/17  0457    135*   K 3.1* 3.3*   CL 98 99   CO2 23 23   * 140*   BUN 60* 61*   CREATININE 2.9* 2.8*   CALCIUM 8.1* 7.9*   PROT  --  5.3*   ALBUMIN  --  1.8*   BILITOT  --  1.1*   ALKPHOS  --  89   AST  --  17   ALT  --  23   ANIONGAP 15 13   EGFRNONAA 15* 15*     POCT Glucose:     Recent Labs  Lab 02/11/17  0515 02/11/17  1131 02/11/17  1836   POCTGLUCOSE 187* 221* 175*     CXR reviewed    Assessment/Plan:      Paroxysmal atrial fibrillation  Appreciate cards recs. On amio gtt while NPO, changed to PO 2/9/2017. Monitor lytes. Cont telemetry. On Coumadin as an outpatient. OAC held 2/2 GI bleed. INR at 2 without use of anticoagulation. Due to Abx use? Better now, but she may have congestive hepatopathy from hear failure    Nonischemic dilated cardiomyopathy  Thought to be 2/2 adriamycin.  EF   Date Value Ref Range Status   02/04/2017 30 (A) 55 - 65    12/28/2016 20 (A) 55 - 65    Not candidate for invasive treatment. Only conservative. Aiming for HR control and diuresis if renal function permits. Holding all PO meds  as she may not digest given current GI issues. Seems like she may tolerate clears today    Severe mitral regurgitation  Management of CHF as above. Not candidate for invasive management    HTN (hypertension)  Benign essential. At goal    Hyperlipidemia  No acute issues. Statin when tolerating PO.    Peripheral vascular disease  Stable. ASA & statin when tolerating PO. restart tomorrow?    Chronic venous stasis dermatitis of both lower extremities  Supportive care.  Wound care as needed.    Chronic combined systolic and diastolic CHF, NYHA class 4  Home meds on hold 2/2 SBO    Chronic kidney disease, stage 4, severely decreased GFR  Renal function slowly improving with supportive therapy. Low EF contributing to advanced renal dysfunction.     Long term current use of anticoagulant therapy  On hold    Type 2 diabetes mellitus without complication  SSI PRN    Acute on chronic renal failure  Her baseline Cr is 1.7 from 1/24. Cr improving.    Encephalopathy, metabolic  Much better today, but still not oriented to situation. Reorienting frequently. Blinds open during the daytime.     GI bleed  GI evaluated. H/H stable. PPI for now.      UTI (urinary tract infection)  UTI with sepsis with hypothermia and hypotension requiring pressor support. Blood cultures neg. Unfortunately, no urine culture ordered on admit. Appreciate ID recs for abx; on zosyn.    SBO (small bowel obstruction)  NG discontinued. Trial of clears today    Advanced care planning/counseling discussion  Appreciate Palliative Care's assistance. Daughter up to date with patient's status and poor prognosis given advanced medical co-morbidities affecting recovery. Palliative is to formally meet with sister (currently next of kin) on Monday. Will remain full code for now.     * Shock, resolved as of 2/11/2017  Sepsis vs dilated cardiomyopathy    VTE Risk Mitigation         Ordered     Medium Risk of VTE  Once      02/04/17 0233          Gisselle Weldon  MD  Department of Hospital Medicine   [facility]

## 2017-02-12 NOTE — PLAN OF CARE
Problem: Infection, Risk/Actual (Adult)  Goal: Infection Prevention/Resolution  Patient will demonstrate the desired outcomes by discharge/transition of care.   Outcome: Ongoing (interventions implemented as appropriate)    02/12/17 0336   Infection, Risk/Actual (Adult)   Infection Prevention/Resolution making progress toward outcome   Receiving ix abx as ordered.    Problem: Patient Care Overview  Goal: Plan of Care Review  Outcome: Ongoing (interventions implemented as appropriate)    02/12/17 0336   Coping/Psychosocial   Plan Of Care Reviewed With patient   Rested quietly at intervals during night, continues with tpn/lipids, tolerating clear liquid diet. No report of pain or nausea. SS insulin coverage not required.    Problem: Renal Failure/Kidney Injury, Acute (Adult)  Goal: Signs and Symptoms of Listed Potential Problems Will be Absent, Minimized or Managed (Renal Failure/Kidney Injury, Acute)  Signs and symptoms of listed potential problems will be absent, minimized or managed by discharge/transition of care (reference Renal Failure/Kidney Injury, Acute (Adult) CPG).   Outcome: Ongoing (interventions implemented as appropriate)    02/12/17 0336   Renal Failure/Kidney Injury, Acute   Problems Assessed (Acute Renal Failure/Kidney Injury) fluid imbalance   Chin catheter with patricia colored urine.    Problem: Pressure Ulcer Risk (Yung Scale) (Adult,Obstetrics,Pediatric)  Goal: Skin Integrity  Patient will demonstrate the desired outcomes by discharge/transition of care.   Outcome: Ongoing (interventions implemented as appropriate)    02/12/17 0336   Pressure Ulcer Risk (Yung Scale) (Adult,Obstetrics,Pediatric)   Skin Integrity making progress toward outcome   Non-granulating wound to sacral area with hydrocolloid dsg in place.

## 2017-02-12 NOTE — NURSING
Blood pressure 87/ 55. Notified Dr. Weldon. No new orders for now. Hold Lanoxin for now. Will continue tp monitor.

## 2017-02-12 NOTE — ASSESSMENT & PLAN NOTE
Much better, but still not oriented to situation. Reorienting frequently. Blinds open during the daytime.

## 2017-02-13 PROBLEM — I95.9 HYPOTENSION: Status: ACTIVE | Noted: 2017-01-01

## 2017-02-13 NOTE — PT/OT/SLP PROGRESS
Acute Skilled PT Services    Pt is now intubated.  Please re-consult skilled PT services when pt is appropriate.  Thank you.

## 2017-02-13 NOTE — PT/OT/SLP EVAL
Physical Therapy  Evaluation    Myra Murphy   MRN: [id number]   Admitting Diagnosis: Severe combined systolic and diastolic congestive heart failure    PT Received On: 17  PT Start Time: 1056     PT Stop Time: 1108    PT Total Time (min): 12 min       Billable Minutes:  Evaluation 12 min    Diagnosis: Severe combined systolic and diastolic congestive heart failure    Past Medical History   Diagnosis Date    Anticoagulant long-term use     Arthritis     Atrial fibrillation     Atrial flutter     Carcinoma of right breast, estrogen receptor negative and progesterone receptor positive 2008    Chronic combined systolic and diastolic congestive heart failure     Colon cancer     Diabetes mellitus     Dilated cardiomyopathy 2008     adriamycin toxicity    Dyslipidemia     Encounter for blood transfusion     Hyperlipidemia     Hypertension     Supraventricular tachycardia      PAF, AFL      Past Surgical History   Procedure Laterality Date    Colon surgery       upper colon removal    Cataract extraction  09     both eyes (Dr. Adam)    Cardioversion  2014    Total abdominal hysterectomy w/ bilateral salpingoophorectomy  1973    Mastectomy, partial Right 2008     R right    Total hip arthroplasty  2010     anuerysm in hip/right hip    Hysterectomy      Eye surgery         General Precautions: Standard, fall, DM, respiratory    Patient History:  Living Arrangements: residential facility ([facility])    Previous Level of Function:  Ambulation Skills: unable to perform  Transfer Skills: needs assist (Per [facility], min - mod A depending on pt.)    Subjective:  Communicated with nursing (Dee) prior to session.    Chief Complaint: nausea  Patient goals: did not state    Pain Ratin/10                    Objective:   Patient found with: blood pressure cuff, central line, daniel catheter, oxygen 6L, pulse ox (continuous), telemetry     Cognitive  Exam:  Oriented to: Person and Place    Follows Commands/attention: Follows two-step commands  Communication: clear/fluent  Safety awareness/insight to disability: impaired    Physical Exam:  Postural examination/scapula alignment: obese    Skin integrity: BLE Unna boot  Edema: Mild trunk and B thigh    Lower Extremity Range of Motion:  Right Lower Extremity: PROM WFL  Left Lower Extremity: PROM WFL    Lower Extremity Strength:  Right Lower Extremity: limited AROM   Left Lower Extremity: limited AROM    Functional Mobility:  Bed Mobility:  Scooting/Bridging: Total Assistance, With assist of 2  Supine to Sit: Total Assistance, With assist of 2  Sit to Supine: Total Assistance, With assist of  2      Balance:   Static Sit: 0: Needs MAXIMAL assist to maintain sitting without back support      AM-PAC 6 CLICK MOBILITY  How much help from another person does this patient currently need?   1 = Unable, Total/Dependent Assistance  2 = A lot, Maximum/Moderate Assistance  3 = A little, Minimum/Contact Guard/Supervision  4 = None, Modified [place]/Independent    Turning over in bed (including adjusting bedclothes, sheets and blankets)?: 1  Sitting down on and standing up from a chair with arms (e.g., wheelchair, bedside commode, etc.): 1  Moving from lying on back to sitting on the side of the bed?: 1  Moving to and from a bed to a chair (including a wheelchair)?: 1  Need to walk in hospital room?: 1  Climbing 3-5 steps with a railing?: 1  Total Score: 6     AM-PAC Raw Score CMS G-Code Modifier Level of Impairment Assistance   6 % Total / Unable   7 - 9 CM 80 - 100% Maximal Assist   10 - 14 CL 60 - 80% Moderate Assist   15 - 19 CK 40 - 60% Moderate Assist   20 - 22 CJ 20 - 40% Minimal Assist   23 CI 1-20% SBA / CGA   24 CH 0% Independent/ Mod I     Patient left with bed in chair position with BLE elevated on pillow with all lines intact and call button in reach.    Assessment:   Pt spO2 on 6L 88-93% and BP  ~105/70.      Rehab identified problem list/impairments: Rehab identified problem list/impairments: weakness, impaired endurance, impaired self care skills, impaired functional mobilty, impaired balance, impaired cognition, decreased coordination, decreased upper extremity function, decreased lower extremity function, decreased safety awareness, decreased ROM, impaired cardiopulmonary response to activity    Rehab potential is fair.    Activity tolerance: Poor    Discharge recommendations: Discharge Facility/Level Of Care Needs: nursing facility, basic (back to [facility])     Barriers to discharge: Barriers to Discharge: None    Equipment recommendations: Equipment Needed After Discharge: none     GOALS:   Physical Therapy Goals        Problem: Physical Therapy Goal    Goal Priority Disciplines Outcome Goal Variances Interventions   Physical Therapy Goal     PT/OT, PT      Description:  Goals to be met by: 17    Patient will increase functional independence with mobility by performin. Supine to sit with Moderate Assistance  2. Sit to supine with Moderate Assistance  3. Rolling to Left and Right with Moderate Assistance  4. Sitting at edge of bed x 30 minutes with Moderate Assistance  5. Lower extremity exercise program x 20 reps per handout, with assistance as needed  6. Will assess transfers and w/c mobility when able                PLAN:    Patient to be seen 3 x/week to address the above listed problems via therapeutic activities, therapeutic exercises, wheelchair management/training  Plan of Care expires: 17  Plan of Care reviewed with: patient    Functional Assessment Tool Used: AM-PAC  Score: 6  Functional Limitation: Mobility: Walking and moving around  Mobility: Walking and Moving Around Current Status (): CN  Mobility: Walking and Moving Around Goal Status (): IVA Camacho, PT  2017

## 2017-02-13 NOTE — CONSULTS
Consult Note  Infectious Disease    Consult Requested By: Yash Benavidez MD    Reason for Consult: hypotension and hypothermia.    SUBJECTIVE:     History of Present Illness:  Patient is a 82 y.o. female presents with hypotension and hypothermia. 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 She was recently admitted with chf in januray 2017 and was placed in a snf 1/28/17. She is now here obtunded and hypothermic. Blood cultures are negative, urine and stool cultures negative form 2/3/17.c diff toxin is negative and she has no diarrhea now. Ct abdomen shows a sbo and rt inguinal hernia.    Past Medical History   Diagnosis Date    Anticoagulant long-term use     Arthritis     Atrial fibrillation     Atrial flutter     Carcinoma of right breast, estrogen receptor negative and progesterone receptor positive 02/2008    Chronic combined systolic and diastolic congestive heart failure     Colon cancer     Diabetes mellitus     Dilated cardiomyopathy 12/01/2008     adriamycin toxicity    Dyslipidemia     Encounter for blood transfusion     Hyperlipidemia     Hypertension     Supraventricular tachycardia      PAF, AFL     Past Surgical History   Procedure Laterality Date    Colon surgery  2000     upper colon removal    Cataract extraction  8/5/09     both eyes (Dr. Adam)    Cardioversion  04/24/2014    Total abdominal hysterectomy w/ bilateral salpingoophorectomy  1973    Mastectomy, partial Right 03/14/2008     R right    Total hip arthroplasty  03/05/2010     anuerysm in hip/right hip    Hysterectomy      Eye surgery       Family History   Problem Relation Age of Onset    Heart disease Mother     Hypertension Mother     Diabetes Mother     Glaucoma Mother     Glaucoma Maternal Aunt      Social History   Substance Use Topics    Smoking status: Never Smoker    Smokeless tobacco: Never Used    Alcohol use No       Review of  patient's allergies indicates:   Allergen Reactions    Shellfish containing products Itching     Can eat shrimp    Adhesive      Other reaction(s): blisters    Iodinated contrast media - iv dye      Other reaction(s): Vomiting    Iodine      Other reaction(s): Topical  Other reaction(s): Rash    Morphine      Other reaction(s): Hallucinations        Antibiotics     Start     Stop Route Frequency Ordered    02/10/17 2115  piperacillin-tazobactam 4.5 g in dextrose 5 % 100 mL IVPB (ready to mix system)      -- IV Every 12 hours (non-standard times) 02/10/17 2005          Review of Systems:  Review of systems not obtained due to patient factors unresponsive.    OBJECTIVE:     Vital Signs (Most Recent)  Temp: 96.8 °F (36 °C) (02/13/17 0900)  Pulse: 99 (02/13/17 1116)  Resp: (!) 36 (02/13/17 1116)  BP: 98/68 (02/13/17 0720)  SpO2: (!) 91 % (02/13/17 1116)    Temperature Range Min/Max (Last 24H):  Temp:  [96.8 °F (36 °C)-98.4 °F (36.9 °C)]     Physical Exam:  General: cachectic  Eyes: conjunctivae/corneas clear. PERRL..  HENT: Head:normocephalic, atraumatic. Ears:not examined. Nose: Nares normal. Septum midline. Mucosa normal. No drainage or sinus tenderness., no discharge. Throat: lips, mucosa, and tongue normal; teeth and gums normal and no throat erythema.  Neck: supple, symmetrical, trachea midline, no JVD and thyroid not enlarged, symmetric, no tenderness/mass/nodules  Lungs:  clear to auscultation bilaterally and normal respiratory effort  Cardiovascular: Heart: regular rate and rhythm, S1, S2 normal, no murmur, click, rub or gallop. Chest Wall: no tenderness. Extremities: excoriated skin. Pulses: not examined.  Abdomen/Rectal: Abdomen: soft, non-tender non-distented; bowel sounds normal; rt inguinal region with reducible hernia but tender  Skin: as above  Musculoskeletal:no clubbing, cyanosis    Laboratory:  CBC    Recent Labs  Lab 02/13/17  0127   WBC 8.48   RBC 4.58   HGB 11.1*   HCT 36.8*         BMP    Recent Labs  Lab 02/13/17  0127   CO2 21*   BUN 63*   CREATININE 2.6*   CALCIUM 8.1*     No results for input(s): COLORU, CLARITYU, SPECGRAV, PHUR, PROTEINUA, GLUCOSEU, BILIRUBINCON, BLOODU, WBCU, RBCU, BACTERIA, MUCUS, NITRITE, LEUKOCYTESUR, UROBILINOGEN, HYALINECASTS in the last 168 hours.  Microbiology Results (last 7 days)     Procedure Component Value Units Date/Time    Blood culture [[id number]] Collected:  02/03/17 2055    Order Status:  Completed Specimen:  Blood from Peripheral, Hand, Left Updated:  02/08/17 2303     Blood Culture, Routine No growth after 5 days.    Blood culture [[id number]] Collected:  02/03/17 2120    Order Status:  Completed Specimen:  Blood from Peripheral, Hand, Right Updated:  02/08/17 2303     Blood Culture, Routine No growth after 5 days.    Stool culture [[id number]] Collected:  02/05/17 0912    Order Status:  Completed Specimen:  Stool from Stool Updated:  02/08/17 0807     Stool Culture No Salmonella,Shigella,Vibrio,Campylobacter,Yersinia isolated.    Urine culture [[id number]] Collected:  02/05/17 1035    Order Status:  Completed Specimen:  Urine from Urine, Catheterized Updated:  02/07/17 0742     Urine Culture, Routine No growth    E. coli 0157 antigen [[id number]] Collected:  02/05/17 0912    Order Status:  Completed Specimen:  Stool from Stool Updated:  02/06/17 1222     Shiga Toxin 1 E.coli Negative     Shiga Toxin 2 E.coli Negative          Diagnostic Results:  Labs: Reviewed  X-Ray: Reviewed    ASSESSMENT/PLAN:     Active Hospital Problems    Diagnosis  POA    *Severe combined systolic and diastolic congestive heart failure [I50.40]  Yes     Chronic    Advanced care planning/counseling discussion [Z71.89]  Not Applicable    SBO (small bowel obstruction) [K56.69]  Yes    GI bleed [K92.2]  Yes    UTI (urinary tract infection) [N39.0]  Yes    Acute on chronic renal failure [N17.9, N18.9]  Yes    Encephalopathy, metabolic [G93.41]  Yes    Type 2 diabetes  mellitus without complication [E11.9]  Yes    Long term current use of anticoagulant therapy [Z79.01]  Not Applicable    Chronic kidney disease, stage 4, severely decreased GFR [N18.4]  Yes     Chronic    Severe tricuspid regurgitation [I07.1]  Yes    Peripheral vascular disease [I73.9]  Yes     Chronic    Chronic venous stasis dermatitis of both lower extremities [I83.11, I83.12]  Yes     Chronic    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 LVEF 20% 12/28/16.      Severe mitral regurgitation [I34.0]  Yes     Chronic     MODERATE-SEVERE      Hyperlipidemia [E78.5]  Yes    Paroxysmal atrial fibrillation [I48.0]  Yes     Chronic     WITH LA THROMBUS 10/12        Resolved Hospital Problems    Diagnosis Date Resolved POA    Shock [R57.9] 02/11/2017 Yes    Hyperkalemia, diminished renal excretion [E87.5] 02/07/2017 Yes    Supratherapeutic INR [R79.1] 02/09/2017 Yes    Lactic acid acidosis [E87.2] 02/07/2017 Yes    Hypothermia [T68.XXXA] 02/08/2017 Yes       1.hypotension and sobar  Suspect chf with ef 25%  2.dilated cardiomyopathy  3. sbo with rt inguinal hernia, contrast now in colon but better   i called her sister at [phone]  She states she is in her md's office and will visit later  She is aware that her sister is in extremis and will likely end up being intubated  She would not say she did not want that or would not say whether she wanted that.  patient is confused

## 2017-02-13 NOTE — PLAN OF CARE
Problem: Ventilation, Mechanical Invasive (Adult)  Goal: Signs and Symptoms of Listed Potential Problems Will be Absent, Minimized or Managed (Ventilation, Mechanical Invasive)  Signs and symptoms of listed potential problems will be absent, minimized or managed by discharge/transition of care (reference Ventilation, Mechanical Invasive (Adult) CPG).   Outcome: Ongoing (interventions implemented as appropriate)  PT REMAINS ON VENT WITH SETTINGS OF PRVC, , 15, +5 OF PEEP WITH 60% FIO2

## 2017-02-13 NOTE — PLAN OF CARE
Problem: SLP Goal  Goal: SLP Goal  Short Term Goals:   1. Pt will tolerate puree diet and NECTAR thick liquids with no overt s/s of aspiration noted.   2. Pt will participate in an ongoing assessment of swallowing function to determine the least restrictive and safest po diet.   Outcome: Ongoing (interventions implemented as appropriate)  Bedside swallow study completed. Pt presented with throat clearing s/p thin liquid trials x2. Slow oral transit time and excess chewing requiring liquid wash with solid trial. Pt refused further solid trials. No overt s/s of aspiration with nectar thick liquid and puree trials. Recommend: Pureed diet, nectar thick liquids, whole po medications 1 at a time or crushed in puree, alternate bites and sips, small bites/sips, upright for all po intake and 20 minutes s/p po intake. ST will continue to follow.   FERNANDO Hinds., CCC-SLP  02/13/2017

## 2017-02-13 NOTE — ASSESSMENT & PLAN NOTE
Patient's initial presentation very consistent with exacerbation of heart failure causing congestive hepatopathy. This resulted in coagulopathy, especially while on anticoagulants as well, shock, hypoglycemia and acute decompensation overall. Patient's advanced illness goes against invasive treatment for such significant cardiomyopathy. We are limited to conservative treatment only. So far, fairly stabilized from this. Aiming for HR control and diuresis as tolerated by BP and renal function  EF is 30% by echo of this hospital stay.

## 2017-02-13 NOTE — PLAN OF CARE
Problem: Occupational Therapy Goal  Goal: Occupational Therapy Goal  Goals to be met by: 2/27/2017     Patient will increase functional independence with ADLs by performing:    Feeding with Minimal Assistance.  UE Dressing with Minimal Assistance.  LE Dressing with Moderate Assistance.  Grooming while seated with Minimal Assistance.  Sitting at edge of bed x5 minutes with Minimal Assistance.  Upper extremity exercise program with assistance as needed.  Outcome: Ongoing (interventions implemented as appropriate)  Patient tolerated evaluation fair, fair participation.  Patient will benefit from skilled OT services to address the above mentioned goals. BERONICA Delgado, MS

## 2017-02-13 NOTE — PLAN OF CARE
Problem: Physical Therapy Goal  Goal: Physical Therapy Goal  Goals to be met by: 17    Patient will increase functional independence with mobility by performin. Supine to sit with Moderate Assistance  2. Sit to supine with Moderate Assistance  3. Rolling to Left and Right with Moderate Assistance  4. Sitting at edge of bed x 30 minutes with Moderate Assistance  5. Lower extremity exercise program x 20 reps per handout, with assistance as needed  6. Will assess transfers and w/c mobility when able  Pt with limited participation, will continue to assess.

## 2017-02-13 NOTE — PLAN OF CARE
Problem: Patient Care Overview  Goal: Plan of Care Review  Outcome: Ongoing (interventions implemented as appropriate)  Afebrile. Pt. Still in afib. maint on dopamine drip for sbp>75 and amiodarone drip is now at 0.5mg. tpn and lipids restarted tonight.  Pt maint npo status.  Urine output low this shift. md aware. No bm . No change to skin assessment since last icu stay.  No falls, injury, or further skin breakdown this shift.

## 2017-02-13 NOTE — ASSESSMENT & PLAN NOTE
Renal function slowly improving with supportive therapy. Low EF contributing to advanced renal dysfunction. Stable.

## 2017-02-13 NOTE — PROGRESS NOTES
Removed from CHF list due to Sabiha ESCOBAR..Brandi Dunham RN, BSN, STN [facility]  2/13/2017

## 2017-02-13 NOTE — PROGRESS NOTES
PALLIATIVE CARE PROGRESS NOTE:    Bedside follow-up visit.  Patient required intubation earlier today.  Sister has not arrived to see patient yet.  I will attempt to schedule meeting for tomorrow or Wednesday to discuss goals of care.      ZBIGNIEW GrantN, RN, CCRN   Palliative Care Nurse Coordinator   [facility]  [phone]

## 2017-02-13 NOTE — PROGRESS NOTES
[facility] [place]  Progress Note    Patient Name: Myra Murphy  MRN: [id number]  Patient Class: IP- Inpatient   Admission Date: 2/3/2017  Length of Stay: 9 days  Attending Physician: Yash Benavidez MD  Primary Care Provider: Lupe Delatorre MD        Subjective:     Principal Problem:Severe combined systolic and diastolic congestive heart failure    HPI:  Myra Murphy is a 83 yo woman with atrial fibrillation on OAC, chronic combined systolic and diastolic congestive heart failure (LVEF30% +DD 2/2017), h/o ER-/[place]+ right breast cancer in 2008, HLD, HTN, obesity, and moderate protein calorie malnutrition who presented 2/3/2017 from [facility] with multiple issues including hypotension, left upper quadrant pain, and emesis ×2. EMS reports an initial blood pressure 52/34 and a blood glucose of 137. She was confused upon admission and was barely arousable to sternal rub.     Hospital Course:  In the emergency department she underwent routine laboratory studies, CT imaging, and EKG. She had transient episodes of hypoglycemia and she had discordant low-pressure readings in her upper extremities although this is thought to be secondary to persistent mastectomy. Multiple abnormalities on laboratory studies were identified including acute renal failure, hyperkalemia, and severe lactic acid acidosis with a lactate of greater than 12 × 2 measures. She was also found to be hypothermic. After fluid resuscitation and treatment for hypokalemia as well as administration of D50 her mental status improved.     She was admitted to the hospital for sepsis thought secondary to a UTI.  The patient initially required pressor support.  ID was consulted on 2/5 to help with management.  Heme + stools were reported by nursing staff and GI was consulted and started patient on a PPI BID. Nephrology was consulted for renal failure.  Interestingly, her urine culture was no growth. The patient had a  CT of abdomen and pelvis on 2/6 that showed a SBO that may be related to R inguinal hernia. NG tube was placed and surgery was consulted.  Cards was consulted as well to follow as the patient has a cardiac history. The hernia was easily reducible and she is a very poor candidate for operation given her comorbidities. Her abdominal pain improved and her lactic acid on 2/8 had decreased to 2.6. She remained in the ICU on pressor support which was weaned off 2/8/2017. Amiodarone infusion was transitioned to PO as her bowel function was improving. She continues to have renal insufficiency. She had some pulmonary edema on CXR 2/9/2017 though oxygenation was stable. She was given was given one dose of Lasix 20 IV 2/9. She was transferred to telemetry for further monitoring 2/9/2017.  The patient was transferred to the ICU on 2/12 for hypotension and started on pressor support.  She was started on IV amio as well for what was thought to be A-fib. Palliative care is following along.        Interval History:  Patient with increased work of breathing.      Review of Systems   Unable to perform ROS: Acuity of condition     Objective:     Vital Signs (Most Recent):  Temp: 96.8 °F (36 °C) (02/13/17 0900)  Pulse: 99 (02/13/17 1116)  Resp: (!) 36 (02/13/17 1116)  BP: 98/68 (02/13/17 0720)  SpO2: (!) 91 % (02/13/17 1116) Vital Signs (24h Range):  Temp:  [96.8 °F (36 °C)-98.4 °F (36.9 °C)] 96.8 °F (36 °C)  Pulse:  [] 99  Resp:  [19-60] 36  SpO2:  [72 %-98 %] 91 %  BP: ()/(43-78) 98/68     Weight: 83.2 kg (183 lb 6.8 oz)  Body mass index is 35.82 kg/(m^2).    Intake/Output Summary (Last 24 hours) at 02/13/17 1230  Last data filed at 02/13/17 1100   Gross per 24 hour   Intake          1751.29 ml   Output              522 ml   Net          1229.29 ml      Physical Exam   Constitutional: She appears well-developed and well-nourished.   HENT:   Head: Normocephalic.   Cardiovascular: Normal rate.    Pulmonary/Chest: She has no  wheezes.   Increased work of breathing    Abdominal: Soft.   Neurological: She is alert.   Skin: Skin is warm and dry.   Vitals reviewed.      Significant Labs:   BMP:   Recent Labs  Lab 02/13/17  0127   *   *   K 4.1   CL 97   CO2 21*   BUN 63*   CREATININE 2.6*   CALCIUM 8.1*   MG 1.9     CBC:   Recent Labs  Lab 02/12/17  0438 02/13/17  0127   WBC 9.92 8.48   HGB 10.3* 11.1*   HCT 33.4* 36.8*    185       Significant Imaging:    Assessment/Plan:      * Severe combined systolic and diastolic congestive heart failure  Patient's initial presentation very consistent with exacerbation of heart failure causing congestive hepatopathy. This resulted in coagulopathy, especially while on anticoagulants as well, shock, hypoglycemia and acute decompensation overall. Patient's advanced illness goes against invasive treatment for such significant cardiomyopathy. We are limited to conservative treatment only. So far, fairly stabilized from this. Aiming for HR control and diuresis as tolerated by BP and renal function  EF is 30% by echo of this hospital stay.      Paroxysmal atrial fibrillation  Appreciate cards recs. On amio gtt while NPO, changed to PO 2/9/2017. Also on digoxin. Monitor lytes. Cont telemetry. Was on Coumadin as an outpatient, which was held due to presenting GI bleed. INR slightly elevated without use of anticoagulation likely due to congestive hepatopathy from heart failure. Moved to ICU on 2/12 and started on IV amio. Will re-consult cards     Hypotension- now on pressor support. Not sure of cause. ? Cardiac?     Nonischemic dilated cardiomyopathy  Thought to be 2/2 adriamycin.      Severe mitral regurgitation  Management of CHF as above. Not candidate for invasive management    Hyperlipidemia  No acute issues. Statin when tolerating PO.    Peripheral vascular disease  Stable. ASA & statin when tolerating PO. restart today?    Chronic venous stasis dermatitis of both lower  extremities  Supportive care.  Wound care as needed.    Severe tricuspid regurgitation  Management of CHF as above. Not candidate for invasive management    Chronic kidney disease, stage 4, severely decreased GFR  Renal function slowly improving with supportive therapy. Low EF contributing to advanced renal dysfunction. Stable.     Long term current use of anticoagulant therapy  On hold. She may not be a candidate to restart this due to congestive hepatopathy     Type 2 diabetes mellitus without complication  SSI PRN    Acute on chronic renal failure  Likely decompensated by heart failure. Cardiorenal syndrome? Diuresis may help but CKD will remain at around stage 4. Nephro on board. Recs very much appreciated    Encephalopathy, metabolic  Much better, but still not oriented to situation. Reorienting frequently. Blinds open during the daytime.     GI bleed  GI evaluated. Resolved. H/H stable. PPI for now.      UTI (urinary tract infection)  UTI with sepsis with hypothermia and hypotension requiring pressor support. Blood cultures neg. Unfortunately, no urine culture ordered on admit. Appreciate ID recs for abx; on zosyn.    SBO (small bowel obstruction)  May have a combination of bowel edema and slow transit. NG discontinued. Advance diet as tolerated. General surgery on board. Recs very much appreciated    Advanced care planning/counseling discussion  Appreciate Palliative Care's assistance. Sister (who is next of kin) up to date with patient's status and poor prognosis given advanced medical co-morbidities affecting recovery. Palliative is to formally meet with sister (currently next of kin) on Monday. Will remain full code for now.     VTE Risk Mitigation         Ordered     Medium Risk of VTE  Once      02/04/17 0233        Very poor prognosis- may need to be intubated.    Palliative care attempting to speak to family.     Critical care time spent 50 minutes.     Addendum 1:27pm  Patient with respiratory failure  and increasing work of breathing.  Will intubate.      Yash Martinez MD  Department of Hospital Medicine   [facility]

## 2017-02-13 NOTE — PROGRESS NOTES
Pt intubated with 7.5 ETT taped 23 cm at lip.  Current vent settings are prvc, vt 500, rate 15, +5 of peep with 100% fio2.  Vent alarms set and functioning and ambu bag and mask at bedside.  Sputum obtained and sent to lab

## 2017-02-13 NOTE — PROGRESS NOTES
"PALLIATIVE CARE PROGRESS NOTE:    Called to bedside by KURT Price who stated patient is decompensating.    Arrived at room, patient very tachypneic on Bipap with RR about 38, extremely hypotensive cuff pressure 53/33, multiple RN's at bedside and anesthesia arrived to place a-line.  She is maxed on on Doopamine @ 20 mcg/kg/min, and now being started on Levophed.  She is also on amiodarone 0.5 mg/min.  Patient able to state her name, and "in the hospital" for location.  She appears anxious and c/o SOB and abdominal pain.      Dr. Cam states she just called patient's sister Caty [phone]) and she stated she was at a doctor appointment and would come "visit later"; she indicates sister aware that patient decompensating but would not indicate whether she wanted her intubated.      Will reassess for patient's ability to make any decisions on her own behalf as soon as lines and meds are established.    (9210)ADDENDUM:    Patient awake, oriented to self, , and hospital, but not time.  She is able to answer some questions - says she would want to be intubated "for a short time" if needed and says "I had that done before", but says she would not want to have a tracheostomy.  When asked about CPR she says she would want this, but was unable to state back pros and cons of this intervention.  When asked if she would want her sister Caty/JORDY to make decision with guidance from her physicians she stated "yes".  rob Patino at bedside for this conversation.    PATIENT REMAINS FULL CODE.      ZBIGNIEW GrantN, RN, CCRN   Palliative Care Nurse Coordinator   [facility]  [phone]    "

## 2017-02-13 NOTE — NURSING
informed Dr. Gavin that pt's urine output has not picked up since tpn/lipids started. He wants to just monitor for rest of the night.

## 2017-02-13 NOTE — PROGRESS NOTES
Myra Murphy is a 82 y.o. female patient.    Active Hospital Problems    Diagnosis  POA    *Severe combined systolic and diastolic congestive heart failure [I50.40]  Yes     Chronic    Advanced care planning/counseling discussion [Z71.89]  Not Applicable    SBO (small bowel obstruction) [K56.69]  Yes    GI bleed [K92.2]  Yes    UTI (urinary tract infection) [N39.0]  Yes    Acute on chronic renal failure [N17.9, N18.9]  Yes    Encephalopathy, metabolic [G93.41]  Yes    Type 2 diabetes mellitus without complication [E11.9]  Yes    Long term current use of anticoagulant therapy [Z79.01]  Not Applicable    Chronic kidney disease, stage 4, severely decreased GFR [N18.4]  Yes     Chronic    Severe tricuspid regurgitation [I07.1]  Yes    Peripheral vascular disease [I73.9]  Yes     Chronic    Chronic venous stasis dermatitis of both lower extremities [I83.11, I83.12]  Yes     Chronic    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 LVEF 20% 12/28/16.      Severe mitral regurgitation [I34.0]  Yes     Chronic     MODERATE-SEVERE      Hyperlipidemia [E78.5]  Yes    Paroxysmal atrial fibrillation [I48.0]  Yes     Chronic     WITH LA THROMBUS 10/12        Resolved Hospital Problems    Diagnosis Date Resolved POA    Shock [R57.9] 02/11/2017 Yes    Hyperkalemia, diminished renal excretion [E87.5] 02/07/2017 Yes    Supratherapeutic INR [R79.1] 02/09/2017 Yes    Lactic acid acidosis [E87.2] 02/07/2017 Yes    Hypothermia [T68.XXXA] 02/08/2017 Yes     Temp: 96.8 °F (36 °C) (02/13/17 0900)  Pulse: 99 (02/13/17 1116)  Resp: (!) 36 (02/13/17 1116)  BP: 98/68 (02/13/17 0720)  SpO2: (!) 91 % (02/13/17 1116)  Weight: 83.2 kg (183 lb 6.8 oz) (02/13/17 0500)  Height: 5' (152.4 cm) (02/04/17 0141)    Subjective:  Symptoms:  Worsening.  She reports shortness of breath.  (Uncomfortable, breathing harder).      Objective:  General Appearance:  Uncomfortable,  ill-appearing, in distress and not in pain.    Vital signs: (most recent): Blood pressure 98/68, pulse 99, temperature 96.8 °F (36 °C), temperature source Axillary, resp. rate (!) 36, height 5' (1.524 m), weight 83.2 kg (183 lb 6.8 oz), SpO2 (!) 91 %, not currently breastfeeding.    HEENT: Normal HEENT exam.    Lungs:  There are decreased breath sounds.  No wheezes.    Heart: Tachycardia.    Extremities: There is dependent edema.  (Extensive hip edema)  Neurological: (Awake).    Skin:  Dry and cool.    Abdomen: Abdomen is soft and non-distended.  Bowel sounds are normal.   There is no abdominal tenderness.         Intake/Output Summary (Last 24 hours) at 02/13/17 1134  Last data filed at 02/13/17 1100   Gross per 24 hour   Intake          1751.29 ml   Output              522 ml   Net          1229.29 ml     Lab Results   Component Value Date    WBC 8.48 02/13/2017    HGB 11.1 (L) 02/13/2017    HCT 36.8 (L) 02/13/2017    MCV 80 (L) 02/13/2017     02/13/2017     BMP  Lab Results   Component Value Date     (L) 02/13/2017    K 4.1 02/13/2017    CL 97 02/13/2017    CO2 21 (L) 02/13/2017    BUN 63 (H) 02/13/2017    CREATININE 2.6 (H) 02/13/2017    CALCIUM 8.1 (L) 02/13/2017    ANIONGAP 15 02/13/2017    ESTGFRAFRICA 19 (A) 02/13/2017    EGFRNONAA 17 (A) 02/13/2017     Current Facility-Administered Medications   Medication    acetaminophen tablet 650 mg    albumin human 25% bottle 25 g    albuterol-ipratropium 2.5mg-0.5mg/3mL nebulizer solution 3 mL    Amino acid 4.25% - dextrose 10% (CLINIMIX-E) solution with additives (1L provides 42.5 gm AA, 100 gm CHO (340 kcal/L dextrose), Na 35, K 30, Mg 5, Ca 4.5, Acetate 70, Cl 39, Phos 15)    Amino acid 4.25% - dextrose 10% (CLINIMIX-E) solution with additives (1L provides 42.5 gm AA, 100 gm CHO (340 kcal/L dextrose), Na 35, K 30, Mg 5, Ca 4.5, Acetate 70, Cl 39, Phos 15)    amiodarone 360 mg/200 mL (1.8 mg/mL) infusion    atorvastatin tablet 20 mg    bisacodyl  suppository 10 mg    bumetanide injection 2 mg    dextrose 50% injection 12.5 g    digoxin tablet 0.125 mg    DOPamine 400 mg in dextrose 5 % 250 mL infusion (premix)    fat emulsion 20 % infusion 250 mL    fat emulsion 20 % infusion 250 mL    glucagon (human recombinant) injection 1 mg    influenza vaccine 180 mcg/0.5 mL (for patients > 65) injection    insulin aspart pen 0-5 Units    mineral oil enema 1 enema    ondansetron injection 4 mg    pantoprazole injection 40 mg    piperacillin-tazobactam 4.5 g in dextrose 5 % 100 mL IVPB (ready to mix system)    polyethylene glycol packet 17 g    promethazine suppository 25 mg    ramelteon tablet 8 mg    sodium chloride 0.9% flush 3 mL       Assessment & Plan  1. JOSE MIGUEL. Creatinine is not better. UOP worse. Will give albumin and bumex. Resp wise worse. +   2.CMP. Volume up. Try diurese pt.  Do not think pt will do well intubated.  3.SBO. Will need to decrease volume delivery.  PT + volume. Pt not able to eat now.   Changed to custom tpn. Ct scan with partial sbo still.  4.severe maln. Adjust tpn to decrease volume.  5.Acute resp failure. Pt becoming more labored. Add bumex. Ck cxr and abg.  7. Hyponatremia. Adjust sodium in tpn.  8. Hypotension. Add pressors. May need to intubate pt. ON dopamine. Add levaphed. May need dobutrex added. Will get laura.  9.AFib. Rate slightly tachy.   Cont tx.  10.Hypothyrodism. Recommend iv synthroid.  40mins at bedside.  Overall, pt looks worse. Will need to  Intubate most likely.  Tried calling sister-no response. PT confused. Not oriented. PT wants code but I do not think she is with it to understand.  Mirna Thapa MD  2/13/2017

## 2017-02-13 NOTE — SUBJECTIVE & OBJECTIVE
Interval History:  Patient with increased work of breathing.      Review of Systems   Unable to perform ROS: Acuity of condition     Objective:     Vital Signs (Most Recent):  Temp: 96.8 °F (36 °C) (02/13/17 0900)  Pulse: 99 (02/13/17 1116)  Resp: (!) 36 (02/13/17 1116)  BP: 98/68 (02/13/17 0720)  SpO2: (!) 91 % (02/13/17 1116) Vital Signs (24h Range):  Temp:  [96.8 °F (36 °C)-98.4 °F (36.9 °C)] 96.8 °F (36 °C)  Pulse:  [] 99  Resp:  [19-60] 36  SpO2:  [72 %-98 %] 91 %  BP: ()/(43-78) 98/68     Weight: 83.2 kg (183 lb 6.8 oz)  Body mass index is 35.82 kg/(m^2).    Intake/Output Summary (Last 24 hours) at 02/13/17 1230  Last data filed at 02/13/17 1100   Gross per 24 hour   Intake          1751.29 ml   Output              522 ml   Net          1229.29 ml      Physical Exam   Constitutional: She appears well-developed and well-nourished.   HENT:   Head: Normocephalic.   Cardiovascular: Normal rate.    Pulmonary/Chest: She has no wheezes.   Increased work of breathing    Abdominal: Soft.   Neurological: She is alert.   Skin: Skin is warm and dry.   Vitals reviewed.      Significant Labs:   BMP:   Recent Labs  Lab 02/13/17  0127   *   *   K 4.1   CL 97   CO2 21*   BUN 63*   CREATININE 2.6*   CALCIUM 8.1*   MG 1.9     CBC:   Recent Labs  Lab 02/12/17  0438 02/13/17  0127   WBC 9.92 8.48   HGB 10.3* 11.1*   HCT 33.4* 36.8*    185       Significant Imaging:

## 2017-02-13 NOTE — CONSULTS
Re-consult for resp failure.  Previously seen by Dr. Leos and had stable afib on amio.   Transferred to the floor but returned to ICU for increased labored breathing and afib w/ RVR.  NO w intubated and on pressor again.      She was admitted to the hospital for sepsis thought secondary to a UTI. The patient initially required pressor support. ID was consulted on 2/5 to help with management. Heme + stools were reported by nursing staff and GI was consulted and started patient on a PPI BID. Nephrology was consulted for renal failure. Interestingly, her urine culture was no growth. The patient had a CT of abdomen and pelvis on 2/6 that showed a SBO that may be related to R inguinal hernia. NG tube was placed and surgery was consulted. Cards was consulted as well to follow as the patient has a cardiac history. The hernia was easily reducible and she is a very poor candidate for operation given her comorbidities. Her abdominal pain improved and her lactic acid on 2/8 had decreased to 2.6. She remained in the ICU on pressor support which was weaned off 2/8/2017. Amiodarone infusion was transitioned to PO as her bowel function was improving. She continues to have renal insufficiency. She had some pulmonary edema on CXR 2/9/2017 though oxygenation was stable. She was given was given one dose of Lasix 20 IV 2/9. She was transferred to telemetry for further monitoring 2/9/2017. The patient was transferred to the ICU on 2/12 for hypotension and started on pressor support. She was started on IV amio as well for what was thought to be A-fib. Palliative care is following along.     D/w Dr. Thapa and decreased UOP and high CVP --> trial of low dose milrinone.

## 2017-02-13 NOTE — NURSING
Asked Dr. Gavin if wanted pt to get bolus for low urine output for last 2 hrs. 19;00-20ml and 20;00-10ml, after I gave him her hx , dx, and why back in the icu.  He stated that since she will be getting tpn/lips later ok to just monitor urine output for now.  20;30- showed Dr. Gavin pt's tele strip that shows pt had a run of v tach.  No orders given.

## 2017-02-13 NOTE — PT/OT/SLP EVAL
Occupational Therapy  Evaluation    Myra Murphy   MRN: [id number]   Admitting Diagnosis: Severe combined systolic and diastolic congestive heart failure    OT Date of Treatment: 02/13/17   OT Start Time: 1056  OT Stop Time: 1109  OT Total Time (min): 13 min    Billable Minutes:  Evaluation 13 minutes (co-eval with PT)    Diagnosis: Severe combined systolic and diastolic congestive heart failure       Past Medical History   Diagnosis Date    Anticoagulant long-term use     Arthritis     Atrial fibrillation     Atrial flutter     Carcinoma of right breast, estrogen receptor negative and progesterone receptor positive 02/2008    Chronic combined systolic and diastolic congestive heart failure     Colon cancer     Diabetes mellitus     Dilated cardiomyopathy 12/01/2008     adriamycin toxicity    Dyslipidemia     Encounter for blood transfusion     Hyperlipidemia     Hypertension     Supraventricular tachycardia      PAF, AFL      Past Surgical History   Procedure Laterality Date    Colon surgery  2000     upper colon removal    Cataract extraction  8/5/09     both eyes (Dr. Adam)    Cardioversion  04/24/2014    Total abdominal hysterectomy w/ bilateral salpingoophorectomy  1973    Mastectomy, partial Right 03/14/2008     R right    Total hip arthroplasty  03/05/2010     anuerysm in hip/right hip    Hysterectomy      Eye surgery         Referring physician: Dr. Benavidez  Date referred to OT: 2/12/2017    General Precautions: Standard, fall  Orthopedic Precautions: N/A  Braces: N/A    Do you have any cultural, spiritual, Sikh conflicts, given your current situation?: Church     Patient History:  Living Environment  Lives With: facility resident (Sabiha Whaley)  Living Arrangements: residential facility  Transportation Available: family or friend will provide  Equipment Currently Used at Home: raised toilet, wheelchair, walker, rolling, CPAP, glucometer    Prior level of function:   Bed  "Mobility/Transfers: needs device and assist  Grooming: needs device and assist  Bathing: needs device and assist  Upper Body Dressing: needs device and assist  Lower Body Dressing: needs device and assist  Toileting: needs device and assist        Dominant hand: right    Subjective:  Communicated with nurse prior to session.    Chief Complaint: "I feel nauseated"  Patient/Family stated goals: to feel better    Pain Ratin/10                   Objective:  Patient found with: blood pressure cuff, central line, daniel catheter, oxygen, pulse ox (continuous), telemetry    Cognitive Exam:  Oriented to: Person, Place and Situation  Follows Commands/attention: Inattentive and Follows one-step commands  Communication: clear/fluent  Memory:  Poor immediate recall  Safety awareness/insight to disability: impaired  Coping skills/emotional control: Appropriate to situation    Visual/perceptual:  Intact    Physical Exam:  Postural examination/scapula alignment: Rounded shoulder and Head forward  Skin integrity: Visible skin intact  Edema: None noted     Sensation:   Intact    Upper Extremity Range of Motion:  Right Upper Extremity: WFL  Left Upper Extremity: WFL    Upper Extremity Strength:  Right Upper Extremity: WFL  Left Upper Extremity: WFL   Strength: WFL    Fine motor coordination:   Unable to assess    Gross motor coordination: WFL    Functional Mobility:  Bed Mobility:  Rolling/Turning to Left: Total assistance  Rolling/Turning Right: Total assistance  Scooting/Bridging: Total Assistance  Supine to Sit: Total Assistance  Sit to Supine: Total Assistance      Activities of Daily Living:  Feeding Level of Assistance: Total assistance  UE Dressing Level of Assistance: Maximum assistance  LE Dressing Level of Assistance: Total assistance    Balance:   Static Sit: 0: Needs MAXIMAL assist to maintain sitting without back support  Dynamic Sit: 0: N/A    AM-PAC 6 CLICK ADL  How much help from another person does this " "patient currently need?  1 = Unable, Total/Dependent Assistance  2 = A lot, Maximum/Moderate Assistance  3 = A little, Minimum/Contact Guard/Supervision  4 = None, Modified Garrett/Independent    Putting on and taking off regular lower body clothing? : 1  Bathing (including washing, rinsing, drying)?: 1  Toileting, which includes using toilet, bedpan, or urinal? : 1  Putting on and taking off regular upper body clothing?: 1  Taking care of personal grooming such as brushing teeth?: 1  Eating meals?: 1  Total Score: 6    AM-PAC Raw Score CMS "G-Code Modifier Level of Impairment Assistance   6 % Total / Unable   7 - 9 CM 80 - 100% Maximal Assist   10 - 14 CL 60 - 80% Moderate Assist   15 - 19 CK 40 - 60% Moderate Assist   20 - 22 CJ 20 - 40% Minimal Assist   23 CI 1-20% SBA / CGA   24 CH 0% Independent/ Mod I       Patient left supine with all lines intact, call button in reach and nursing notified    Assessment:  Myra Murphy is a 82 y.o. female with a medical diagnosis of Severe combined systolic and diastolic congestive heart failure and presents with  independence for self-care and functional transfers.    Rehab identified problem list/impairments: Rehab identified problem list/impairments: weakness, impaired endurance, impaired self care skills, impaired functional mobilty, impaired balance, impaired cognition, decreased upper extremity function, decreased safety awareness, pain, decreased ROM, impaired skin, impaired cardiopulmonary response to activity    Rehab potential is fair.    Activity tolerance: Fair    Discharge recommendations: Discharge Facility/Level Of Care Needs: nursing facility, basic     Barriers to discharge: Barriers to Discharge: None    Equipment recommendations: none     GOALS:   Occupational Therapy Goals        Problem: Occupational Therapy Goal    Goal Priority Disciplines Outcome Interventions   Occupational Therapy Goal     OT, PT/OT Ongoing (interventions " implemented as appropriate)    Description:  Goals to be met by: 2/27/2017     Patient will increase functional independence with ADLs by performing:    Feeding with Minimal Assistance.  UE Dressing with Minimal Assistance.  LE Dressing with Moderate Assistance.  Grooming while seated with Minimal Assistance.  Sitting at edge of bed x5 minutes with Minimal Assistance.  Upper extremity exercise program with assistance as needed.                PLAN:  Patient to be seen 3 x/week to address the above listed problems via self-care/home management, therapeutic activities, therapeutic exercises  Plan of Care expires: 02/27/17  Plan of Care reviewed with: patient    OT G-codes  Functional Assessment Tool Used: [facility]  Score: 6  Functional Limitation: Self care  Self Care Current Status (): CN  Self Care Goal Status (): BERONICA Neff, MS  02/13/2017

## 2017-02-13 NOTE — PLAN OF CARE
Problem: Coping, Compromised Individual (Adult,Obstetrics,Pediatric)  Goal: Effective Coping  Patient will demonstrate the desired outcomes by discharge/transition of care.   Outcome: Ongoing (interventions implemented as appropriate)  Pt alert but disoriented to all but self. Dopamine running at 10, mamnio running. CT preformed. Pt changed to NPO swallow study put in and TPN to be restarted. All other vitals stable and safety maintained throughout shift

## 2017-02-13 NOTE — PT/OT/SLP PROGRESS
Occupational Therapy      Myraalan Murphy  MRN: [id number]    Patient intubated following evaluation.  Please re-consult once patient awake and able to participate in EOB/OOB activity    BERONICA Delgado, MS  2/13/2017

## 2017-02-13 NOTE — NURSING
Pt currently intubated; appears comfortable no longer tachypneic.  low dose propofol for sedation; dopamine gtt weaning down; levophed weaning down; tolerating well. A line in place with appropriate waveforms; NIBP not correlating due to poor vasculature. Dr. Thapa notified of scant (<20cc) urine output total for the past 3 hours after given diuretic. No new orders. Dr. Quick notified of reconsult and will see patient after cath lab procedures. Will continue to monitor.

## 2017-02-13 NOTE — PT/OT/SLP EVAL
Speech Language Pathology  Bedside Swallow Study  Evaluation    Myra Murphy   MRN: [id number]   [id number]/[id number] A  Admitting Diagnosis: Severe combined systolic and diastolic congestive heart failure    Diet recommendations: Solid Diet Level: Puree  Liquid Diet Level: Nectar Thick   · Feed only when awake/alert,   · HOB to 90 degrees,   · Small bites/sips,   · Alternating bites/sips,   · 1 bite/sip at a time,   · Check for pocketing/oral residue,   · Remain upright 30 minutes post meal,   · Meds whole 1 at a time/crushed in puree as needed  · Assistance with meals and Assistance with thickening liquids    SLP Treatment Date: 02/13/17  Speech Start Time: 0904     Speech Stop Time: 0918     Speech Total (min): 14 min       TREATMENT BILLABLE MINUTES:  Eval Swallow and Oral Function 14    Diagnosis: Severe combined systolic and diastolic congestive heart failure    H&P: Myra Murphy is a 82 y.o. female that (in part)  has a past medical history of Anticoagulant long-term use; Arthritis; Atrial fibrillation; Atrial flutter; Carcinoma of right breast, estrogen receptor negative and progesterone receptor positive (02/2008); Chronic combined systolic and diastolic congestive heart failure; Colon cancer; Diabetes mellitus; Dilated cardiomyopathy (12/01/2008); Dyslipidemia; Encounter for blood transfusion; Hyperlipidemia; Hypertension; and Supraventricular tachycardia. Presents to [facility] from nursing home with multiple issues including hypotension, left upper quadrant pain, and emesis ×2. EMS reports an initial blood pressure 52/34 and a blood glucose of 137. She was confused upon admission and was barely arousable to sternal rub. The history is otherwise limited due to the condition of the patient.   In the emergency department she underwent routine laboratory studies, CT imaging, and EKG. She had transient episodes of hypoglycemia and she had discordant low-pressure readings in  her upper extremities although this is thought to be secondary to persistent mastectomy. Multiple abnormalities on laboratory studies were identified including acute renal failure, hyperkalemia, and severe lactic acid acidosis with a lactate of greater than 12 × 2 measures. She was also found to be hypothermic. After fluid resuscitation and treatment for hypokalemia as well as administration of D50 her mental status improved.Hospital medicine has been asked to admit for further evaluation and treatment.     CXR: There is a nasogastric tube with the tip anterior to the diaphragm. There is a right internal jugular line with the tip in the superior vena cava. There is no pneumothorax or pleural effusion. The cardiac silhouette is enlarged but unchanged from prior exam. There is atherosclerotic disease of aorta the patient has right axillary surgical clips.    Past Medical History   Diagnosis Date    Anticoagulant long-term use     Arthritis     Atrial fibrillation     Atrial flutter     Carcinoma of right breast, estrogen receptor negative and progesterone receptor positive 02/2008    Chronic combined systolic and diastolic congestive heart failure     Colon cancer     Diabetes mellitus     Dilated cardiomyopathy 12/01/2008     adriamycin toxicity    Dyslipidemia     Encounter for blood transfusion     Hyperlipidemia     Hypertension     Supraventricular tachycardia      PAF, AFL     Past Surgical History   Procedure Laterality Date    Colon surgery  2000     upper colon removal    Cataract extraction  8/5/09     both eyes (Dr. Adam)    Cardioversion  04/24/2014    Total abdominal hysterectomy w/ bilateral salpingoophorectomy  1973    Mastectomy, partial Right 03/14/2008     R right    Total hip arthroplasty  03/05/2010     anuerysm in hip/right hip    Hysterectomy      Eye surgery         Has the patient been evaluated by SLP for swallowing? : Yes  Keep patient NPO?: No   General Precautions:  "Standard, aspiration, pureed diet, nectar thick    Current Respiratory Status: nasal cannula       Prior diet: reg/thin.    Subjective:  Pt awake, cooperative, and confused. Pt reported, "My sister knows you."   Patient goals: none stated.    Pain Ratin/10  Pain Rating Post-Intervention: 0/10    Objective:      Oral Musculature Evaluation  Oral Musculature: general weakness  Dentition: present and adequate  Mucosal Quality: adequate  Mandibular Strength and Mobility: WFL  Oral Labial Strength and Mobility: impaired coordination, impaired seal  Lingual Strength and Mobility: WFL  Velar Elevation: WFL  Buccal Strength and Mobility: WFL  Volitional Cough: elicited  Volitional Swallow: delayed  Voice Prior to PO Intake: clear  Oral Musculature Comments: O2 remained in upper 80s-lower 90s     Bedside Swallow Eval:  Consistencies Assessed: Thin liquids ice chip x1, tsp trial x1, cup sip trial x1, straw sip x2, Nectar thick liquids straw sips x3, Puree tsp bites of pudding x8 and Solids bite of ba cracker x1  Oral Phase: excess chewing and slow oral transit time  Pharyngeal Phase: multiple spontaneous swallows, throat clearing and wet vocal quality after swallow    Bedside swallow study completed. Pt presented with throat clearing s/p thin liquid trials x2. Slow oral transit time and excess chewing requiring liquid wash with solid trial. Pt refused further solid trials. No overt s/s of aspiration with nectar thick liquid and puree trials. Recommend: Pureed diet, nectar thick liquids, whole po medications 1 at a time or crushed in puree, alternate bites and sips, small bites/sips, upright for all po intake and 20 minutes s/p po intake. ST will continue to follow.     Assessment:  Myra Murphy is a 82 y.o. female with a medical diagnosis of Severe combined systolic and diastolic congestive heart failure and presents with s/s of pharyngeal dysphagia with thin liquids and oral dysphagia with solids. ST will f/uy to " assess diet tolerance and possible diet upgrade pending pt progress.           Discharge recommendations: Discharge Facility/Level Of Care Needs:  (pending progress)     Goals:   SLP Goals        Problem: SLP Goal    Goal Priority Disciplines Outcome   SLP Goal     SLP Ongoing (interventions implemented as appropriate)   Description:  Short Term Goals:   1. Pt will tolerate puree diet and NECTAR thick liquids with no overt s/s of aspiration noted.   2. Pt will participate in an ongoing assessment of swallowing function to determine the least restrictive and safest po diet.                  Plan:   Patient to be seen Therapy Frequency: 3 x/week   Plan of Care expires: 03/14/17  Plan of Care reviewed with: patient  SLP Follow-up?: Yes  SLP - Next Visit Date: 02/14/17      SLP G-Codes  Functional Assessment Tool Used: noms  Score: 5  Functional Limitations: Swallowing  Swallow Current Status (): SHANNON  Swallow Goal Status (): CLEMENTE Chang, CCC-SLP  02/13/2017

## 2017-02-13 NOTE — PLAN OF CARE
02/13/17 1510   Discharge Reassessment   Assessment Type Discharge Planning Reassessment   Can the patient answer the patient profile reliably? No, cognitively impaired   Discharge plan remains the same: Yes   Provided patient/caregiver education on the expected discharge date and the discharge plan No   Discharge Plan A Return to nursing home;Skilled Nursing Facility   Discharge Plan B Home   Change in patient condition or support system Yes   Patient choice form signed by patient/caregiver No   Explained to the the patient/caregiver why the discharge planned changed: No   Involved the patient/caregiver in establishing a new discharge plan: No   patient returned to ICU 2/12 with hypotension, and AFiB with RVR. She is now intubated. RACHELL aware of Palliative Care RN consult. Will attempt to meet with patient's sister when she is here to meet with others. RACHELL will continue to follow with tx team and assist as needed.

## 2017-02-13 NOTE — PT/OT/SLP PROGRESS
Speech Language Pathology      Myra Murphy  MRN: [id number]   W269/W269 A    Speech Therapy will require new orders 2/2 change in medical status and intubation. Please reconsult Speech Therapy when medically appropriate/upon extubation to assess swallowing function and determine the least restrictive and safest po diet.     CLEMENTE Hinds, CCC-SLP   02/13/2017

## 2017-02-13 NOTE — ASSESSMENT & PLAN NOTE
Appreciate cards recs. On amio gtt while NPO, changed to PO 2/9/2017. Also on digoxin. Monitor lytes. Cont telemetry. Was on Coumadin as an outpatient, which was held due to presenting GI bleed. INR slightly elevated without use of anticoagulation likely due to congestive hepatopathy from heart failure. Moved to ICU on 2/12 and started on IV amio. Will re-consult cards

## 2017-02-13 NOTE — CONSULTS
Consult Note  Pulmonology    Consult Requested By: Yash Benavidez MD  Reason for Consult : vent management    SUBJECTIVE: dyspnea     History of Present Illness:  Patient unresponsive;information obtained from chart  ;  82 y.o. female that (in part)  has a past medical history of Anticoagulant long-term use; Arthritis; Atrial fibrillation; Atrial flutter; Carcinoma of right breast, estrogen receptor negative and progesterone receptor positive (02/2008); Chronic combined systolic and diastolic congestive heart failure; Colon cancer; Diabetes mellitus; Dilated cardiomyopathy (12/01/2008); Dyslipidemia; Encounter for blood transfusion; Hyperlipidemia; Hypertension; and Supraventricular tachycardia. Presents to [facility] from nursing home with multiple issues including hypotension, left upper quadrant pain, and emesis ×2. EMS reports an initial blood pressure 52/34 and a blood glucose of 137. She was confused upon admission and was barely arousable to sternal rub    Review of patient's allergies indicates:   Allergen Reactions    Shellfish containing products Itching     Can eat shrimp    Adhesive      Other reaction(s): blisters    Iodinated contrast media - iv dye      Other reaction(s): Vomiting    Iodine      Other reaction(s): Topical  Other reaction(s): Rash    Morphine      Other reaction(s): Hallucinations       Past Medical History   Diagnosis Date    Anticoagulant long-term use     Arthritis     Atrial fibrillation     Atrial flutter     Carcinoma of right breast, estrogen receptor negative and progesterone receptor positive 02/2008    Chronic combined systolic and diastolic congestive heart failure     Colon cancer     Diabetes mellitus     Dilated cardiomyopathy 12/01/2008     adriamycin toxicity    Dyslipidemia     Encounter for blood transfusion     Hyperlipidemia     Hypertension     Supraventricular tachycardia      PAF, AFL     Past  Surgical History   Procedure Laterality Date    Colon surgery  2000     upper colon removal    Cataract extraction  8/5/09     both eyes (Dr. Adam)    Cardioversion  04/24/2014    Total abdominal hysterectomy w/ bilateral salpingoophorectomy  1973    Mastectomy, partial Right 03/14/2008     R right    Total hip arthroplasty  03/05/2010     anuerysm in hip/right hip    Hysterectomy      Eye surgery       Family History   Problem Relation Age of Onset    Heart disease Mother     Hypertension Mother     Diabetes Mother     Glaucoma Mother     Glaucoma Maternal Aunt        Review of systems not obtained due to patient factors ; patient intubated and unable to communicate.    OBJECTIVE:     Vital Signs (Most Recent)  Temp: 97.5 °F (36.4 °C) (02/13/17 1530)  Pulse: 101 (02/13/17 1631)  Resp: 16 (02/13/17 1631)  BP: 110/80 (02/13/17 1515)  SpO2: 98 % (02/13/17 1631)    Vital Signs Range (Last 24H):  Temp:  [96.8 °F (36 °C)-98.4 °F (36.9 °C)]   Pulse:  []   Resp:  [0-60]   BP: ()/(43-80)   SpO2:  [72 %-98 %]   Arterial Line BP: (104-138)/(60-90)     Physical Exam:    General: no distress,.on the ventilator  Neck: no jugular venous distention and no carotid bruit  Lungs:  diminished breath sounds bilaterally and 7.5 ett in place and rhonchi bilaterally  Heart: regular rate and rhythm and no murmur  Abdomen: soft, non-tender non-distended; bowel sounds normal and right NGT in place  Extremities: no cyanosis or edema, or clubbing  Neurologic: sedated.  Skin-warm, moist. No rash      Laboratory:  Recent Results (from the past 24 hour(s))   POCT glucose    Collection Time: 02/12/17  5:53 PM   Result Value Ref Range    POCT Glucose 110 70 - 110 mg/dL   POCT glucose    Collection Time: 02/13/17 12:18 AM   Result Value Ref Range    POCT Glucose 155 (H) 70 - 110 mg/dL   CBC auto differential    Collection Time: 02/13/17  1:27 AM   Result Value Ref Range    WBC 8.48 3.90 - 12.70 K/uL    RBC 4.58 4.00 - 5.40  M/uL    Hemoglobin 11.1 (L) 12.0 - 16.0 g/dL    Hematocrit 36.8 (L) 37.0 - 48.5 %    MCV 80 (L) 82 - 98 fL    MCH 24.2 (L) 27.0 - 31.0 pg    MCHC 30.2 (L) 32.0 - 36.0 %    RDW 19.6 (H) 11.5 - 14.5 %    Platelets 185 150 - 350 K/uL    MPV 10.3 9.2 - 12.9 fL    Gran # 6.3 1.8 - 7.7 K/uL    Lymph # 1.0 1.0 - 4.8 K/uL    Mono # 1.1 (H) 0.3 - 1.0 K/uL    Eos # 0.0 0.0 - 0.5 K/uL    Baso # 0.03 0.00 - 0.20 K/uL    Gran% 74.5 (H) 38.0 - 73.0 %    Lymph% 12.0 (L) 18.0 - 48.0 %    Mono% 13.1 4.0 - 15.0 %    Eosinophil% 0.0 0.0 - 8.0 %    Basophil% 0.4 0.0 - 1.9 %    Differential Method Automated    Protime-INR    Collection Time: 02/13/17  1:27 AM   Result Value Ref Range    Prothrombin Time 17.0 (H) 9.0 - 12.5 sec    INR 1.6 (H) 0.8 - 1.2   Comprehensive metabolic panel    Collection Time: 02/13/17  1:27 AM   Result Value Ref Range    Sodium 133 (L) 136 - 145 mmol/L    Potassium 4.1 3.5 - 5.1 mmol/L    Chloride 97 95 - 110 mmol/L    CO2 21 (L) 23 - 29 mmol/L    Glucose 179 (H) 70 - 110 mg/dL    BUN, Bld 63 (H) 8 - 23 mg/dL    Creatinine 2.6 (H) 0.5 - 1.4 mg/dL    Calcium 8.1 (L) 8.7 - 10.5 mg/dL    Total Protein 6.2 6.0 - 8.4 g/dL    Albumin 1.9 (L) 3.5 - 5.2 g/dL    Total Bilirubin 1.1 (H) 0.1 - 1.0 mg/dL    Alkaline Phosphatase 107 55 - 135 U/L    AST 18 10 - 40 U/L    ALT 18 10 - 44 U/L    Anion Gap 15 8 - 16 mmol/L    eGFR if African American 19 (A) >60 mL/min/1.73 m^2    eGFR if non African American 17 (A) >60 mL/min/1.73 m^2   Magnesium    Collection Time: 02/13/17  1:27 AM   Result Value Ref Range    Magnesium 1.9 1.6 - 2.6 mg/dL   Phosphorus    Collection Time: 02/13/17  1:27 AM   Result Value Ref Range    Phosphorus 6.0 (H) 2.7 - 4.5 mg/dL   POCT glucose    Collection Time: 02/13/17  5:47 AM   Result Value Ref Range    POCT Glucose 181 (H) 70 - 110 mg/dL   ISTAT PROCEDURE    Collection Time: 02/13/17 12:34 PM   Result Value Ref Range    POC PH 7.167 (LL) 7.35 - 7.45    POC PCO2 40.7 35 - 45 mmHg    POC PO2 127 (H)  80 - 100 mmHg    POC HCO3 14.7 (L) 24 - 28 mmol/L    POC BE -13 -2 to 2 mmol/L    POC SATURATED O2 98 95 - 100 %    POC TCO2 16 (L) 23 - 27 mmol/L    Sample ARTERIAL     Site Maria T/UAC     Allens Test N/A     DelSys NRB     Mode SPONT     Flow 15     FiO2 100    Culture, Respiratory    Collection Time: 02/13/17  1:51 PM   Result Value Ref Range    Gram Stain (Respiratory) <10 epithelial cells per low power field.     Gram Stain (Respiratory) Few WBC's     Gram Stain (Respiratory) Few budding yeast     Gram Stain (Respiratory) Few Gram positive cocci in pairs    POCT glucose    Collection Time: 02/13/17  2:28 PM   Result Value Ref Range    POCT Glucose 83 70 - 110 mg/dL     CBC:   Recent Labs  Lab 02/13/17 0127   WBC 8.48   RBC 4.58   HGB 11.1*   HCT 36.8*      MCV 80*   MCH 24.2*   MCHC 30.2*     BMP:   Recent Labs  Lab 02/13/17  0127   *   *   K 4.1   CL 97   CO2 21*   BUN 63*   CREATININE 2.6*   CALCIUM 8.1*   MG 1.9     CMP:   Recent Labs  Lab 02/13/17  0127   *   CALCIUM 8.1*   ALBUMIN 1.9*   PROT 6.2   *   K 4.1   CO2 21*   CL 97   BUN 63*   CREATININE 2.6*   ALKPHOS 107   ALT 18   AST 18   BILITOT 1.1*     LFTs:   Recent Labs  Lab 02/13/17  0127   ALT 18   AST 18   ALKPHOS 107   BILITOT 1.1*   PROT 6.2   ALBUMIN 1.9*     Coagulation:   Recent Labs  Lab 02/13/17  0127   INR 1.6*     Cardiac markers: No results for input(s): CKMB, TROPONINT, MYOGLOBIN in the last 168 hours.  Microbiology Results (last 7 days)     Procedure Component Value Units Date/Time    Culture, Respiratory [[id number]] Collected:  02/13/17 1351    Order Status:  Completed Specimen:  Respiratory from Endotracheal Aspirate Updated:  02/13/17 1502     Gram Stain (Respiratory) <10 epithelial cells per low power field.     Gram Stain (Respiratory) Few WBC's     Gram Stain (Respiratory) Few budding yeast     Gram Stain (Respiratory) Few Gram positive cocci in pairs    Blood culture [[id number]] Collected:  02/03/17  2055    Order Status:  Completed Specimen:  Blood from Peripheral, Hand, Left Updated:  02/08/17 2303     Blood Culture, Routine No growth after 5 days.    Blood culture [[id number]] Collected:  02/03/17 2120    Order Status:  Completed Specimen:  Blood from Peripheral, Hand, Right Updated:  02/08/17 2303     Blood Culture, Routine No growth after 5 days.    Stool culture [[id number]] Collected:  02/05/17 0912    Order Status:  Completed Specimen:  Stool from Stool Updated:  02/08/17 0807     Stool Culture No Salmonella,Shigella,Vibrio,Campylobacter,Yersinia isolated.    Urine culture [[id number]] Collected:  02/05/17 1035    Order Status:  Completed Specimen:  Urine from Urine, Catheterized Updated:  02/07/17 0742     Urine Culture, Routine No growth        ABGs:   Recent Labs  Lab 02/13/17  1234   PH 7.167*   PCO2 40.7   PO2 127*   HCO3 14.7*   POCSATURATED 98   BE -13       Ventilator Setting:  Vent Mode: PRVC  Oxygen Concentration (%):  [100] 100  Resp Rate Total:  [15 br/min] 15 br/min  Vt Set:  [500 mL] 500 mL  PEEP/CPAP:  [5 cmH20] 5 cmH20  Mean Airway Pressure:  [8.5 pkE43-05.2 cmH20] 13.2 cmH20     Diagnostic Results:   Imaging Results         X-Ray Chest 1 View (Final result) Result time:  02/13/17 13:01:18    Final result by Ann Sandoval MD (02/13/17 13:01:18)    Impression:     There are findings suggesting edema versus pneumonia.      Electronically signed by: ANN SANDOVAL MD  Date:     02/13/17  Time:    13:01     Narrative:    Views: Portable    There is a right internal saline. The cardiac silhouette is enlarged. There is patchy opacification of the pulmonary parenchyma. There is no pneumothorax.            CT Abdomen Pelvis  Without Contrast (Final result) Result time:  02/12/17 16:51:51    Final result by Bon Mcadams DO (02/12/17 16:51:51)    Impression:        1. Findings suggesting partial small bowel obstruction secondary to the right inguinal hernia containing a loop of small bowel.   Contrast is now noted within the colon.    2.  Increased anasarca changes within the subcutaneous soft tissues.    3.  Remaining findings as discussed above.          Electronically signed by: MALORIE LARSEN D.O.  Date:     02/12/17  Time:    16:51     Narrative:    CT of the abdomen and pelvis without contrast    History: Abdominal pain    Comparison: Small bowel obstruction    Technique:CT of the Abdomen and Pelvis was acquired helically without IV contrast from the lung bases through the ischial tuberosities. Axial and reformatted images were reviewed.     Comparison: 02/05/2017    Findings:    ABDOMEN    Lung bases:There are small to moderate sized bilateral pleural effusions.  The heart is enlarged.    Liver/gallbladder/biliary:There is couple of punctate calcified granulomas noted within the liver. The gallbladder is not well visualized and is either contracted or surgically absent.No biliary ductal dilation.    Pancreas:The pancreas demonstrates a negative noncontrast appearance.    Spleen:The spleen is not enlarged.    Adrenals:Unremarkable    Kidneys:There is no evidence for nephrolithiasis or obstructive uropathy.    Bowel/Mesentery:There is a loop of small bowel noted within a right inguinal hernia.  Findings suggesting at least partial bowel obstruction secondary to this loop of bowel contained within the hernia.  Contrast is now noted within the colon.  There are loops of small bowel within the pelvis that measure up to 3.2 cm.  No mesenteric stranding or adenopathy.  A small fat filled periumbilical hernia is noted.    Retroperitoneum:No adenopathy.The aorta demonstrates a normal caliber.    PELVIS:    Genitourinary/Reproductive organs:The urinary bladder is collapsed around the Chin catheter.    Adenopathy:None    Free Fluid:A small amount of pelvic free fluid is present.  The amount of free fluid may have increased slightly since the prior exam.  A left total hip arthroplasty is in  place.    Osseus Structures/Soft tissues:No suspicious appearing osseus lesions.  There are anasarca changes noted within the subcutaneous soft tissues of both flanks.            X-Ray Chest 1 View (Final result) Result time:  02/09/17 10:06:05    Final result by Ann Sandoval MD (02/09/17 10:06:05)    Impression:     The lungs appear similar no seen on February 4, 2017.      Electronically signed by: ANN SANDOVAL MD  Date:     02/09/17  Time:    10:06     Narrative:    There is a nasogastric tube with the tip anterior to the diaphragm. There is a right internal jugular line with the tip in the superior vena cava. There is no pneumothorax or pleural effusion. The cardiac silhouette is enlarged but unchanged from prior exam. There is atherosclerotic disease of aorta the patient has right axillary surgical clips.            X-Ray Abdomen Portable_NG Tube Placement (Final result) Result time:  02/07/17 11:54:18    Procedure changed from X-Ray Abdomen AP 1 View        Final result by Milo Diaz MD (02/07/17 11:54:18)    Impression:     Nasogastric tube cold within the stomach as previous.      Electronically signed by: Dr. Milo Diaz MD  Date:     02/07/17  Time:    11:54     Narrative:    Single view examination abdomen compared to prior examination of 2/5/17 demonstrates nasogastric tube coiled in LEFT upper quadrant, unchanged in position since prior examination.  Bowel gas pattern is not significantly changed.            CT Abdomen Pelvis  Without Contrast (Final result) Result time:  02/06/17 08:05:35    Final result by Ann Sandoval MD (02/06/17 08:05:35)    Impression:      1. There are CT findings small bowel obstruction. The patient does have a right inguinal hernia that does contain with small bowel. It is not definite that this is the cause of the small bowel obstruction is loops of bowel are dilated but entering and exiting the hernia.        Electronically signed by: ANN SANDOVAL  MD  Date:     02/06/17  Time:    08:05     Narrative:    ct abdomen and pelvis without contrast     There are dilated loops of small bowel are seen throughout the abdomen. The patient has a right inguinal hernia and loops of bowel leading into and away from the hernia are both dilated.     There is no evidence pneumatosis.    There is no evidence abdominal or pelvic lymphadenopathy. There is atherosclerotic disease of the aorta and iliac vasculature.    The patient is a left total hip replacement. There degenerative changes of the spine and there is anasarca.    There are small bilateral pleural effusions. There is associated compressive atelectasis. There is cardiomegaly.    The liver, spleen, adrenal glands, a are unremarkable.    There is pancreatic atrophy.            X-Ray Abdomen Portable_NG Tube Placement (Final result) Result time:  02/05/17 16:08:54    Final result by Joe Melgar MD (02/05/17 16:08:54)    Impression:      NG tube with tip coiled within the proximal stomach.  Atherosclerosis.  Left hip prosthesis.      Electronically signed by: JOE MELGAR MD  Date:     02/05/17  Time:    16:08     Narrative:    Comparison is made to prior examination also dated 2/5/17 at 2:28 PM.    A single AP radiograph of the abdomen was obtained. There is an NG tube present with the tip coiled within the proximal stomach. Intestinal gas pattern is unremarkable. Atherosclerotic calcification is present within the abdominal aorta and branch arteries within the pelvis. The patient has a left hip prosthesis.            X-Ray Abdomen Portable_NG Tube Placement (Final result) Result time:  02/05/17 14:58:24    Final result by Joe Melgar MD (02/05/17 14:58:24)    Impression:      NG tube with tip coiled within the proximal stomach.  Unremarkable intestinal gas pattern.      Electronically signed by: JOE MELGAR MD  Date:     02/05/17  Time:    14:58     Narrative:    Comparison is made to prior examination  dated 9/19/16.    An AP radiograph of the upper abdomen was obtained for NG tube placement purposes. There is an NG tube with the tip coiled within the proximal stomach. No dilated loops of bowel are evident. There is atherosclerotic calcification present within the abdominal aorta. Bony structures are grossly intact.            US Retroperitoneal Complete (Final result) Result time:  02/04/17 16:43:29    Procedure changed from US Kidney Only        Final result by Molly Amor MD (02/04/17 16:43:29)    Impression:       #1. Bilateral medical renal disease.    #2.  1.2 cm right renal simple cyst.      Electronically signed by: MOLLY AMOR MD  Date:     02/04/17  Time:    16:43     Narrative:    HISTORY: Renal failure    COMPARISON: Renal ultrasound 01/19/17    FINDINGS:     RIGHT: The right kidney measures 10.2cm and demonstrates decreased perfusion. The arterial resistive index is 0.88.  There is a 1.2 cm simple right renal cyst.  No right-sided hydronephrosis. The right kidney is otherwise unremarkable.     LEFT: The left kidney measures 9.8cm and demonstrates decreased perfusion.  The arterial resistive index is 0.80.  No left-sided hydronephrosis. The left kidney is otherwise unremarkable.    The urinary bladder was not well seen secondary to the presence of a Chin catheter.            X-Ray Chest 1 View (Final result) Result time:  02/04/17 01:24:14    Final result by Sandro Qureshi MD (02/04/17 01:24:14)    Impression:       Interval placement of right-sided internal jugular access catheter with the tip in the SVC.  No acute complications.    Cardiomegaly with associated obscuration of the left hemidiaphragm with airspace opacity and small pleural effusion suspected.              Electronically signed by: SANDRO QURESHI MD  Date:     02/04/17  Time:    01:24     Narrative:    Exam: [id number]  02/04/17  00:59:00 IMG34 ([facility]) : XR CHEST 1 VIEW    Technique:    Single frontal chest x-ray    Comparison:     2/3/17 at 1936 hrs.    Findings:      Monitoring EKG leads are present.  Percutaneous pacer wire has been removed.  There has been interval insertion of a right-sided internal jugular access catheter with the tip in the SVC.  No evidence of a pneumothorax is present.  There are post surgical changes in the right axillary region.    The trachea is unremarkable.  There are calcifications of the aortic knob.  There is stable global enlargement of the cardiac silhouette.  The right hemidiaphragm is unremarkable.  There is obscuration of the left hemidiaphragm.  There is stable increased attenuation of the pulmonary parenchyma.   There are degenerative changes in the osseous structures.            X-Ray Chest 1 View (Final result) Result time:  02/03/17 19:45:52    Final result by Aj Walters MD (02/03/17 19:45:52)    Impression:     Improving lung consolidation      Electronically signed by: JA WALTERS MD  Date:     02/03/17  Time:    19:45     Narrative:    Portable AP view of the chest was obtained.  Comparison -- 1/19/17. Surgical clips are again seen at the right axilla. A tube or wire projects near the midline of the upper abdomen and lower chest, uncertain function. The heart remains mildly enlarged. Mediastinum shows aortic atherosclerosis. Lungs are better expanded and aerated. Right lung is relatively clear. Consolidation persists in the left lower lung zone . Small pleural effusions are probably present. No pneumothorax.            CT Head Without Contrast (Final result) Result time:  02/03/17 19:22:01    Final result by Milo Diaz MD (02/03/17 19:22:01)    Impression:         No intracranial hemorrhage or mass effect.     *Unusual pattern of air at the level of the carotid vessels and also at the level of the pterygomaxillary fossa on the RIGHT.  Etiology unknown.  In the absence of a history of trauma alternative etiologies including intravascular air or infectious etiology must be  considered.  Correlation advised.  At a minimum, 6 hour followup CT examination recommended for further evaluation.*        Dr. Milo Diaz discussed the findings with Dr. Alvarez at 19:21:17 02/03/17          Electronically signed by: Dr. Milo Diaz MD  Date:     02/03/17  Time:    19:22     Narrative:    CT OF THE HEAD WITHOUT CONTRAST:     Technique: 5 mm axial images were obtained from the vertex to the skullbase, without administration of contrast.    Comparison: None.    Findings:    No intracranial hemorrhage, extra-axial fluid collection, midline shift, or mass effect.  No evidence of an acute cortical infarct or of an infarct in a major vascular territory.  Prominence of ventricular system cortical sulci likely reflects underlying neuronal loss.  Normal pressure hydrocephalus cannot excluded. If there is additional clinical concern for acute ischemia, MRI with diffusion-weighted imaging could be obtained.     There is, however, air at the level of the carotid vessels, and RIGHT pterygomaxillary fossa, etiology of which is not known.  In the absence of history of trauma, whether this is due to air within an IV line, or infectious etiology cannot be determined with certainty.  6 hour followup examination recommended as a minimum.  Correlation advised.    The calvarium is unremarkable. Visualized portions of the paranasal sinuses are clear. Visualized mastoid air cells are clear. Visualized orbits are unremarkable.                  ASSESSMENT/PLAN:     1. Acute on chronic renal failure    2. Lactic acidosis    3. Hypoglycemia    4. Hyperkalemia    5. Abnormal CT of the head    6. Supratherapeutic INR    7. Hypothermia, initial encounter    8. Diastolic congestive heart failure    9.      Acute respiratory failure  10.    Vent management        Plan:elderly woman with numerous medical problems now intubated on vent ; cxr favors pulmonary edema ; she is anuric and metabolic acidosis ; will wean according  to situation.

## 2017-02-14 PROBLEM — J96.02 ACUTE HYPERCAPNIC RESPIRATORY FAILURE: Status: ACTIVE | Noted: 2017-01-01

## 2017-02-14 PROBLEM — R57.9 SHOCK CIRCULATORY: Status: ACTIVE | Noted: 2017-01-01

## 2017-02-14 NOTE — PROGRESS NOTES
[facility]  Adult Nutrition  Consult Note    SUMMARY     Recommendations    Recommendation/Intervention:   1. Rec d/c lipids with current propofol rate       - TPN + lipids + propofol = 150% EEN   2. Rec check TG weekly   3. RD to monitor  Goals: Provide nutrition support within 24 hours  Nutrition Goal Status: goal met  Communication of RD Recs: reviewed with RN    Continuum of Care Plan     D/C planning: To soon to determine    Reason for Assessment    Reason for Assessment: RD follow-up  Diagnosis:  (acute on chronic renal failure)  Relevent Medical History: anticoagulant use long term, h/o breast cancer, h/ocolon cancer, dyslipidemia, HLD, DM   Interdisciplinary Rounds: attended     General Information Comments: TPN customized. Palliative care meeting with family pending.  SBO continues. No HD. Propofol/Intubated.    Nutrition Prescription Ordered    Current Diet Order: NPO     Current Nutrition Support Formula Ordered: Other (Comment) (Customized)  Current Nutrition Support Rate Ordered: 60 (ml)  Current Nutrition Support Frequency Ordered: continuous  Oral Nutrition Supplement: IVFE Daily     Evaluation of Received Nutrients/Fluid Intake     Parenteral Calories (kcal): 1472  Parenteral Protein (gm): 90  Parenteral Fluid (mL): 1440      Other Calories (kcal): 541 (propofol + IVdextrose)     Energy Calories Required: exceed needs (150%)     Protein Required: meeting needs     IV Fluid (mL): 2400 (physician's sticky note)      Fluid Required:  (per MD)  Comments:  (Net I/O +985.5)  Tolerance: GIR: 1.75     Nutrition Risk Screen     Nutrition Risk Screen: no indicators present    Nutrition/Diet History    Patient Reported Diet/Restrictions/Preferences: low salt  Typical Food/Fluid Intake: NPO  Food Preferences: GIORGI      Factors Affecting Nutritional Intake: NPO, on mechanical ventilation     Labs/Tests/Procedures/Meds     Pertinent Labs Reviewed: reviewed, pertinent  Pertinent Labs Comments: TG:  176; Na 131; K 3.0; BUN 71; Cr 2.6; Phos 5.6  Pertinent Medications Reviewed: reviewed, pertinent  Pertinent Medications Comments: norepi, statin, abx, pantoprazole    Physical Findings    Overall Physical Appearance: on ventilator support  Tubes: nasogastric tube  Oral/Mouth Cavity: WDL  Skin: other (see comments) (dermatitis, abrasion, 2 x ulcers chronic venous insufficienc)    Anthropometrics     Height (inches): 60 in  Weight Method: Bed Scale  Weight (kg): 71.1 kg     Ideal Body Weight (IBW), Female: 100 lb     % Ideal Body Weight, Female (lb): 156.75 lb  BMI (kg/m2): 30.61  BMI Grade: 30 - 34.9- obesity - grade I        Estimated/Assessed Needs    Weight Used For Calorie Calculations: 71.1 kg (156 lb 12 oz) (edematous- admit weight of 71.1 kg)   Height (cm): 152.4 cm     Energy Need Method: [facility] (1328)     MR ([place]-St. Jeor Equation): 1098.35      Weight Used For Protein Calculations: 71.1 kg (156 lb 12 oz)  Protein Requirements: 85- 106 g    Fluid Need Method: RDA Method (1 ml per kcal or per MD)       Fiber Requirement: 164g CHO/day     Nutrition Diagnosis    Problem: Inadequate Energy Intake  Etiology: SBO  As Evidenced by: NPO/TPN dependent  Nutrition Diagnosis Status: Continues    Monitor and Evaluation    Food and Nutrient Intake: energy intake, food and beverage intake, enteral nutrition intake, parenteral nutrition intake  Food and Nutrient Adminstration: diet order, enteral and parenteral nutrition administration  Knowledge/Beliefs/Attitudes: food and nutrition knowledge/skill  Physical Activity and Function: nutrition-related ADLs and IADLs  Anthropometric Measurements: weight, weight change  Biochemical Data, Medical Tests and Procedures: electrolyte and renal panel, gastrointestinal profile, glucose/endocrine profile, lipid profile, inflammatory profile  Nutrition-Focused Physical Findings: overall appearance, skin    Nutrition Risk    Level of Risk: high (2x/week)    Nutrition  Follow-Up    RD Follow-up?: Yes    Assessment and Plan    No new Assessment & Plan notes have been filed under this hospital service since the last note was generated.  Service: Nutrition

## 2017-02-14 NOTE — PROGRESS NOTES
Progress Note  Pulmonology    Admit Date: 2/3/2017   LOS: 10 days       SUBJECTIVE: acute respiratory failure  On vent     History of Present Illness:  See chart    Review of patient's allergies indicates:   Allergen Reactions    Shellfish containing products Itching     Can eat shrimp    Adhesive      Other reaction(s): blisters    Iodinated contrast media - iv dye      Other reaction(s): Vomiting    Iodine      Other reaction(s): Topical  Other reaction(s): Rash    Morphine      Other reaction(s): Hallucinations       Past Medical History   Diagnosis Date    Anticoagulant long-term use     Arthritis     Atrial fibrillation     Atrial flutter     Carcinoma of right breast, estrogen receptor negative and progesterone receptor positive 02/2008    Chronic combined systolic and diastolic congestive heart failure     Colon cancer     Diabetes mellitus     Dilated cardiomyopathy 12/01/2008     adriamycin toxicity    Dyslipidemia     Encounter for blood transfusion     Hyperlipidemia     Hypertension     Supraventricular tachycardia      PAF, AFL     Past Surgical History   Procedure Laterality Date    Colon surgery  2000     upper colon removal    Cataract extraction  8/5/09     both eyes (Dr. Adam)    Cardioversion  04/24/2014    Total abdominal hysterectomy w/ bilateral salpingoophorectomy  1973    Mastectomy, partial Right 03/14/2008     R right    Total hip arthroplasty  03/05/2010     anuerysm in hip/right hip    Hysterectomy      Eye surgery       Family History   Problem Relation Age of Onset    Heart disease Mother     Hypertension Mother     Diabetes Mother     Glaucoma Mother     Glaucoma Maternal Aunt        ROS:patient  unable to communicate because intubated, on vent, and sedated     OBJECTIVE:     Vital Signs (Most Recent)  Temp: 97.1 °F (36.2 °C) (02/14/17 0715)  Pulse: 86 (02/14/17 0955)  Resp: (!) 0 (02/14/17 0955)  BP: (!) 88/52 (02/14/17 0732)  SpO2: 100 % (02/14/17  0955)    Vital Signs Range (Last 24H):  Temp:  [97.1 °F (36.2 °C)-98.6 °F (37 °C)]   Pulse:  []   Resp:  [0-36]   BP: ()/(45-80)   SpO2:  [85 %-100 %]   Arterial Line BP: ()/(48-90)     Physical Exam:  Intubated on vent  Lungs:  rales bilaterally and rhonchi bilaterally  Chest Wall: no tenderness  Heart: regular rate and rhythm and no murmur  Abdomen: soft, non-tender non-distended; bowel sounds normal  Extremities: no cyanosis or edema, or clubbing  Skin: No rashes or lesions or good skin turgor  Neurologic: alert, oriented, thought content appropriate    Laboratory:  Recent Results (from the past 24 hour(s))   ISTAT PROCEDURE    Collection Time: 02/13/17 12:34 PM   Result Value Ref Range    POC PH 7.167 (LL) 7.35 - 7.45    POC PCO2 40.7 35 - 45 mmHg    POC PO2 127 (H) 80 - 100 mmHg    POC HCO3 14.7 (L) 24 - 28 mmol/L    POC BE -13 -2 to 2 mmol/L    POC SATURATED O2 98 95 - 100 %    POC TCO2 16 (L) 23 - 27 mmol/L    Sample ARTERIAL     Site Maria T/UAC     Allens Test N/A     DelSys NRB     Mode SPONT     Flow 15     FiO2 100    Culture, Respiratory    Collection Time: 02/13/17  1:51 PM   Result Value Ref Range    Gram Stain (Respiratory) <10 epithelial cells per low power field.     Gram Stain (Respiratory) Few WBC's     Gram Stain (Respiratory) Few budding yeast     Gram Stain (Respiratory) Few Gram positive cocci in pairs    POCT glucose    Collection Time: 02/13/17  2:28 PM   Result Value Ref Range    POCT Glucose 83 70 - 110 mg/dL   ISTAT PROCEDURE    Collection Time: 02/13/17  5:13 PM   Result Value Ref Range    POC PH 7.294 (L) 7.35 - 7.45    POC PCO2 32.1 (L) 35 - 45 mmHg    POC PO2 89 80 - 100 mmHg    POC HCO3 15.6 (L) 24 - 28 mmol/L    POC BE -10 -2 to 2 mmol/L    POC SATURATED O2 96 95 - 100 %    POC TCO2 17 (L) 23 - 27 mmol/L    Rate 15     Sample ARTERIAL     Site Maria T/UAC     Allens Test N/A     DelSys Adult Vent     Mode AC/PRVC     Vt 500     PEEP 5     FiO2 60    POCT glucose     Collection Time: 02/13/17  5:23 PM   Result Value Ref Range    POCT Glucose 166 (H) 70 - 110 mg/dL   Lactic acid, plasma    Collection Time: 02/13/17  7:01 PM   Result Value Ref Range    Lactate (Lactic Acid) 5.2 (HH) 0.5 - 2.2 mmol/L   POCT glucose    Collection Time: 02/14/17 12:04 AM   Result Value Ref Range    POCT Glucose 189 (H) 70 - 110 mg/dL   CBC auto differential    Collection Time: 02/14/17  1:22 AM   Result Value Ref Range    WBC 18.38 (H) 3.90 - 12.70 K/uL    RBC 3.67 (L) 4.00 - 5.40 M/uL    Hemoglobin 9.0 (L) 12.0 - 16.0 g/dL    Hematocrit 29.0 (L) 37.0 - 48.5 %    MCV 79 (L) 82 - 98 fL    MCH 24.5 (L) 27.0 - 31.0 pg    MCHC 31.0 (L) 32.0 - 36.0 %    RDW 18.8 (H) 11.5 - 14.5 %    Platelets 121 (L) 150 - 350 K/uL    MPV 10.9 9.2 - 12.9 fL    Gran # 16.9 (H) 1.8 - 7.7 K/uL    Lymph # 1.0 1.0 - 4.8 K/uL    Mono # 0.5 0.3 - 1.0 K/uL    Eos # 0.0 0.0 - 0.5 K/uL    Baso # 0.01 0.00 - 0.20 K/uL    Gran% 92.0 (H) 38.0 - 73.0 %    Lymph% 5.4 (L) 18.0 - 48.0 %    Mono% 2.5 (L) 4.0 - 15.0 %    Eosinophil% 0.0 0.0 - 8.0 %    Basophil% 0.1 0.0 - 1.9 %    Differential Method Automated    Protime-INR    Collection Time: 02/14/17  1:22 AM   Result Value Ref Range    Prothrombin Time 24.1 (H) 9.0 - 12.5 sec    INR 2.4 (H) 0.8 - 1.2   Comprehensive metabolic panel    Collection Time: 02/14/17  1:22 AM   Result Value Ref Range    Sodium 131 (L) 136 - 145 mmol/L    Potassium 3.0 (L) 3.5 - 5.1 mmol/L    Chloride 95 95 - 110 mmol/L    CO2 19 (L) 23 - 29 mmol/L    Glucose 156 (H) 70 - 110 mg/dL    BUN, Bld 71 (H) 8 - 23 mg/dL    Creatinine 2.7 (H) 0.5 - 1.4 mg/dL    Calcium 7.9 (L) 8.7 - 10.5 mg/dL    Total Protein 5.3 (L) 6.0 - 8.4 g/dL    Albumin 2.2 (L) 3.5 - 5.2 g/dL    Total Bilirubin 1.4 (H) 0.1 - 1.0 mg/dL    Alkaline Phosphatase 90 55 - 135 U/L    AST 70 (H) 10 - 40 U/L    ALT 30 10 - 44 U/L    Anion Gap 17 (H) 8 - 16 mmol/L    eGFR if African American 18 (A) >60 mL/min/1.73 m^2    eGFR if non  16 (A)  >60 mL/min/1.73 m^2   Magnesium    Collection Time: 02/14/17  1:22 AM   Result Value Ref Range    Magnesium 1.8 1.6 - 2.6 mg/dL   Phosphorus    Collection Time: 02/14/17  1:22 AM   Result Value Ref Range    Phosphorus 5.6 (H) 2.7 - 4.5 mg/dL   Triglycerides    Collection Time: 02/14/17  1:22 AM   Result Value Ref Range    Triglycerides 176 (H) 30 - 150 mg/dL   Basic metabolic panel    Collection Time: 02/14/17  1:22 AM   Result Value Ref Range    Sodium 131 (L) 136 - 145 mmol/L    Potassium 3.0 (L) 3.5 - 5.1 mmol/L    Chloride 95 95 - 110 mmol/L    CO2 20 (L) 23 - 29 mmol/L    Glucose 160 (H) 70 - 110 mg/dL    BUN, Bld 71 (H) 8 - 23 mg/dL    Creatinine 2.6 (H) 0.5 - 1.4 mg/dL    Calcium 7.9 (L) 8.7 - 10.5 mg/dL    Anion Gap 16 8 - 16 mmol/L    eGFR if African American 19 (A) >60 mL/min/1.73 m^2    eGFR if non African American 17 (A) >60 mL/min/1.73 m^2   Magnesium    Collection Time: 02/14/17  1:22 AM   Result Value Ref Range    Magnesium 1.7 1.6 - 2.6 mg/dL   Phosphorus    Collection Time: 02/14/17  1:22 AM   Result Value Ref Range    Phosphorus 5.6 (H) 2.7 - 4.5 mg/dL   ISTAT PROCEDURE    Collection Time: 02/14/17  4:16 AM   Result Value Ref Range    POC PH 7.443 7.35 - 7.45    POC PCO2 33.0 (L) 35 - 45 mmHg    POC PO2 111 (H) 80 - 100 mmHg    POC HCO3 22.6 (L) 24 - 28 mmol/L    POC BE -1 -2 to 2 mmol/L    POC SATURATED O2 99 95 - 100 %    POC TCO2 24 23 - 27 mmol/L    Rate 15     Sample ARTERIAL     Site Maria T/UAC     Allens Test Pass     DelSys Adult Vent     Mode AC/PRVC     Vt 500     PEEP 5     FiO2 60     Sp02 100    POCT glucose    Collection Time: 02/14/17  5:21 AM   Result Value Ref Range    POCT Glucose 157 (H) 70 - 110 mg/dL     CBC:   Recent Labs  Lab 02/14/17 0122   WBC 18.38*   RBC 3.67*   HGB 9.0*   HCT 29.0*   *   MCV 79*   MCH 24.5*   MCHC 31.0*     BMP:   Recent Labs  Lab 02/14/17 0122   *  160*   *  131*   K 3.0*  3.0*   CL 95  95   CO2 19*  20*   BUN 71*  71*    CREATININE 2.7*  2.6*   CALCIUM 7.9*  7.9*   MG 1.8  1.7     CMP:   Recent Labs  Lab 02/14/17  0122   *  160*   CALCIUM 7.9*  7.9*   ALBUMIN 2.2*   PROT 5.3*   *  131*   K 3.0*  3.0*   CO2 19*  20*   CL 95  95   BUN 71*  71*   CREATININE 2.7*  2.6*   ALKPHOS 90   ALT 30   AST 70*   BILITOT 1.4*     LFTs:   Recent Labs  Lab 02/14/17  0122   ALT 30   AST 70*   ALKPHOS 90   BILITOT 1.4*   PROT 5.3*   ALBUMIN 2.2*     Coagulation:   Recent Labs  Lab 02/14/17  0122   INR 2.4*     Cardiac markers: No results for input(s): CKMB, TROPONINT, MYOGLOBIN in the last 168 hours.  Microbiology Results (last 7 days)     Procedure Component Value Units Date/Time    Culture, Respiratory [[id number]] Collected:  02/13/17 1351    Order Status:  Completed Specimen:  Respiratory from Endotracheal Aspirate Updated:  02/13/17 1502     Gram Stain (Respiratory) <10 epithelial cells per low power field.     Gram Stain (Respiratory) Few WBC's     Gram Stain (Respiratory) Few budding yeast     Gram Stain (Respiratory) Few Gram positive cocci in pairs    Blood culture [[id number]] Collected:  02/03/17 2055    Order Status:  Completed Specimen:  Blood from Peripheral, Hand, Left Updated:  02/08/17 2303     Blood Culture, Routine No growth after 5 days.    Blood culture [[id number]] Collected:  02/03/17 2120    Order Status:  Completed Specimen:  Blood from Peripheral, Hand, Right Updated:  02/08/17 2303     Blood Culture, Routine No growth after 5 days.    Stool culture [[id number]] Collected:  02/05/17 0912    Order Status:  Completed Specimen:  Stool from Stool Updated:  02/08/17 0807     Stool Culture No Salmonella,Shigella,Vibrio,Campylobacter,Yersinia isolated.        ABGs:   Recent Labs  Lab 02/14/17  0416   PH 7.443   PCO2 33.0*   PO2 111*   HCO3 22.6*   POCSATURATED 99   BE -1       Ventilator Setting:  Vent Mode: PRVC  Oxygen Concentration (%):  [] 50  Resp Rate Total:  [15 br/min-19 br/min] 16 br/min  Vt Set:   [500 mL] 500 mL  PEEP/CPAP:  [5 cmH20] 5 cmH20  Mean Airway Pressure:  [8.5 cmH20-15.1 cmH20] 9.4 cmH20     Diagnostic Results:    There is a right internal saline. The cardiac silhouette is enlarged. There is patchy opacification of the pulmonary parenchyma. There is no pneumothorax.    ASSESSMENT/PLAN:     1. Acute on chronic renal failure    2. Lactic acidosis    3. Hypoglycemia    4. Hyperkalemia    5. Abnormal CT of the head    6. Supratherapeutic INR    7. Hypothermia, initial encounter    8. Diastolic congestive heart failure        Plan:elderly pt with multiple medical problems now intubated and on vent ; now om palliative care . Will wean as tolerated

## 2017-02-14 NOTE — PROGRESS NOTES
[facility]  Cardiology  Progress Note    Patient Name: Myra Murphy  MRN: [id number]  Admission Date: 2/3/2017  Hospital Length of Stay: 10 days  Code Status: DNR   Attending Physician: Tatianna Benosn MD   Primary Care Physician: Lupe Delatorre MD  Expected Discharge Date:   Principal Problem:Severe combined systolic and diastolic congestive heart failure    Subjective:     Hospital Course: sepsis, resp failure, h/o CHF    Interval History: Stable on vent and no acute events    Review of Systems   Unable to perform ROS: intubated     Objective:     Vital Signs (Most Recent):  Temp: 97.1 °F (36.2 °C) (02/14/17 0715)  Pulse: 86 (02/14/17 0955)  Resp: (!) 0 (02/14/17 0955)  BP: (!) 88/52 (02/14/17 0732)  SpO2: 100 % (02/14/17 0955) Vital Signs (24h Range):  Temp:  [97.1 °F (36.2 °C)-98.6 °F (37 °C)] 97.1 °F (36.2 °C)  Pulse:  [] 86  Resp:  [0-36] 0  SpO2:  [85 %-100 %] 100 %  BP: ()/(45-80) 88/52  Arterial Line BP: ()/(48-90) 116/54     Weight: 87.8 kg (193 lb 9 oz)  Body mass index is 37.8 kg/(m^2).    SpO2: 100 %  O2 Device (Oxygen Therapy): ventilator      Intake/Output Summary (Last 24 hours) at 02/14/17 1110  Last data filed at 02/14/17 0900   Gross per 24 hour   Intake          3291.75 ml   Output             1102 ml   Net          2189.75 ml       Lines/Drains/Airways     Central Venous Catheter Line                 CVC Triple Lumen - PreSep Cath 02/04/17 0039 right internal jugular 10 days          Drain                 Urethral Catheter 02/03/17 2210 10 days          Airway                 Airway - Non-Surgical 02/13/17 1410 Endotracheal Tube less than 1 day          Arterial Line                 Arterial Line 02/13/17 1 day                Physical Exam   Constitutional:   Intubated, sedated   Cardiovascular: Normal rate.  An irregularly irregular rhythm present.   Pulmonary/Chest:   Decreased bs        Significant Labs:   BMP:   Recent Labs  Lab 02/13/17  0123  02/14/17  0122   * 156*  160*   * 131*  131*   K 4.1 3.0*  3.0*   CL 97 95  95   CO2 21* 19*  20*   BUN 63* 71*  71*   CREATININE 2.6* 2.7*  2.6*   CALCIUM 8.1* 7.9*  7.9*   MG 1.9 1.8  1.7    and CBC   Recent Labs  Lab 02/13/17 0127 02/14/17  0122   WBC 8.48 18.38*   HGB 11.1* 9.0*   HCT 36.8* 29.0*    121*       Significant Imaging: Echocardiogram:   2D echo with color flow doppler:   Results for orders placed or performed during the hospital encounter of 02/03/17   2D echo with color flow doppler   Result Value Ref Range    EF 30 (A) 55 - 65    Mitral Valve Regurgitation MODERATE (A)     Diastolic Dysfunction Yes (A)     Est. 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 [I50.40] 05/11/2014 Yes     Chronic    Acute hypercapnic respiratory failure [J96.02] 02/14/2017 Yes    Shock circulatory [R57.9] 02/13/2017 No    Advanced care planning/counseling discussion [Z71.89] 02/09/2017 Not Applicable    SBO (small bowel obstruction) [K56.69] 02/06/2017 Yes    GI bleed [K92.2] 02/05/2017 Yes    UTI (urinary tract infection) [N39.0] 02/05/2017 Yes    Acute on chronic renal failure [N17.9, N18.9] 02/04/2017 Yes    Encephalopathy, metabolic [G93.41] 02/04/2017 Yes    DM (diabetes mellitus) type II controlled with renal manifestation [E11.29] 11/28/2016 Yes    Long term current use of anticoagulant therapy [Z79.01] 05/13/2014 Not Applicable    Chronic kidney disease, stage 4, severely decreased GFR [N18.4] 05/11/2014 Yes     Chronic    Severe tricuspid regurgitation [I07.1] 06/28/2013 Yes    Peripheral vascular disease [I73.9] 01/21/2013 Yes     Chronic    Chronic venous stasis dermatitis of both lower  extremities [I83.11, I83.12] 01/21/2013 Yes     Chronic    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] 11/08/2012 Yes     Chronic    Severe mitral regurgitation [I34.0] 11/08/2012 Yes     Chronic    Hyperlipidemia [E78.5] 11/08/2012 Yes    Paroxysmal atrial fibrillation [I48.0] 10/03/2012 Yes     Chronic      Problems Resolved During this Admission:    Diagnosis Date Noted Date Resolved POA    Shock [R57.9] 02/06/2017 02/11/2017 Yes    Hyperkalemia, diminished renal excretion [E87.5] 02/04/2017 02/07/2017 Yes    Supratherapeutic INR [R79.1] 02/04/2017 02/09/2017 Yes    Lactic acid acidosis [E87.2] 02/04/2017 02/07/2017 Yes    Hypothermia [T68.XXXA] 02/04/2017 02/08/2017 Yes       VTE Risk Mitigation         Ordered     Medium Risk of VTE  Once      02/04/17 0233        # Sepsis/septic shock.  # lactic acidosis  # SBO, ?incarcerated hernia/ischemic bowel, pt continues to complain of abd pain.  # NSVT  # Chr AF on warfarin, HR better controlled.  # Known severe CMP, presumed nonischemic, pt appears euvolemic and well perfused --> didn't tolerate inotrope  # MR/TR  # Supratherapeutic INR, now in range  # ARF/CRI, POA  # Mental status changes     *palliative appropriate    Anticipated Disposition: Currently Undergoing Therapy    Discharge Needs: Med Availability    Angelo Quick MD  Cardiology  [facility]

## 2017-02-14 NOTE — ASSESSMENT & PLAN NOTE
May have a combination of bowel edema and slow transit. NG discontinued. General surgery on board. Recs very much appreciated,no plan for intervention.on TPN.

## 2017-02-14 NOTE — PLAN OF CARE
Problem: Patient Care Overview  Goal: Plan of Care Review  Outcome: Ongoing (interventions implemented as appropriate)  Pt admitted to ICU last night; worsening shortness of breath and increased labored breathing. Pt states she would want to be on ventilator if necessary. Sister spoke with 2 different MDs and wants to continue to escalate care. Patient currently intubated, PRVC tidal volume 500, rate 15, fio2 60%; peep 5. Dopamine weaned off; currently on levophed, propofol, TPN & lipids, amio, and milrinone. Chin with inadequate urine output; MD aware; slight increase towards end of shift. A line in L wrist placed by anesthesia for adequate blood pressure readings. Afebrile. Plan of care reviewed with sister via telephone at end of shift in which she expressed her wishes of making patient DNR, verified/witness with Palliative RN, Michelle via telephone. MD Romaine rose.

## 2017-02-14 NOTE — ASSESSMENT & PLAN NOTE
Appreciate cards recs. On amio gtt while NPO, changed to PO 2/9/2017. Also on digoxin. Monitor lytes. Cont telemetry. Was on Coumadin as an outpatient, which was held due to presenting GI bleed. INR slightly elevated without use of anticoagulation likely due to congestive hepatopathy from heart failure. Moved to ICU on 2/12 and started on IV amio.cardioogy is following.

## 2017-02-14 NOTE — CONSULTS
Consult Note  Infectious Disease    Consult Requested By: Tatianna Benson MD    Reason for Consult: hypotension and hypothermia.    SUBJECTIVE:     History of Present Illness:  Patient is a 82 y.o. female presents with hypotension and hypothermia. 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 She was recently admitted with chf in januray 2017 and was placed in a snf 1/28/17. She is now here obtunded and hypothermic. Blood cultures are negative, urine and stool cultures negative form 2/3/17.c diff toxin is negative and she has no diarrhea now. Ct abdomen shows a sbo and rt inguinal hernia.    Past Medical History   Diagnosis Date    Anticoagulant long-term use     Arthritis     Atrial fibrillation     Atrial flutter     Carcinoma of right breast, estrogen receptor negative and progesterone receptor positive 02/2008    Chronic combined systolic and diastolic congestive heart failure     Colon cancer     Diabetes mellitus     Dilated cardiomyopathy 12/01/2008     adriamycin toxicity    Dyslipidemia     Encounter for blood transfusion     Hyperlipidemia     Hypertension     Supraventricular tachycardia      PAF, AFL     Past Surgical History   Procedure Laterality Date    Colon surgery  2000     upper colon removal    Cataract extraction  8/5/09     both eyes (Dr. Adam)    Cardioversion  04/24/2014    Total abdominal hysterectomy w/ bilateral salpingoophorectomy  1973    Mastectomy, partial Right 03/14/2008     R right    Total hip arthroplasty  03/05/2010     anuerysm in hip/right hip    Hysterectomy      Eye surgery       Family History   Problem Relation Age of Onset    Heart disease Mother     Hypertension Mother     Diabetes Mother     Glaucoma Mother     Glaucoma Maternal Aunt      Social History   Substance Use Topics    Smoking status: Never Smoker    Smokeless tobacco: Never Used    Alcohol use No       Review of  patient's allergies indicates:   Allergen Reactions    Shellfish containing products Itching     Can eat shrimp    Adhesive      Other reaction(s): blisters    Iodinated contrast media - iv dye      Other reaction(s): Vomiting    Iodine      Other reaction(s): Topical  Other reaction(s): Rash    Morphine      Other reaction(s): Hallucinations        Antibiotics     Start     Stop Route Frequency Ordered    02/10/17 2115  piperacillin-tazobactam 4.5 g in dextrose 5 % 100 mL IVPB (ready to mix system)      -- IV Every 12 hours (non-standard times) 02/10/17 2005          Review of Systems:  Review of systems not obtained due to patient factors unresponsive.    OBJECTIVE:     Vital Signs (Most Recent)  Temp: 97.2 °F (36.2 °C) (02/14/17 1500)  Pulse: 97 (02/14/17 1510)  Resp: 14 (02/14/17 1510)  BP: (!) 76/52 (02/14/17 1202)  SpO2: 100 % (02/14/17 1510)    Temperature Range Min/Max (Last 24H):  Temp:  [97.1 °F (36.2 °C)-98.6 °F (37 °C)]     Physical Exam:  General: cachectic  Eyes: conjunctivae/corneas clear. PERRL..  HENT: Head:normocephalic, atraumatic. Ears:not examined. Nose: Nares normal. Septum midline. Mucosa normal. No drainage or sinus tenderness., no discharge. Throat: lips, mucosa, and tongue normal; teeth and gums normal and no throat erythema.  Neck: supple, symmetrical, trachea midline, no JVD and thyroid not enlarged, symmetric, no tenderness/mass/nodules  Lungs:  clear to auscultation bilaterally and normal respiratory effort  Cardiovascular: Heart: regular rate and rhythm, S1, S2 normal, no murmur, click, rub or gallop. Chest Wall: no tenderness. Extremities: excoriated skin. Pulses: not examined.  Abdomen/Rectal: Abdomen: soft, non-tender non-distented; bowel sounds normal; rt inguinal region with reducible hernia but tender  Skin: as above  Musculoskeletal:no clubbing, cyanosis    Laboratory:  CBC    Recent Labs  Lab 02/14/17  0122   WBC 18.38*   RBC 3.67*   HGB 9.0*   HCT 29.0*   *      BMP    Recent Labs  Lab 02/14/17  0122   CO2 19*  20*   BUN 71*  71*   CREATININE 2.7*  2.6*   CALCIUM 7.9*  7.9*     No results for input(s): COLORU, CLARITYU, SPECGRAV, PHUR, PROTEINUA, GLUCOSEU, BILIRUBINCON, BLOODU, WBCU, RBCU, BACTERIA, MUCUS, NITRITE, LEUKOCYTESUR, UROBILINOGEN, HYALINECASTS in the last 168 hours.  Microbiology Results (last 7 days)     Procedure Component Value Units Date/Time    Culture, Respiratory [[id number]] Collected:  02/13/17 1351    Order Status:  Completed Specimen:  Respiratory from Endotracheal Aspirate Updated:  02/14/17 1028     Respiratory Culture --     KAYDEN ALBICANS  Many       Gram Stain (Respiratory) <10 epithelial cells per low power field.     Gram Stain (Respiratory) Few WBC's     Gram Stain (Respiratory) Few budding yeast     Gram Stain (Respiratory) Few Gram positive cocci in pairs    Blood culture [[id number]] Collected:  02/03/17 2055    Order Status:  Completed Specimen:  Blood from Peripheral, Hand, Left Updated:  02/08/17 2303     Blood Culture, Routine No growth after 5 days.    Blood culture [[id number]] Collected:  02/03/17 2120    Order Status:  Completed Specimen:  Blood from Peripheral, Hand, Right Updated:  02/08/17 2303     Blood Culture, Routine No growth after 5 days.    Stool culture [[id number]] Collected:  02/05/17 0912    Order Status:  Completed Specimen:  Stool from Stool Updated:  02/08/17 0807     Stool Culture No Salmonella,Shigella,Vibrio,Campylobacter,Yersinia isolated.          Diagnostic Results:  Labs: Reviewed  X-Ray: Reviewed    ASSESSMENT/PLAN:     Active Hospital Problems    Diagnosis  POA    *Severe combined systolic and diastolic congestive heart failure [I50.40]  Yes     Chronic    Acute hypercapnic respiratory failure [J96.02]  Yes    Shock circulatory [R57.9]  No    Advanced care planning/counseling discussion [Z71.89]  Not Applicable    SBO (small bowel obstruction) [K56.69]  Yes    GI bleed [K92.2]  Yes    UTI  (urinary tract infection) [N39.0]  Yes    Acute on chronic renal failure [N17.9, N18.9]  Yes    Encephalopathy, metabolic [G93.41]  Yes    DM (diabetes mellitus) type II controlled with renal manifestation [E11.29]  Yes    Long term current use of anticoagulant therapy [Z79.01]  Not Applicable    Chronic kidney disease, stage 4, severely decreased GFR [N18.4]  Yes     Chronic    Severe tricuspid regurgitation [I07.1]  Yes    Peripheral vascular disease [I73.9]  Yes     Chronic    Chronic venous stasis dermatitis of both lower extremities [I83.11, I83.12]  Yes     Chronic    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 LVEF 20% 12/28/16.      Severe mitral regurgitation [I34.0]  Yes     Chronic     MODERATE-SEVERE      Hyperlipidemia [E78.5]  Yes    Paroxysmal atrial fibrillation [I48.0]  Yes     Chronic     WITH LA THROMBUS 10/12        Resolved Hospital Problems    Diagnosis Date Resolved POA    Shock [R57.9] 02/11/2017 Yes    Hyperkalemia, diminished renal excretion [E87.5] 02/07/2017 Yes    Supratherapeutic INR [R79.1] 02/09/2017 Yes    Lactic acid acidosis [E87.2] 02/07/2017 Yes    Hypothermia [T68.XXXA] 02/08/2017 Yes       1.hypotension and sobar  Suspect chf with ef 25%  2.dilated cardiomyopathy  3. sbo with rt inguinal hernia, contrast now in colon but better, suspect ischemic knuckle of bowel   unfortunate course of events with intubation as sister unwilling to give a decision prior to intubation  Now elects no cpr  i see no way out for this poor lady- hospice appropriate

## 2017-02-14 NOTE — PROGRESS NOTES
"PALLIATIVE CARE PROGRESS NOTE:    Family meeting with patient's sisters Caty and Dyana, and her granddaughter Sylvester.  Both sisters were RN's.  They understand patient's current condition and multiple comorbidities and are aware her prognosis is very guarded.  They state patient was enjoying an independent life prior to admit (though she was on SNF) and had good QOL.  They feel her QOL is very fragile now and patient has expressed in the past she would not ever want to live on machines or live in a nursing home.  I explained patient was able to tell us prior to admit that she would only want intubation "for a few days - a short time."    We discussed goals of care, and they want to continue supportive care for the next few days to see if she can be weaned off the vent.  They do NOT WANT TO ESCALATE CARE TO THE POINT OF ADDING A SECOND VASOPRESSOR.  If patient unable to extubate in reasonable amount of time, they will discuss withdrawal.   We also discussed long term goals - if able to get better ok with SNF, if not they will agree to hospice care (at home).  They do not want to see patient suffer.  Family is very realistic in this regard.      We also discussed code status again, they do NOT WANT CPR/SHOCK.  Sister's understand with multiple comorbidities she is very high risk for poor outcome.  Family also states patient would not want this.  LaPOST will be completed prior to discharge     Provided literature, "Hard Choices For [place] People", fact sheets on cpr, nutrition and beneficial dehydration at the end of life.  Ample time allowed for discussion of concerns and feelings.  Family tearful at times and exhibit anticipatory grief.  All questions were asked and answered to their satisfaction.  Emotional support provided.      Plan/recommendations:   Continue supportive care for now.   DNR.   DO NOT ADD A SECOND VASOPRESSOR IF B/P STARTS TO FALL.    Family will be agreeable to comfort if patient fails to " improve/wean off vent.   Follow-up with family on Thursday.    Rebeca Erwin, ZBIGNIEWN, RN, CCRN   Palliative Care Nurse Coordinator   [facility]  [phone]

## 2017-02-14 NOTE — ASSESSMENT & PLAN NOTE
Renal function slowly improving with supportive therapy. Low EF contributing to advanced renal dysfunction. Stable.nephrology is follwoing,not good candidate for HD.

## 2017-02-14 NOTE — PROGRESS NOTES
"PALLIATIVE CARE PROGRESS NOTE:    Spoke to patient's sister Caty by telephone.  She understands patient decompensated today and required intubation, but states she did not want that for her.  Explained patient was able to tell us prior to intubation that she was agreeable to vent but for "short period of time."   Discussed code status with the sister regarding CPR/SHOCK and she DOES NOT WANT CPR/SHOCK.  She understands patient is already on vasopressor support.  Conversation was witnessed by bedside RN Marisa.      PLAN/RECOMMENDATIONS;   Patient's sister will be here tomorrow morning, and we will have conversation regarding goals of care.   She states her sisters has "been through a lot" and we will discuss comfort measures.   CHANGE CODE STATUS TO DNR.        Rebeca Erwin, ZBIGNIEWN, RN, CCRN   Palliative Care Nurse Coordinator   [facility]  [phone]    "

## 2017-02-14 NOTE — PROGRESS NOTES
Pt vent rate weaned to 10.Pt tolerated respiratory treatment with AMY.Pt alarms on and functioning.

## 2017-02-14 NOTE — SUBJECTIVE & OBJECTIVE
Interval History:  Patient with increased work of breathing.      Review of Systems   Unable to perform ROS: Acuity of condition     Objective:     Vital Signs (Most Recent):  Temp: 97.1 °F (36.2 °C) (02/14/17 0715)  Pulse: 86 (02/14/17 0955)  Resp: (!) 0 (02/14/17 0955)  BP: (!) 88/52 (02/14/17 0732)  SpO2: 100 % (02/14/17 0955) Vital Signs (24h Range):  Temp:  [97.1 °F (36.2 °C)-98.6 °F (37 °C)] 97.1 °F (36.2 °C)  Pulse:  [] 86  Resp:  [0-36] 0  SpO2:  [85 %-100 %] 100 %  BP: ()/(45-80) 88/52  Arterial Line BP: ()/(48-90) 116/54     Weight: 87.8 kg (193 lb 9 oz)  Body mass index is 37.8 kg/(m^2).    Intake/Output Summary (Last 24 hours) at 02/14/17 1003  Last data filed at 02/14/17 0900   Gross per 24 hour   Intake          3433.25 ml   Output             1106 ml   Net          2327.25 ml      Physical Exam   Constitutional: She appears well-developed and well-nourished.   HENT:   Head: Normocephalic.   Cardiovascular: Normal rate.    Pulmonary/Chest: She has no wheezes.   Increased work of breathing    Abdominal: Soft.   Neurological: She is alert.   Skin: Skin is warm and dry.   Vitals reviewed.      Significant Labs:   BMP:     Recent Labs  Lab 02/14/17  0122   *  160*   *  131*   K 3.0*  3.0*   CL 95  95   CO2 19*  20*   BUN 71*  71*   CREATININE 2.7*  2.6*   CALCIUM 7.9*  7.9*   MG 1.8  1.7     CBC:     Recent Labs  Lab 02/13/17  0127 02/14/17  0122   WBC 8.48 18.38*   HGB 11.1* 9.0*   HCT 36.8* 29.0*    121*       Significant Imaging:

## 2017-02-14 NOTE — PROGRESS NOTES
Pt was received on vent settings are PRVC/10/500/+5/50% sats maintained. Dr Farah wrote orders to wean pt to CPAP. I was able to wean pt from a rate of 15 to 10. Switched pt to SIMV with 10ps pt's RR increased to 40's and increased WOB RN aware. Put pt back on PRVC will try again if pt tolerates. Sx mod amount of secretions.

## 2017-02-14 NOTE — PROGRESS NOTES
[facility] Medicine  Progress Note    Patient Name: Myra Murphy  MRN: [id number]  Patient Class: IP- Inpatient   Admission Date: 2/3/2017  Length of Stay: 10 days  Attending Physician: Tatianna Benson MD  Primary Care Provider: Lupe Delatorre MD        Subjective:     Principal Problem:Severe combined systolic and diastolic congestive heart failure    HPI:  Myra Murphy is a 83 yo woman with atrial fibrillation on OAC, chronic combined systolic and diastolic congestive heart failure (LVEF30% +DD 2/2017), h/o ER-/UT+ right breast cancer in 2008, HLD, HTN, obesity, and moderate protein calorie malnutrition who presented 2/3/2017 from [facility] with multiple issues including hypotension, left upper quadrant pain, and emesis ×2. EMS reports an initial blood pressure 52/34 and a blood glucose of 137. She was confused upon admission and was barely arousable to sternal rub.     Hospital Course:  In the emergency department she underwent routine laboratory studies, CT imaging, and EKG. She had transient episodes of hypoglycemia and she had discordant low-pressure readings in her upper extremities although this is thought to be secondary to persistent mastectomy. Multiple abnormalities on laboratory studies were identified including acute renal failure, hyperkalemia, and severe lactic acid acidosis with a lactate of greater than 12 × 2 measures. She was also found to be hypothermic. After fluid resuscitation and treatment for hypokalemia as well as administration of D50 her mental status improved.     She was admitted to the hospital for sepsis thought secondary to a UTI.  The patient initially required pressor support.  ID was consulted on 2/5 to help with management.  Heme + stools were reported by nursing staff and GI was consulted and started patient on a PPI BID. Nephrology was consulted for renal failure.  Interestingly, her urine culture was no growth. The patient had  a CT of abdomen and pelvis on 2/6 that showed a SBO that may be related to R inguinal hernia. NG tube was placed and surgery was consulted.  Cards was consulted as well to follow as the patient has a cardiac history. The hernia was easily reducible and she is a very poor candidate for operation given her comorbidities. Her abdominal pain improved and her lactic acid on 2/8 had decreased to 2.6. She remained in the ICU on pressor support which was weaned off 2/8/2017. Amiodarone infusion was transitioned to PO as her bowel function was improving. She continues to have renal insufficiency. She had some pulmonary edema on CXR 2/9/2017 though oxygenation was stable. She was given was given one dose of Lasix 20 IV 2/9. She was transferred to telemetry for further monitoring 2/9/2017.  The patient was transferred to the ICU on 2/12 for hypotension and started on pressor support.  She was started on IV amio as well for what was thought to be A-fib. Palliative care is following along. She is now DNR,per family patient never want be intubated,on empiric IV Abx.      Interval History:  Patient with increased work of breathing.      Review of Systems   Unable to perform ROS: Acuity of condition     Objective:     Vital Signs (Most Recent):  Temp: 97.1 °F (36.2 °C) (02/14/17 0715)  Pulse: 86 (02/14/17 0955)  Resp: (!) 0 (02/14/17 0955)  BP: (!) 88/52 (02/14/17 0732)  SpO2: 100 % (02/14/17 0955) Vital Signs (24h Range):  Temp:  [97.1 °F (36.2 °C)-98.6 °F (37 °C)] 97.1 °F (36.2 °C)  Pulse:  [] 86  Resp:  [0-36] 0  SpO2:  [85 %-100 %] 100 %  BP: ()/(45-80) 88/52  Arterial Line BP: ()/(48-90) 116/54     Weight: 87.8 kg (193 lb 9 oz)  Body mass index is 37.8 kg/(m^2).    Intake/Output Summary (Last 24 hours) at 02/14/17 1003  Last data filed at 02/14/17 0900   Gross per 24 hour   Intake          3433.25 ml   Output             1106 ml   Net          2327.25 ml      Physical Exam   Constitutional: She appears  well-developed and well-nourished.   HENT:   Head: Normocephalic.   Cardiovascular: Normal rate.    Pulmonary/Chest: She has no wheezes.   Increased work of breathing    Abdominal: Soft.   Neurological: She is alert.   Skin: Skin is warm and dry.   Vitals reviewed.      Significant Labs:   BMP:     Recent Labs  Lab 02/14/17  0122   *  160*   *  131*   K 3.0*  3.0*   CL 95  95   CO2 19*  20*   BUN 71*  71*   CREATININE 2.7*  2.6*   CALCIUM 7.9*  7.9*   MG 1.8  1.7     CBC:     Recent Labs  Lab 02/13/17  0127 02/14/17  0122   WBC 8.48 18.38*   HGB 11.1* 9.0*   HCT 36.8* 29.0*    121*       Significant Imaging:    Assessment/Plan:      * Severe combined systolic and diastolic congestive heart failure  Patient's initial presentation very consistent with exacerbation of heart failure causing congestive hepatopathy. This resulted in coagulopathy, especially while on anticoagulants as well, shock, hypoglycemia and acute decompensation overall. Patient's advanced illness goes against invasive treatment for such significant cardiomyopathy. We are limited to conservative treatment only. So far, fairly stabilized from this. Aiming for HR control and diuresis as tolerated by BP and renal function  EF is 30% by echo of this hospital stay.      Paroxysmal atrial fibrillation  Appreciate cards recs. On amio gtt while NPO, changed to PO 2/9/2017. Also on digoxin. Monitor lytes. Cont telemetry. Was on Coumadin as an outpatient, which was held due to presenting GI bleed. INR slightly elevated without use of anticoagulation likely due to congestive hepatopathy from heart failure. Moved to ICU on 2/12 and started on IV amio.cardioogy is following.    Nonischemic dilated cardiomyopathy  Thought to be 2/2 adriamycin.      Severe mitral regurgitation  Management of CHF as above. Not candidate for invasive management    Hyperlipidemia  No acute issues. Statin when tolerating PO.    Peripheral vascular  disease  Stable. ASA & statin when tolerating PO.     Chronic venous stasis dermatitis of both lower extremities  Supportive care.  Wound care as needed.    Severe tricuspid regurgitation  Management of CHF as above. Not candidate for invasive management    Chronic kidney disease, stage 4, severely decreased GFR  Renal function slowly improving with supportive therapy. Low EF contributing to advanced renal dysfunction. Stable.nephrology is follwoing,not good candidate for HD.    Long term current use of anticoagulant therapy  On hold. She may not be a candidate to restart this due to congestive hepatopathy     DM (diabetes mellitus) type II controlled with renal manifestation  SSI PRN    Acute on chronic renal failure  Likely decompensated by heart failure. Cardiorenal syndrome? Diuresis may help but CKD will remain at around stage 4. Nephro on board. Recs very much appreciated    Encephalopathy, metabolic  Intubated and sedated at this time.    GI bleed  GI evaluated. Resolved. H/H stable. PPI for now.      UTI (urinary tract infection)  UTI with sepsis with hypothermia and hypotension requiring pressor support. Blood cultures neg. Unfortunately, no urine culture ordered on admit. Appreciate ID recs for abx; on zosyn.    SBO (small bowel obstruction)  May have a combination of bowel edema and slow transit. NG discontinued. General surgery on board. Recs very much appreciated,no plan for intervention.on TPN.    Advanced care planning/counseling discussion  Appreciate Palliative Care's assistance. Sister (who is next of kin) up to date with patient's status and poor prognosis given advanced medical co-morbidities affecting recovery. Palliative is to formally meet with sister (currently next of kin) . DNR at this time.    Shock circulatory  on pressure support,continue monitor.      Acute hypercapnic respiratory failure  Intubated duo to circulatory shock,vent managed by Pulmonology.      VTE Risk Mitigation          Ordered     Medium Risk of VTE  Once      02/04/17 0233          Tatianna Benson MD  Department of Hospital Medicine   [facility]

## 2017-02-14 NOTE — PROGRESS NOTES
Myra Murphy is a 82 y.o. female patient.    Active Hospital Problems    Diagnosis  POA    *Severe combined systolic and diastolic congestive heart failure [I50.40]  Yes     Chronic    Acute hypercapnic respiratory failure [J96.02]  Yes    Shock circulatory [R57.9]  No    Advanced care planning/counseling discussion [Z71.89]  Not Applicable    SBO (small bowel obstruction) [K56.69]  Yes    GI bleed [K92.2]  Yes    UTI (urinary tract infection) [N39.0]  Yes    Acute on chronic renal failure [N17.9, N18.9]  Yes    Encephalopathy, metabolic [G93.41]  Yes    DM (diabetes mellitus) type II controlled with renal manifestation [E11.29]  Yes    Long term current use of anticoagulant therapy [Z79.01]  Not Applicable    Chronic kidney disease, stage 4, severely decreased GFR [N18.4]  Yes     Chronic    Severe tricuspid regurgitation [I07.1]  Yes    Peripheral vascular disease [I73.9]  Yes     Chronic    Chronic venous stasis dermatitis of both lower extremities [I83.11, I83.12]  Yes     Chronic    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 LVEF 20% 12/28/16.      Severe mitral regurgitation [I34.0]  Yes     Chronic     MODERATE-SEVERE      Hyperlipidemia [E78.5]  Yes    Paroxysmal atrial fibrillation [I48.0]  Yes     Chronic     WITH LA THROMBUS 10/12        Resolved Hospital Problems    Diagnosis Date Resolved POA    Shock [R57.9] 02/11/2017 Yes    Hyperkalemia, diminished renal excretion [E87.5] 02/07/2017 Yes    Supratherapeutic INR [R79.1] 02/09/2017 Yes    Lactic acid acidosis [E87.2] 02/07/2017 Yes    Hypothermia [T68.XXXA] 02/08/2017 Yes     Temp: 97.1 °F (36.2 °C) (02/14/17 0715)  Pulse: 86 (02/14/17 0955)  Resp: (!) 0 (02/14/17 0955)  BP: (!) 88/52 (02/14/17 0732)  SpO2: 100 % (02/14/17 0955)  Weight: 87.8 kg (193 lb 9 oz) (02/14/17 0500)  Height: 5' (152.4 cm) (02/04/17 0141)    Subjective:  Symptoms:  Stable.  (Sedated,  intubated).      Objective:  General Appearance:  Not in pain, comfortable, ill-appearing and in no acute distress.    Vital signs: (most recent): Blood pressure (!) 88/52, pulse 86, temperature 97.1 °F (36.2 °C), temperature source Oral, resp. rate (!) 0, height 5' (1.524 m), weight 87.8 kg (193 lb 9 oz), SpO2 100 %, not currently breastfeeding.    HEENT: (+ett)    Lungs:  Breath sounds clear to auscultation.  No wheezes or rales.    Heart: S1 normal and S2 normal.    Extremities: There is dependent edema.  (Extensive hip edema)  Neurological: (Sedated).    Skin:  Dry and cool.    Abdomen: Abdomen is soft and non-distended.  Bowel sounds are normal.   There is no abdominal tenderness.         Intake/Output Summary (Last 24 hours) at 02/14/17 1045  Last data filed at 02/14/17 0900   Gross per 24 hour   Intake          3433.25 ml   Output             1106 ml   Net          2327.25 ml     Lab Results   Component Value Date    WBC 18.38 (H) 02/14/2017    HGB 9.0 (L) 02/14/2017    HCT 29.0 (L) 02/14/2017    MCV 79 (L) 02/14/2017     (L) 02/14/2017     BMP  Lab Results   Component Value Date     (L) 02/14/2017     (L) 02/14/2017    K 3.0 (L) 02/14/2017    K 3.0 (L) 02/14/2017    CL 95 02/14/2017    CL 95 02/14/2017    CO2 19 (L) 02/14/2017    CO2 20 (L) 02/14/2017    BUN 71 (H) 02/14/2017    BUN 71 (H) 02/14/2017    CREATININE 2.7 (H) 02/14/2017    CREATININE 2.6 (H) 02/14/2017    CALCIUM 7.9 (L) 02/14/2017    CALCIUM 7.9 (L) 02/14/2017    ANIONGAP 17 (H) 02/14/2017    ANIONGAP 16 02/14/2017    ESTGFRAFRICA 18 (A) 02/14/2017    ESTGFRAFRICA 19 (A) 02/14/2017    EGFRNONAA 16 (A) 02/14/2017    EGFRNONAA 17 (A) 02/14/2017     Current Facility-Administered Medications   Medication    acetaminophen tablet 650 mg    albuterol-ipratropium 2.5mg-0.5mg/3mL nebulizer solution 3 mL    amiodarone 360 mg/200 mL (1.8 mg/mL) infusion    atorvastatin tablet 20 mg    bisacodyl suppository 10 mg    bumetanide  injection 2 mg    dextrose 50% injection 12.5 g    DOPamine 400 mg in dextrose 5 % 250 mL infusion (premix)    fat emulsion 20 % infusion 250 mL    glucagon (human recombinant) injection 1 mg    influenza vaccine 180 mcg/0.5 mL (for patients > 65) injection    insulin aspart pen 0-5 Units    levothyroxine injection 100 mcg    milrinone 20mg in D5W 100 mL infusion    mineral oil enema 1 enema    norepinephrine 4 mg in dextrose 5% 250 mL infusion (premix) (titrating)    ondansetron injection 4 mg    pantoprazole injection 40 mg    piperacillin-tazobactam 4.5 g in dextrose 5 % 100 mL IVPB (ready to mix system)    polyethylene glycol packet 17 g    promethazine suppository 25 mg    propofol (DIPRIVAN) 10 mg/mL infusion    ramelteon tablet 8 mg    sodium chloride 0.9% flush 3 mL    TPN ADULT CENTRAL LINE CUSTOM    TPN ADULT CENTRAL LINE CUSTOM       Assessment & Plan  1. JOSE MIGUEL. Creatinine is not better. UOP better. Following NO hd at this time. Poor canddiate.  2.CMP. Volume up. Try diurese pt.  Do not think pt well intubated.  3.SBO. Cont tpn. Ct scan with partial sbo still.  4.severe maln. Adjust tpn today.  5.Acute resp failure.cont vent support. Bumex today.  7. Hyponatremia. Adjust sodium in tpn.  8. Hypotension.  Cont pressor support.   9.AFib. Rate better.  10.Hypothyrodism. Recommend iv synthroid.  11.hypokalemia. Adjusted tpn.  30mins critical care time.  Overall, pt looks more stable. Do not think pt however will do well.  Mirna Thapa MD  2/14/2017

## 2017-02-14 NOTE — ASSESSMENT & PLAN NOTE
Appreciate Palliative Care's assistance. Sister (who is next of kin) up to date with patient's status and poor prognosis given advanced medical co-morbidities affecting recovery. Palliative is to formally meet with sister (currently next of kin) . DNR at this time.

## 2017-02-15 NOTE — PROGRESS NOTES
Progress Note  Pulmonology    Admit Date: 2/3/2017   LOS: 11 days       SUBJECTIVE: acute respiratory failure  On vent     History of Present Illness:  See chart    Review of patient's allergies indicates:   Allergen Reactions    Shellfish containing products Itching     Can eat shrimp    Adhesive      Other reaction(s): blisters    Iodinated contrast media - iv dye      Other reaction(s): Vomiting    Iodine      Other reaction(s): Topical  Other reaction(s): Rash    Morphine      Other reaction(s): Hallucinations       Past Medical History   Diagnosis Date    Anticoagulant long-term use     Arthritis     Atrial fibrillation     Atrial flutter     Carcinoma of right breast, estrogen receptor negative and progesterone receptor positive 02/2008    Chronic combined systolic and diastolic congestive heart failure     Colon cancer     Diabetes mellitus     Dilated cardiomyopathy 12/01/2008     adriamycin toxicity    Dyslipidemia     Encounter for blood transfusion     Hyperlipidemia     Hypertension     Supraventricular tachycardia      PAF, AFL     Past Surgical History   Procedure Laterality Date    Colon surgery  2000     upper colon removal    Cataract extraction  8/5/09     both eyes (Dr. Adam)    Cardioversion  04/24/2014    Total abdominal hysterectomy w/ bilateral salpingoophorectomy  1973    Mastectomy, partial Right 03/14/2008     R right    Total hip arthroplasty  03/05/2010     anuerysm in hip/right hip    Hysterectomy      Eye surgery       Family History   Problem Relation Age of Onset    Heart disease Mother     Hypertension Mother     Diabetes Mother     Glaucoma Mother     Glaucoma Maternal Aunt        ROS:patient  unable to communicate because intubated, on vent, and sedated     OBJECTIVE:     Vital Signs (Most Recent)  Temp: 98 °F (36.7 °C) (02/15/17 0400)  Pulse: 94 (02/15/17 0919)  Resp: (!) 0 (02/15/17 0919)  BP: 93/61 (02/15/17 0800)  SpO2: 100 % (02/15/17  0919)    Vital Signs Range (Last 24H):  Temp:  [97.2 °F (36.2 °C)-98.2 °F (36.8 °C)]   Pulse:  [84-98]   Resp:  [0-18]   BP: ()/(34-61)   SpO2:  [50 %-100 %]   Arterial Line BP: ()/(44-54)     Physical Exam:  Intubated on vent  Lungs:  rales bilaterally and rhonchi bilaterally  Chest Wall: no tenderness  Heart: regular rate and rhythm and no murmur  Abdomen: soft, non-tender non-distended; bowel sounds normal  Extremities: no cyanosis or edema, or clubbing  Skin: No rashes or lesions or good skin turgor  Neurologic: alert, oriented, thought content appropriate    Laboratory:  Recent Results (from the past 24 hour(s))   POCT glucose    Collection Time: 02/14/17 12:13 PM   Result Value Ref Range    POCT Glucose 225 (H) 70 - 110 mg/dL   POCT glucose    Collection Time: 02/14/17  5:29 PM   Result Value Ref Range    POCT Glucose 144 (H) 70 - 110 mg/dL   POCT glucose    Collection Time: 02/14/17 11:19 PM   Result Value Ref Range    POCT Glucose 176 (H) 70 - 110 mg/dL   CBC auto differential    Collection Time: 02/15/17  1:24 AM   Result Value Ref Range    WBC 17.67 (H) 3.90 - 12.70 K/uL    RBC 4.00 4.00 - 5.40 M/uL    Hemoglobin 9.7 (L) 12.0 - 16.0 g/dL    Hematocrit 30.2 (L) 37.0 - 48.5 %    MCV 76 (L) 82 - 98 fL    MCH 24.3 (L) 27.0 - 31.0 pg    MCHC 32.1 32.0 - 36.0 %    RDW 18.8 (H) 11.5 - 14.5 %    Platelets 140 (L) 150 - 350 K/uL    MPV 10.7 9.2 - 12.9 fL    Gran # 16.2 (H) 1.8 - 7.7 K/uL    Lymph # 0.9 (L) 1.0 - 4.8 K/uL    Mono # 0.6 0.3 - 1.0 K/uL    Eos # 0.0 0.0 - 0.5 K/uL    Baso # 0.02 0.00 - 0.20 K/uL    Gran% 91.4 (H) 38.0 - 73.0 %    Lymph% 5.0 (L) 18.0 - 48.0 %    Mono% 3.5 (L) 4.0 - 15.0 %    Eosinophil% 0.0 0.0 - 8.0 %    Basophil% 0.1 0.0 - 1.9 %    Differential Method Automated    Comprehensive metabolic panel    Collection Time: 02/15/17  1:24 AM   Result Value Ref Range    Sodium 134 (L) 136 - 145 mmol/L    Potassium 2.3 (LL) 3.5 - 5.1 mmol/L    Chloride 95 95 - 110 mmol/L    CO2 24 23 -  29 mmol/L    Glucose 149 (H) 70 - 110 mg/dL    BUN, Bld 73 (H) 8 - 23 mg/dL    Creatinine 2.5 (H) 0.5 - 1.4 mg/dL    Calcium 7.8 (L) 8.7 - 10.5 mg/dL    Total Protein 5.3 (L) 6.0 - 8.4 g/dL    Albumin 1.9 (L) 3.5 - 5.2 g/dL    Total Bilirubin 1.6 (H) 0.1 - 1.0 mg/dL    Alkaline Phosphatase 99 55 - 135 U/L    AST 34 10 - 40 U/L    ALT 22 10 - 44 U/L    Anion Gap 15 8 - 16 mmol/L    eGFR if African American 20 (A) >60 mL/min/1.73 m^2    eGFR if non African American 17 (A) >60 mL/min/1.73 m^2   Magnesium    Collection Time: 02/15/17  1:24 AM   Result Value Ref Range    Magnesium 1.7 1.6 - 2.6 mg/dL   Phosphorus    Collection Time: 02/15/17  1:24 AM   Result Value Ref Range    Phosphorus 4.6 (H) 2.7 - 4.5 mg/dL   Protime-INR    Collection Time: 02/15/17  1:25 AM   Result Value Ref Range    Prothrombin Time 23.4 (H) 9.0 - 12.5 sec    INR 2.3 (H) 0.8 - 1.2   ISTAT PROCEDURE    Collection Time: 02/15/17  4:35 AM   Result Value Ref Range    POC PH 7.479 (H) 7.35 - 7.45    POC PCO2 34.6 (L) 35 - 45 mmHg    POC PO2 91 80 - 100 mmHg    POC HCO3 25.7 24 - 28 mmol/L    POC BE 2 -2 to 2 mmol/L    POC SATURATED O2 98 95 - 100 %    POC TCO2 27 23 - 27 mmol/L    Rate 10     Sample ARTERIAL     Site Maria T/UAC     Allens Test N/A     DelSys Adult Vent     Mode AC/PRVC     Vt 500     PEEP 5     PiP 31     FiO2 45     Min Vol 7.6     Sp02 99    POCT glucose    Collection Time: 02/15/17  6:23 AM   Result Value Ref Range    POCT Glucose 159 (H) 70 - 110 mg/dL     CBC:     Recent Labs  Lab 02/15/17  0124   WBC 17.67*   RBC 4.00   HGB 9.7*   HCT 30.2*   *   MCV 76*   MCH 24.3*   MCHC 32.1     BMP:     Recent Labs  Lab 02/15/17  0124   *   *   K 2.3*   CL 95   CO2 24   BUN 73*   CREATININE 2.5*   CALCIUM 7.8*   MG 1.7     CMP:     Recent Labs  Lab 02/15/17  0124   *   CALCIUM 7.8*   ALBUMIN 1.9*   PROT 5.3*   *   K 2.3*   CO2 24   CL 95   BUN 73*   CREATININE 2.5*   ALKPHOS 99   ALT 22   AST 34   BILITOT 1.6*      LFTs:     Recent Labs  Lab 02/15/17  0124   ALT 22   AST 34   ALKPHOS 99   BILITOT 1.6*   PROT 5.3*   ALBUMIN 1.9*     Coagulation:     Recent Labs  Lab 02/15/17  0125   INR 2.3*     Cardiac markers: No results for input(s): CKMB, TROPONINT, MYOGLOBIN in the last 168 hours.  Microbiology Results (last 7 days)     Procedure Component Value Units Date/Time    Culture, Respiratory [[id number]] Collected:  02/13/17 1351    Order Status:  Completed Specimen:  Respiratory from Endotracheal Aspirate Updated:  02/15/17 0817     Respiratory Culture --     KAYDEN ALBICANS  Many       Respiratory Culture --     GRAM NEGATIVE MAKAYLA  Moderate  Identification and susceptibility pending       Gram Stain (Respiratory) <10 epithelial cells per low power field.     Gram Stain (Respiratory) Few WBC's     Gram Stain (Respiratory) Few budding yeast     Gram Stain (Respiratory) Few Gram positive cocci in pairs    Blood culture [[id number]] Collected:  02/03/17 2055    Order Status:  Completed Specimen:  Blood from Peripheral, Hand, Left Updated:  02/08/17 2303     Blood Culture, Routine No growth after 5 days.    Blood culture [[id number]] Collected:  02/03/17 2120    Order Status:  Completed Specimen:  Blood from Peripheral, Hand, Right Updated:  02/08/17 2303     Blood Culture, Routine No growth after 5 days.        ABGs:     Recent Labs  Lab 02/15/17  0435   PH 7.479*   PCO2 34.6*   PO2 91   HCO3 25.7   POCSATURATED 98   BE 2       Ventilator Setting:  Vent Mode: PRVC  Oxygen Concentration (%):  [45-50] 45  Resp Rate Total:  [10 br/min-19 br/min] 19 br/min  Vt Set:  [500 mL] 500 mL  PEEP/CPAP:  [5 cmH20] 5 cmH20  Mean Airway Pressure:  [9.8 ydE98-22.8 cmH20] 11.5 cmH20     Diagnostic Results:    There is a right internal saline. The cardiac silhouette is enlarged. There is patchy opacification of the pulmonary parenchyma. There is no pneumothorax.    ASSESSMENT/PLAN:     1. Acute on chronic renal failure    2. Lactic acidosis    3.  Hypoglycemia    4. Hyperkalemia    5. Abnormal CT of the head    6. Supratherapeutic INR    7. Hypothermia, initial encounter    8. Diastolic congestive heart failure    9. Severe combined systolic and diastolic congestive heart failure        Plan:elderly pt with multiple medical problems now intubated and on vent ; now om palliative care . Will place on on IMV and wean to cpap as tolerated

## 2017-02-15 NOTE — PLAN OF CARE
Problem: Patient Care Overview  Goal: Plan of Care Review  Outcome: Ongoing (interventions implemented as appropriate)  Plan of care formulated and reviewed with family when present. Patient unable to participate in care. Neither vent or vasopressors were able to be weaned over shift. All md's saw patient and palliative care nurse had a meeting with the family. Patient remains DNR without any plans to escalate care. Comfort and support was given to patient and family. Frequent position changes. Patient remains without any further changes to skin.

## 2017-02-15 NOTE — PROGRESS NOTES
[facility] Medicine  Progress Note    Patient Name: Myra Murphy  MRN: [id number]  Patient Class: IP- Inpatient   Admission Date: 2/3/2017  Length of Stay: 11 days  Attending Physician: Tatianna Benson MD  Primary Care Provider: Lupe Delatorre MD        Subjective:     Principal Problem:Severe combined systolic and diastolic congestive heart failure    HPI:  Myra Murphy is a 83 yo woman with atrial fibrillation on OAC, chronic combined systolic and diastolic congestive heart failure (LVEF30% +DD 2/2017), h/o ER-/ME+ right breast cancer in 2008, HLD, HTN, obesity, and moderate protein calorie malnutrition who presented 2/3/2017 from [facility] with multiple issues including hypotension, left upper quadrant pain, and emesis ×2. EMS reports an initial blood pressure 52/34 and a blood glucose of 137. She was confused upon admission and was barely arousable to sternal rub.     Hospital Course:  In the emergency department she underwent routine laboratory studies, CT imaging, and EKG. She had transient episodes of hypoglycemia and she had discordant low-pressure readings in her upper extremities although this is thought to be secondary to persistent mastectomy. Multiple abnormalities on laboratory studies were identified including acute renal failure, hyperkalemia, and severe lactic acid acidosis with a lactate of greater than 12 × 2 measures. She was also found to be hypothermic. After fluid resuscitation and treatment for hypokalemia as well as administration of D50 her mental status improved.     She was admitted to the hospital for sepsis thought secondary to a UTI.  The patient initially required pressor support.  ID was consulted on 2/5 to help with management.  Heme + stools were reported by nursing staff and GI was consulted and started patient on a PPI BID. Nephrology was consulted for renal failure.  Interestingly, her urine culture was no growth. The patient had  a CT of abdomen and pelvis on 2/6 that showed a SBO that may be related to R inguinal hernia. NG tube was placed and surgery was consulted.  Cards was consulted as well to follow as the patient has a cardiac history. The hernia was easily reducible and she is a very poor candidate for operation given her comorbidities. Her abdominal pain improved and her lactic acid on 2/8 had decreased to 2.6. She remained in the ICU on pressor support which was weaned off 2/8/2017. Amiodarone infusion was transitioned to PO as her bowel function was improving. She continues to have renal insufficiency. She had some pulmonary edema on CXR 2/9/2017 though oxygenation was stable. She was given was given one dose of Lasix 20 IV 2/9. She was transferred to telemetry for further monitoring 2/9/2017.  The patient was transferred to the ICU on 2/12 for hypotension and started on pressor support.  She was started on IV amio as well for what was thought to be A-fib. Palliative care is following along. She is now DNR,per family patient never want be intubated,on empiric IV Abx.GNR on sputum,family has meting with palliative care in AM.still in PRVC.      Interval History:  Patient is still intubated,on PRVC.    Review of Systems   Unable to perform ROS: Acuity of condition     Objective:     Vital Signs (Most Recent):  Temp: 98 °F (36.7 °C) (02/15/17 0400)  Pulse: 93 (02/15/17 0805)  Resp: 18 (02/15/17 0805)  BP: 93/61 (02/15/17 0800)  SpO2: 100 % (02/15/17 0805) Vital Signs (24h Range):  Temp:  [97.2 °F (36.2 °C)-98.2 °F (36.8 °C)] 98 °F (36.7 °C)  Pulse:  [84-98] 93  Resp:  [0-18] 18  SpO2:  [50 %-100 %] 100 %  BP: ()/(34-61) 93/61  Arterial Line BP: ()/(44-56) 132/48     Weight: 86.5 kg (190 lb 11.2 oz)  Body mass index is 37.24 kg/(m^2).    Intake/Output Summary (Last 24 hours) at 02/15/17 0851  Last data filed at 02/15/17 0611   Gross per 24 hour   Intake          3611.98 ml   Output             2650 ml   Net            961.98 ml      Physical Exam   Constitutional: She appears well-developed and well-nourished.   HENT:   Head: Normocephalic.   Cardiovascular: Normal rate.    Pulmonary/Chest: She has no wheezes.   Increased work of breathing    Abdominal: Soft.   Neurological: She is alert.   Skin: Skin is warm and dry.   Vitals reviewed.      Significant Labs:   BMP:     Recent Labs  Lab 02/15/17  0124   *   *   K 2.3*   CL 95   CO2 24   BUN 73*   CREATININE 2.5*   CALCIUM 7.8*   MG 1.7     CBC:     Recent Labs  Lab 02/14/17  0122 02/15/17  0124   WBC 18.38* 17.67*   HGB 9.0* 9.7*   HCT 29.0* 30.2*   * 140*       Significant Imaging:    Assessment/Plan:      * Severe combined systolic and diastolic congestive heart failure  Patient's initial presentation very consistent with exacerbation of heart failure causing congestive hepatopathy. This resulted in coagulopathy, especially while on anticoagulants as well, shock, hypoglycemia and acute decompensation overall. Patient's advanced illness goes against invasive treatment for such significant cardiomyopathy. We are limited to conservative treatment only. So far, fairly stabilized from this. Aiming for HR control and diuresis as tolerated by BP and renal function  EF is 30% by echo of this hospital stay.      Atrial fibrillation with RVR  Appreciate cards recs. On amio gtt while NPO, changed to PO 2/9/2017. Also on digoxin. Monitor lytes. Cont telemetry. Was on Coumadin as an outpatient, which was held due to presenting GI bleed. INR is therapeutic without use of anticoagulation likely due to congestive hepatopathy from heart failure. Moved to ICU on 2/12 and started on IV amio.cardioogy is following.    Nonischemic dilated cardiomyopathy  Thought to be 2/2 adriamycin.      Severe mitral regurgitation  Management of CHF as above. Not candidate for invasive management    Hyperlipidemia  No acute issues. Statin when tolerating PO.    Peripheral vascular  disease  Stable. ASA & statin when tolerating PO.     Chronic venous stasis dermatitis of both lower extremities  Supportive care.  Wound care as needed.    Severe tricuspid regurgitation  Management of CHF as above. Not candidate for invasive management    Chronic kidney disease, stage 4, severely decreased GFR  Renal function slowly improving with supportive therapy. Low EF contributing to advanced renal dysfunction. Stable.nephrology is follwoing,not good candidate for HD.    Long term current use of anticoagulant therapy  On hold. She may not be a candidate to restart this due to congestive hepatopathy     DM (diabetes mellitus) type II controlled with renal manifestation  SSI PRN    Acute on chronic renal failure  Likely decompensated by heart failure. Cardiorenal syndrome? Diuresis may help but CKD will remain at around stage 4. Nephro on board. Recs very much appreciated    Encephalopathy, metabolic  Intubated and sedated at this time.    GI bleed  GI evaluated. Resolved. H/H stable. PPI for now.      UTI (urinary tract infection)  UTI with sepsis with hypothermia and hypotension requiring pressor support. Blood cultures neg. Unfortunately, no urine culture ordered on admit. Appreciate ID recs for abx; on zosyn.    SBO (small bowel obstruction)  May have a combination of bowel edema and slow transit. NG discontinued. General surgery on board. Recs very much appreciated,no plan for intervention.on TPN.    Advanced care planning/counseling discussion  Appreciate Palliative Care's assistance. Sister (who is next of kin) up to date with patient's status and poor prognosis given advanced medical co-morbidities affecting recovery. Palliative is to formally meet with sister (currently next of kin) . DNR at this time.will have family meeting in AM again,apropriate for hospice,poor prognosis.    Shock circulatory  on pressure support,continue monitor.      Acute hypercapnic respiratory failure  Intubated duo to  circulatory shock,vent managed by Pulmonology.[facility]      VTE Risk Mitigation         Ordered     Medium Risk of VTE  Once      02/04/17 0233          Tatianna Benson MD  Department of Hospital Medicine   [facility]

## 2017-02-15 NOTE — UM SECONDARY REVIEW
VP Medical Affairs    IP Extended Stay > 10  : DX: DX: Acute on Chronic rf, NSVT, Afib, hypotenson, hypothermia, hyperkalemia, abnormal CT head, supratherapeutic INR      Remains in ICU   Principle Problem: Severe combined systolic and diastolic congestive heart failure     2/13/17 patient very tachypneic on Bipap with RR about 38, extremely hypotensive cuff pressure 53/33, multiple RN's at bedside and anesthesia arrived to place a-line. She is maxed on on Doopamine @ 20 mcg/kg/min, and now being started on Levophed. She is also on amiodarone 0.5 mg/min.      2/13/17 1:43pm Intubated and remains on Vent     Palliative care is following along. She is now DNR,per family patient never want be intubated,on empiric IV Abx.GNR on sputum,family has meting with palliative care      LOS: approved w/o recommendations due to severity of clinical picture

## 2017-02-15 NOTE — PROGRESS NOTES
"PALLIATIVE CARE PROGRESS NOTE:    Spoke to sister Caty by phone, status update provided regarding inability to wean patient from vent (back on PRVC).  She will be arriving here shortly.  Will discuss in more detail regarding when/whether to proceed with comfort measures when the sister arrives.      (1400)  Met with sister Caty at bedside.  Discussed lack of progress with weaning.  She wants to discuss with her sister Dyana.  We will meet again tomorrow (? Around 10 am) to discuss when family will be ready to proceed with comfort.  We are now moving into day 3 of  Intubation (just past 48 hours) and patient had indicated she would only want intubation for "a few days".      Plan:  Family meeting tomorrow.    Rebeca Erwin, ZBIGNIEWN, RN, CCRN   Palliative Care Nurse Coordinator   [facility]  [phone]    "

## 2017-02-15 NOTE — CONSULTS
Consult Note  Infectious Disease    Consult Requested By: Tatianna Benson MD    Reason for Consult: hypotension and hypothermia.    SUBJECTIVE:     History of Present Illness:  Patient is a 82 y.o. female presents with hypotension and hypothermia. 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 She was recently admitted with chf in januray 2017 and was placed in a snf 1/28/17. She is now here obtunded and hypothermic. Blood cultures are negative, urine and stool cultures negative form 2/3/17.c diff toxin is negative and she has no diarrhea now. Ct abdomen shows a sbo and rt inguinal hernia.    Past Medical History   Diagnosis Date    Anticoagulant long-term use     Arthritis     Atrial fibrillation     Atrial flutter     Carcinoma of right breast, estrogen receptor negative and progesterone receptor positive 02/2008    Chronic combined systolic and diastolic congestive heart failure     Colon cancer     Diabetes mellitus     Dilated cardiomyopathy 12/01/2008     adriamycin toxicity    Dyslipidemia     Encounter for blood transfusion     Hyperlipidemia     Hypertension     Supraventricular tachycardia      PAF, AFL     Past Surgical History   Procedure Laterality Date    Colon surgery  2000     upper colon removal    Cataract extraction  8/5/09     both eyes (Dr. Adam)    Cardioversion  04/24/2014    Total abdominal hysterectomy w/ bilateral salpingoophorectomy  1973    Mastectomy, partial Right 03/14/2008     R right    Total hip arthroplasty  03/05/2010     anuerysm in hip/right hip    Hysterectomy      Eye surgery       Family History   Problem Relation Age of Onset    Heart disease Mother     Hypertension Mother     Diabetes Mother     Glaucoma Mother     Glaucoma Maternal Aunt      Social History   Substance Use Topics    Smoking status: Never Smoker    Smokeless tobacco: Never Used    Alcohol use No       Review of  patient's allergies indicates:   Allergen Reactions    Shellfish containing products Itching     Can eat shrimp    Adhesive      Other reaction(s): blisters    Iodinated contrast media - iv dye      Other reaction(s): Vomiting    Iodine      Other reaction(s): Topical  Other reaction(s): Rash    Morphine      Other reaction(s): Hallucinations        Antibiotics     Start     Stop Route Frequency Ordered    02/10/17 2115  piperacillin-tazobactam 4.5 g in dextrose 5 % 100 mL IVPB (ready to mix system)      -- IV Every 12 hours (non-standard times) 02/10/17 2005          Review of Systems:  Review of systems not obtained due to patient factors unresponsive.    OBJECTIVE:     Vital Signs (Most Recent)  Temp: 98 °F (36.7 °C) (02/15/17 0400)  Pulse: 92 (02/15/17 1648)  Resp: (!) 0 (02/15/17 1648)  BP: 93/61 (02/15/17 0800)  SpO2: 100 % (02/15/17 1648)    Temperature Range Min/Max (Last 24H):  Temp:  [97.6 °F (36.4 °C)-98.2 °F (36.8 °C)]     Physical Exam:  General: cachectic  Eyes: conjunctivae/corneas clear. PERRL..  HENT: Head:normocephalic, atraumatic. Ears:not examined. Nose: Nares normal. Septum midline. Mucosa normal. No drainage or sinus tenderness., no discharge. Throat: lips, mucosa, and tongue normal; teeth and gums normal and no throat erythema.  Neck: supple, symmetrical, trachea midline, no JVD and thyroid not enlarged, symmetric, no tenderness/mass/nodules  Lungs:  clear to auscultation bilaterally and normal respiratory effort  Cardiovascular: Heart: regular rate and rhythm, S1, S2 normal, no murmur, click, rub or gallop. Chest Wall: no tenderness. Extremities: excoriated skin. Pulses: not examined.  Abdomen/Rectal: Abdomen: soft, non-tender non-distented; bowel sounds normal; rt inguinal region with reducible hernia but tender  Skin: as above  Musculoskeletal:no clubbing, cyanosis    Laboratory:  CBC    Recent Labs  Lab 02/15/17  0124   WBC 17.67*   RBC 4.00   HGB 9.7*   HCT 30.2*   *      BMP    Recent Labs  Lab 02/15/17  0124   CO2 24   BUN 73*   CREATININE 2.5*   CALCIUM 7.8*     No results for input(s): COLORU, CLARITYU, SPECGRAV, PHUR, PROTEINUA, GLUCOSEU, BILIRUBINCON, BLOODU, WBCU, RBCU, BACTERIA, MUCUS, NITRITE, LEUKOCYTESUR, UROBILINOGEN, HYALINECASTS in the last 168 hours.  Microbiology Results (last 7 days)     Procedure Component Value Units Date/Time    Culture, Respiratory [[id number]] Collected:  02/13/17 1351    Order Status:  Completed Specimen:  Respiratory from Endotracheal Aspirate Updated:  02/15/17 0817     Respiratory Culture --     KAYDEN ALBICANS  Many       Respiratory Culture --     GRAM NEGATIVE MAKAYLA  Moderate  Identification and susceptibility pending       Gram Stain (Respiratory) <10 epithelial cells per low power field.     Gram Stain (Respiratory) Few WBC's     Gram Stain (Respiratory) Few budding yeast     Gram Stain (Respiratory) Few Gram positive cocci in pairs    Blood culture [[id number]] Collected:  02/03/17 2055    Order Status:  Completed Specimen:  Blood from Peripheral, Hand, Left Updated:  02/08/17 2303     Blood Culture, Routine No growth after 5 days.    Blood culture [[id number]] Collected:  02/03/17 2120    Order Status:  Completed Specimen:  Blood from Peripheral, Hand, Right Updated:  02/08/17 2303     Blood Culture, Routine No growth after 5 days.          Diagnostic Results:  Labs: Reviewed  X-Ray: Reviewed    ASSESSMENT/PLAN:     Active Hospital Problems    Diagnosis  POA    *Severe combined systolic and diastolic congestive heart failure [I50.40]  Yes     Chronic    Acute hypercapnic respiratory failure [J96.02]  Yes    Shock circulatory [R57.9]  No    Advanced care planning/counseling discussion [Z71.89]  Not Applicable    SBO (small bowel obstruction) [K56.69]  Yes    GI bleed [K92.2]  Yes    UTI (urinary tract infection) [N39.0]  Yes    Acute on chronic renal failure [N17.9, N18.9]  Yes    Encephalopathy, metabolic [G93.41]  Yes    DM  (diabetes mellitus) type II controlled with renal manifestation [E11.29]  Yes    Long term current use of anticoagulant therapy [Z79.01]  Not Applicable    Chronic kidney disease, stage 4, severely decreased GFR [N18.4]  Yes     Chronic    Severe tricuspid regurgitation [I07.1]  Yes    Peripheral vascular disease [I73.9]  Yes     Chronic    Chronic venous stasis dermatitis of both lower extremities [I83.11, I83.12]  Yes     Chronic    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 LVEF 20% 12/28/16.      Severe mitral regurgitation [I34.0]  Yes     Chronic     MODERATE-SEVERE      Hyperlipidemia [E78.5]  Yes    Atrial fibrillation with RVR [I48.91]  Yes     WITH LA THROMBUS 10/12        Resolved Hospital Problems    Diagnosis Date Resolved POA    Shock [R57.9] 02/11/2017 Yes    Hyperkalemia, diminished renal excretion [E87.5] 02/07/2017 Yes    Supratherapeutic INR [R79.1] 02/09/2017 Yes    Lactic acid acidosis [E87.2] 02/07/2017 Yes    Hypothermia [T68.XXXA] 02/08/2017 Yes       1.hypotension and sobar  Suspect chf with ef 25%  2.dilated cardiomyopathy  3. sbo with rt inguinal hernia, contrast now in colon but better, suspect ischemic knuckle of bowel   unfortunate course of events with intubation as sister unwilling to give a decision prior to intubation  Now elects no cpr  i see no way out for this poor lady- hospice appropriate  Await family plans

## 2017-02-15 NOTE — PROGRESS NOTES
Pt remains on vent with settings of prvc,, vt 500, rate 10, +5 of peep with 50% fio2.  Vent alarms set and functioning.  ambu bag and mask at bedside.  hme changed and ETT rotated from center to left side.

## 2017-02-15 NOTE — SUBJECTIVE & OBJECTIVE
Interval History:  Patient is still intubated,on PRVC.    Review of Systems   Unable to perform ROS: Acuity of condition     Objective:     Vital Signs (Most Recent):  Temp: 98 °F (36.7 °C) (02/15/17 0400)  Pulse: 93 (02/15/17 0805)  Resp: 18 (02/15/17 0805)  BP: 93/61 (02/15/17 0800)  SpO2: 100 % (02/15/17 0805) Vital Signs (24h Range):  Temp:  [97.2 °F (36.2 °C)-98.2 °F (36.8 °C)] 98 °F (36.7 °C)  Pulse:  [84-98] 93  Resp:  [0-18] 18  SpO2:  [50 %-100 %] 100 %  BP: ()/(34-61) 93/61  Arterial Line BP: ()/(44-56) 132/48     Weight: 86.5 kg (190 lb 11.2 oz)  Body mass index is 37.24 kg/(m^2).    Intake/Output Summary (Last 24 hours) at 02/15/17 0851  Last data filed at 02/15/17 0611   Gross per 24 hour   Intake          3611.98 ml   Output             2650 ml   Net           961.98 ml      Physical Exam   Constitutional: She appears well-developed and well-nourished.   HENT:   Head: Normocephalic.   Cardiovascular: Normal rate.    Pulmonary/Chest: She has no wheezes.   Increased work of breathing    Abdominal: Soft.   Neurological: She is alert.   Skin: Skin is warm and dry.   Vitals reviewed.      Significant Labs:   BMP:     Recent Labs  Lab 02/15/17  0124   *   *   K 2.3*   CL 95   CO2 24   BUN 73*   CREATININE 2.5*   CALCIUM 7.8*   MG 1.7     CBC:     Recent Labs  Lab 02/14/17  0122 02/15/17  0124   WBC 18.38* 17.67*   HGB 9.0* 9.7*   HCT 29.0* 30.2*   * 140*       Significant Imaging:

## 2017-02-15 NOTE — ASSESSMENT & PLAN NOTE
Appreciate Palliative Care's assistance. Sister (who is next of kin) up to date with patient's status and poor prognosis given advanced medical co-morbidities affecting recovery. Palliative is to formally meet with sister (currently next of kin) . DNR at this time.will have family meeting in AM again,apropriate for hospice,poor prognosis.

## 2017-02-15 NOTE — PROGRESS NOTES
Myra Murphy is a 82 y.o. female patient.    Active Hospital Problems    Diagnosis  POA    *Severe combined systolic and diastolic congestive heart failure [I50.40]  Yes     Chronic    Acute hypercapnic respiratory failure [J96.02]  Yes    Shock circulatory [R57.9]  No    Advanced care planning/counseling discussion [Z71.89]  Not Applicable    SBO (small bowel obstruction) [K56.69]  Yes    GI bleed [K92.2]  Yes    UTI (urinary tract infection) [N39.0]  Yes    Acute on chronic renal failure [N17.9, N18.9]  Yes    Encephalopathy, metabolic [G93.41]  Yes    DM (diabetes mellitus) type II controlled with renal manifestation [E11.29]  Yes    Long term current use of anticoagulant therapy [Z79.01]  Not Applicable    Chronic kidney disease, stage 4, severely decreased GFR [N18.4]  Yes     Chronic    Severe tricuspid regurgitation [I07.1]  Yes    Peripheral vascular disease [I73.9]  Yes     Chronic    Chronic venous stasis dermatitis of both lower extremities [I83.11, I83.12]  Yes     Chronic    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 LVEF 20% 12/28/16.      Severe mitral regurgitation [I34.0]  Yes     Chronic     MODERATE-SEVERE      Hyperlipidemia [E78.5]  Yes    Atrial fibrillation with RVR [I48.91]  Yes     WITH LA THROMBUS 10/12        Resolved Hospital Problems    Diagnosis Date Resolved POA    Shock [R57.9] 02/11/2017 Yes    Hyperkalemia, diminished renal excretion [E87.5] 02/07/2017 Yes    Supratherapeutic INR [R79.1] 02/09/2017 Yes    Lactic acid acidosis [E87.2] 02/07/2017 Yes    Hypothermia [T68.XXXA] 02/08/2017 Yes     Temp: 98 °F (36.7 °C) (02/15/17 0400)  Pulse: 92 (02/15/17 1648)  Resp: (!) 0 (02/15/17 1648)  BP: 93/61 (02/15/17 0800)  SpO2: 100 % (02/15/17 1648)  Weight: 86.5 kg (190 lb 11.2 oz) (02/15/17 0500)  Height: 5' (152.4 cm) (02/04/17 0141)    Subjective:  Symptoms:  Stable.  (Still sedated, intubated).       Objective:  General Appearance:  Not in pain, comfortable, ill-appearing and in no acute distress.    Vital signs: (most recent): Blood pressure 93/61, pulse 92, temperature 98 °F (36.7 °C), temperature source Axillary, resp. rate (!) 0, height 5' (1.524 m), weight 86.5 kg (190 lb 11.2 oz), SpO2 100 %, not currently breastfeeding.    HEENT: (+ett)    Lungs:  Breath sounds clear to auscultation.  No wheezes or rales.    Heart: S1 normal and S2 normal.    Extremities: (Edema better)  Neurological: (Sedated).    Skin:  Dry and cool.    Abdomen: Abdomen is soft and non-distended.  Bowel sounds are normal.   There is no abdominal tenderness.         Intake/Output Summary (Last 24 hours) at 02/15/17 1712  Last data filed at 02/15/17 0611   Gross per 24 hour   Intake           2255.1 ml   Output              850 ml   Net           1405.1 ml     Lab Results   Component Value Date    WBC 17.67 (H) 02/15/2017    HGB 9.7 (L) 02/15/2017    HCT 30.2 (L) 02/15/2017    MCV 76 (L) 02/15/2017     (L) 02/15/2017     BMP  Lab Results   Component Value Date     (L) 02/15/2017    K 2.3 (LL) 02/15/2017    CL 95 02/15/2017    CO2 24 02/15/2017    BUN 73 (H) 02/15/2017    CREATININE 2.5 (H) 02/15/2017    CALCIUM 7.8 (L) 02/15/2017    ANIONGAP 15 02/15/2017    ESTGFRAFRICA 20 (A) 02/15/2017    EGFRNONAA 17 (A) 02/15/2017     Current Facility-Administered Medications   Medication    acetaminophen tablet 650 mg    albuterol-ipratropium 2.5mg-0.5mg/3mL nebulizer solution 3 mL    amiodarone 360 mg/200 mL (1.8 mg/mL) infusion    atorvastatin tablet 20 mg    bisacodyl suppository 10 mg    bumetanide injection 2 mg    dextrose 50% injection 12.5 g    DOPamine 400 mg in dextrose 5 % 250 mL infusion (premix)    fat emulsion 20 % infusion 250 mL    glucagon (human recombinant) injection 1 mg    influenza vaccine 180 mcg/0.5 mL (for patients > 65) injection    insulin aspart pen 0-5 Units    levothyroxine injection 100  mcg    milrinone 20mg in D5W 100 mL infusion    mineral oil enema 1 enema    norepinephrine 4 mg in dextrose 5% 250 mL infusion (premix) (titrating)    ondansetron injection 4 mg    pantoprazole injection 40 mg    piperacillin-tazobactam 4.5 g in dextrose 5 % 100 mL IVPB (ready to mix system)    polyethylene glycol packet 17 g    potassium chloride 40 mEq in 100 mL IVPB (FOR CENTRAL LINE ADMINISTRATION ONLY)    promethazine suppository 25 mg    propofol (DIPRIVAN) 10 mg/mL infusion    ramelteon tablet 8 mg    sodium chloride 0.9% flush 3 mL    TPN ADULT CENTRAL LINE CUSTOM    TPN ADULT CENTRAL LINE CUSTOM       Assessment & Plan  1. JOSE MIGUEL. Creatinine is better. UOP low. Following.   2.CMP. Volume up.  SBP still low. Following.  3.SBO. Cont tpn. Ct scan with partial sbo still.  4.severe maln. COnt  tpn today.  5.Acute resp failure.cont vent support. Bumex cont. Weaning to cpap.  7. Hyponatremia. Cont tpn.  8. Hypotension.  Cont pressor support.   9.AFib. Rate better.  10.Hypothyrodism. Recommend iv synthroid.  11.hypokalemia. More k given. Following.  12.Anemia. Hg low. NO prbc yet.  30mins critical care time.  Overall, Pt still very ill.  Comfort care?  Mirna Thapa MD  2/15/2017

## 2017-02-15 NOTE — PROGRESS NOTES
Vent settings changed to SIMV 14/500/+5/10ps/45%.  Respiratory rate increased to upper 30's.  Pressure support increased to 12.  Will continue to monitor

## 2017-02-15 NOTE — ASSESSMENT & PLAN NOTE
Appreciate cards recs. On amio gtt while NPO, changed to PO 2/9/2017. Also on digoxin. Monitor lytes. Cont telemetry. Was on Coumadin as an outpatient, which was held due to presenting GI bleed. INR is therapeutic without use of anticoagulation likely due to congestive hepatopathy from heart failure. Moved to ICU on 2/12 and started on IV amio.cardioogy is following.

## 2017-02-15 NOTE — PLAN OF CARE
Problem: Ventilation, Mechanical Invasive (Adult)  Intervention: Prevent Airway Displacement/Mechanical Dysfunction  Patient remains on servoi ventilator. Will continue to monitor

## 2017-02-16 NOTE — PLAN OF CARE
Problem: Patient Care Overview  Goal: Plan of Care Review  Outcome: Ongoing (interventions implemented as appropriate)  Plan of care formulated with and reviewed with family. Unable to wean levophed further. Patient had x1 large bm. Family was at bedside. Support and comfort given. Patient remains free from injury or fall.

## 2017-02-16 NOTE — PROGRESS NOTES
Rec'd patient orally intubated with 7.0 ETT secured at 23cm.  Patient mode on ventilator changed from SIMV to PRVC due to tachypnea.  Aerosol treatment given as ordered.  Patient tolerated treatment well.  All ventilator alarms are set and ambu at [facility].

## 2017-02-16 NOTE — ASSESSMENT & PLAN NOTE
Appreciate Palliative Care's assistance. Sister (who is next of kin) up to date with patient's status and poor prognosis given advanced medical co-morbidities affecting recovery. Palliative is to formally meet with sister (currently next of kin) . DNR at this time.will have family meeting today again,apropriate for hospice,poor prognosis.hopefully withdraw in AM.

## 2017-02-16 NOTE — SUBJECTIVE & OBJECTIVE
Interval History:  Patient is still intubated,on PRVC.    Review of Systems   Unable to perform ROS: Acuity of condition     Objective:     Vital Signs (Most Recent):  Temp: 96.6 °F (35.9 °C) (02/16/17 0700)  Pulse: 77 (02/16/17 0754)  Resp: (!) 23 (02/16/17 0754)  BP: 90/62 (02/16/17 0700)  SpO2: 100 % (02/16/17 0753) Vital Signs (24h Range):  Temp:  [96.6 °F (35.9 °C)-98.8 °F (37.1 °C)] 96.6 °F (35.9 °C)  Pulse:  [] 77  Resp:  [0-23] 23  SpO2:  [94 %-100 %] 100 %  BP: ()/(47-62) 90/62  Arterial Line BP: ()/(44-62) 104/50     Weight: 86.5 kg (190 lb 11.2 oz)  Body mass index is 37.24 kg/(m^2).    Intake/Output Summary (Last 24 hours) at 02/16/17 0933  Last data filed at 02/16/17 0700   Gross per 24 hour   Intake          3460.13 ml   Output             4800 ml   Net         -1339.87 ml      Physical Exam   Constitutional: She appears well-developed and well-nourished.   HENT:   Head: Normocephalic.   Cardiovascular: Normal rate.    Pulmonary/Chest: She has no wheezes.   Increased work of breathing    Abdominal: Soft.   Neurological: She is alert.   Skin: Skin is warm and dry.   Vitals reviewed.      Significant Labs:   BMP:     Recent Labs  Lab 02/16/17  0116   *   *   K 3.7   CL 95   CO2 24   BUN 76*   CREATININE 2.5*   CALCIUM 8.0*   MG 1.6     CBC:     Recent Labs  Lab 02/15/17  0124 02/16/17  0117   WBC 17.67* 19.29*   HGB 9.7* 9.6*   HCT 30.2* 29.9*   * 112*       Significant Imaging:

## 2017-02-16 NOTE — PROGRESS NOTES
Myra Murphy is a 82 y.o. female patient.    Active Hospital Problems    Diagnosis  POA    *Severe combined systolic and diastolic congestive heart failure [I50.40]  Yes     Chronic    Acute hypercapnic respiratory failure [J96.02]  Yes    Shock circulatory [R57.9]  No    Advanced care planning/counseling discussion [Z71.89]  Not Applicable    SBO (small bowel obstruction) [K56.69]  Yes    GI bleed [K92.2]  Yes    UTI (urinary tract infection) [N39.0]  Yes    Acute on chronic renal failure [N17.9, N18.9]  Yes    Encephalopathy, metabolic [G93.41]  Yes    DM (diabetes mellitus) type II controlled with renal manifestation [E11.29]  Yes    Long term current use of anticoagulant therapy [Z79.01]  Not Applicable    Chronic kidney disease, stage 4, severely decreased GFR [N18.4]  Yes     Chronic    Severe tricuspid regurgitation [I07.1]  Yes    Peripheral vascular disease [I73.9]  Yes     Chronic    Chronic venous stasis dermatitis of both lower extremities [I83.11, I83.12]  Yes     Chronic    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 LVEF 20% 12/28/16.      Severe mitral regurgitation [I34.0]  Yes     Chronic     MODERATE-SEVERE      Hyperlipidemia [E78.5]  Yes    Atrial fibrillation with RVR [I48.91]  Yes     WITH LA THROMBUS 10/12        Resolved Hospital Problems    Diagnosis Date Resolved POA    Shock [R57.9] 02/11/2017 Yes    Hyperkalemia, diminished renal excretion [E87.5] 02/07/2017 Yes    Supratherapeutic INR [R79.1] 02/09/2017 Yes    Lactic acid acidosis [E87.2] 02/07/2017 Yes    Hypothermia [T68.XXXA] 02/08/2017 Yes     Temp: 98.8 °F (37.1 °C) (02/16/17 1100)  Pulse: 90 (02/16/17 1500)  Resp: (!) 0 (02/16/17 1500)  BP: (!) 77/47 (02/16/17 1400)  SpO2: 100 % (02/16/17 1515)  Weight: 86.5 kg (190 lb 11.2 oz) (02/15/17 0500)  Height: 5' (152.4 cm) (02/04/17 0141)    Subjective:  Symptoms:  Stable.  (Still sedated, intubated).       Objective:  General Appearance:  Not in pain, comfortable, ill-appearing and in no acute distress.    Vital signs: (most recent): Blood pressure (!) 77/47, pulse 90, temperature 98.8 °F (37.1 °C), temperature source Axillary, resp. rate (!) 0, height 5' (1.524 m), weight 86.5 kg (190 lb 11.2 oz), SpO2 100 %, not currently breastfeeding.    HEENT: (+ett)    Lungs:  Breath sounds clear to auscultation.  No wheezes or rales.    Heart: S1 normal and S2 normal.    Extremities: There is dependent edema.    Neurological: (Sedated).    Skin:  Dry and cool.    Abdomen: Abdomen is soft and non-distended.  Bowel sounds are normal.   There is no abdominal tenderness.         Intake/Output Summary (Last 24 hours) at 02/16/17 1602  Last data filed at 02/16/17 1500   Gross per 24 hour   Intake          3513.55 ml   Output             6000 ml   Net         -2486.45 ml     Lab Results   Component Value Date    WBC 19.29 (H) 02/16/2017    HGB 9.6 (L) 02/16/2017    HCT 29.9 (L) 02/16/2017    MCV 75 (L) 02/16/2017     (L) 02/16/2017     BMP  Lab Results   Component Value Date     (L) 02/16/2017    K 3.7 02/16/2017    CL 95 02/16/2017    CO2 24 02/16/2017    BUN 76 (H) 02/16/2017    CREATININE 2.5 (H) 02/16/2017    CALCIUM 8.0 (L) 02/16/2017    ANIONGAP 14 02/16/2017    ESTGFRAFRICA 20 (A) 02/16/2017    EGFRNONAA 17 (A) 02/16/2017     Current Facility-Administered Medications   Medication    acetaminophen tablet 650 mg    albuterol-ipratropium 2.5mg-0.5mg/3mL nebulizer solution 3 mL    amiodarone 360 mg/200 mL (1.8 mg/mL) infusion    atorvastatin tablet 20 mg    bisacodyl suppository 10 mg    bumetanide injection 2 mg    dextrose 50% injection 12.5 g    DOPamine 400 mg in dextrose 5 % 250 mL infusion (premix)    glucagon (human recombinant) injection 1 mg    influenza vaccine 180 mcg/0.5 mL (for patients > 65) injection    insulin aspart pen 0-5 Units    levothyroxine injection 100 mcg    milrinone 20mg in  D5W 100 mL infusion    mineral oil enema 1 enema    morphine injection 2 mg    norepinephrine 4 mg in dextrose 5% 250 mL infusion (premix) (titrating)    ondansetron injection 4 mg    pantoprazole injection 40 mg    piperacillin-tazobactam 4.5 g in dextrose 5 % 100 mL IVPB (ready to mix system)    polyethylene glycol packet 17 g    potassium chloride 40 mEq in 100 mL IVPB (FOR CENTRAL LINE ADMINISTRATION ONLY)    promethazine suppository 25 mg    propofol (DIPRIVAN) 10 mg/mL infusion    ramelteon tablet 8 mg    sodium chloride 0.9% flush 3 mL       Assessment & Plan  1. JOSE MIGUEL. Creatinine is holding. UOP noted.  2.CMP. Volume negative. Following.  3.SBO. Weaning tpn off with extubation planned for am.  4.severe maln. Moving to comfort care. Wean off tpn.  5.Acute resp failure.cont vent support. Bumex cont. Weaning off.  7. Hyponatremia. Stable.  8. Hypotension. Cont pressors for no.  9.AFib. Rate ok.  10.Hypothyrodism. Recommend iv synthroid.  11.hypokalemia. K better.  12.Anemia. Hg low. NO prbc yet.    Overall, Pt still very ill.  Moving to comfort care   Mirna Thapa MD  2/16/2017

## 2017-02-16 NOTE — CONSULTS
Consult Note  Infectious Disease    Consult Requested By: Tatianna Benson MD    Reason for Consult: hypotension and hypothermia.    SUBJECTIVE:     History of Present Illness:  Patient is a 82 y.o. female presents with hypotension and hypothermia. 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 She was recently admitted with chf in januray 2017 and was placed in a snf 1/28/17. She is now here obtunded and hypothermic. Blood cultures are negative, urine and stool cultures negative form 2/3/17.c diff toxin is negative and she has no diarrhea now. Ct abdomen shows a sbo and rt inguinal hernia.    Past Medical History   Diagnosis Date    Anticoagulant long-term use     Arthritis     Atrial fibrillation     Atrial flutter     Carcinoma of right breast, estrogen receptor negative and progesterone receptor positive 02/2008    Chronic combined systolic and diastolic congestive heart failure     Colon cancer     Diabetes mellitus     Dilated cardiomyopathy 12/01/2008     adriamycin toxicity    Dyslipidemia     Encounter for blood transfusion     Hyperlipidemia     Hypertension     Supraventricular tachycardia      PAF, AFL     Past Surgical History   Procedure Laterality Date    Colon surgery  2000     upper colon removal    Cataract extraction  8/5/09     both eyes (Dr. Adam)    Cardioversion  04/24/2014    Total abdominal hysterectomy w/ bilateral salpingoophorectomy  1973    Mastectomy, partial Right 03/14/2008     R right    Total hip arthroplasty  03/05/2010     anuerysm in hip/right hip    Hysterectomy      Eye surgery       Family History   Problem Relation Age of Onset    Heart disease Mother     Hypertension Mother     Diabetes Mother     Glaucoma Mother     Glaucoma Maternal Aunt      Social History   Substance Use Topics    Smoking status: Never Smoker    Smokeless tobacco: Never Used    Alcohol use No       Review of  patient's allergies indicates:   Allergen Reactions    Shellfish containing products Itching     Can eat shrimp    Adhesive      Other reaction(s): blisters    Iodinated contrast media - iv dye      Other reaction(s): Vomiting    Iodine      Other reaction(s): Topical  Other reaction(s): Rash    Morphine      Other reaction(s): Hallucinations        Antibiotics     Start     Stop Route Frequency Ordered    02/10/17 2118  piperacillin-tazobactam 4.5 g in dextrose 5 % 100 mL IVPB (ready to mix system)      -- IV Every 12 hours (non-standard times) 02/10/17 2005          Review of Systems:  Review of systems not obtained due to patient factors unresponsive.    OBJECTIVE:     Vital Signs (Most Recent)  Temp: 96.6 °F (35.9 °C) (02/16/17 0700)  Pulse: 77 (02/16/17 0754)  Resp: (!) 23 (02/16/17 0754)  BP: 90/62 (02/16/17 0700)  SpO2: 100 % (02/16/17 0753)    Temperature Range Min/Max (Last 24H):  Temp:  [96.6 °F (35.9 °C)-98.8 °F (37.1 °C)]     Physical Exam:  General: cachectic  Eyes: conjunctivae/corneas clear. PERRL..  HENT: Head:normocephalic, atraumatic. Ears:not examined. Nose: Nares normal. Septum midline. Mucosa normal. No drainage or sinus tenderness., no discharge. Throat: lips, mucosa, and tongue normal; teeth and gums normal and no throat erythema.  Neck: supple, symmetrical, trachea midline, no JVD and thyroid not enlarged, symmetric, no tenderness/mass/nodules  Lungs:  clear to auscultation bilaterally and normal respiratory effort  Cardiovascular: Heart: regular rate and rhythm, S1, S2 normal, no murmur, click, rub or gallop. Chest Wall: no tenderness. Extremities: excoriated skin. Pulses: not examined.  Abdomen/Rectal: Abdomen: soft, non-tender non-distented; bowel sounds normal; rt inguinal region with reducible hernia but tender  Skin: as above  Musculoskeletal:no clubbing, cyanosis    Laboratory:  CBC    Recent Labs  Lab 02/16/17  0117   WBC 19.29*   RBC 4.01   HGB 9.6*   HCT 29.9*   *      BMP    Recent Labs  Lab 02/16/17  0116   CO2 24   BUN 76*   CREATININE 2.5*   CALCIUM 8.0*     No results for input(s): COLORU, CLARITYU, SPECGRAV, PHUR, PROTEINUA, GLUCOSEU, BILIRUBINCON, BLOODU, WBCU, RBCU, BACTERIA, MUCUS, NITRITE, LEUKOCYTESUR, UROBILINOGEN, HYALINECASTS in the last 168 hours.  Microbiology Results (last 7 days)     Procedure Component Value Units Date/Time    Culture, Respiratory [[id number]] Collected:  02/13/17 1351    Order Status:  Completed Specimen:  Respiratory from Endotracheal Aspirate Updated:  02/16/17 0739     Respiratory Culture --     KAYDEN ALBICANS  Many       Respiratory Culture --     GRAM NEGATIVE MAKAYLA  Moderate  Identification and susceptibility pending       Gram Stain (Respiratory) <10 epithelial cells per low power field.     Gram Stain (Respiratory) Few WBC's     Gram Stain (Respiratory) Few budding yeast     Gram Stain (Respiratory) Few Gram positive cocci in pairs          Diagnostic Results:  Labs: Reviewed  X-Ray: Reviewed    ASSESSMENT/PLAN:     Active Hospital Problems    Diagnosis  POA    *Severe combined systolic and diastolic congestive heart failure [I50.40]  Yes     Chronic    Acute hypercapnic respiratory failure [J96.02]  Yes    Shock circulatory [R57.9]  No    Advanced care planning/counseling discussion [Z71.89]  Not Applicable    SBO (small bowel obstruction) [K56.69]  Yes    GI bleed [K92.2]  Yes    UTI (urinary tract infection) [N39.0]  Yes    Acute on chronic renal failure [N17.9, N18.9]  Yes    Encephalopathy, metabolic [G93.41]  Yes    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 LVEF 20% 12/28/16.      Severe mitral regurgitation [I34.0]  Yes     Chronic     MODERATE-SEVERE      Hyperlipidemia [E78.5]  Yes    Atrial fibrillation with RVR [I48.91]  Yes     WITH LA THROMBUS 10/12        Resolved Hospital Problems    Diagnosis Date Resolved POA    Shock [R57.9] 02/11/2017 Yes    Hyperkalemia, diminished renal excretion [E87.5] 02/07/2017 Yes    Supratherapeutic INR [R79.1] 02/09/2017 Yes    Lactic acid acidosis [E87.2] 02/07/2017 Yes    Hypothermia [T68.XXXA] 02/08/2017 Yes       1.hypotension and sobar  Suspect chf with ef 25%  2.dilated cardiomyopathy  3. sbo with rt inguinal hernia, contrast now in colon but better, suspect ischemic knuckle of bowel   unfortunate course of events with intubation as sister unwilling to give a decision prior to intubation  Now elects no cpr  i see no way out for this poor lady- hospice appropriate  Await family plans  Id on gnr in sputum pending

## 2017-02-16 NOTE — PLAN OF CARE
Problem: Patient Care Overview  Goal: Plan of Care Review  Patient remain on Servoi ventilator settings are PRVC, rate 10, tidal volume 500, +5 PEEP and fio2 45%.   Patient will continue to be monitored.

## 2017-02-16 NOTE — PLAN OF CARE
Problem: Infection, Risk/Actual (Adult)  Goal: Identify Related Risk Factors and Signs and Symptoms  Related risk factors and signs and symptoms are identified upon initiation of Human Response Clinical Practice Guideline (CPG)     02/16/17 1526   Infection, Risk/Actual   Related Risk Factors (Infection, Risk/Actual) chronic illness/condition;age extremes;malnutrition;skin integrity impairment;tissue perfusion altered   Signs and Symptoms (Infection, Risk/Actual) blood glucose changes;drainage;feeding/eating intolerance;weakness       Goal: Infection Prevention/Resolution  Patient will demonstrate the desired outcomes by discharge/transition of care.   Outcome: Ongoing (interventions implemented as appropriate)    02/16/17 1526   Infection, Risk/Actual (Adult)   Infection Prevention/Resolution making progress toward outcome   Pt remains afebrile, while plan to withdraw life support measures tomorrow, will continue with antibiotics through the night.    Problem: Fall Risk (Adult)  Goal: Absence of Falls  Patient will demonstrate the desired outcomes by discharge/transition of care.   Outcome: Ongoing (interventions implemented as appropriate)    02/16/17 1526   Fall Risk (Adult)   Absence of Falls making progress toward outcome   Pt remains free from falls/injury.    Problem: Pressure Ulcer Risk (Yung Scale) (Adult,Obstetrics,Pediatric)  Goal: Identify Related Risk Factors and Signs and Symptoms  Related risk factors and signs and symptoms are identified upon initiation of Human Response Clinical Practice Guideline (CPG)   Outcome: Ongoing (interventions implemented as appropriate)    02/16/17 1526   Pressure Ulcer Risk (Yung Scale)   Related Risk Factors (Pressure Ulcer Risk (Yung Scale)) age extremes;critical care admission;excretions/secretions;hospitalization prolonged;mobility impaired;tissue perfusion altered       Goal: Skin Integrity  Patient will demonstrate the desired outcomes by discharge/transition  of care.   Outcome: Ongoing (interventions implemented as appropriate)    02/16/17 1526   Pressure Ulcer Risk (Yung Scale) (Adult,Obstetrics,Pediatric)   Skin Integrity making progress toward outcome   No new skin breakdown, measures taken to promote skin integrity and safety. Patient turned Q 2 hours, foam support utilizied, skin inspected. Wound care performed this shift.    Problem: Ventilation, Mechanical Invasive (Adult)  Goal: Signs and Symptoms of Listed Potential Problems Will be Absent, Minimized or Managed (Ventilation, Mechanical Invasive)  Signs and symptoms of listed potential problems will be absent, minimized or managed by discharge/transition of care (reference Ventilation, Mechanical Invasive (Adult) CPG).   Outcome: Ongoing (interventions implemented as appropriate)    02/16/17 1526   Ventilation, Mechanical Invasive   Problems Assessed (Mechanical Ventilation, Invasive) all   Problems Present (Mechanical Ventilation, Invasive) immobility;inability to wean;mechanical dysfunction   Pt remains intubated, weaning unsuccessful. Vent settings unchanged, plan to withdraw life support tomorrow.

## 2017-02-16 NOTE — PROGRESS NOTES
[facility] [place] [facility]  Progress Note    Patient Name: Myra Murphy  MRN: [id number]  Patient Class: IP- Inpatient   Admission Date: 2/3/2017  Length of Stay: 12 days  Attending Physician: Tatianna Benson MD  Primary Care Provider: Lupe Delatorre MD        Subjective:     Principal Problem:Severe combined systolic and diastolic congestive heart failure    HPI:  Myra Murphy is a 83 yo woman with atrial fibrillation on OAC, chronic combined systolic and diastolic congestive heart failure (LVEF30% +DD 2/2017), h/o ER-/MD+ right breast cancer in 2008, HLD, HTN, obesity, and moderate protein calorie malnutrition who presented 2/3/2017 from [facility] with multiple issues including hypotension, left upper quadrant pain, and emesis ×2. EMS reports an initial blood pressure 52/34 and a blood glucose of 137. She was confused upon admission and was barely arousable to sternal rub.     Hospital Course:  In the emergency department she underwent routine laboratory studies, CT imaging, and EKG. She had transient episodes of hypoglycemia and she had discordant low-pressure readings in her upper extremities although this is thought to be secondary to persistent mastectomy. Multiple abnormalities on laboratory studies were identified including acute renal failure, hyperkalemia, and severe lactic acid acidosis with a lactate of greater than 12 × 2 measures. She was also found to be hypothermic. After fluid resuscitation and treatment for hypokalemia as well as administration of D50 her mental status improved.     She was admitted to the hospital for sepsis thought secondary to a UTI.  The patient initially required pressor support.  ID was consulted on 2/5 to help with management.  Heme + stools were reported by nursing staff and GI was consulted and started patient on a PPI BID. Nephrology was consulted for renal failure.  Interestingly, her urine culture was no growth. The patient had  a CT of abdomen and pelvis on 2/6 that showed a SBO that may be related to R inguinal hernia. NG tube was placed and surgery was consulted.  Cards was consulted as well to follow as the patient has a cardiac history. The hernia was easily reducible and she is a very poor candidate for operation given her comorbidities. Her abdominal pain improved and her lactic acid on 2/8 had decreased to 2.6. She remained in the ICU on pressor support which was weaned off 2/8/2017. Amiodarone infusion was transitioned to PO as her bowel function was improving. She continues to have renal insufficiency. She had some pulmonary edema on CXR 2/9/2017 though oxygenation was stable. She was given was given one dose of Lasix 20 IV 2/9. She was transferred to telemetry for further monitoring 2/9/2017.  The patient was transferred to the ICU on 2/12 for hypotension and started on pressor support.  She was started on IV amio as well for what was thought to be A-fib. Palliative care is following along. She is now DNR,per family patient never want be intubated,on empiric IV Abx.GNR on sputum,family has meeting with palliative care today,possibe withdraw in AM.still in PRVC.      Interval History:  Patient is still intubated,on PRVC.    Review of Systems   Unable to perform ROS: Acuity of condition     Objective:     Vital Signs (Most Recent):  Temp: 96.6 °F (35.9 °C) (02/16/17 0700)  Pulse: 77 (02/16/17 0754)  Resp: (!) 23 (02/16/17 0754)  BP: 90/62 (02/16/17 0700)  SpO2: 100 % (02/16/17 0753) Vital Signs (24h Range):  Temp:  [96.6 °F (35.9 °C)-98.8 °F (37.1 °C)] 96.6 °F (35.9 °C)  Pulse:  [] 77  Resp:  [0-23] 23  SpO2:  [94 %-100 %] 100 %  BP: ()/(47-62) 90/62  Arterial Line BP: ()/(44-62) 104/50     Weight: 86.5 kg (190 lb 11.2 oz)  Body mass index is 37.24 kg/(m^2).    Intake/Output Summary (Last 24 hours) at 02/16/17 0933  Last data filed at 02/16/17 0700   Gross per 24 hour   Intake          3460.13 ml   Output              4800 ml   Net         -1339.87 ml      Physical Exam   Constitutional: She appears well-developed and well-nourished.   HENT:   Head: Normocephalic.   Cardiovascular: Normal rate.    Pulmonary/Chest: She has no wheezes.   Increased work of breathing    Abdominal: Soft.   Neurological: She is alert.   Skin: Skin is warm and dry.   Vitals reviewed.      Significant Labs:   BMP:     Recent Labs  Lab 02/16/17  0116   *   *   K 3.7   CL 95   CO2 24   BUN 76*   CREATININE 2.5*   CALCIUM 8.0*   MG 1.6     CBC:     Recent Labs  Lab 02/15/17  0124 02/16/17  0117   WBC 17.67* 19.29*   HGB 9.7* 9.6*   HCT 30.2* 29.9*   * 112*       Significant Imaging:    Assessment/Plan:      * Severe combined systolic and diastolic congestive heart failure  Patient's initial presentation very consistent with exacerbation of heart failure causing congestive hepatopathy. This resulted in coagulopathy, especially while on anticoagulants as well, shock, hypoglycemia and acute decompensation overall. Patient's advanced illness goes against invasive treatment for such significant cardiomyopathy. We are limited to conservative treatment only. So far, fairly stabilized from this. Aiming for HR control and diuresis as tolerated by BP and renal function  EF is 30% by echo of this hospital stay.      Atrial fibrillation with RVR  Appreciate cards recs. On amio gtt while NPO, changed to PO 2/9/2017. Also on digoxin. Monitor lytes. Cont telemetry. Was on Coumadin as an outpatient, which was held due to presenting GI bleed. INR is therapeutic without use of anticoagulation likely due to congestive hepatopathy from heart failure. Moved to ICU on 2/12 and started on IV amio.cardiology is following.    Nonischemic dilated cardiomyopathy  Thought to be 2/2 adriamycin.      Severe mitral regurgitation  Management of CHF as above. Not candidate for invasive management    Hyperlipidemia  No acute issues. Statin when tolerating  PO.    Peripheral vascular disease  Stable. ASA & statin when tolerating PO.     Chronic venous stasis dermatitis of both lower extremities  Supportive care.  Wound care as needed.    Severe tricuspid regurgitation  Management of CHF as above. Not candidate for invasive management    Chronic kidney disease, stage 4, severely decreased GFR  Renal function slowly improving with supportive therapy. Low EF contributing to advanced renal dysfunction. Stable.nephrology is follwoing,not good candidate for HD.    Long term current use of anticoagulant therapy  On hold. She may not be a candidate to restart this due to congestive hepatopathy     DM (diabetes mellitus) type II controlled with renal manifestation  SSI PRN    Acute on chronic renal failure  Likely decompensated by heart failure. Cardiorenal syndrome? Diuresis may help but CKD will remain at around stage 4. Nephro on board. Recs very much appreciated    Encephalopathy, metabolic  Intubated and sedated at this time.    GI bleed  GI evaluated. Resolved. H/H stable. PPI for now.      UTI (urinary tract infection)  UTI with sepsis with hypothermia and hypotension requiring pressor support. Blood cultures neg. Unfortunately, no urine culture ordered on admit. Appreciate ID recs for abx; on zosyn.    SBO (small bowel obstruction)  May have a combination of bowel edema and slow transit. NG discontinued. General surgery on board. Recs very much appreciated,no plan for intervention.on TPN.    Advanced care planning/counseling discussion  Appreciate Palliative Care's assistance. Sister (who is next of kin) up to date with patient's status and poor prognosis given advanced medical co-morbidities affecting recovery. Palliative is to formally meet with sister (currently next of kin) . DNR at this time.will have family meeting today again,apropriate for hospice,poor prognosis.hopefully withdraw in AM.    Shock circulatory  on pressure support,continue monitor.      Acute  hypercapnic respiratory failure  Intubated duo to circulatory shock,vent managed by Pulmonology.still on PRVC      VTE Risk Mitigation         Ordered     Medium Risk of VTE  Once      02/04/17 9541          Tatianna Benson MD  Department of Hospital Medicine   [facility]

## 2017-02-16 NOTE — PROGRESS NOTES
[facility] [place]  Adult Nutrition  Progress Note    SUMMARY     Recommendations    Recommendation/Intervention: 1. Rec d/c lipids with current propofol rate ( TPN + lipids + propofol = 150% EEN) 2. Rec check TG weekly 3. RD to monitor  Goals: Provide nutrition support within 24 hours  Nutrition Goal Status: goal met  Communication of RD Recs: reviewed with physician    Continuum of Care Plan    D/C Planning:  Too soon to determine. Will monitor/follow-up.      Reason for Assessment    Reason for Assessment: RD follow-up  Diagnosis:  (acute on chronic renal failure)  Relevent Medical History: anticoagulant use long term, h/o breast cancer, h/ocolon cancer, dyslipidemia, HLD, DM   Interdisciplinary Rounds: attended  General Information Comments: Patient intubated and sedated. Family meeting scheduled today to discuss withdrawl from care. TPN custom infusing. Lipids discontinued.     Nutrition Prescription Ordered    Current Diet Order: NPO     Current Nutrition Support Formula Ordered: Other (Comment) (Customized)  Current Nutrition Support Rate Ordered: 60 (ml)  Current Nutrition Support Frequency Ordered: continuous    Evaluation of Received Nutrients/Fluid Intake    Parenteral Calories (kcal): 971  Parenteral Protein (gm): 90  Parenteral Fluid (mL): 1440    Other Calories (kcal): 528  Total Calories (kcal/kg): 1499    Energy Calories Required: exceed needs  % Kcal Needs: 111     Protein Required: meeting needs  % Protein Needs: 100      Fluid Required:  (per MD)  Comments:  (Net I/O - 1,286.5)  Tolerance: tolerating     Nutrition Risk Screen     Nutrition Risk Screen: no indicators present    Nutrition/Diet History    Patient Reported Diet/Restrictions/Preferences: low salt  Typical Food/Fluid Intake: NPO  Food Preferences: GIORGI  Factors Affecting Nutritional Intake: NPO, on mechanical ventilation    Labs/Tests/Procedures/Meds    Pertinent Labs Reviewed: reviewed, pertinent  Pertinent Labs Comments: TG:   176,  - 158, Na 133, BUN 76, Creat 2.5, GFR 20, Alb 1.7  Pertinent Medications Reviewed: reviewed, pertinent  Pertinent Medications Comments: norepi, statin, abx, pantoprazole, propofol    Physical Findings    Overall Physical Appearance: on ventilator support  Tubes: nasogastric tube  Oral/Mouth Cavity: WDL  Skin: other (see comments) (dermatitis, abrasion, 2 x ulcers chronic venous insufficienc)    Anthropometrics    Height (inches): 60 in  Weight Method: Bed Scale  Weight (kg): 71.1 kg  Ideal Body Weight (IBW), Female: 100 lb  % Ideal Body Weight, Female (lb): 156.75 lb  BMI (kg/m2): 30.61  BMI Grade: 30 - 34.9- obesity - grade I    Estimated/Assessed Needs    Weight Used For Calorie Calculations: 71.1 kg (156 lb 12 oz) (edematous- admit weight of 71.1 kg)   Height (cm): 152.4 cm  Energy Need Method: [facility] (modified) (1341)  RMR ([place]-St. Jeor Equation): 1098.35  Weight Used For Protein Calculations: 71.1 kg (156 lb 12 oz)  Protein Requirements: 85- 106 g  Fluid Need Method: RDA Method (1 ml per kcal or per MD)  Fiber Requirement: 164g CHO/day     Nutrition Diagnosis     Problem: Inadequate Energy Intake  Etiology: SBO  As Evidenced by: NPO/TPN dependent  Nutrition Diagnosis Status: Continues    Monitor and Evaluation    Food and Nutrient Intake: energy intake, food and beverage intake, enteral nutrition intake, parenteral nutrition intake  Food and Nutrient Adminstration: diet order, enteral and parenteral nutrition administration  Knowledge/Beliefs/Attitudes: food and nutrition knowledge/skill  Physical Activity and Function: nutrition-related ADLs and IADLs  Anthropometric Measurements: weight, weight change  Biochemical Data, Medical Tests and Procedures: electrolyte and renal panel, gastrointestinal profile, glucose/endocrine profile, lipid profile, inflammatory profile  Nutrition-Focused Physical Findings: overall appearance, skin    Nutrition Risk    Level of Risk: moderate  (1x/week)    Nutrition Follow-Up    RD Follow-up?: Yes    Assessment and Plan    No new Assessment & Plan notes have been filed under this hospital service since the last note was generated.  Service: Nutrition

## 2017-02-16 NOTE — PROGRESS NOTES
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5 kg (190 lb 11.2 oz)  Body mass index is 37.24 kg/(m^2).    SpO2: 100 %  O2 Device (Oxygen Therapy): ventilator      Intake/Output Summary (Last 24 hours) at 02/16/17 1033  Last data filed at 02/16/17 0700   Gross per 24 hour   Intake          3333.43 ml   Output             4700 ml   Net         -1366.57 ml       Lines/Drains/Airways     Central Venous Catheter Line                 CVC Triple Lumen - PreSep Cath 02/04/17 0039 right internal jugular 10 days          Drain                 Urethral Catheter 02/03/17 2210 10 days          Airway                 Airway - Non-Surgical 02/13/17 1410 Endotracheal Tube less than 1 day          Arterial Line                 Arterial Line 02/13/17 1 day                Physical Exam   Constitutional:   Intubated, sedated   Cardiovascular: Normal rate.  An irregularly irregular rhythm present.   Pulmonary/Chest:   Decreased bs        Significant Labs:   BMP:     Recent Labs  Lab 02/15/17  0121  02/16/17  0116   * 152*   * 133*   K 2.3* 3.7   CL 95 95   CO2 24 24   BUN 73* 76*   CREATININE 2.5* 2.5*   CALCIUM 7.8* 8.0*   MG 1.7 1.6    and CBC     Recent Labs  Lab 02/15/17  0124 02/16/17  0117   WBC 17.67* 19.29*   HGB 9.7* 9.6*   HCT 30.2* 29.9*   * 112*       Significant Imaging: Echocardiogram:   2D echo with color flow doppler:   Results for orders placed or performed during the hospital encounter of 02/03/17   2D echo with color flow doppler   Result Value Ref Range    EF 30 (A) 55 - 65    Mitral Valve Regurgitation MODERATE (A)     Diastolic Dysfunction Yes (A)     Est. 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 [I50.40] 05/11/2014 Yes     Chronic    Acute hypercapnic respiratory failure [J96.02] 02/14/2017 Yes    Shock circulatory [R57.9] 02/13/2017 No    Advanced care planning/counseling discussion [Z71.89] 02/09/2017 Not Applicable    SBO (small bowel obstruction) [K56.69] 02/06/2017 Yes    GI bleed [K92.2] 02/05/2017 Yes    UTI (urinary tract infection) [N39.0] 02/05/2017 Yes    Acute on chronic renal failure [N17.9, N18.9] 02/04/2017 Yes    Encephalopathy, metabolic [G93.41] 02/04/2017 Yes    DM (diabetes mellitus) type II controlled with renal manifestation [E11.29] 11/28/2016 Yes    Long term current use of anticoagulant therapy [Z79.01] 05/13/2014 Not Applicable    Chronic kidney disease, stage 4, severely decreased GFR [N18.4] 05/11/2014 Yes     Chronic    Severe tricuspid regurgitation [I07.1] 06/28/2013 Yes    Peripheral vascular disease [I73.9] 01/21/2013 Yes     Chronic    Chronic venous stasis dermatitis of both lower extremities [I83.11, I83.12] 01/21/2013 Yes     Chronic     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] 11/08/2012 Yes     Chronic    Severe mitral regurgitation [I34.0] 11/08/2012 Yes     Chronic    Hyperlipidemia [E78.5] 11/08/2012 Yes    Atrial fibrillation with RVR [I48.91] 10/03/2012 Yes      Problems Resolved During this Admission:    Diagnosis Date Noted Date Resolved POA    Shock [R57.9] 02/06/2017 02/11/2017 Yes    Hyperkalemia, diminished renal excretion [E87.5] 02/04/2017 02/07/2017 Yes    Supratherapeutic INR [R79.1] 02/04/2017 02/09/2017 Yes    Lactic acid acidosis [E87.2] 02/04/2017 02/07/2017 Yes    Hypothermia [T68.XXXA] 02/04/2017 02/08/2017 Yes       VTE Risk Mitigation         Ordered     Medium Risk of VTE  Once      02/04/17 0233        # Sepsis/septic shock.  # lactic acidosis  # SBO, ?incarcerated hernia/ischemic bowel, pt continues to complain of abd pain.  # NSVT  # Chr AF on warfarin, HR better controlled.  # Known severe CMP, presumed nonischemic, pt appears euvolemic and well perfused --> didn't tolerate inotrope  # MR/TR  # Supratherapeutic INR, now in range  # ARF/CRI, POA  # Mental status changes     *Plan is for withdrawal tomorrow.  We'll sign off    Anticipated Disposition: Currently Undergoing Therapy    Discharge Needs: Med Romaine Quick MD  Cardiology  [facility]

## 2017-02-16 NOTE — ASSESSMENT & PLAN NOTE
Appreciate cards recs. On amio gtt while NPO, changed to PO 2/9/2017. Also on digoxin. Monitor lytes. Cont telemetry. Was on Coumadin as an outpatient, which was held due to presenting GI bleed. INR is therapeutic without use of anticoagulation likely due to congestive hepatopathy from heart failure. Moved to ICU on 2/12 and started on IV amio.cardiology is following.

## 2017-02-16 NOTE — PROGRESS NOTES
Progress Note  Pulmonology    Admit Date: 2/3/2017   LOS: 12 days       SUBJECTIVE: acute respiratory failure  On vent     History of Present Illness:  See chart    Review of patient's allergies indicates:   Allergen Reactions    Shellfish containing products Itching     Can eat shrimp    Adhesive      Other reaction(s): blisters    Iodinated contrast media - iv dye      Other reaction(s): Vomiting    Iodine      Other reaction(s): Topical  Other reaction(s): Rash    Morphine      Other reaction(s): Hallucinations       Past Medical History   Diagnosis Date    Anticoagulant long-term use     Arthritis     Atrial fibrillation     Atrial flutter     Carcinoma of right breast, estrogen receptor negative and progesterone receptor positive 02/2008    Chronic combined systolic and diastolic congestive heart failure     Colon cancer     Diabetes mellitus     Dilated cardiomyopathy 12/01/2008     adriamycin toxicity    Dyslipidemia     Encounter for blood transfusion     Hyperlipidemia     Hypertension     Supraventricular tachycardia      PAF, AFL     Past Surgical History   Procedure Laterality Date    Colon surgery  2000     upper colon removal    Cataract extraction  8/5/09     both eyes (Dr. Adam)    Cardioversion  04/24/2014    Total abdominal hysterectomy w/ bilateral salpingoophorectomy  1973    Mastectomy, partial Right 03/14/2008     R right    Total hip arthroplasty  03/05/2010     anuerysm in hip/right hip    Hysterectomy      Eye surgery       Family History   Problem Relation Age of Onset    Heart disease Mother     Hypertension Mother     Diabetes Mother     Glaucoma Mother     Glaucoma Maternal Aunt        ROS:patient  unable to communicate because intubated, on vent, and sedated     OBJECTIVE:     Vital Signs (Most Recent)  Temp: 96.6 °F (35.9 °C) (02/16/17 0700)  Pulse: 77 (02/16/17 0754)  Resp: (!) 23 (02/16/17 0754)  BP: 90/62 (02/16/17 0700)  SpO2: 100 % (02/16/17  0753)    Vital Signs Range (Last 24H):  Temp:  [96.6 °F (35.9 °C)-98.8 °F (37.1 °C)]   Pulse:  []   Resp:  [0-23]   BP: ()/(47-62)   SpO2:  [94 %-100 %]   Arterial Line BP: ()/(44-62)     Physical Exam:  Intubated on vent  Lungs:  rales bilaterally and rhonchi bilaterally  Chest Wall: no tenderness  Heart: regular rate and rhythm and no murmur  Abdomen: soft, non-tender non-distended; bowel sounds normal  Extremities: no cyanosis or edema, or clubbing  Skin: No rashes or lesions or good skin turgor  Neurologic: alert, oriented, thought content appropriate    Laboratory:  Recent Results (from the past 24 hour(s))   POCT glucose    Collection Time: 02/15/17 11:56 AM   Result Value Ref Range    POCT Glucose 178 (H) 70 - 110 mg/dL   POCT glucose    Collection Time: 02/15/17  5:45 PM   Result Value Ref Range    POCT Glucose 195 (H) 70 - 110 mg/dL   POCT glucose    Collection Time: 02/15/17 11:44 PM   Result Value Ref Range    POCT Glucose 135 (H) 70 - 110 mg/dL   Comprehensive metabolic panel    Collection Time: 02/16/17  1:16 AM   Result Value Ref Range    Sodium 133 (L) 136 - 145 mmol/L    Potassium 3.7 3.5 - 5.1 mmol/L    Chloride 95 95 - 110 mmol/L    CO2 24 23 - 29 mmol/L    Glucose 152 (H) 70 - 110 mg/dL    BUN, Bld 76 (H) 8 - 23 mg/dL    Creatinine 2.5 (H) 0.5 - 1.4 mg/dL    Calcium 8.0 (L) 8.7 - 10.5 mg/dL    Total Protein 5.4 (L) 6.0 - 8.4 g/dL    Albumin 1.7 (L) 3.5 - 5.2 g/dL    Total Bilirubin 1.9 (H) 0.1 - 1.0 mg/dL    Alkaline Phosphatase 108 55 - 135 U/L    AST 27 10 - 40 U/L    ALT 20 10 - 44 U/L    Anion Gap 14 8 - 16 mmol/L    eGFR if African American 20 (A) >60 mL/min/1.73 m^2    eGFR if non African American 17 (A) >60 mL/min/1.73 m^2   Magnesium    Collection Time: 02/16/17  1:16 AM   Result Value Ref Range    Magnesium 1.6 1.6 - 2.6 mg/dL   Phosphorus    Collection Time: 02/16/17  1:16 AM   Result Value Ref Range    Phosphorus 4.2 2.7 - 4.5 mg/dL   CBC auto differential     Collection Time: 02/16/17  1:17 AM   Result Value Ref Range    WBC 19.29 (H) 3.90 - 12.70 K/uL    RBC 4.01 4.00 - 5.40 M/uL    Hemoglobin 9.6 (L) 12.0 - 16.0 g/dL    Hematocrit 29.9 (L) 37.0 - 48.5 %    MCV 75 (L) 82 - 98 fL    MCH 23.9 (L) 27.0 - 31.0 pg    MCHC 32.1 32.0 - 36.0 %    RDW 19.4 (H) 11.5 - 14.5 %    Platelets 112 (L) 150 - 350 K/uL    MPV 10.9 9.2 - 12.9 fL    Gran # 17.6 (H) 1.8 - 7.7 K/uL    Lymph # 1.0 1.0 - 4.8 K/uL    Mono # 0.7 0.3 - 1.0 K/uL    Eos # 0.0 0.0 - 0.5 K/uL    Baso # 0.03 0.00 - 0.20 K/uL    Gran% 92.0 (H) 38.0 - 73.0 %    Lymph% 5.0 (L) 18.0 - 48.0 %    Mono% 3.6 (L) 4.0 - 15.0 %    Eosinophil% 0.0 0.0 - 8.0 %    Basophil% 0.2 0.0 - 1.9 %    Platelet Estimate Decreased (A)     Aniso Slight     Poik Slight     Poly Occasional     Hypo Occasional     Ovalocytes Occasional     Target Cells Occasional     Differential Method Automated    Protime-INR    Collection Time: 02/16/17  1:17 AM   Result Value Ref Range    Prothrombin Time 20.8 (H) 9.0 - 12.5 sec    INR 2.0 (H) 0.8 - 1.2   ISTAT PROCEDURE    Collection Time: 02/16/17  4:28 AM   Result Value Ref Range    POC PH 7.464 (H) 7.35 - 7.45    POC PCO2 36.4 35 - 45 mmHg    POC PO2 121 (H) 80 - 100 mmHg    POC HCO3 26.1 24 - 28 mmol/L    POC BE 2 -2 to 2 mmol/L    POC SATURATED O2 99 95 - 100 %    POC TCO2 27 23 - 27 mmol/L    Rate 10     Sample ARTERIAL     Site Maria T/UAC     Allens Test N/A     DelSys Adult Vent     Mode AC/PRVC     Vt 500     PEEP 5     PiP 30     FiO2 45     Min Vol 7.3     Sp02 100    POCT glucose    Collection Time: 02/16/17  5:49 AM   Result Value Ref Range    POCT Glucose 152 (H) 70 - 110 mg/dL     CBC:     Recent Labs  Lab 02/16/17 0117   WBC 19.29*   RBC 4.01   HGB 9.6*   HCT 29.9*   *   MCV 75*   MCH 23.9*   MCHC 32.1     BMP:     Recent Labs  Lab 02/16/17 0116   *   *   K 3.7   CL 95   CO2 24   BUN 76*   CREATININE 2.5*   CALCIUM 8.0*   MG 1.6     CMP:     Recent Labs  Lab 02/16/17 0116    *   CALCIUM 8.0*   ALBUMIN 1.7*   PROT 5.4*   *   K 3.7   CO2 24   CL 95   BUN 76*   CREATININE 2.5*   ALKPHOS 108   ALT 20   AST 27   BILITOT 1.9*     LFTs:     Recent Labs  Lab 02/16/17  0116   ALT 20   AST 27   ALKPHOS 108   BILITOT 1.9*   PROT 5.4*   ALBUMIN 1.7*     Coagulation:     Recent Labs  Lab 02/16/17  0117   INR 2.0*     Cardiac markers: No results for input(s): CKMB, TROPONINT, MYOGLOBIN in the last 168 hours.  Microbiology Results (last 7 days)     Procedure Component Value Units Date/Time    Culture, Respiratory [[id number]] Collected:  02/13/17 1351    Order Status:  Completed Specimen:  Respiratory from Endotracheal Aspirate Updated:  02/16/17 0739     Respiratory Culture --     KAYDEN ALBICANS  Many       Respiratory Culture --     GRAM NEGATIVE MAKAYLA  Moderate  Identification and susceptibility pending       Gram Stain (Respiratory) <10 epithelial cells per low power field.     Gram Stain (Respiratory) Few WBC's     Gram Stain (Respiratory) Few budding yeast     Gram Stain (Respiratory) Few Gram positive cocci in pairs        ABGs:     Recent Labs  Lab 02/16/17  0428   PH 7.464*   PCO2 36.4   PO2 121*   HCO3 26.1   POCSATURATED 99   BE 2       Ventilator Setting:  [facility]  Oxygen Concentration (%):  [] 100  Resp Rate Total:  [10 br/min-41 br/min] 11 br/min  Vt Set:  [500 mL] 500 mL  PEEP/CPAP:  [5 cmH20] 5 cmH20  Pressure Support:  [10 mcY74-42 cmH20] 12 cmH20  Mean Airway Pressure:  [8.7 cjW18-34.3 cmH20] 10 cmH20     Diagnostic Results:    There is a right internal saline. The cardiac silhouette is enlarged. There is patchy opacification of the pulmonary parenchyma. There is no pneumothorax.    ASSESSMENT/PLAN:     1. Acute on chronic renal failure    2. Lactic acidosis    3. Hypoglycemia    4. Hyperkalemia    5. Abnormal CT of the head    6. Supratherapeutic INR    7. Hypothermia, initial encounter    8. Diastolic congestive heart failure    9. Severe combined  systolic and diastolic congestive heart failure        Plan:withdrawal is set for tomorrow ; .Will d/c f/u but please to see again at your request.

## 2017-02-16 NOTE — PROGRESS NOTES
Nursing reports removing unna boots today and replacing dressing with Kerlix roll. States venous stasis changes to legs with small area of ulceration. Will manage legs with dressings.

## 2017-02-16 NOTE — PROGRESS NOTES
Dr. Thapa at bedside, spoke with Palliative care, plan to withdraw care tomorrow. Will begin weaning off TPN and not reorder for tomorrow, pharmacy notified. Will continue to monitor CBG.

## 2017-02-16 NOTE — PLAN OF CARE
02/16/17 1139   Discharge Reassessment   Assessment Type Discharge Planning Reassessment   Can the patient answer the patient profile reliably? Yes, cognitively intact   How does the patient rate their overall health at the present time? Fair  (record)   Describe the patient's ability to walk at the present time. Does not walk or unable to take any steps at all   How often would a person be available to care for the patient? Whenever needed   Number of comorbid conditions (as recorded on the chart) Five or more   During the past month, has the patient often been bothered by feeling down, depressed or hopeless? No  (record)   During the past month, has the patient often been bothered by little interest or pleasure in doing things? No  (record)   Discharge plan remains the same: No   Provided patient/caregiver education on the expected discharge date and the discharge plan Yes  (by Palliative Care RN during meeting 2 days ago and on phone today for /POA (SW present for part of call))   Discharge Plan A Inpatient Hospice  ([facility])   Discharge Plan B Inpatient Hospice  ([facility])   Change in patient condition or support system No   Patient choice form signed by patient/caregiver No  (per phone call)   Explained to the the patient/caregiver why the discharge planned changed: Yes  (Palliative Care RN reviewed/updated )   Involved the patient/caregiver in establishing a new discharge plan: Yes  (sister is HCPOA)   Palliative Care RN has provided most of communication with patient  in [place].  is not coming today, will be here at 10 am tomorrow with other family members. At this time, plan is start moving toward comfort care today (stop TPN), continue tomorrow with additional changes.  prefers [facility] (inpatient) 1st, [facility] 2nd in event patient survives this admission.  RACHELL spoke with Landon ([phone]) at [facility] who informs that may have bed open up. Plan to speak in am. He can meet with  family at 11 am if have bed. Spoke with Omayra ([phone]) with  who informs will also speak in am as may have bed open up. SW will follow in ICU and assist as needed.

## 2017-02-16 NOTE — PLAN OF CARE
Problem: Patient Care Overview  Goal: Plan of Care Review  Outcome: Ongoing (interventions implemented as appropriate)  Pt remains in ICU, intubated and sedated on propofol. Levophed/amiodarone transfusing, no escalation of care per family and MD. Vitals currently stable. TPN weaned off completely at 1745, will now monitor CBG q 1 hour x 4.  CVP d/c. Pt w/ several loose BM's today, daniel remains UO > 2 L. TLC and a-line remain intact and functional. No new injury, safety and skin integrity maintained. Wound care performed to BLE today. Plan for [facility]OLS tomorrow pt appears very calm and comfortable; plan of care reviewed with pt and family at bedside.

## 2017-02-16 NOTE — PROGRESS NOTES
Received patient on vent settings PRVC 500/10/+5/FIO2 45% ETT size 7.5 secured at 23cm at the lip Ambu bag and mask at bed side no signs of any distress at this current time

## 2017-02-17 NOTE — PLAN OF CARE
Problem: Infection, Risk/Actual (Adult)  Goal: Identify Related Risk Factors and Signs and Symptoms  Related risk factors and signs and symptoms are identified upon initiation of Human Response Clinical Practice Guideline (CPG)   Outcome: Unable to achieve outcome(s) by discharge    17 1526   Infection, Risk/Actual   Related Risk Factors (Infection, Risk/Actual) chronic illness/condition;age extremes;malnutrition;skin integrity impairment;tissue perfusion altered   Signs and Symptoms (Infection, Risk/Actual) blood glucose changes;drainage;feeding/eating intolerance;weakness   Pt , WOLS.  Goal: Infection Prevention/Resolution  Patient will demonstrate the desired outcomes by discharge/transition of care.   Outcome: Unable to achieve outcome(s) by discharge    17 1703   Infection, Risk/Actual (Adult)   Infection Prevention/Resolution unable to achieve outcome   No signs of new infection, pt remains on abx until this AM, pt .    Problem: Fall Risk (Adult)  Goal: Absence of Falls  Patient will demonstrate the desired outcomes by discharge/transition of care.   Outcome: Outcome(s) achieved Date Met:  17   Fall Risk (Adult)   Absence of Falls achieves outcome   No falls prior to TOD.    Problem: Patient Care Overview  Goal: Plan of Care Review  Outcome: Outcome(s) achieved Date Met:  17   Coping/Psychosocial   Plan Of Care Reviewed With patient;sibling   Plan of care thoroughly reviewed and agreed upon with POA, pt's sister. Questions answered and support Provided.     Problem: Renal Failure/Kidney Injury, Acute (Adult)  Goal: Signs and Symptoms of Listed Potential Problems Will be Absent, Minimized or Managed (Renal Failure/Kidney Injury, Acute)  Signs and symptoms of listed potential problems will be absent, minimized or managed by discharge/transition of care (reference Renal Failure/Kidney Injury, Acute (Adult) CPG).   Outcome: Unable to achieve  outcome(s) by discharge    17   Renal Failure/Kidney Injury, Acute   Problems Assessed (Acute Renal Failure/Kidney Injury) all   Pt , WOLS.    Problem: Pressure Ulcer Risk (Yung Scale) (Adult,Obstetrics,Pediatric)  Goal: Skin Integrity  Patient will demonstrate the desired outcomes by discharge/transition of care.   Outcome: Unable to achieve outcome(s) by discharge    17   Pressure Ulcer Risk (Yung Scale) (Adult,Obstetrics,Pediatric)   Skin Integrity unable to achieve outcome   No new skin injury, skin integrity maintained, pt did have BLE wounds and pressure injury prior to admission that was not resulved prior to TOD.    Problem: Coping, Compromised Individual (Adult,Obstetrics,Pediatric)  Goal: Effective Coping  Patient will demonstrate the desired outcomes by discharge/transition of care.   Outcome: Outcome(s) achieved Date Met:  17   Coping, Compromised Individual (Adult,Obstetrics,Pediatric)   Effective Coping achieves outcome         Problem: Cardiac Output Decreased (Adult)  Goal: Adequate Cardiac Output/Effective Tissue Perfusion  Patient will demonstrate the desired outcomes by discharge/transition of care.   Outcome: Unable to achieve outcome(s) by discharge    17   Cardiac Output Decreased (Adult)   Adequate Cardiac Output/Effective Tissue Perfusion unable to achieve outcome         Problem: Ventilation, Mechanical Invasive (Adult)  Goal: Signs and Symptoms of Listed Potential Problems Will be Absent, Minimized or Managed (Ventilation, Mechanical Invasive)  Signs and symptoms of listed potential problems will be absent, minimized or managed by discharge/transition of care (reference Ventilation, Mechanical Invasive (Adult) CPG).   Outcome: Unable to achieve outcome(s) by discharge    17   Ventilation, Mechanical Invasive   Problems Assessed (Mechanical Ventilation, Invasive) all   Problems Present (Mechanical Ventilation,  Invasive) inability to wean   Pt extubated, per MD order for WOLS. Weaning unsuccessful.     Problem: Airway, Artificial (Adult)  Goal: Signs and Symptoms of Listed Potential Problems Will be Absent, Minimized or Managed (Airway, Artificial)  Signs and symptoms of listed potential problems will be absent, minimized or managed by discharge/transition of care (reference Airway, Artificial (Adult) CPG).   Outcome: Outcome(s) achieved Date Met:  02/17/17 02/17/17 1703   Airway, Artificial   Problems Assessed (Artificial Airway) all   Problems Present (Artificial Airway) none         Problem: Dying Patient, Actively (Adult)  Goal: Identify Related Risk Factors and Signs and Symptoms  Related risk factors and signs and symptoms are identified upon initiation of Human Response Clinical Practice Guideline (CPG)   Outcome: Outcome(s) achieved Date Met:  02/17/17 02/17/17 1703   Dying Patient, Actively   Related Risk Factors (Actively Dying Patient) disease progression;dyspnea;level of consciousness;ventilator dependency   Signs and Symptoms (Actively Dying Patient) awareness reduced;vital sign changes       Goal: Dying Process, Peace and Dignity  Patient will demonstrate the desired outcomes by discharge/transition of care.   Outcome: Outcome(s) achieved Date Met:  02/17/17 02/17/17 1703   Dying Patient, Actively (Adult)   Dying Process, Peace and Dignity achieves outcome

## 2017-02-17 NOTE — PROGRESS NOTES
Received patient orally intubated and on a Servo I Ventilator with settings as follows:  PRVC 10/ 500/ +5 PEEP/ 45%.  Patient has a size 7.5 ET tube in place

## 2017-02-17 NOTE — PROGRESS NOTES
Called by nurse to pronounce patient.  She had no heart sounds/pulses or spontaneous respirations.  She was declared dead at 4:34pm

## 2017-02-17 NOTE — PROGRESS NOTES
[facility] Medicine  Progress Note    Patient Name: Myra Murphy  MRN: [id number]  Patient Class: IP- Inpatient   Admission Date: 2/3/2017  Length of Stay: 13 days  Attending Physician: Tatianna Benson MD  Primary Care Provider: Lupe Delatorre MD        Subjective:     Principal Problem:Severe combined systolic and diastolic congestive heart failure    HPI:  Myra Murphy is a 81 yo woman with atrial fibrillation on OAC, chronic combined systolic and diastolic congestive heart failure (LVEF30% +DD 2/2017), h/o ER-/NE+ right breast cancer in 2008, HLD, HTN, obesity, and moderate protein calorie malnutrition who presented 2/3/2017 from [facility] with multiple issues including hypotension, left upper quadrant pain, and emesis ×2. EMS reports an initial blood pressure 52/34 and a blood glucose of 137. She was confused upon admission and was barely arousable to sternal rub.     Hospital Course:  In the emergency department she underwent routine laboratory studies, CT imaging, and EKG. She had transient episodes of hypoglycemia and she had discordant low-pressure readings in her upper extremities although this is thought to be secondary to persistent mastectomy. Multiple abnormalities on laboratory studies were identified including acute renal failure, hyperkalemia, and severe lactic acid acidosis with a lactate of greater than 12 × 2 measures. She was also found to be hypothermic. After fluid resuscitation and treatment for hypokalemia as well as administration of D50 her mental status improved.     She was admitted to the hospital for sepsis thought secondary to a UTI.  The patient initially required pressor support.  ID was consulted on 2/5 to help with management.  Heme + stools were reported by nursing staff and GI was consulted and started patient on a PPI BID. Nephrology was consulted for renal failure.  Interestingly, her urine culture was no growth. The patient had  a CT of abdomen and pelvis on 2/6 that showed a SBO that may be related to R inguinal hernia. NG tube was placed and surgery was consulted.  Cards was consulted as well to follow as the patient has a cardiac history. The hernia was easily reducible and she is a very poor candidate for operation given her comorbidities. Her abdominal pain improved and her lactic acid on 2/8 had decreased to 2.6. She remained in the ICU on pressor support which was weaned off 2/8/2017. Amiodarone infusion was transitioned to PO as her bowel function was improving. She continues to have renal insufficiency. She had some pulmonary edema on CXR 2/9/2017 though oxygenation was stable. She was given was given one dose of Lasix 20 IV 2/9. She was transferred to telemetry for further monitoring 2/9/2017.  The patient was transferred to the ICU on 2/12 for hypotension and started on pressor support.  She was started on IV amio as well for what was thought to be A-fib. Palliative care is following along. She is now DNR,per family patient never want be intubated,on empiric IV Abx.GNR on sputum,family had  meeting with palliative care,plan for withdraw today.      Interval History:  Patient is still intubated,waiting family for withdraw of care.    Review of Systems   Unable to perform ROS: Acuity of condition     Objective:     Vital Signs (Most Recent):  Temp: 97.7 °F (36.5 °C) (02/17/17 0720)  Pulse: 86 (02/17/17 0940)  Resp: (!) 0 (02/17/17 0940)  BP: (!) 79/48 (02/17/17 0905)  SpO2: 100 % (02/17/17 0940) Vital Signs (24h Range):  Temp:  [97.7 °F (36.5 °C)-98.2 °F (36.8 °C)] 97.7 °F (36.5 °C)  Pulse:  [] 86  Resp:  [0-16] 0  SpO2:  [89 %-100 %] 100 %  BP: (56-94)/(29-58) 79/48  Arterial Line BP: ()/(50-72) 106/54     Weight: 86.5 kg (190 lb 11.2 oz)  Body mass index is 37.24 kg/(m^2).    Intake/Output Summary (Last 24 hours) at 02/17/17 1110  Last data filed at 02/17/17 0900   Gross per 24 hour   Intake          1501.78 ml    Output             2715 ml   Net         -1213.22 ml      Physical Exam   Constitutional: She appears well-developed and well-nourished.   HENT:   Head: Normocephalic.   Cardiovascular: Normal rate.    Pulmonary/Chest: She has no wheezes.   Increased work of breathing    Abdominal: Soft.   Neurological: She is alert.   Skin: Skin is warm and dry.   Vitals reviewed.      Significant Labs:   BMP:     Recent Labs  Lab 02/16/17 0116   *   *   K 3.7   CL 95   CO2 24   BUN 76*   CREATININE 2.5*   CALCIUM 8.0*   MG 1.6     CBC:     Recent Labs  Lab 02/16/17 0117   WBC 19.29*   HGB 9.6*   HCT 29.9*   *       Significant Imaging:    Assessment/Plan:      * Severe combined systolic and diastolic congestive heart failure  Patient's initial presentation very consistent with exacerbation of heart failure causing congestive hepatopathy. This resulted in coagulopathy, especially while on anticoagulants as well, shock, hypoglycemia and acute decompensation overall. Patient's advanced illness goes against invasive treatment for such significant cardiomyopathy. We are limited to conservative treatment only. So far, fairly stabilized from this. Aiming for HR control and diuresis as tolerated by BP and renal function  EF is 30% by echo of this hospital stay.      Atrial fibrillation with RVR  Appreciate cards recs. On amio gtt while NPO, changed to PO 2/9/2017. Also on digoxin. Monitor lytes. Cont telemetry. Was on Coumadin as an outpatient, which was held due to presenting GI bleed. INR is therapeutic without use of anticoagulation likely due to congestive hepatopathy from heart failure. Moved to ICU on 2/12 and started on IV amio.cardiology is following.    Nonischemic dilated cardiomyopathy  Thought to be 2/2 adriamycin.      Severe mitral regurgitation  Management of CHF as above. Not candidate for invasive management    Hyperlipidemia  No acute issues. Statin when tolerating PO.    Peripheral vascular  disease  Stable. ASA & statin when tolerating PO.     Chronic venous stasis dermatitis of both lower extremities  Supportive care.  Wound care as needed.    Severe tricuspid regurgitation  Management of CHF as above. Not candidate for invasive management    Chronic kidney disease, stage 4, severely decreased GFR  Renal function slowly improving with supportive therapy. Low EF contributing to advanced renal dysfunction. Stable.nephrology is follwoing,not good candidate for HD.    Long term current use of anticoagulant therapy  On hold. She may not be a candidate to restart this due to congestive hepatopathy     DM (diabetes mellitus) type II controlled with renal manifestation  SSI PRN    Acute on chronic renal failure  Likely decompensated by heart failure. Cardiorenal syndrome? Diuresis may help but CKD will remain at around stage 4. Nephro on board. Recs very much appreciated    Encephalopathy, metabolic  Intubated and sedated at this time.    GI bleed  GI evaluated. Resolved. H/H stable. PPI for now.      UTI (urinary tract infection)  UTI with sepsis with hypothermia and hypotension requiring pressor support. Blood cultures neg. Unfortunately, no urine culture ordered on admit. Appreciate ID recs for abx;was  on zosyn.    SBO (small bowel obstruction)  May have a combination of bowel edema and slow transit. NG discontinued. General surgery on board. Recs very much appreciated,no plan for intervention.on TPN.    Advanced care planning/counseling discussion  Appreciate Palliative Care's assistance. Sister (who is next of kin) up to date with patient's status and poor prognosis given advanced medical co-morbidities affecting recovery. Palliative is to formally meet with sister (currently next of kin) . DNR at this time.will have family meeting today again,apropriate for hospice,poor prognosis,with family agreement will have withdraw today.    Shock circulatory  on pressure support,continue monitor.      Acute  hypercapnic respiratory failure  Intubated duo to circulatory shock,vent managed by Pulmonology.      VTE Risk Mitigation     None          Tatianna Benson MD  Department of Hospital Medicine   [facility]

## 2017-02-17 NOTE — PROGRESS NOTES
PALLIATIVE CARE PROGRESS NOTE:    Bedside visit.  Patient very comfortable, RR 16 shallow.  Discussed with sister Caty and niece Sylvester at the bedside that patient looks too frail to travel and is likely to pass soon.  As we were talking, patient RR slowed, HR slowed.  Explained her passing was imminent.  Patient gradually progressed to apnea with asystole.  Very peaceful death. Emotional support provided to family who expressed appreciation for care and concern given.      Notified Dr. Benavidez who came to pronounce patient,  .      ZBIGNIEW GrantN, RN, CCRN   Palliative Care Nurse Coordinator   [facility]  [phone]

## 2017-02-17 NOTE — SUBJECTIVE & OBJECTIVE
Interval History:  Patient is still intubated,waiting family for withdraw of care.    Review of Systems   Unable to perform ROS: Acuity of condition     Objective:     Vital Signs (Most Recent):  Temp: 97.7 °F (36.5 °C) (02/17/17 0720)  Pulse: 86 (02/17/17 0940)  Resp: (!) 0 (02/17/17 0940)  BP: (!) 79/48 (02/17/17 0905)  SpO2: 100 % (02/17/17 0940) Vital Signs (24h Range):  Temp:  [97.7 °F (36.5 °C)-98.2 °F (36.8 °C)] 97.7 °F (36.5 °C)  Pulse:  [] 86  Resp:  [0-16] 0  SpO2:  [89 %-100 %] 100 %  BP: (56-94)/(29-58) 79/48  Arterial Line BP: ()/(50-72) 106/54     Weight: 86.5 kg (190 lb 11.2 oz)  Body mass index is 37.24 kg/(m^2).    Intake/Output Summary (Last 24 hours) at 02/17/17 1110  Last data filed at 02/17/17 0900   Gross per 24 hour   Intake          1501.78 ml   Output             2715 ml   Net         -1213.22 ml      Physical Exam   Constitutional: She appears well-developed and well-nourished.   HENT:   Head: Normocephalic.   Cardiovascular: Normal rate.    Pulmonary/Chest: She has no wheezes.   Increased work of breathing    Abdominal: Soft.   Neurological: She is alert.   Skin: Skin is warm and dry.   Vitals reviewed.      Significant Labs:   BMP:     Recent Labs  Lab 02/16/17 0116   *   *   K 3.7   CL 95   CO2 24   BUN 76*   CREATININE 2.5*   CALCIUM 8.0*   MG 1.6     CBC:     Recent Labs  Lab 02/16/17 0117   WBC 19.29*   HGB 9.6*   HCT 29.9*   *       Significant Imaging:

## 2017-02-17 NOTE — ASSESSMENT & PLAN NOTE
UTI with sepsis with hypothermia and hypotension requiring pressor support. Blood cultures neg. Unfortunately, no urine culture ordered on admit. Appreciate ID recs for abx;was  on zosyn.

## 2017-02-17 NOTE — PLAN OF CARE
Problem: Patient Care Overview  Goal: Plan of Care Review  Outcome: Ongoing (interventions implemented as appropriate)  Pt remains in ICU, intubated and sedated on propofol. Levophed/amiodarone transfusing, no escalation of care per family and MD. Vitals currently stable. Daniel in place with adequate urine output, daniel leaking. TLC and a-line remain intact and functional. No new injury, safety and skin integrity maintained. Plan to withdraw care today.

## 2017-02-17 NOTE — PROGRESS NOTES
Discharge plan update: spoke with Omayra with [facility]. Weekend contact information--if patient stabilizes for discharge, SW/CM can call [phone], fax [phone]. Will alert weekend SW/CM team as well.

## 2017-02-17 NOTE — ASSESSMENT & PLAN NOTE
Appreciate Palliative Care's assistance. Sister (who is next of kin) up to date with patient's status and poor prognosis given advanced medical co-morbidities affecting recovery. Palliative is to formally meet with sister (currently next of kin) . DNR at this time.will have family meeting today again,apropriate for hospice,poor prognosis,with family agreement will have withdraw today.

## 2017-02-17 NOTE — PROGRESS NOTES
Hospice--inpatient bed availability: spoke with Ladnon with [facility] this am--will not have bed today. Spoke with Omayra with [facility]--will have bed, not private room. Added [facility] to EPIC, post acute provider. Omayra informs is on [email or web address] at this time and can meet with family when they are available. RACHELL will follow up with Palliative Care RN Michelle to determine time.   11:15 RACHELL spoke with sister/HCPOA Caty Figueroa ([phone]) to update regarding hospice availability. Ms Real indicates that she is in route to the hospital, expects to be here within 30 minutes. She informs can meet with Omayra from  following this. RACHELL contacted Omayra ([phone]) who will come to this hospital in about half hour. RACHELL will follow in ICU and assist as needed.   1 pm--accepted by  in event patient is stable for this. To contact Omayra once confirmed and orders written.

## 2017-02-17 NOTE — PROGRESS NOTES
Placed patient on CPAP +5/10PS,.45. Spontaneous RR 38, Vt 220. Placed patient SIMV 8/500/+5/10/.45. Spont RR 34.  Increased set RR to 10. Spont RR 26, spont Vt 200.

## 2017-02-17 NOTE — PLAN OF CARE
17 1753   Final Note   Assessment Type Final Discharge Note   Discharge Disposition    SW notified Omayra at [facility] follow death of patient.

## 2017-02-17 NOTE — PROGRESS NOTES
1300- Family meeting with hospice rep and palliative care, agreed to proceed with extubation. Plan of care for [facility] explained in detail to family and patient. Comfort provided.   1340- pt extubated to 2 L NC. Pt initially presented tachypneic, medication provided per MD order.  1500- placed on 40% VM for increased comfort. Pt now with RR < 20, improved and decreased WOB, no signs of distress. Patient appears very peaceful and calm. Family incorporated into plan of care and verbalized they agreed that the patient looked very comfortable.  1634- Dr. Benavidez called to bedside to pronounce patient, TOD 1634. No visible respirations, no palpable pulse, no BP.  Family and Palliative care at bedside, comfort provided.

## 2017-02-17 NOTE — PROGRESS NOTES
PALLIATIVE CARE PROGRESS NOTE:    Met with patient's sister Caty.  She is agreeable to withdrawal of life support today, tearful with anticipatory grief.  Emotional support provided.  She signed hospice with [facility] this morning also.      Plan:     WITHDRAWAL OF LIFE SUPPORT per protocol orders.   COMFORT.   INPATIENT HOSPICE (if needed).    ZBIGNIEW GrantN, RN, CCRN   Palliative Care Nurse Coordinator   [facility]  [phone]

## 2017-02-18 NOTE — DISCHARGE SUMMARY
[facility] Hospital Medicine  Discharge Summary      Patient Name: Myra Murphy  MRN: [id number]  Admission Date: 2/3/2017  Hospital Length of Stay: 13 days  Discharge Date and Time:  02/17/2017 6:24 PM  Attending Physician: Tatianna Benson MD   Discharging Provider: Tatianna Benson MD  Primary Care Provider: Lupe Delatorre MD      HPI:   Myra Murphy is a 83 yo woman with atrial fibrillation on OAC, chronic combined systolic and diastolic congestive heart failure (LVEF30% +DD 2/2017), h/o ER-/[place]+ right breast cancer in 2008, HLD, HTN, obesity, and moderate protein calorie malnutrition who presented 2/3/2017 from [facility] with multiple issues including hypotension, left upper quadrant pain, and emesis ×2. EMS reports an initial blood pressure 52/34 and a blood glucose of 137. She was confused upon admission and was barely arousable to sternal rub.     * No surgery found *      Indwelling Lines/Drains at time of discharge:   Lines/Drains/Airways     Central Venous Catheter Line                 CVC Triple Lumen - PreSep Cath 02/04/17 0039 right internal jugular 13 days          Drain                 Urethral Catheter 02/03/17 2210 13 days          Arterial Line                 Arterial Line 02/13/17 4 days              Hospital Course:   In the emergency department she underwent routine laboratory studies, CT imaging, and EKG. She had transient episodes of hypoglycemia and she had discordant low-pressure readings in her upper extremities although this is thought to be secondary to persistent mastectomy. Multiple abnormalities on laboratory studies were identified including acute renal failure, hyperkalemia, and severe lactic acid acidosis with a lactate of greater than 12 × 2 measures. She was also found to be hypothermic. After fluid resuscitation and treatment for hypokalemia as well as administration of D50 her mental status improved.     She was admitted to the  hospital for sepsis thought secondary to a UTI.  The patient initially required pressor support.  ID was consulted on  to help with management.  Heme + stools were reported by nursing staff and GI was consulted and started patient on a PPI BID. Nephrology was consulted for renal failure.  Interestingly, her urine culture was no growth. The patient had a CT of abdomen and pelvis on  that showed a SBO that may be related to R inguinal hernia. NG tube was placed and surgery was consulted.  Cards was consulted as well to follow as the patient has a cardiac history. The hernia was easily reducible and she is a very poor candidate for operation given her comorbidities. Her abdominal pain improved and her lactic acid on  had decreased to 2.6. She remained in the ICU on pressor support which was weaned off 2017. Amiodarone infusion was transitioned to PO as her bowel function was improving. She continues to have renal insufficiency. She had some pulmonary edema on CXR 2017 though oxygenation was stable. She was given one dose of Lasix 20 IV . She was transferred to telemetry for further monitoring 2017.  The patient was transferred to the ICU on  for hypotension and started on pressor support.  She was started on IV amio as well for what was thought to be A-fib. Palliative care is following along. She was then DNR,per family patient never want be intubated,she was on empiric IV Abx.,family had  meeting with palliative care for multiorgan failure,she could not be extubated and remains intubated,all speciality were agree with Hospice and withdraw of care,she was also not candidate for HD.after long discussion with family family agree with withdraw of care,patient  peasefully on comfort care on 17       Consults:   Consults         Status Ordering Provider     Inpatient consult to Cardiology  Once     Provider:  Delano Leos MD    Completed CAL CASTAÑEDA     Inpatient  consult to Cardiology  Once     Provider:  Delano Leos MD    Completed CAL CASTAÑEDA     Inpatient consult to Dietary  Once     Provider:  (Not yet assigned)    Completed NATALY MONTE     Inpatient consult to Gastroenterology  Once     Provider:  Dylan Aguilar MD    Acknowledged CAL CASTAÑEDA     Inpatient consult to General Surgery  Once     Provider:  Sid Collier MD    Acknowledged CAL CASTAÑEDA     Inpatient consult to Infectious Diseases  Once     Provider:  Chani Cam MD    Completed CAL CASTAÑEDA     Inpatient consult to Nephrology  Once     Provider:  Radha Gonzalez MD    Acknowledged CAL CASTAÑEDA     Inpatient consult to Palliative Care  Once     Provider:  Rebeca Erwin RN    Completed NATALY MONTE HMarti     Inpatient consult to Pulmonology  Once     Provider:  Florentin Farah MD    Completed CAL CASTAÑEDA     IP consult to case management  Once     Provider:  (Not yet assigned)    Completed CAL CASTAÑEDA     Nutrition Services Referral  Once     Provider:  (Not yet assigned)    Completed CAL CASTAÑEDA          Significant Diagnostic Studies: Labs:   BMP:   Recent Labs  Lab 02/16/17  0116   *   *   K 3.7   CL 95   CO2 24   BUN 76*   CREATININE 2.5*   CALCIUM 8.0*   MG 1.6    and CBC   Recent Labs  Lab 02/16/17  0117   WBC 19.29*   HGB 9.6*   HCT 29.9*   *     Microbiology:   Blood Culture   Lab Results   Component Value Date    LABBLOO No growth after 5 days. 02/03/2017   , Sputum Culture   Lab Results   Component Value Date    GSRESP <10 epithelial cells per low power field. 02/13/2017    GSRESP Few WBC's 02/13/2017    GSRESP Few budding yeast 02/13/2017    GSRESP Few Gram positive cocci in pairs 02/13/2017    RESPIRATORYC KAYDEN ALBICANS  Many   02/13/2017    RESPIRATORYC STENOTROPHOMONAS (X.) MALTOPHILIA  Moderate   02/13/2017    and Urine Culture    Lab Results   Component Value Date    LABURIN No  growth 02/05/2017     Radiology: X-Ray: CXR: X-Ray Chest 1 View (CXR):   Results for orders placed or performed during the hospital encounter of 02/03/17   X-Ray Chest 1 View    Narrative    Views: Portable    There is a right internal saline. The cardiac silhouette is enlarged. There is patchy opacification of the pulmonary parenchyma. There is no pneumothorax.    Impression     There are findings suggesting edema versus pneumonia.      Electronically signed by: ANDRIA CASANOVA MD  Date:     02/13/17  Time:    13:01        Pending Diagnostic Studies:     None        Final Active Diagnoses:    Diagnosis Date Noted POA    PRINCIPAL PROBLEM:  Severe combined systolic and diastolic congestive heart failure [I50.40] 05/11/2014 Yes     Chronic    Acute hypercapnic respiratory failure [J96.02] 02/14/2017 Yes    Shock circulatory [R57.9] 02/13/2017 No    Advanced care planning/counseling discussion [Z71.89] 02/09/2017 Not Applicable    SBO (small bowel obstruction) [K56.69] 02/06/2017 Yes    GI bleed [K92.2] 02/05/2017 Yes    UTI (urinary tract infection) [N39.0] 02/05/2017 Yes    Acute on chronic renal failure [N17.9, N18.9] 02/04/2017 Yes    Encephalopathy, metabolic [G93.41] 02/04/2017 Yes    DM (diabetes mellitus) type II controlled with renal manifestation [E11.29] 11/28/2016 Yes    Long term current use of anticoagulant therapy [Z79.01] 05/13/2014 Not Applicable    Chronic kidney disease, stage 4, severely decreased GFR [N18.4] 05/11/2014 Yes     Chronic    Severe tricuspid regurgitation [I07.1] 06/28/2013 Yes    Peripheral vascular disease [I73.9] 01/21/2013 Yes     Chronic    Chronic venous stasis dermatitis of both lower extremities [I83.11, I83.12] 01/21/2013 Yes     Chronic    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] 11/08/2012 Yes     Chronic    Severe mitral regurgitation [I34.0] 11/08/2012 Yes     Chronic    Hyperlipidemia [E78.5] 11/08/2012 Yes    Atrial fibrillation with RVR [I48.91]  10/03/2012 Yes      Problems Resolved During this Admission:    Diagnosis Date Noted Date Resolved POA    Shock [R57.9] 2017 Yes    Hyperkalemia, diminished renal excretion [E87.5] 2017 Yes    Supratherapeutic INR [R79.1] 2017 Yes    Lactic acid acidosis [E87.2] 2017 Yes    Hypothermia [T68.XXXA] 2017 Yes      No new Assessment & Plan notes have been filed under this hospital service since the last note was generated.  Service: Hospital Medicine      Discharged Condition:     Disposition:     Follow Up:N/A    Patient Instructions:   No discharge procedures on file.  Medications:  Reconciled Home Medications:   Current Discharge Medication List      CONTINUE these medications which have NOT CHANGED    Details   allopurinol (ZYLOPRIM) 100 MG tablet Take 1 tablet (100 mg total) by mouth once daily.  Qty: 270 tablet, Refills: 1    Comments: **Patient requests 90 days supply**  Associated Diagnoses: Gout of foot, unspecified cause, unspecified chronicity, unspecified laterality      fluticasone (FLONASE) 50 mcg/actuation nasal spray 1 spray by Each Nare route once daily.  Qty: 16 g, Refills: 0    Associated Diagnoses: Cough      furosemide (LASIX) 40 MG tablet Take 2 tablets (80 mg total) by mouth once daily.  Qty: 180 tablet, Refills: 3    Associated Diagnoses: Essential hypertension; Edema of lower extremity, unspecified laterality      letrozole (FEMARA) 2.5 mg Tab Take 1 tablet (2.5 mg total) by mouth once daily.  Qty: 90 tablet, Refills: 1    Associated Diagnoses: History of breast cancer      metoprolol succinate (TOPROL-XL) 100 MG 24 hr tablet Take 1 tablet (100 mg total) by mouth once daily.    Associated Diagnoses: Chronic atrial fibrillation; Essential hypertension      omeprazole (PRILOSEC) 20 MG capsule Take 1 capsule (20 mg total) by mouth once daily.  Qty: 90 capsule, Refills: 1    Comments: **Patient  requests 90 days supply**  Associated Diagnoses: Gastroesophageal reflux disease without esophagitis      potassium chloride SA (K-DUR,KLOR-CON) 20 MEQ tablet Take 20 mEq by mouth once daily.      pravastatin (PRAVACHOL) 40 MG tablet Take 1 tablet (40 mg total) by mouth once daily.  Qty: 90 tablet, Refills: 1    Comments: **Patient requests 90 days supply**  Associated Diagnoses: Hyperlipidemia, unspecified hyperlipidemia type      acetaminophen (TYLENOL) 325 MG tablet Take 2 tablets (650 mg total) by mouth every 6 (six) hours as needed for Pain. Do NOT exceed 3000 mg in a 24 hour time period and do not take with other medications containing acetaminophen  Do NOT drink alcohol when taking this medication  Refills: 0    Associated Diagnoses: Debility      senna-docusate 8.6-50 mg (PERICOLACE) 8.6-50 mg per tablet Take 1 tablet by mouth daily as needed for Constipation.    Associated Diagnoses: Debility      warfarin (COUMADIN) 1 MG tablet Take 1 tablet (1 mg total) by mouth Daily. Adjust to keep INR between 2.0-3.0  Qty: 240 tablet, Refills: 6    Comments: **Patient requests 90 days supply**  Associated Diagnoses: Chronic atrial fibrillation           Time spent on the discharge of patient: 30  minutes    Tatianna Benson MD  Department of Hospital Medicine  [facility]

## 2017-02-20 ENCOUNTER — TELEPHONE (OUTPATIENT)
Dept: INTERNAL MEDICINE | Facility: CLINIC | Age: 82
End: 2017-02-20

## 2017-03-03 ENCOUNTER — TELEPHONE (OUTPATIENT)
Dept: INTERNAL MEDICINE | Facility: CLINIC | Age: 82
End: 2017-03-03

## 2017-03-03 NOTE — TELEPHONE ENCOUNTER
----- Message from Tushar Rivera sent at 3/3/2017  3:16 PM CST -----  Contact: Corky Swain  Home [phone]  Corky called and stated, are you going to signed the death certificate for the above pt? Advice    Thanks

## 2017-03-06 ENCOUNTER — TELEPHONE (OUTPATIENT)
Dept: INTERNAL MEDICINE | Facility: CLINIC | Age: 82
End: 2017-03-06

## 2017-03-06 NOTE — TELEPHONE ENCOUNTER
----- Message from Tayler Lynn sent at 3/6/2017  8:10 AM CST -----  Contact: Nicolas [facility]/ Corky [phone]  [facility] is waiting for a reply to the last message regarding whether or not  will sign this patient's death certificate. They are waiting to do the cremation.Please call today.

## 2017-03-06 NOTE — TELEPHONE ENCOUNTER
----- Message from Obdulio Gonzales MA sent at 3/6/2017  8:23 AM CST -----  Contact: Deanna smith/Tanya Mcqueen [facility] - [phone]  Please advise if Dr Delatorre will song the Death Certificate for Cremation. Please call. Thanks!

## 2017-03-07 ENCOUNTER — ANTI-COAG VISIT (OUTPATIENT)
Dept: CARDIOLOGY | Facility: CLINIC | Age: 82
End: 2017-03-07

## 2017-03-07 DIAGNOSIS — I48.91 ATRIAL FIBRILLATION WITH RVR: ICD-10-CM

## 2017-03-07 DIAGNOSIS — Z79.01 ANTICOAGULATION MONITORING BY PHARMACIST: ICD-10-CM

## 2017-03-08 ENCOUNTER — TELEPHONE (OUTPATIENT)
Dept: INTERNAL MEDICINE | Facility: CLINIC | Age: 82
End: 2017-03-08

## 2017-03-08 NOTE — TELEPHONE ENCOUNTER
----- Message from Emilia Yi MA sent at 3/8/2017 11:09 AM CST -----  Contact: Matthew smith/Tim  Hyck-[phone]  Please advise the status of the Pt's Death Certificate that was dropped off on Yesterday. Please call. Thanks!

## 2018-01-15 NOTE — PT/OT/SLP PROGRESS
Established Patient   SUBJECTIVE:  Patient ID: Mirna Pollock is a 44 y.o. female.  Chief Complaint: Headache and Follow-up    History of Present Illness:  Mirna Pollock is a 44 y.o. female with chronic migraine, HTN, insomnia, anxiety, depression, and CTS, who presents to clinic alone for follow-up of headaches.     Recommendations made at last Office Visit on 10/19/2017:  - Will again seek approval for Botox for Chronic migraine   - Does not feel adjustments in her medications are necessary at this time  - Offered restarting Topamax, she would rather try Botox first   - Continue tracking headaches   - Discussed goals of therapy are to decrease the frequency, intensity, and duration of headaches  - Follow up in 1 month to start Botox     01/15/2018 - Interval History:  She was approved for Botox injections, but was a no-show to her appointment last week.  States she spoke with her  about the injections and she was very nervous to try the Botox injections for her migraines.    She continues to experience headaches on 1-4 days per week with migraines occurring 1-2 days per week.  Recently she has been experiencing more migraines and headaches than usually, but admits she has been going through a lot emotionally as well as she got sick with the flu.  She has continued to regularly follow-up with her psychiatrist who changed her anti-depressant, she is now taking Cymbalta.  Since change in medication, she feels she has been experiencing a mild headache each day.  She has had to miss a few days of work due to her migraines since last visit.  Additionally, she feels occasionally she begins to get dizzy, this occurs with or without headaches, she has an appointment with her eye doctor next week as her vision is also getting progressively worse.  She did not find vistaril to be useful with regards to her headaches.  She does feel Zanaflex helps with her neck as well as helping to relax her neck muscles.  When  Physical Therapy  Treatment    Myra Murphy   MRN: [id number]   Admitting Diagnosis: Paroxysmal atrial fibrillation with rapid ventricular response    PT Received On: 17  PT Start Time: 1334     PT Stop Time: 1420    PT Total Time (min): 46 min       Billable Minutes:  Therapeutic Activity 30 and Therapeutic Exercise 16    Treatment Type: Treatment  PT/PTA: PTA     PTA Visit Number: 4       General Precautions: Standard, fall  Orthopedic Precautions: N/A   Braces:           Subjective:  Communicated with ns prior to session.  Pt requesting to wash her face and brush her teeth once sitting up EOB.    Pain Ratin/10              Pain Rating Post-Intervention: 0/10    Objective:   Patient found with: oxygen, telemetry, peripheral IV    Functional Mobility:  Bed Mobility:   Rolling/Turning to Left: Moderate assistance, With side rail  Rolling/Turning Right: Moderate assistance, With side rail  Scooting/Bridging: Total Assistance  Supine to Sit: Moderate Assistance, With side rail  Sit to Supine: With leg lift, Maximum Assistance    Transfers:  Sit <> Stand Assistance: Maximum Assistance (x 2 people 2/2 pt has fear of falling)  Sit <> Stand Assistive Device: Rolling Walker    Gait:   Gait Distance: pt stood only x 3 trials (:10 sec ea) w/ max.A x 2 for ea., pt resisting into extension when attempting to stand up.   Assistance 1: Maximum assistance (x 2 people)  Gait Assistive Device: Rolling walker    Stairs:  n/a    Balance:   Static Sit: FAIR+: Able to take MINIMAL challenges from all directions  Dynamic Sit: FAIR+: Maintains balance through MINIMAL excursions of active trunk motion  Static Stand: 0: Needs MAXIMAL assist to maintain   Dynamic stand: 0: N/A     Therapeutic Activities and Exercises:  Pt sat up EOB ~30 min w/ SBA, perf'd ADL's of washing her face and brushing her teeth w/ SBA only. Pt. O2:96% on RA, HR:66. Pt perf'd seated LAQ's x 10 ea. Sit to stand x 3 attempts (PCT assisting) w/ RW and max.A x  2, pt pushing posteriorly and crying out at times. Pt. Stood w/ LE's propped against bedframe w/ knees locked in extension and forward flexed trunk, :10 sec. Ea trial . Ret'd to supine w/ max.A x 2, Dep x 2 slide up to HOB. Perf'd AAROm ex's of  AP's,heelsides, and hip abd/add x 5 reps ea.    AM-PAC 6 CLICK MOBILITY  How much help from another person does this patient currently need?   1 = Unable, Total/Dependent Assistance  2 = A lot, Maximum/Moderate Assistance  3 = A little, Minimum/Contact Guard/Supervision  4 = None, Modified [place]/Independent    Turning over in bed (including adjusting bedclothes, sheets and blankets)?: 2  Sitting down on and standing up from a chair with arms (e.g., wheelchair, bedside commode, etc.): 2  Moving from lying on back to sitting on the side of the bed?: 2  Moving to and from a bed to a chair (including a wheelchair)?: 2  Need to walk in hospital room?: 2  Climbing 3-5 steps with a railing?: 1  Total Score: 11    AM-PAC Raw Score CMS G-Code Modifier Level of Impairment Assistance   6 % Total / Unable   7 - 9 CM 80 - 100% Maximal Assist   10 - 14 CL 60 - 80% Moderate Assist   15 - 19 CK 40 - 60% Moderate Assist   20 - 22 CJ 20 - 40% Minimal Assist   23 CI 1-20% SBA / CGA   24 CH 0% Independent/ Mod I     Patient left HOB elevated with all lines intact, call button in reach, bed alarm on, ns notified and sister present.    Assessment:  Myra Murphy is a 82 y.o. female with a medical diagnosis of Paroxysmal atrial fibrillation with rapid ventricular response and presents with dec mobility. Pt. Seems to have a fear of falling, which is limiting her ability to progress to ambulating. Needs 2 people present for t/f's..    Rehab identified problem list/impairments: Rehab identified problem list/impairments: weakness, impaired endurance, impaired self care skills, impaired functional mobilty, gait instability, impaired balance, decreased safety awareness (fear of  she gets a migraine she has been taking Vistaril and Ibuprofen together which does help the pain, she also has been trying Excedrin migraines which also gives her some relief.  Patient with known HTN on multiple anti-hypertensive agents, blood pressure significantly elevated in clinic today.  She is not symptomatic.  States she needs to follow-up with her primary care (who is outside the [facility] system) for better management.      Current Medications:    amlodipine (NORVASC) 10 MG tablet, TK 1 T PO QD, Disp: , Rfl: 5    cloNIDine (CATAPRES) 0.2 MG tablet, 0.2 mg 3 (three) times daily. , Disp: , Rfl: 2    DULoxetine (CYMBALTA) 30 MG capsule, Take 1 capsule (30 mg total) by mouth once daily., Disp: 30 capsule, Rfl: 0    hydrochlorothiazide (HYDRODIURIL) 25 MG tablet, Take 25 mg by mouth once daily.  , Disp: , Rfl:     hydrocodone-acetaminophen 5-325mg (NORCO) 5-325 mg per tablet, Take 1 tablet by mouth nightly as needed for Pain (as needed for pain)., Disp: 60 tablet, Rfl: 0    [START ON 3/5/2018] lisdexamfetamine (VYVANSE) 50 MG capsule, Take 1 capsule (50 mg total) by mouth every morning., Disp: 30 capsule, Rfl: 0    nebivolol (BYSTOLIC) 10 MG Tab, Take 2 tablets (20 mg total) by mouth once daily., Disp: 60 tablet, Rfl: 11    promethazine (PHENERGAN) 25 MG tablet, Take 1 tab by mouth every 4-6 hours as needed for nausea., Disp: 20 tablet, Rfl: 2    tiZANidine (ZANAFLEX) 2 MG tablet, 1-2 tabs at bedtime.  No alcohol or driving with medication., Disp: 60 tablet, Rfl: 5    zolpidem (AMBIEN) 10 mg Tab, Take 1 tablet (10 mg total) by mouth nightly as needed (insomnia)., Disp: 30 tablet, Rfl: 2    candesartan (ATACAND) 8 MG tablet, Take 2 tablets (16 mg total) by mouth once daily., Disp: 60 tablet, Rfl: 11    DULoxetine (CYMBALTA) 60 MG capsule, Take 1 capsule (60 mg total) by mouth once daily., Disp: 30 capsule, Rfl: 2    hydrOXYzine pamoate (VISTARIL) 25 MG Cap, 1 capsule as needed for headache rescue,  "Disp: 12 capsule, Rfl: 5    ketorolac 15.75 mg/spray Spry, 15.75 mg by Nasal route every 6 to 8 hours as needed., Disp: 5 each, Rfl: 5    Current Facility-Administered Medications:     onabotulinumtoxina injection 200 Units, 200 Units, Intramuscular, Q90 Days, BC Fleming    Review of Systems - as per HPI, otherwise a balanced 10 systems review is negative.    OBJECTIVE:  Vitals:  BP (!) 195/124 (BP Location: Left arm, Patient Position: Sitting, BP Method: Large (Automatic))   Pulse 79   Ht 5' 5" (1.651 m)   Wt 67.5 kg (148 lb 13 oz)   LMP 12/14/2000   BMI 24.76 kg/m²    BP rechecked manually - 172/104    Physical Exam:  Constitutional: she appears well-developed and well-nourished. she is well groomed. NAD   HENT:    Head: Normocephalic and atraumatic  Eyes: Conjunctivae and EOM are normal  Musculoskeletal: Normal range of motion. No joint stiffness.   Skin: Skin is warm and dry.  Psychiatric: Mood and affect are normal    Neuro: Patient is awake, alert, and oriented to person, place, and time. Language is intact and fluent.  Recent and remote memory are intact.  Normal attention and concentration.  Facial movement is symmetric.  Gait is normal.     Review of Data:   Notes from Psychiatry and ENT   Labs:  No visits with results within 3 Month(s) from this visit.   Latest known visit with results is:   Admission on 09/28/2017, Discharged on 09/28/2017   Component Date Value Ref Range Status    WBC 09/28/2017 11.94  3.90 - 12.70 K/uL Final    RBC 09/28/2017 4.77  4.00 - 5.40 M/uL Final    Hemoglobin 09/28/2017 14.6  12.0 - 16.0 g/dL Final    Hematocrit 09/28/2017 42.6  37.0 - 48.5 % Final    MCV 09/28/2017 89  82 - 98 fL Final    MCH 09/28/2017 30.6  27.0 - 31.0 pg Final    MCHC 09/28/2017 34.3  32.0 - 36.0 g/dL Final    RDW 09/28/2017 12.7  11.5 - 14.5 % Final    Platelets 09/28/2017 382* 150 - 350 K/uL Final    MPV 09/28/2017 9.7  9.2 - 12.9 fL Final    Gran # 09/28/2017 8.8* 1.8 - 7.7 " falling)    Rehab potential is good.    Activity tolerance: Good    Discharge recommendations: Discharge Facility/Level Of Care Needs: nursing facility, skilled     Barriers to discharge: Barriers to Discharge: Decreased caregiver support    Equipment recommendations: Equipment Needed After Discharge:  (TBD)     GOALS:   Physical Therapy Goals        Problem: Physical Therapy Goal    Goal Priority Disciplines Outcome Goal Variances Interventions   Physical Therapy Goal     PT/OT, PT Ongoing (interventions implemented as appropriate)     Description:  Goals to be met by: 2017     Patient will increase functional independence with mobility by performin. Supine to sit with Moderate Assistance  2. Sit to stand transfer with Minimal Assistance  3. Bed to chair transfer with Moderate Assistance using Rolling Walker  4. Gait  x 20 feet with Moderate Assistance using Rolling Walker.                 PLAN:    Patient to be seen 5 x/week  to address the above listed problems via therapeutic activities, therapeutic exercises, gait training  Plan of Care expires: 17  Plan of Care reviewed with: patient, sibling         Summer Hopson, PTA  2017     K/uL Final    Lymph # 09/28/2017 1.7  1.0 - 4.8 K/uL Final    Mono # 09/28/2017 1.0  0.3 - 1.0 K/uL Final    Eos # 09/28/2017 0.2  0.0 - 0.5 K/uL Final    Baso # 09/28/2017 0.08  0.00 - 0.20 K/uL Final    Gran% 09/28/2017 73.2* 38.0 - 73.0 % Final    Lymph% 09/28/2017 14.6* 18.0 - 48.0 % Final    Mono% 09/28/2017 8.5  4.0 - 15.0 % Final    Eosinophil% 09/28/2017 1.6  0.0 - 8.0 % Final    Basophil% 09/28/2017 0.7  0.0 - 1.9 % Final    Differential Method 09/28/2017 Automated   Final    Sodium 09/28/2017 137  136 - 145 mmol/L Final    Potassium 09/28/2017 3.3* 3.5 - 5.1 mmol/L Final    Chloride 09/28/2017 101  95 - 110 mmol/L Final    CO2 09/28/2017 24  23 - 29 mmol/L Final    Glucose 09/28/2017 97  70 - 110 mg/dL Final    BUN, Bld 09/28/2017 16  6 - 20 mg/dL Final    Creatinine 09/28/2017 0.8  0.5 - 1.4 mg/dL Final    Calcium 09/28/2017 9.5  8.7 - 10.5 mg/dL Final    Total Protein 09/28/2017 8.3  6.0 - 8.4 g/dL Final    Albumin 09/28/2017 3.8  3.5 - 5.2 g/dL Final    Total Bilirubin 09/28/2017 1.2* 0.1 - 1.0 mg/dL Final    Alkaline Phosphatase 09/28/2017 105  55 - 135 U/L Final    AST 09/28/2017 32  10 - 40 U/L Final    ALT 09/28/2017 24  10 - 44 U/L Final    Anion Gap 09/28/2017 12  8 - 16 mmol/L Final    eGFR if African American 09/28/2017 >60.0  >60 mL/min/1.73 m^2 Final    eGFR if non African American 09/28/2017 >60.0  >60 mL/min/1.73 m^2 Final    BNP 09/28/2017 14  0 - 99 pg/mL Final    aPTT 09/28/2017 24.1  21.0 - 32.0 sec Final    D-Dimer 09/28/2017 0.54* <0.50 mg/L FEU Final    Lipase 09/28/2017 9  4 - 60 U/L Final    Magnesium 09/28/2017 2.1  1.6 - 2.6 mg/dL Final    Prothrombin Time 09/28/2017 10.1  9.0 - 12.5 sec Final    INR 09/28/2017 0.9  0.8 - 1.2 Final    Troponin I 09/28/2017 <0.006  0.000 - 0.026 ng/mL Final    TSH 09/28/2017 1.507  0.400 - 4.000 uIU/mL Final    Specimen UA 09/28/2017 Urine, Clean Catch   Final    Color, UA 09/28/2017 Yellow  Yellow, Straw,  Janie Final    Appearance, UA 09/28/2017 Hazy* Clear Final    pH, UA 09/28/2017 6.0  5.0 - 8.0 Final    Specific Gravity, UA 09/28/2017 1.010  1.005 - 1.030 Final    Protein, UA 09/28/2017 Negative  Negative Final    Glucose, UA 09/28/2017 Negative  Negative Final    Ketones, UA 09/28/2017 1+* Negative Final    Bilirubin (UA) 09/28/2017 Negative  Negative Final    Occult Blood UA 09/28/2017 Negative  Negative Final    Nitrite, UA 09/28/2017 Negative  Negative Final    Urobilinogen, UA 09/28/2017 Negative  <2.0 EU/dL Final    Leukocytes, UA 09/28/2017 Negative  Negative Final    POC Glucose 09/28/2017 98  70 - 110 mg/dL Final    POC BUN 09/28/2017 16  6 - 30 mg/dL Final    POC Creatinine 09/28/2017 0.7  0.5 - 1.4 mg/dL Final    POC Sodium 09/28/2017 139  136 - 145 mmol/L Final    POC Potassium 09/28/2017 3.3* 3.5 - 5.1 mmol/L Final    POC Chloride 09/28/2017 100  95 - 110 mmol/L Final    POC TCO2 (MEASURED) 09/28/2017 25  23 - 29 mmol/L Final    POC Ionized Calcium 09/28/2017 1.14  1.06 - 1.42 mmol/L Final    POC Hematocrit 09/28/2017 46  36 - 54 %PCV Final    Sample 09/28/2017 DENISSE   Final     Imaging:  Results for orders placed or performed during the hospital encounter of 09/28/17   MRV Brain Without Contrast    Narrative    Procedure: MRV of the head without contrast    Technique: Noncontrast 2-D time-of-flight MRV of the head with coronal and sagittal source imaging and 3-dimensional mip projection views.      Comparison: None    Results:The superior sagittal sinus is patent.  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  The paired internal cerebral veins and basal veins of Jay are patent.  The vein of Zac and torcula Herophili are patent.  The straight sinus is patent.  The bilateral transverse and sigmoid dural venous sinuses are patent to the jugular foramen.    Impression     Unremarkable noncontrast MRV specifically without evidence for venous  sinus thrombosis..      Electronically signed by: VIOLETTE RENTERIAMILA CUELLAR  Date:     09/28/17  Time:    10:43    MRI Brain W WO Contrast    Narrative    Procedure: MRI the brain with andwithout contrast.    Technique: Sagittal and axial T1, axial T2, axial FLAIR, axial gradient, axial diffusion imaging of the whole brain pre-contrast with postcontrast axial T1 and axial spoiled gradient imaging reformatted in the coronal and sagittal planes.. 10 ml of Gadavist injected intravenously.         Comparison: None    Findings: The brain parenchyma is normal in contour. There is a small focus of T2 flair signal hyperintensity in the right lateral putamen suggestive for remote lacunar type infarct with diffusion hyperintensity without restriction compatible with T2 shine through. There are no abnormal areas of parenchymal enhancement. There are no areas are restricted diffusion to suggest acute infarction. A few additional punctate foci of T2 flair signal hyperintensity supratentorial white matter which are nonspecific and may represent sequela migraine headaches in light of history. The ventricles are normal in size and configuration without evidence for hydrocephalus. There are no abnormal areas of the gradient susceptibility to suggest parenchymal hemorrhage. No abnormal intra or extra axial fluid collections. The major intracranial T2  flow-voids are present.    Impression     Small T2 flair hyperintense focus right lateral putamen with diffusion hyperintensity. 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    A few scattered punctate regions of T2 flair signal hyperintensity elsewhere supratentorial parenchyma while overall nonspecific in light of the given history this may be sequela migraine headaches. Alternative differential including prior demyelination to be considered.    otherwise unremarkable MRI brain specifically without evidence for acute infarction or enhancing lesion.          Electronically signed  by: VIOLETTE CARO DO  Date:     09/28/17  Time:    10:49    MRA Brain without contrast    Narrative    Procedure: MRA of the head and neck    Technique: noncontrast 3-D time of flight MRA head and postcontrast 3-D time-of-flight MRA head and neck. 10 ml of Gadavist injected intravenously.    Comparison: None    Findings:    MRA head:    Anterior circulation:  The bilateral distal cervical, Gabby, cavernous and supraclinoid segments of the ICA's and the visualized bilateral anterior and middle cerebral arteries are patent without evidence for significant focal stenosis or aneurysm. There is a left PCOM.    Posterior circulation: The distal vertebral arteries, basilar artery and posterior cerebral arteries are patent without evidence for significant focal stenosis or aneurysm.    MRA neck: The origins of the right brachycephalic,  left common carotid and left subclavian arteries from the arch are within normal limits. The origins of the vertebral arteries from the subclavian arteries are within normal limits. The vertebral arteries are unremarkable throughout their course without evidence for focal stenosis or occlusion.    Right carotid: The right common carotid artery, carotid bifurcation and extracranial portions of the internal carotid artery are patent without evidence for focal stenosis or occlusion.    Left carotid: The left common carotid artery, carotid bifurcation and extra cranial portions of the internal carotid artery are patent without evidence for focal stenosis or occlusion.    There is less than 50% proximal ICA stenosis by NASCET criteria.    Impression     Unremarkable MRA of the head specifically without evidence for focal stenosis or intracranial aneurysm.    Unremarkable MRA of the neck without evidence for significant focal stenosis or occlusion.      Electronically signed by: VIOLETTE CARO DO  Date:     09/28/17  Time:    10:40      Note: I have independently reviewed any/all imaging/labs/tests  and agree with the report (s) as documented.  Any discrepancies will be as noted/demarcated by free text.  ARA HDZ 1/15/2018    Focused examination was undertaken today.     ASSESSMENT:  1. Chronic migraine without aura without status migrainosus, not intractable    2. Essential hypertension    3. Depression, unspecified depression type      PLAN:  - Literature on Botox injections for chronic migraine provided to patient, she will read and do research and contact office in the next few weeks to let us know how she would like to proceed with treatment   - Discussed I do feel Botox injections are the option for treatment of her headaches at this time   - Continue Zanaflex nightly to help with sleep and neck stiffness/myofascial pain as well as headache prevention   - For migraine abortive - given Sprix nasal spray    1 spray in each nostril at onset of migraine    May repeat dose every 6-8 hours as needed   - For rescue - has Vistaril available   - Refills provided of Zanaflex and Vistaril   - Advised she either go to the emergency room or to same day primary care clinic for urgent appointment for more aggressive management of her hypertension, which she is agreeable to   - Continue Cymbalta daily as advised by psychiatry for depression   - Continue tracking headaches   - Discussed goals of therapy are to decrease the frequency, intensity, and duration of headaches  - Follow up in 6 weeks or sooner if needed     Orders Placed This Encounter    tiZANidine (ZANAFLEX) 2 MG tablet    hydrOXYzine pamoate (VISTARIL) 25 MG Cap    ketorolac 15.75 mg/spray Spry     I spent 30 minutes face-to-face with the patient with >50% of the time spent with counseling and education regarding:  - results of data, diagnosis, and recommendations stated above  - risks, benefits, and potential side effects of botox injections for chronic migraine, vistaril, and sprix nasal spray discussed   - dangers associated with untreated  hypertension as well as the likelihood that elevated blood pressure is likely contributing to her headaches   - importance of healthy diet, regular exercise and sleep hygiene in the treatment of headaches     Questions and concerns were sought and answered to the patient's stated verbal satisfaction.  The patient verbalizes understanding and agreement with the above stated treatment plan.     CC: MD Luci Echols, FNP-C  [facility]   [phone]    Dr. Farfan was available during today's encounter.

## 2018-02-20 NOTE — NURSING
Notified Lizette RAMIREZ of possible 6 beat run of vtach   intact/recalls information; daughter verifies

## 2018-04-25 NOTE — PT/OT/SLP PROGRESS
"Occupational Therapy  Treatment    Myra Murphy   MRN: [id number]   Admitting Diagnosis: Paroxysmal atrial fibrillation with rapid ventricular response    OT Date of Treatment: 17   OT Start Time: 926  OT Stop Time:   OT Total Time (min): 39 min    Billable Minutes:  Self Care/Home Management 10 and Therapeutic Activity 29    General Precautions: Standard, fall  Orthopedic Precautions: N/A  Braces: N/A    Subjective:  Communicated with nurseSummer prior to session. "I'm so cold and tired!"    Pain Ratin/10              Pain Rating Post-Intervention: 0/10    Objective:  Patient found with: telemetry, oxygen     Functional Mobility:  Bed Mobility:  Rolling/Turning to Left: Maximum assistance  Scooting/Bridging: Total Assistance  Supine to Sit: Maximum Assistance, WIth side rail  Sit to Supine: Maximum Assistance, With side rail    Transfers: N/A    Functional Ambulation: N/A    Activities of Daily Living:  Grooming Position: Seated, EOB  Grooming Level of Assistance: Stand by assistance    Balance:   Static Sit: FAIR: Maintains without assist, but unable to take any challenges   Dynamic Sit: FAIR: Cannot move trunk without losing balance    Therapeutic Activities and Exercises:  Patient with increased c/o throughout session - "hand hurts, hip is bad, cancer in arm". Very minimal initiation of movement for bed mobility. When SAMEERA provided (A), patient c/o pain and the way she was moved. Sup > sit with max (A) and max VCs for sequencing - increased time for initiation. Total (A) to EOB after patient given time to initiate and (A) with scoot. Sat EOB x 25 mins with SBA and 1 bout of CGA 2* patient with lateral lean to L. Patient able to correct to midline and maintain after VCs - stated "I always lean to the left. Y'all don't know anything". Patient able to wash face with washcloth with SBA. Declined oral care, completed already, though nsg did not confirm. Patient declining all other tx ideas - returned " to supine with Max (A) for BLEs. Total (A) of 2 to scoot to HOB.     AM-PAC 6 CLICK ADL   How much help from another person does this patient currently need?   1 = Unable, Total/Dependent Assistance  2 = A lot, Maximum/Moderate Assistance  3 = A little, Minimum/Contact Guard/Supervision  4 = None, Modified [place]/Independent    Putting on and taking off regular lower body clothing? : 1  Bathing (including washing, rinsing, drying)?: 2  Toileting, which includes using toilet, bedpan, or urinal? : 1  Putting on and taking off regular upper body clothing?: 2  Taking care of personal grooming such as brushing teeth?: 3  Eating meals?: 3  Total Score: 12     AM-PAC Raw Score CMS G-Code Modifier Level of Impairment Assistance   6 % Total / Unable   7 - 9 CM 80 - 100% Maximal Assist   10 - 14 CL 60 - 80% Moderate Assist   15 - 19 CK 40 - 60% Moderate Assist   20 - 22 CJ 20 - 40% Minimal Assist   23 CI 1-20% SBA / CGA   24 CH 0% Independent/ Mod I     Patient left HOB elevated with all lines intact, call button in reach and Summer notified    ASSESSMENT:  Myra Murphy is a 82 y.o. female with a medical diagnosis of Paroxysmal atrial fibrillation with rapid ventricular response   Patient with poor mood and minimal active participation today. Required maximum encouragement and increased time for all tasks. Patient remains limited by decreased strength/endurance and poor ax tolerance. Will benefit from skilled OT to address functional deficits.    Rehab identified problem list/impairments: Rehab identified problem list/impairments: weakness, impaired endurance, impaired self care skills, impaired functional mobilty, gait instability, impaired balance, impaired cognition, decreased upper extremity function, decreased lower extremity function, edema, decreased safety awareness, impaired cardiopulmonary response to activity    Rehab potential is good.    Activity tolerance: Fair    Discharge recommendations:  Discharge Facility/Level Of Care Needs: nursing facility, skilled     Barriers to discharge: Barriers to Discharge: Decreased caregiver support    Equipment recommendations:  (TBD)     GOALS:   Occupational Therapy Goals        Problem: Occupational Therapy Goal    Goal Priority Disciplines Outcome Interventions   Occupational Therapy Goal     OT, PT/OT Ongoing (interventions implemented as appropriate)    Description:  Goals to be met by: 02/07/2017     Patient will increase functional independence with ADLs by performing:    UE Dressing with Set-up Assistance.  LE Dressing with Moderate Assistance and Assistive Devices as needed.  Grooming while EOB with Modified [place].  Sitting at edge of bed x 20 minutes with Supervision.  Supine to sit with Minimal Assistance.  Upper extremity exercise program x 10 reps per handout, with supervision.                Plan:  Patient to be seen 5 x/week to address the above listed problems via self-care/home management, therapeutic activities, therapeutic exercises  Plan of Care expires:    Plan of Care reviewed with: patient         ASIF Esquivel  01/20/2017   no

## 2018-08-06 NOTE — PLAN OF CARE
Assessment/Plan:    No problem-specific Assessment & Plan notes found for this encounter  There are no diagnoses linked to this encounter  Subjective:      Patient ID: Janes Maza is a [de-identified] y o  female  She has a solitary kidney  Her labs were normal in May of 2018  She has diastolic dysfunction on her echocardiogram and was taking diuretics for that  She was admitted to the hospital with hyponatremia and hypokalemia  The details of the hospitalization are not clear to me  She feels weak and washed out  She has decreased appetite but she is forcing herself to eat  The following portions of the patient's history were reviewed and updated as appropriate:   She  has a past medical history of Hypercholesteremia; Hypertension; IBS (irritable bowel syndrome); Rheumatic fever; and Skin lesion  She   Patient Active Problem List    Diagnosis Date Noted    Screening for genitourinary condition 05/07/2018    Screening for neurological condition 05/07/2018    Peripheral neuropathy 02/13/2018    B12 deficiency 02/13/2018    Sinusitis 02/13/2018    Osteoporosis 02/13/2018    Closed head injury 12/20/2017    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    Acute lower UTI 09/20/2017    Headache, worsening 08/14/2017    Chronic diastolic CHF (congestive heart failure) ([facility] [address] ) 05/15/2017    Low serum vitamin B12 02/13/2017    Peripheral edema 02/13/2017    Atypical chest pain 08/01/2016    Biceps tendinitis of right shoulder 74/30/4166    Diastolic dysfunction 23/22/7384    Lateral epicondylitis of right elbow 04/11/2016    Essential hypertension 03/14/2016    Dyspnea on exertion 03/14/2016    GERD without esophagitis 03/14/2016     She  has a past surgical history that includes Hysterectomy; Kidney surgery; Tonsillectomy; and Ankle fracture surgery  Her family history includes Asthma in her father; Cancer in her sister; Hypertension in her mother    She  reports that she has Remains incontinent of urine . Bilateral inner buttock excoriations improving,barrier cream applied post each incontinence .Bilateral leg dressings dry and intact. Repositioned .   never smoked  She has never used smokeless tobacco  She reports that she does not drink alcohol or use drugs  Current Outpatient Prescriptions   Medication Sig Dispense Refill    citalopram (CeleXA) 20 mg tablet Take 20 mg by mouth daily       Current Facility-Administered Medications   Medication Dose Route Frequency Provider Last Rate Last Dose    cyanocobalamin injection 1,000 mcg  1,000 mcg Intramuscular Q30 Days Alexandru Garcia MD   1,000 mcg at 02/19/18 1008     Current Outpatient Prescriptions on File Prior to Visit   Medication Sig    [DISCONTINUED] lansoprazole (PREVACID SOLUTAB) 15 mg disintegrating tablet Take 15 mg by mouth daily    [DISCONTINUED] cephalexin (KEFLEX) 500 mg capsule take 1 capsule by mouth three times a day for 7 days    [DISCONTINUED] cyanocobalamin 1,000 mcg/mL Inject 1 mL (1,000 mcg total) under the skin once a week    [DISCONTINUED] fluticasone (FLONASE) 50 mcg/act nasal spray 2 sprays into each nostril daily    [DISCONTINUED] furosemide (LASIX) 20 mg tablet Take 2 tablets (40 mg total) by mouth daily    [DISCONTINUED] hydrochlorothiazide (HYDRODIURIL) 25 mg tablet Take 1 tablet by mouth daily    [DISCONTINUED] Methylprednisolone 4 MG TBPK Take as directed on pack    [DISCONTINUED] nitrofurantoin (MACROBID) 100 mg capsule take 1 capsule by mouth twice a day for 10 days    [DISCONTINUED] potassium chloride (K-DUR,KLOR-CON) 10 mEq tablet Take by mouth    [DISCONTINUED] traMADol (ULTRAM) 50 mg tablet Take 1 tablet (50 mg total) by mouth every 6 (six) hours as needed for moderate pain     Current Facility-Administered Medications on File Prior to Visit   Medication    cyanocobalamin injection 1,000 mcg     She is allergic to amoxicillin; ciprofloxacin; erythromycin; and statins       Review of Systems   Constitutional: Positive for fatigue and unexpected weight change  Psychiatric/Behavioral: Positive for confusion  All other systems reviewed and are negative  Objective:      /62 (BP Location: Right arm, Patient Position: Sitting, Cuff Size: Large)   Pulse 74   Temp 98 8 °F (37 1 °C)   Resp 15   Ht 4' 11" (1 499 m)   Wt 76 3 kg (168 lb 3 2 oz)   SpO2 98%   BMI 33 97 kg/m²          Physical Exam   Constitutional: She is oriented to person, place, and time  She appears well-developed and well-nourished  Neck: Normal range of motion  Neck supple  Cardiovascular: Normal rate, regular rhythm, normal heart sounds and intact distal pulses  Pulmonary/Chest: Effort normal and breath sounds normal    Abdominal: Soft  Bowel sounds are normal    Musculoskeletal: Normal range of motion  Neurological: She is alert and oriented to person, place, and time  She has normal reflexes  Skin: Skin is warm and dry  Psychiatric: She has a normal mood and affect  Her behavior is normal  Judgment and thought content normal    Nursing note and vitals reviewed

## 2020-06-11 NOTE — ASSESSMENT & PLAN NOTE
"[facility]  Cardiology  Consult Note    Patient Name: Iza Esquivel  MRN: [id number]  Admission Date: 6/11/2020  Hospital Length of Stay: 0 days  Code Status: Full Code   Attending Provider: Erich Gleason MD   Consulting Provider: Erica Dunne PA-C  Primary Care Physician: Park Gomez MD  Principal Problem:Chest pain    Patient information was obtained from patient, past medical records and ER records.     Inpatient consult to Cardiology  Consult performed by: Erica Dunne PA-C  Consult ordered by: Ioana Branch MD        Subjective:     Chief Complaint: Chest pain    HPI:   Ms. Esquivel is a 61 year old female patient whose current medical conditions include HTN, hyperlipidemia, carotid bruit, severe PAD, and CAD s/p recent STEMI on 5/25/20 with successful PCI of LCX who was sent to [facility] ED from cardiology clinic due to chest pain. Patient complained of intermittent bouts of substernal chest tightness that became more intense yesterday morning and persisted throughout the day. Worse with lying down and associated with SOB. Patient denied any radiation of pain or any associated nausea, vomiting, diaphoresis, palpitations, near syncope, or syncope. Reported some mild relief with nitropaste given in ED. Initial workup unremarkable with exception of elevated BP and patient was subsequently admitted for further evaluation and treatment. Cardiology consulted to assist with management. Patient seen and examined today, sitting up in bed. Still has some residual discomfort, improved since arrival to ED. Feels pain is somewhat dissimilar to what she experienced prior to MI. She reports compliance with her medications. Still smoking but claims she has "cut back". Chart reviewed. Troponin x 1 negative. EKG from clinic showed no acute ischemic changes. Echo report pending.     Discussed with Dr. Jarvis. In light of patient's history and continued symptoms/possible component of " Appreciate cards recs. Add amio gtt to aid with rate control of AF. Monitor lytes. Cont telemetry. On Coumadin as an outpatient.   angina decubitis will proceed with [facility] today for further evaluation. Further rec's to follow.       Past Medical History:   Diagnosis Date    Atherosclerotic PVD with intermittent claudication 5/25/2020    Cervical cancer     Hyperlipidemia     Right carotid bruit     S/P PTCA (percutaneous transluminal coronary angioplasty) 05/25/2020    STEMI: stenting of LCx       Past Surgical History:   Procedure Laterality Date    ABDOMINAL AORTOGRAPHY N/A 5/25/2020    Procedure: Aortogram, Abdominal;  Surgeon: Shelly Jarvis MD;  Location: [facility] CATH LAB;  Service: Cardiology;  Laterality: N/A;    LEFT HEART CATHETERIZATION Left 5/25/2020    Procedure: CATHETERIZATION, HEART, LEFT;  Surgeon: Shelly Jarvis MD;  Location: [facility] CATH LAB;  Service: Cardiology;  Laterality: Left;    LYMPH NODE BIOPSY      PERCUTANEOUS TRANSLUMINAL BALLOON ANGIOPLASTY OF CORONARY ARTERY  5/25/2020    Procedure: Angioplasty-coronary;  Surgeon: Shelly Jarvis MD;  Location: [facility] CATH LAB;  Service: Cardiology;;       Review of patient's allergies indicates:  No Known Allergies    No current facility-administered medications on file prior to encounter.      Current Outpatient Medications on File Prior to Encounter   Medication Sig    aspirin (ECOTRIN) 81 MG EC tablet Take 1 tablet (81 mg total) by mouth once daily.    atorvastatin (LIPITOR) 80 MG tablet Take 1 tablet (80 mg total) by mouth once daily.    losartan (COZAAR) 25 MG tablet Take 1 tablet (25 mg total) by mouth once daily.    metoprolol tartrate (LOPRESSOR) 25 MG tablet Take 1 tablet (25 mg total) by mouth 2 (two) times daily.    ticagrelor (BRILINTA) 90 mg tablet Take 1 tablet (90 mg total) by mouth 2 (two) times daily.     Family History     Problem Relation (Age of Onset)    Cancer Mother, Father    Diabetes Brother    Hypertension Brother    Transient ischemic attack Father        Tobacco Use    Smoking status: Current Every Day Smoker     Packs/day: 1.50     Years:  40.00     Pack years: 60.00    Smokeless tobacco: Never Used    Tobacco comment: now down to 10 cigarettes daily   Substance and Sexual Activity    Alcohol use: No     Frequency: Never     Binge frequency: Never    Drug use: Not on file    Sexual activity: Never     Review of Systems   Constitution: Negative.   HENT: Negative.    Eyes: Negative.    Cardiovascular: Positive for chest pain and claudication.   Respiratory: Positive for shortness of breath.    Endocrine: Negative.    Hematologic/Lymphatic: Negative.    Skin: Negative.    Musculoskeletal: Negative.    Gastrointestinal: Negative.    Genitourinary: Negative.    Neurological: Negative.    Psychiatric/Behavioral: Negative.    Allergic/Immunologic: Negative.      Objective:     Vital Signs (Most Recent):  Temp: 96.2 °F (35.7 °C) (06/11/20 1615)  Pulse: (!) 52 (06/11/20 1615)  Resp: 18 (06/11/20 1615)  BP: (!) 158/84 (06/11/20 1615)  SpO2: 99 % (06/11/20 1615) Vital Signs (24h Range):  Temp:  [96.2 °F (35.7 °C)-98.3 °F (36.8 °C)] 96.2 °F (35.7 °C)  Pulse:  [44-63] 52  Resp:  [13-20] 18  SpO2:  [96 %-100 %] 99 %  BP: (144-190)/(64-98) 158/84     Weight: 70.4 kg (155 lb 3.3 oz)  Body mass index is 26.64 kg/m².    SpO2: 99 %  O2 Device (Oxygen Therapy): room air    No intake or output data in the 24 hours ending 06/11/20 1632    Lines/Drains/Airways     Peripheral Intravenous Line                 Peripheral IV - Single Lumen 06/11/20 1150 20 G Right Antecubital less than 1 day                Physical Exam   Constitutional: She is oriented to person, place, and time. She appears well-developed and well-nourished. No distress.   HENT:   Head: Normocephalic and atraumatic.   Eyes: Pupils are equal, round, and reactive to light. Right eye exhibits no discharge. Left eye exhibits no discharge.   Neck: Neck supple. No JVD present.   Cardiovascular: Regular rhythm, S1 normal, S2 normal and normal heart sounds. Bradycardia present.   No murmur  heard.  Pulmonary/Chest: Effort normal and breath sounds normal. No respiratory distress. She has no wheezes. She has no rales.   Abdominal: Soft. She exhibits no distension.   Musculoskeletal: She exhibits no edema.   Neurological: She is alert and oriented to person, place, and time.   Skin: Skin is warm and dry. She is not diaphoretic. No erythema.   Psychiatric: She has a normal mood and affect. Her behavior is normal. Thought content normal.   Nursing note and vitals reviewed.      Significant Labs:   CMP   Recent Labs   Lab 06/11/20  1149      K 3.7      CO2 26   *   BUN 7*   CREATININE 1.0   CALCIUM 9.5   PROT 7.5   ALBUMIN 3.5   BILITOT 0.3   ALKPHOS 149*   AST 14   ALT 10   ANIONGAP 12   ESTGFRAFRICA >60   EGFRNONAA >60   , CBC   Recent Labs   Lab 06/11/20  1149   WBC 8.47   HGB 11.2*   HCT 35.5*   *   , Troponin   Recent Labs   Lab 06/11/20  1149   TROPONINI 0.016    and All pertinent lab results from the last 24 hours have been reviewed.    Significant Imaging: Echocardiogram:   2D echo with color flow doppler:   Results for orders placed or performed during the hospital encounter of 02/09/19   2D echo with color flow doppler   Result Value Ref Range    QEF 60 55 - 65    Diastolic Dysfunction Yes (A)     Est. PA Systolic Pressure 16.16     Narrative    Date of Procedure: 02/10/2019        TEST DESCRIPTION       Aorta: The aortic root is normal in size, measuring 2.9 cm at sinotubular junction and 2.9 cm at Sinuses of Valsalva. The proximal ascending aorta is normal in size, measuring 2.9 cm across.     Left Atrium: The left atrial volume index is normal, measuring 22.55 cc/m2.     Left Ventricle: The left ventricle is normal in size, with an end-diastolic diameter of 4.3 cm, and an end-systolic diameter of 2.8 cm. LV wall thickness is normal, with the septum measuring 1.0 cm and the posterior wall measuring 0.7 cm across. Relative   wall thickness was normal at 0.33, and the LV  mass index was 75.1 g/m2 consistent with normal left ventricular mass. There are no regional wall motion abnormalities. Left ventricular systolic function appears normal. Visually estimated ejection fraction   is 60-65%. The LV Doppler derived stroke volume equals 61.0 ccs.     Diastolic indices: E wave velocity 0.8 m/s, E/A ratio 0.9,  msec., E/e' ratio(avg) 7. There is diastolic dysfunction secondary to relaxation abnormality.     Right Atrium: The right atrium is normal in size, measuring 4.5 cm in length and 3.4 cm in width in the apical view.     Right Ventricle: The right ventricle is normal in size. Global right ventricular systolic function appears normal. Tricuspid annular plane systolic excursion (TAPSE) is 1.7 cm. The estimated PA systolic pressure is greater than 16 mmHg.     Aortic Valve:  Aortic valve is normal in structure with normal leaflet mobility. The mean gradient obtained across the aortic valve is 3 mmHg.     Mitral Valve:  Mitral valve is normal in structure with normal leaflet mobility. The pressure half time is 84 msec. The calculated mitral valve area is 2.62 cm2.     Tricuspid Valve:  Tricuspid valve is normal in structure with normal leaflet mobility.     Pulmonary Valve:  Pulmonary valve is normal in structure with normal leaflet mobility.     Intracavitary: There is no evidence of pericardial effusion, intracavity mass, thrombi, or vegetation.         CONCLUSIONS     1 - No wall motion abnormalities.     2 - Normal left ventricular systolic function (EF 60-65%).     3 - Impaired LV relaxation, normal LAP (grade 1 diastolic dysfunction).     4 - Normal right ventricular systolic function .     5 - The estimated PA systolic pressure is greater than 16 mmHg.             This document has been electronically    SIGNED BY: Damian Estrada MD On: 02/10/2019 12:10   , EKG: Reviewed and X-Ray: CXR: X-Ray Chest 1 View (CXR): No results found for this visit on 06/11/20. and X-Ray Chest PA  and Lateral (CXR):   Results for orders placed or performed during the hospital encounter of 06/11/20   X-Ray Chest PA And Lateral    Narrative    EXAMINATION:  XR CHEST PA AND LATERAL    CLINICAL HISTORY:  Chest Pain;    COMPARISON:  06/01/2020    FINDINGS:  Cardiac silhouette is normal. Aorta demonstrates atherosclerotic disease. The lungs demonstrate no evidence of active disease.  Emphysematous changes suspected within the upper lobes.  No evidence of pleural effusion or pneumothorax.  Bones appear intact.      Impression    No acute process seen.      Electronically signed by: Calvin Vasquez MD  Date:    06/11/2020  Time:    11:00     Assessment and Plan:   Patient with recent STEMI who presents with chest pain with typical and atypical features. Conitnue OMT. Repeat LHC today, further rec's to follow.    * Chest pain  -Patient with recent STEMI who presents with chest pain with typical and atypical features, concerned she may have element of angina decubitis  -In light of continued chest pain, will proceed with C for further evaluation and PCI if indicated. All risks, benefits, and treatment alternatives explained to patient in detail. All questions answered. She has agreed to proceed.   -Continue ASA, Brilinta, ARB, statin, nitrates  -Continue BB as pulse permits  -Trend troponin  -Further rec's to follow    Coronary artery disease  -See plan under chest pain    Atherosclerotic PVD with intermittent claudication  -Continue ASA, statin  -Will need peripheral angiogram in future    Bruit of right carotid artery  -Continue ASA, statin    Mixed hyperlipidemia  -Continue statin    Tobacco abuse  -Smoking cessation counseling provided        VTE Risk Mitigation (From admission, onward)         Ordered     IP VTE HIGH RISK PATIENT  Once      06/11/20 1535     Place sequential compression device  Until discontinued      06/11/20 1535                Thank you for your consult. I will follow-up with patient. Please  contact us if you have any additional questions.    Erica Dunne PA-C  Cardiology   [facility]

## 2022-03-31 NOTE — ASSESSMENT & PLAN NOTE
Acute exacerbation today with BNP up to 3500 and increased symptoms. Will hold hydralazine 25 mg q8h, isosorbide mononitrate 30 mg daily because of low blood pressure. Will give IV furosemide 60 mg this afternoon. Continue Toprol- mg daily.   
Acute exacerbation/ Non-ischemic cardiomyopathy/Cardiorenal syndrome/Severe mitral regurg    Continue to hold hydralazine 25 mg q8h, isosorbide mononitrate 30 mg daily because of low blood pressure. Will give IV furosemide 80 mg this AM. Continue Toprol- mg daily. Consult Cardiology for assistance  
Acute exacerbation/ Non-ischemic cardiomyopathy/Cardiorenal syndrome/Severe mitral regurgitation  Continue to hold hydralazine 25 mg q8h, isosorbide mononitrate 30 mg daily because of low blood pressure. Continue Toprol- mg daily. [place]. Appreciate Cardiology.  
Acute exacerbation/ Non-ischemic cardiomyopathy/Cardiorenal syndrome/Severe mitral regurgitation  Continue to hold hydralazine 25 mg q8h, isosorbide mononitrate 30 mg daily because of low blood pressure. Continue Toprol- mg daily. Diuresing with diuril and furosemdie. Appreciate Cardiology.  
Acute exacerbation/ Non-ischemic cardiomyopathy/Cardiorenal syndrome/Severe mitral regurgitation  Continue to hold hydralazine 25 mg q8h, isosorbide mononitrate 30 mg daily. Continue Toprol- mg daily. Diuresing well with IV lasix BID, and nephrology decreased the dose for today. Will try oral furosemide tomorrow. Appreciate Cardiology.  
Acute exacerbation/ Non-ischemic cardiomyopathy/Cardiorenal syndrome/Severe mitral regurgitation  Continue to hold hydralazine 25 mg q8h, isosorbide mononitrate 30 mg daily. Continue Toprol- mg daily. Diuresing well with IV lasix BID. Appreciate Cardiology.  
CPAP broken. Follow up with Dr. Delatorre.    
CPAP broken. Follow up with Dr. Delatorre.    
Cardiorenal syndrome    Stable on admission, but up to 2.6 this AM. Gave a small amount of saline this AM with no change in creatinine or sodium. Now likely related to volume overload. Will give IV lasix now. Monitor UOP and renal function.   
Cardiorenal syndrome/JOSE MIGUEL    Up to 2.8 this AM. Consult Cardiology and Nephology. Will give more lasix. Monitor UOP and renal function.   
Cardiorenal syndrome/JOSE MIGUEL  Monitor while diuresing. Appreciate Nephrology.  
Cardiorenal syndrome/JOSE MIGUEL  Monitor while diuresing. Appreciate Nephrology. Creatinine is slowly coming down and is 2 today. Will put back on oral lasix tomorrow.   
Cardiorenal syndrome/JOSE MIGUEL  Monitor while diuresing. Appreciate Nephrology. Creatinine is slowly coming down and is 2.2 today.  
Cardiorenal syndrome/JOSE MIGUEL  Monitor while diuresing. Appreciate Nephrology. Creatinine is slowly coming down and is 2.5 today.  
Continue hydralazine 25 mg q8h, isosorbide mononitrate 30 mg daily,furosemide 80 mg twice daily. Takes Toprol-XL 50 mg daily and increased to 100 mg for rate control.   
Continue hydralazine 25 mg q8h, isosorbide mononitrate 30 mg daily,furosemide 80 mg twice daily. Takes Toprol-XL 50 mg daily and will increase to 100 mg.    
Continue pravastatin 40 mg daily.     
Continue pravastatin 40 mg daily.     
Does not want to return to [facility], so SW working on new SNF placement.     
Due to adriamycin toxicity, followed by Cardiology for many years.    
Due to rapid a fib. Troponin negative for ischemia.    
Increase Toprol-XL 50 mg to 100 mg.    
Increased Toprol-XL 50 mg to 100 mg. Restart digoxin, renally dosed at 0.125 mg every other day. HR is better controlled. INR is 1.9. Continue warfarin.     
Increased Toprol-XL 50 mg to 100 mg. Stop digoxin given renal dysfunction. HR is better controlled. INR is 2.5. Continue warfarin.     
Increased Toprol-XL 50 mg to 100 mg. Stop digoxin given renal dysfunction. HR is better controlled. INR is 2.9. Hold warfarin tonight.     
Legs wrapped. Continue wound care at SNF as ordered from main campus.    
Legs wrapped. Continue wound care at SNF as ordered from main campus.    
Legs wrapped. Continue wound care at [facility].    
Legs wrapped. Continue wound care.    
May be due to rapid a fib. Trend troponin. May be due to GERD, which she has. Takes omeprazole 20 mg daily, and will give pantoprazole 40 mg daily.    
Patient denies. 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    
Stable on admission, but up slightly this AM. Hold PM lasix today and reassess tomorrow. Baseline creatinine is 1.8 to 2.     
Stable on admission.    
Stable on admission. Was transfused pRBCs at [facility].    
Toprol-XL dose increased to 100 mg daily with improvement in rate control.     
Toprol-XL dose increased to 100 mg daily with improvement in rate control. Continue warfarin. INR is 1.9 today.     
Toprol-XL dose increased to 100 mg daily with improvement in rate control. Continue warfarin. INR is 2.2 today.     
Toprol-XL dose increased to 100 mg daily with improvement in rate control. Continue warfarin. INR is 2.5 today.     
UCx with E.coli.  Completed 3 days of ciprofloxacin.     
UCx with GNR. Started on ciprofloxacin.     
Will go to a different SNF on discharge.    
Will go to a different SNF on discharge.    
L&D

## 2025-05-10 NOTE — PLAN OF CARE
Problem: Occupational Therapy Goal  Goal: Occupational Therapy Goal  Goals to be met by: 02/07/2017     Patient will increase functional independence with ADLs by performing:    UE Dressing with Set-up Assistance.  LE Dressing with Moderate Assistance and Assistive Devices as needed.  Grooming while EOB with Modified [place].  Sitting at edge of bed x 20 minutes with Supervision.  Supine to sit with Minimal Assistance.  Upper extremity exercise program x 10 reps per handout, with supervision.   Outcome: Ongoing (interventions implemented as appropriate)  Pt. anxious and complained of constipation. Nurse aware and nursing addressing pt needs.  Pt. agreeable to EOB sitting; bed mobility max assist supine>sit with assist for LB and UB; sensitivity in legs to touch.  Sat 20 min EOB with SBA.  Pt fed self with Setup assist but ate very little; performed oral care with SBA to brush teeth.  Pt. Returned to supine with mod assist for legs only to lift into bed as pt. lowered self onto L side with BUE on bed rail.  Once in bed, pt dep x 2 for cleanup after BM in diaper; rolled to R/L side in bed with max assist with bed rail use. Continue with OT POC.       Report given to 9 stone nurse. Patient left [id number] with transport, VSS, all belongings with patient.